# Patient Record
Sex: FEMALE | Race: WHITE | Employment: FULL TIME | URBAN - NONMETROPOLITAN AREA
[De-identification: names, ages, dates, MRNs, and addresses within clinical notes are randomized per-mention and may not be internally consistent; named-entity substitution may affect disease eponyms.]

---

## 2017-01-04 ENCOUNTER — OFFICE VISIT (OUTPATIENT)
Dept: FAMILY MEDICINE | Facility: OTHER | Age: 42
End: 2017-01-04
Payer: COMMERCIAL

## 2017-01-04 VITALS
OXYGEN SATURATION: 100 % | HEIGHT: 67 IN | SYSTOLIC BLOOD PRESSURE: 126 MMHG | WEIGHT: 147.7 LBS | RESPIRATION RATE: 20 BRPM | DIASTOLIC BLOOD PRESSURE: 60 MMHG | TEMPERATURE: 96.9 F | BODY MASS INDEX: 23.18 KG/M2 | HEART RATE: 90 BPM

## 2017-01-04 VITALS
HEIGHT: 67 IN | DIASTOLIC BLOOD PRESSURE: 60 MMHG | WEIGHT: 147.7 LBS | BODY MASS INDEX: 23.18 KG/M2 | RESPIRATION RATE: 20 BRPM | SYSTOLIC BLOOD PRESSURE: 126 MMHG | OXYGEN SATURATION: 100 % | TEMPERATURE: 96.9 F | HEART RATE: 90 BPM

## 2017-01-04 DIAGNOSIS — F33.2 MAJOR DEPRESSIVE DISORDER, RECURRENT, SEVERE WITHOUT PSYCHOTIC FEATURES (H): ICD-10-CM

## 2017-01-04 DIAGNOSIS — E78.5 HYPERLIPIDEMIA LDL GOAL <160: ICD-10-CM

## 2017-01-04 DIAGNOSIS — K21.9 GASTROESOPHAGEAL REFLUX DISEASE, ESOPHAGITIS PRESENCE NOT SPECIFIED: Primary | ICD-10-CM

## 2017-01-04 DIAGNOSIS — Z00.00 ROUTINE GENERAL MEDICAL EXAMINATION AT A HEALTH CARE FACILITY: Primary | ICD-10-CM

## 2017-01-04 LAB
CHOLEST SERPL-MCNC: 201 MG/DL
HDLC SERPL-MCNC: 32 MG/DL
LDLC SERPL CALC-MCNC: 120 MG/DL
NONHDLC SERPL-MCNC: 169 MG/DL
TRIGL SERPL-MCNC: 244 MG/DL

## 2017-01-04 PROCEDURE — 80061 LIPID PANEL: CPT | Mod: 90 | Performed by: NURSE PRACTITIONER

## 2017-01-04 PROCEDURE — 99395 PREV VISIT EST AGE 18-39: CPT | Performed by: NURSE PRACTITIONER

## 2017-01-04 PROCEDURE — 36415 COLL VENOUS BLD VENIPUNCTURE: CPT | Performed by: NURSE PRACTITIONER

## 2017-01-04 PROCEDURE — 99000 SPECIMEN HANDLING OFFICE-LAB: CPT | Performed by: NURSE PRACTITIONER

## 2017-01-04 PROCEDURE — 99213 OFFICE O/P EST LOW 20 MIN: CPT | Performed by: NURSE PRACTITIONER

## 2017-01-04 RX ORDER — VENLAFAXINE HYDROCHLORIDE 225 MG/1
225 TABLET, EXTENDED RELEASE ORAL DAILY
Qty: 30 TABLET | Refills: 6 | Status: SHIPPED | OUTPATIENT
Start: 2017-01-04 | End: 2017-05-19 | Stop reason: DRUGHIGH

## 2017-01-04 NOTE — PROGRESS NOTES
SUBJECTIVE:     CC: Aide Burroughs is an 41 year old woman who presents for preventive health visit.     Healthy Habits:    Do you get at least three servings of calcium containing foods daily (dairy, green leafy vegetables, etc.)? No    Amount of exercise or daily activities, outside of work: 2-3 day(s) per week    Problems taking medications regularly No    Medication side effects: No    Have you had an eye exam in the past two years? no    Do you see a dentist twice per year? yes  Do you have sleep apnea, excessive snoring or daytime drowsiness?no      Today's PHQ-2 Score:   PHQ-2 (  [employer]) 2017   Q1: Little interest or pleasure in doing things 0 0   Q2: Feeling down, depressed or hopeless 0 0   PHQ-2 Score 0 0       Abuse: Current or Past(Physical, Sexual or Emotional)- No  Do you feel safe in your environment - Yes    Social History   Substance Use Topics     Smoking status: Never Smoker      Smokeless tobacco: Never Used     Alcohol Use: 0.0 oz/week     0 Standard drinks or equivalent per week      Comment: rare     The patient does not drink >3 drinks per day nor >7 drinks per week.    Recent Labs   Lab Test  14   0814 11   CHOL  247*  219*   HDL  49*  39   LDL  182*  148   TRIG  81  161   CHOLHDLRATIO  5.0  5.6       Reviewed orders with patient.  Reviewed health maintenance and updated orders accordingly - Yes    Mammo Decision Support:  Mammogram not appropriate for this patient based on age.    Pertinent mammograms are reviewed under the imaging tab.  History of abnormal Pap smear: NO - age 30-65 PAP every 5 years with negative HPV co-testing recommended  All Histories reviewed and updated in Epic.  Past Medical History   Diagnosis Date     MDD (major depressive disorder), recurrent, severe, with psychosis (H)      Generalized anxiety disorder       Past Surgical History   Procedure Laterality Date     Myringotomy, insert tube, combined Left      Obstetric History        T0      TAB0   SAB0   E0   M0   L2       # Outcome Date GA Lbr Fidencio/2nd Weight Sex Delivery Anes PTL Lv   2             1                    ROS:  C: NEGATIVE for fever, chills, change in weight  I: NEGATIVE for worrisome rashes, moles or lesions  E: NEGATIVE for vision changes or irritation  ENT: NEGATIVE for ear, mouth and throat problems  R: NEGATIVE for significant cough or SOB  B: NEGATIVE for masses, tenderness or discharge  CV: NEGATIVE for chest pain, palpitations or peripheral edema  GI: NEGATIVE for nausea, abdominal pain, heartburn, or change in bowel habits  : NEGATIVE for unusual urinary or vaginal symptoms. Periods are regular.  M: NEGATIVE for significant arthralgias or myalgia  N: NEGATIVE for weakness, dizziness or paresthesias  P: NEGATIVE for changes in mood or affect    Problem list, Medication list, Allergies, and Medical/Social/Surgical histories reviewed in [facility] and updated as appropriate.  BP Readings from Last 3 Encounters:   17 126/60   17 126/60   16 98/64    Wt Readings from Last 3 Encounters:   17 147 lb 11.2 oz (66.996 kg)   17 147 lb 11.2 oz (66.996 kg)   16 152 lb (68.947 kg)                  Patient Active Problem List   Diagnosis     Seasonal allergic rhinitis     Mild major depression (H)     Generalized anxiety disorder     Transient global amnesia     Fracture of great toe     Hyperlipidemia LDL goal <160     Major depressive disorder, recurrent episode, severe (H)     Depressive episode     Severe major depression with psychotic features (H)     Panic disorder with agoraphobia     PTSD (post-traumatic stress disorder)     Suicidal ideation     Past Surgical History   Procedure Laterality Date     Myringotomy, insert tube, combined Left        Social History   Substance Use Topics     Smoking status: Never Smoker      Smokeless tobacco: Never Used     Alcohol Use: 0.0 oz/week     0 Standard drinks or equivalent per week  "     Comment: rare     Family History   Problem Relation Age of Onset     Hypertension Mother      Cancer - colorectal Father      Substance Abuse Father      CEREBROVASCULAR DISEASE Maternal Grandmother      DIABETES Paternal Grandmother      HEART DISEASE Paternal Grandfather      Bipolar Disorder Maternal Aunt      Schizophrenia Maternal Aunt      Hyperlipidemia Mother      Coronary Artery Disease Maternal Grandfather          Current Outpatient Prescriptions   Medication Sig Dispense Refill     venlafaxine (EFFEXOR-ER) 225 MG TB24 24 hr tablet Take 1 tablet (225 mg) by mouth daily 30 tablet 6     omeprazole (PRILOSEC) 20 MG CR capsule Take 1 capsule (20 mg) by mouth daily 30 capsule 1     [DISCONTINUED] venlafaxine (EFFEXOR-ER) 225 MG TB24 Take 1 tablet (225 mg) by mouth daily 30 tablet 6     Allergies   Allergen Reactions     Erythromycin Nausea and Vomiting     Seasonal Allergies      Molds, grass, multiple trees, plant pollen, cats, rabbits, dogs, hay.     OBJECTIVE:     /60 mmHg  Pulse 90  Temp(Src) 96.9  F (36.1  C) (Tympanic)  Resp 20  Ht 5' 6.7\" (1.694 m)  Wt 147 lb 11.2 oz (66.996 kg)  BMI 23.35 kg/m2  SpO2 100%  LMP 01/03/2017  Breastfeeding? No  EXAM:  GENERAL: healthy, alert and no distress  EYES: Eyes grossly normal to inspection, PERRL and conjunctivae and sclerae normal  HENT: ear canals and TM's normal, nose and mouth without ulcers or lesions  NECK: no adenopathy, no asymmetry, masses, or scars and thyroid normal to palpation  RESP: lungs clear to auscultation - no rales, rhonchi or wheezes  BREAST: normal without masses, tenderness or nipple discharge and no palpable axillary masses or adenopathy  CV: regular rate and rhythm, normal S1 S2, no S3 or S4, no murmur, click or rub, no peripheral edema and peripheral pulses strong  ABDOMEN: soft, nontender, no hepatosplenomegaly, no masses and bowel sounds normal  MS: no gross musculoskeletal defects noted, no edema  SKIN: no suspicious " "lesions or rashes  NEURO: Normal strength and tone, mentation intact and speech normal  PSYCH: mentation appears normal, affect normal/bright    ASSESSMENT/PLAN:     1. Routine general medical examination at a health care facility    2. Major depressive disorder, recurrent, severe without psychotic features (H)  Chronic, controlled.  No change in treatment plan.   - venlafaxine (EFFEXOR-ER) 225 MG TB24 24 hr tablet; Take 1 tablet (225 mg) by mouth daily  Dispense: 30 tablet; Refill: 6    3. Hyperlipidemia LDL goal <160  - LIPID REFLEX TO DIRECT LDL PANEL    COUNSELING:   Reviewed preventive health counseling, as reflected in patient instructions       Regular exercise       Healthy diet/nutrition    BP Screening:   Last 3 BP Readings:    BP Readings from Last 3 Encounters:   01/04/17 126/60   01/04/17 126/60   09/30/16 98/64       The following was recommended to the patient:  Re-screen BP within a year and recommended lifestyle modifications       reports that she has never smoked. She has never used smokeless tobacco.    Estimated body mass index is 23.35 kg/(m^2) as calculated from the following:    Height as of this encounter: 5' 6.7\" (1.694 m).    Weight as of this encounter: 147 lb 11.2 oz (66.996 kg).       Counseling Resources:  ATP IV Guidelines  Pooled Cohorts Equation Calculator  Breast Cancer Risk Calculator  FRAX Risk Assessment  ICSI Preventive Guidelines  Dietary Guidelines for Americans, 2010  USDA's MyPlate  ASA Prophylaxis  Lung CA Screening    KYLEE Narvaez [facility]  "

## 2017-01-04 NOTE — PATIENT INSTRUCTIONS
Try Omeprazole once a day on an empty stomach.  Take for 4-6 weeks, then wean off if symptoms are gone.      If your symptoms come back, let me know      GERD (Adult)    The esophagus is a tube that carries food from the mouth to the stomach. A valve at the lower end of the esophagus prevents stomach acid from flowing upward. When this valve doesn't work properly, stomach contents may repeatedly flow back up (reflux) into the esophagus. This is called gastroesophageal reflux disease (GERD). GERD can irritate the esophagus. It can cause problems with swallowing or breathing. In severe cases, GERD can cause recurrent pneumonia or other serious problems.  Symptoms of reflux include burning, pressure or sharp pain in the upper abdomen or mid to lower chest. The pain can spread to the neck, back, or shoulder. There may be belching, an acid taste in the back of the throat, chronic cough, or sore throat or hoarseness. GERD symptoms often occur during the day after a big meal. They can also occur at night when lying down.   Home care  Lifestyle changes can help reduce symptoms. If needed, medicines may be prescribed. Symptoms often improve with treatment, but if treatment is stopped, the symptoms often return after a few months. So most persons with GERD will need to continue treatment.  Lifestyle changes    Limit or avoid fatty, fried, and spicy foods, as well as coffee, chocolate, mint, and foods with high acid content such as tomatoes and citrus fruit and juices (orange, grapefruit, lemon).    Don t eat large meals, especially at night. Frequent, smaller meals are best. Do not lie down right after eating. And don t eat anything 3 hours before going to bed.    Avoid drinking alcohol and smoking. As much as possible, stay away from second hand smoke.    If you are overweight, losing weight will reduce symptoms.     Avoid wearing tight clothing around your stomach area.    If your symptoms occur during sleep, use a foam  "wedge to elevate your upper body (not just your head.) Or, place 4\" blocks under the head of your bed.  Medicines  If needed, medicines can help relieve the symptoms of GERD and prevent damage to the esophagus. Discuss a medicine plan with your healthcare provider. This may include one or more of the following medicines:    Antacids to help neutralize the normal acids in your stomach.    Acid blockers (H2 blockers) to decrease acid production.    Acid inhibitors (PPIs) to decrease acid production in a different way than the blockers. They may work better, but can take a little longer to take effect.  Take an antacid 30-60 minutes after eating and at bedtime, but not at the same time as an acid blocker.  Try not to take medicines such as ibuprofen and aspirin. If you are taking aspirin for your heart or other medical reasons, talk to your healthcare provider about stopping it.  Follow-up care  Follow up with your healthcare provider or as advised by our staff.  When to seek medical advice  Call your healthcare provider if any of the following occur:    Stomach pain gets worse or moves to the lower right abdomen (appendix area)    Chest pain appears or gets worse, or spreads to the back, neck, shoulder, or arm    Frequent vomiting (can t keep down liquids)    Blood in the stool or vomit (red or black in color)    Feeling weak or dizzy    Fever of 100.4 F (38 C) or higher, or as directed by your healthcare provider    1886-3809 [employer]. [address] [place] [address]. All rights reserved. This information is not intended as a substitute for professional medical care. Always follow your healthcare professional's instructions.        "

## 2017-01-04 NOTE — PATIENT INSTRUCTIONS
Labs will be done today.  I will call or send a letter with results.     Consider a calcium supplement.     Preventive Health Recommendations  Female Ages 26 - 39  Yearly exam:   See your health care provider every year in order to    Review health changes.     Discuss preventive care.      Review your medicines if you your doctor has prescribed any.    Until age 30: Get a Pap test every three years (more often if you have had an abnormal result).    After age 30: Talk to your doctor about whether you should have a Pap test every 3 years or have a Pap test with HPV screening every 5 years.   You do not need a Pap test if your uterus was removed (hysterectomy) and you have not had cancer.  You should be tested each year for STDs (sexually transmitted diseases), if you're at risk.   Talk to your provider about how often to have your cholesterol checked.  If you are at risk for diabetes, you should have a diabetes test (fasting glucose).  Shots: Get a flu shot each year. Get a tetanus shot every 10 years.   Nutrition:     Eat at least 5 servings of fruits and vegetables each day.    Eat whole-grain bread, whole-wheat pasta and brown rice instead of white grains and rice.    Talk to your provider about Calcium and Vitamin D.     Lifestyle    Exercise at least 150 minutes a week (30 minutes a day, 5 days of the week). This will help you control your weight and prevent disease.    Limit alcohol to one drink per day.    No smoking.     Wear sunscreen to prevent skin cancer.    See your dentist every six months for an exam and cleaning.

## 2017-01-04 NOTE — NURSING NOTE
"Chief Complaint   Patient presents with     Gastric Problem     burping a lot       Initial /60 mmHg  Pulse 90  Temp(Src) 96.9  F (36.1  C) (Tympanic)  Resp 20  Ht 5' 6.7\" (1.694 m)  Wt 147 lb 11.2 oz (66.996 kg)  BMI 23.35 kg/m2  SpO2 100%  LMP 01/03/2017 Estimated body mass index is 23.35 kg/(m^2) as calculated from the following:    Height as of this encounter: 5' 6.7\" (1.694 m).    Weight as of this encounter: 147 lb 11.2 oz (66.996 kg).  BP completed using cuff size: large  ................Moe Gonzalez LPN,   January 4, 2017,      8:30 AM,   [facility]      "

## 2017-01-04 NOTE — MR AVS SNAPSHOT
After Visit Summary   1/4/2017    Aide Burroughs    MRN: [id number]           Patient Information     Date Of Birth          1975        Visit Information        Provider Department      1/4/2017 8:00 AM Rivka Rodrigues APRN [facility]        Today's Diagnoses     Routine general medical examination at a health care facility    -  1     Major depressive disorder, recurrent, severe without psychotic features (H)         Hyperlipidemia LDL goal <160           Care Instructions    Labs will be done today.  I will call or send a letter with results.     Consider a calcium supplement.     Preventive Health Recommendations  Female Ages 26 - 39  Yearly exam:   See your health care provider every year in order to    Review health changes.     Discuss preventive care.      Review your medicines if you your doctor has prescribed any.    Until age 30: Get a Pap test every three years (more often if you have had an abnormal result).    After age 30: Talk to your doctor about whether you should have a Pap test every 3 years or have a Pap test with HPV screening every 5 years.   You do not need a Pap test if your uterus was removed (hysterectomy) and you have not had cancer.  You should be tested each year for STDs (sexually transmitted diseases), if you're at risk.   Talk to your provider about how often to have your cholesterol checked.  If you are at risk for diabetes, you should have a diabetes test (fasting glucose).  Shots: Get a flu shot each year. Get a tetanus shot every 10 years.   Nutrition:     Eat at least 5 servings of fruits and vegetables each day.    Eat whole-grain bread, whole-wheat pasta and brown rice instead of white grains and rice.    Talk to your provider about Calcium and Vitamin D.     Lifestyle    Exercise at least 150 minutes a week (30 minutes a day, 5 days of the week). This will help you control your weight and prevent disease.    Limit alcohol to one drink per  "day.    No smoking.     Wear sunscreen to prevent skin cancer.    See your dentist every six months for an exam and cleaning.            Follow-ups after your visit        Who to contact     If you have questions or need follow up information about today's clinic visit or your schedule please contact [facility] directly at [phone].  Normal or non-critical lab and imaging results will be communicated to you by MyChart, letter or phone within 4 business days after the clinic has received the results. If you do not hear from us within 7 days, please contact the clinic through [employer] or phone. If you have a critical or abnormal lab result, we will notify you by phone as soon as possible.  Submit refill requests through [employer] or call your pharmacy and they will forward the refill request to us. Please allow 3 business days for your refill to be completed.          Additional Information About Your Visit        MyChart Information     [employer] lets you send messages to your doctor, view your test results, renew your prescriptions, schedule appointments and more. To sign up, go to [email or web address] . Click on \"Log in\" on the left side of the screen, which will take you to the Welcome page. Then click on \"Sign up Now\" on the right side of the page.     You will be asked to enter the access code listed below, as well as some personal information. Please follow the directions to create your username and password.     Your access code is: [email or web address]  Expires: 2017  8:47 AM     Your access code will  in 90 days. If you need help or a new code, please call your [facility] or [phone].        Care EveryWhere ID     This is your Care EveryWhere ID. This could be used by other organizations to access your [facility] medical records  [id number]        Your Vitals Were     Pulse Temperature Respirations    90 96.9  F (36.1  C) (Tympanic) 20    Height BMI (Body Mass Index) Pulse Oximetry " "   5' 6.7\" (1.694 m) 23.35 kg/m2 100%    Last Period Breastfeeding?       01/03/2017 No        Blood Pressure from Last 3 Encounters:   01/04/17 126/60   01/04/17 126/60   09/30/16 98/64    Weight from Last 3 Encounters:   01/04/17 147 lb 11.2 oz (66.996 kg)   01/04/17 147 lb 11.2 oz (66.996 kg)   09/30/16 152 lb (68.947 kg)              We Performed the Following     LIPID REFLEX TO DIRECT LDL PANEL          Today's Medication Changes          These changes are accurate as of: 1/4/17  8:47 AM.  If you have any questions, ask your nurse or doctor.               Start taking these medicines.        Dose/Directions    omeprazole 20 MG CR capsule   Commonly known as:  priLOSEC   Used for:  Gastroesophageal reflux disease, esophagitis presence not specified   Started by:  Rivka Rodrigues APRN CNP        Dose:  20 mg   Take 1 capsule (20 mg) by mouth daily   Quantity:  30 capsule   Refills:  1            Where to get your medicines      These medications were sent to [id number] - [place] - [address]  [address] [place] [address]     Phone:  [phone]    - omeprazole 20 MG CR capsule  - venlafaxine 225 MG Tb24 24 hr tablet             Primary Care Provider Office Phone # Fax #    Priscila KYLEE Bennett [facility] [phone]       [facility]  [address] [place] [address]        Thank you!     Thank you for choosing [facility]  for your care. Our goal is always to provide you with excellent care. Hearing back from our patients is one way we can continue to improve our services. Please take a few minutes to complete the written survey that you may receive in the mail after your visit with us. Thank you!             Your Updated Medication List - Protect others around you: Learn how to safely use, store and throw away your medicines at www.disposemymeds.org.          This list is accurate as of: 1/4/17  8:47 AM.  Always use your most recent med list.             "       Brand Name Dispense Instructions for use    omeprazole 20 MG CR capsule    priLOSEC    30 capsule    Take 1 capsule (20 mg) by mouth daily       venlafaxine 225 MG Tb24 24 hr tablet    EFFEXOR-ER    30 tablet    Take 1 tablet (225 mg) by mouth daily

## 2017-01-04 NOTE — Clinical Note
[facility]  [address] [facility] [address]  Phone: [phone]          January 4, 2017    Aide Burroughs  [address] [address]   [facility] [address]          Dear Aide,      LAB RESULTS:     The results of your recent Tests were ABNORMAL .  If you have any further questions or problems, please contact our office.  Your cholesterol levels came back elevated.  This can put you at increased risk for heart attacks and strokes.  At this time, you do not need medications, but I would like you to watch your diet and increase your exercise.  We will recheck these levels in 1 year.  I have included some information about ways to decrease your cholesterol.                      Electronically signed by Rivka Rodrigues CNP.               How can I control my cholesterol level?      High cholesterol may run in families. Know your family history and discuss it with your healthcare provider.      You can often control cholesterol levels by      eating right      exercising      not smoking      losing weight if you are overweight.      If you have a high risk for heart disease, your healthcare provider may prescribe cholesterol-lowering medicine as well as changes in lifestyle.      Eat right.     To control the cholesterol and types and amounts of fat you eat:      Check food labels for fat and cholesterol content. Choose the foods with less fat per serving.      Limit the amount of butter and margarine you eat.      Use olive, canola, sunflower, safflower, soybean, or corn oil. Avoid tropical oils such as palm or coconut oil. Also avoid oils that have been hydrogenated or partially hydrogenated.      Use salad dressings and margarine made with polyunsaturated and monounsaturated fats.      Use egg whites or egg substitutes rather than whole eggs.      Replace whole-milk dairy products with nonfat or low-fat milk, cheese, spreads, and yogurt.      Eat skinless chicken, turkey, fish, and meatless entrees more often than  red meat.      Choose lean cuts of meat and trim off all visible fat. Keep portion sizes moderate.      Avoid fatty desserts such as ice cream, cream-filled cakes, and cheesecakes. Choose fresh fruits, nonfat frozen yogurt, Popsicles, etc.      Reduce the amount of fried foods, vending machine food, and fast food you eat.      Eat several daily servings of fruits and vegetables (especially fresh fruits and leafy vegetables), beans, and whole grains (such as whole wheat, bran, brown rice, oats, and oatmeal). The fiber in these foods helps lower cholesterol.      Eat 4 to 5 servings of nuts a week. Examples of nuts that can be a part of a healthy diet are walnuts, almonds, hazelnuts, peanuts, pecans, and pistachio nuts.      Look for low-fat or nonfat varieties of the foods you like to eat, or look for substitutes.      Exercise.     Exercise goes hand-in-hand with a healthy diet for controlling cholesterol. Exercise helps because it:      Keeps your weight down.      Decreases your total cholesterol level.      Decreases your LDL (bad cholesterol).      Increases your HDL (good cholesterol).      A good exercise program includes aerobic exercise. Aerobic exercise is any activity that keeps your heart rate up (such as swimming, jogging, walking, and bicycling). You should get at least 30 minutes of moderate aerobic exercise most days of the week. Moderate aerobic exercise is generally defined as requiring the energy it takes to walk 2 miles in 30 minutes. You may need to exercise 60 minutes a day to prevent weight gain and 90 minutes a day to lose weight.      If you haven't been exercising, ask your healthcare provider for an exercise prescription and start your new exercise program slowly.      Don't smoke.     Do not smoke. Smoking increases your risk of heart disease because it lowers HDL levels, increases your risk of blood clots, and decreases oxygen to the tissues.      Lose excess weight.     Extra weight  "increases your risk for heart and blood vessel disease. One way it does this is by causing your LDL (\"bad\") cholesterol to go up. Extra weight can also make you tired. It takes a lot of energy to carry all those pounds around. The result is that you are less active. This can mean that you don't get enough exercise and gain even more weight.      Losing excess weight:      Improves not only the bad LDL cholesterol but other blood fats as well.      Lowers your risk for heart attack or stroke.      Increases your energy and helps you feel better (both physically and mentally) and become more active.      Your weight is primarily the result of 2 factors. One is the number of calories you consume. The other is the number of calories you \"burn\". If you eat more calories than you use, your body will store the extra calories as fat and your weight will go up. If your body uses more calories than you eat, you will lose weight.      Here are some things you can do to lose weight.      Talk to your healthcare provider about your weight. Ask how a change in diet and exercise will change your cholesterol levels. Plan for gradual weight loss, just 1 or 2 pounds per week      Eat fewer calories.      Get more physical activity.      Keep a diary of your food and exercise for a couple of weeks to become more aware of your habits.                In summary, changes that you can make in your lifestyle to control your cholesterol level are:      Eat healthy.      Get regular exercise.      Don't smoke.      Keep a healthy weight.      Have your cholesterol levels checked as often as your provider recommends.                   Sincerely,      Rivka Rodrigues, CNP        "

## 2017-01-04 NOTE — NURSING NOTE
"Chief Complaint   Patient presents with     Physical     fasting       Initial /60 mmHg  Pulse 90  Temp(Src) 96.9  F (36.1  C) (Tympanic)  Resp 20  Ht 5' 6.7\" (1.694 m)  Wt 147 lb 11.2 oz (66.996 kg)  BMI 23.35 kg/m2  SpO2 100%  LMP 01/03/2017  Breastfeeding? No Estimated body mass index is 23.35 kg/(m^2) as calculated from the following:    Height as of this encounter: 5' 6.7\" (1.694 m).    Weight as of this encounter: 147 lb 11.2 oz (66.996 kg).  BP completed using cuff size: large  ................Moe Gonzalez LPN,   January 4, 2017,      8:25 AM,   [facility]      "

## 2017-01-04 NOTE — MR AVS SNAPSHOT
After Visit Summary   1/4/2017    Aide Burroughs    MRN: [id number]           Patient Information     Date Of Birth          1975        Visit Information        Provider Department      1/4/2017 8:20 AM Rivka Rodrigues APRN [facility]        Today's Diagnoses     Gastroesophageal reflux disease, esophagitis presence not specified    -  1       Care Instructions    Try Omeprazole once a day on an empty stomach.  Take for 4-6 weeks, then wean off if symptoms are gone.      If your symptoms come back, let me know      GERD (Adult)    The esophagus is a tube that carries food from the mouth to the stomach. A valve at the lower end of the esophagus prevents stomach acid from flowing upward. When this valve doesn't work properly, stomach contents may repeatedly flow back up (reflux) into the esophagus. This is called gastroesophageal reflux disease (GERD). GERD can irritate the esophagus. It can cause problems with swallowing or breathing. In severe cases, GERD can cause recurrent pneumonia or other serious problems.  Symptoms of reflux include burning, pressure or sharp pain in the upper abdomen or mid to lower chest. The pain can spread to the neck, back, or shoulder. There may be belching, an acid taste in the back of the throat, chronic cough, or sore throat or hoarseness. GERD symptoms often occur during the day after a big meal. They can also occur at night when lying down.   Home care  Lifestyle changes can help reduce symptoms. If needed, medicines may be prescribed. Symptoms often improve with treatment, but if treatment is stopped, the symptoms often return after a few months. So most persons with GERD will need to continue treatment.  Lifestyle changes    Limit or avoid fatty, fried, and spicy foods, as well as coffee, chocolate, mint, and foods with high acid content such as tomatoes and citrus fruit and juices (orange, grapefruit, lemon).    Don t eat large meals,  "especially at night. Frequent, smaller meals are best. Do not lie down right after eating. And don t eat anything 3 hours before going to bed.    Avoid drinking alcohol and smoking. As much as possible, stay away from second hand smoke.    If you are overweight, losing weight will reduce symptoms.     Avoid wearing tight clothing around your stomach area.    If your symptoms occur during sleep, use a foam wedge to elevate your upper body (not just your head.) Or, place 4\" blocks under the head of your bed.  Medicines  If needed, medicines can help relieve the symptoms of GERD and prevent damage to the esophagus. Discuss a medicine plan with your healthcare provider. This may include one or more of the following medicines:    Antacids to help neutralize the normal acids in your stomach.    Acid blockers (H2 blockers) to decrease acid production.    Acid inhibitors (PPIs) to decrease acid production in a different way than the blockers. They may work better, but can take a little longer to take effect.  Take an antacid 30-60 minutes after eating and at bedtime, but not at the same time as an acid blocker.  Try not to take medicines such as ibuprofen and aspirin. If you are taking aspirin for your heart or other medical reasons, talk to your healthcare provider about stopping it.  Follow-up care  Follow up with your healthcare provider or as advised by our staff.  When to seek medical advice  Call your healthcare provider if any of the following occur:    Stomach pain gets worse or moves to the lower right abdomen (appendix area)    Chest pain appears or gets worse, or spreads to the back, neck, shoulder, or arm    Frequent vomiting (can t keep down liquids)    Blood in the stool or vomit (red or black in color)    Feeling weak or dizzy    Fever of 100.4 F (38 C) or higher, or as directed by your healthcare provider    9752-9675 [employer]. [address] [place] [address], [place] [address]. All rights reserved. " "This information is not intended as a substitute for professional medical care. Always follow your healthcare professional's instructions.              Follow-ups after your visit        Who to contact     If you have questions or need follow up information about today's clinic visit or your schedule please contact [facility] directly at [phone].  Normal or non-critical lab and imaging results will be communicated to you by MyChart, letter or phone within 4 business days after the clinic has received the results. If you do not hear from us within 7 days, please contact the clinic through MyChart or phone. If you have a critical or abnormal lab result, we will notify you by phone as soon as possible.  Submit refill requests through [employer] or call your pharmacy and they will forward the refill request to us. Please allow 3 business days for your refill to be completed.          Additional Information About Your Visit        MyChart Information     [employer] lets you send messages to your doctor, view your test results, renew your prescriptions, schedule appointments and more. To sign up, go to [email or web address] . Click on \"Log in\" on the left side of the screen, which will take you to the Welcome page. Then click on \"Sign up Now\" on the right side of the page.     You will be asked to enter the access code listed below, as well as some personal information. Please follow the directions to create your username and password.     Your access code is: [email or web address]  Expires: 2017  8:47 AM     Your access code will  in 90 days. If you need help or a new code, please call your [facility] or [phone].        Care EveryWhere ID     This is your Care EveryWhere ID. This could be used by other organizations to access your [facility] medical records  [id number]        Your Vitals Were     Pulse Temperature Respirations    90 96.9  F (36.1  C) (Tympanic) 20    Height BMI (Body Mass Index) " "Pulse Oximetry    5' 6.7\" (1.694 m) 23.35 kg/m2 100%    Last Period          01/03/2017         Blood Pressure from Last 3 Encounters:   01/04/17 126/60   01/04/17 126/60   09/30/16 98/64    Weight from Last 3 Encounters:   01/04/17 147 lb 11.2 oz (66.996 kg)   01/04/17 147 lb 11.2 oz (66.996 kg)   09/30/16 152 lb (68.947 kg)              Today, you had the following     No orders found for display         Today's Medication Changes          These changes are accurate as of: 1/4/17  8:48 AM.  If you have any questions, ask your nurse or doctor.               Start taking these medicines.        Dose/Directions    omeprazole 20 MG CR capsule   Commonly known as:  priLOSEC   Used for:  Gastroesophageal reflux disease, esophagitis presence not specified   Started by:  Rivka Rodrigues APRN CNP        Dose:  20 mg   Take 1 capsule (20 mg) by mouth daily   Quantity:  30 capsule   Refills:  1            Where to get your medicines      These medications were sent to [facility]2017 - [place] [place] - [address]  [address] [place] [address]     Phone:  [phone]    - omeprazole 20 MG CR capsule  - venlafaxine 225 MG Tb24 24 hr tablet             Primary Care Provider Office Phone # Fax #    Priscila KYLEE Bennett [facility] [phone]       [facility]  [address] [place] [address]        Thank you!     Thank you for choosing [facility]  for your care. Our goal is always to provide you with excellent care. Hearing back from our patients is one way we can continue to improve our services. Please take a few minutes to complete the written survey that you may receive in the mail after your visit with us. Thank you!             Your Updated Medication List - Protect others around you: Learn how to safely use, store and throw away your medicines at www.disposemymeds.org.          This list is accurate as of: 1/4/17  8:48 AM.  Always use your most recent med list.                "    Brand Name Dispense Instructions for use    omeprazole 20 MG CR capsule    priLOSEC    30 capsule    Take 1 capsule (20 mg) by mouth daily       venlafaxine 225 MG Tb24 24 hr tablet    EFFEXOR-ER    30 tablet    Take 1 tablet (225 mg) by mouth daily

## 2017-01-04 NOTE — PROGRESS NOTES
SUBJECTIVE:                                                    Aide Burroughs is a 41 year old female who presents to clinic today for the following health issues:      Gastrointestinal symptoms      Duration: since September 2016    Description:           Burping a lot    Intensity:  moderate    Accompanying signs and symptoms:  nausea    History  Previous {similar problem: no   Previous evaluation:  none    Aggravating factors: meals    Alleviating factors: nothing    Other Therapies tried: None         Problem list and histories reviewed & adjusted, as indicated.  Additional history: none    Patient Active Problem List   Diagnosis     Seasonal allergic rhinitis     Mild major depression (H)     Generalized anxiety disorder     Transient global amnesia     Fracture of great toe     Hyperlipidemia LDL goal <160     Major depressive disorder, recurrent episode, severe (H)     Depressive episode     Severe major depression with psychotic features (H)     Panic disorder with agoraphobia     PTSD (post-traumatic stress disorder)     Suicidal ideation     Past Surgical History   Procedure Laterality Date     Myringotomy, insert tube, combined Left        Social History   Substance Use Topics     Smoking status: Never Smoker      Smokeless tobacco: Never Used     Alcohol Use: 0.0 oz/week     0 Standard drinks or equivalent per week      Comment: rare     Family History   Problem Relation Age of Onset     Hypertension Mother      Cancer - colorectal Father      Substance Abuse Father      CEREBROVASCULAR DISEASE Maternal Grandmother      DIABETES Paternal Grandmother      HEART DISEASE Paternal Grandfather      Bipolar Disorder Maternal Aunt      Schizophrenia Maternal Aunt      Hyperlipidemia Mother      Coronary Artery Disease Maternal Grandfather          Current Outpatient Prescriptions   Medication Sig Dispense Refill     omeprazole (PRILOSEC) 20 MG CR capsule Take 1 capsule (20 mg) by mouth daily 30 capsule 1      "venlafaxine (EFFEXOR-ER) 225 MG TB24 24 hr tablet Take 1 tablet (225 mg) by mouth daily 30 tablet 6     [DISCONTINUED] venlafaxine (EFFEXOR-ER) 225 MG TB24 Take 1 tablet (225 mg) by mouth daily 30 tablet 6     Allergies   Allergen Reactions     Erythromycin Nausea and Vomiting     Seasonal Allergies      Molds, grass, multiple trees, plant pollen, cats, rabbits, dogs, hay.     BP Readings from Last 3 Encounters:   01/04/17 126/60   01/04/17 126/60   09/30/16 98/64    Wt Readings from Last 3 Encounters:   01/04/17 147 lb 11.2 oz (66.996 kg)   01/04/17 147 lb 11.2 oz (66.996 kg)   09/30/16 152 lb (68.947 kg)                    ROS:  C: NEGATIVE for fever, chills, change in weight  E/M: NEGATIVE for ear, mouth and throat problems  R: NEGATIVE for significant cough or SOB  CV: NEGATIVE for chest pain, palpitations or peripheral edema  GI: POSITIVE for gas or bloating and nausea and NEGATIVE for constipation, diarrhea, heartburn or reflux, melena, vomiting and weight loss    OBJECTIVE:                                                    /60 mmHg  Pulse 90  Temp(Src) 96.9  F (36.1  C) (Tympanic)  Resp 20  Ht 5' 6.7\" (1.694 m)  Wt 147 lb 11.2 oz (66.996 kg)  BMI 23.35 kg/m2  SpO2 100%  LMP 01/03/2017  Body mass index is 23.35 kg/(m^2).  GENERAL: healthy, alert and no distress  NECK: no adenopathy, no asymmetry, masses, or scars and thyroid normal to palpation  RESP: lungs clear to auscultation - no rales, rhonchi or wheezes  CV: regular rate and rhythm, normal S1 S2, no S3 or S4, no murmur, click or rub, no peripheral edema and peripheral pulses strong  ABDOMEN: soft, nontender, no hepatosplenomegaly, no masses and bowel sounds normal  MS: no gross musculoskeletal defects noted, no edema    Diagnostic Test Results:  none      ASSESSMENT/PLAN:                                                        1. Gastroesophageal reflux disease, esophagitis presence not specified  Will try Omeprazole for 4-6 weeks.  " Discussed dietary modifications, but her diet is fairly good already.  If symptoms fail to resolve, consider upper GI.   - omeprazole (PRILOSEC) 20 MG CR capsule; Take 1 capsule (20 mg) by mouth daily  Dispense: 30 capsule; Refill: 1    FUTURE APPOINTMENTS:       - Follow up if symptoms fail to resolve as expected.  See Patient Instructions    KYLEE Narvaez [facility]

## 2017-01-05 ASSESSMENT — PATIENT HEALTH QUESTIONNAIRE - PHQ9: SUM OF ALL RESPONSES TO PHQ QUESTIONS 1-9: 0

## 2017-01-16 ENCOUNTER — TELEPHONE (OUTPATIENT)
Dept: FAMILY MEDICINE | Facility: OTHER | Age: 42
End: 2017-01-16

## 2017-01-16 DIAGNOSIS — K21.9 GASTROESOPHAGEAL REFLUX DISEASE, ESOPHAGITIS PRESENCE NOT SPECIFIED: Primary | ICD-10-CM

## 2017-01-16 RX ORDER — OMEPRAZOLE 40 MG/1
40 CAPSULE, DELAYED RELEASE ORAL DAILY
Qty: 30 CAPSULE | Refills: 1 | Status: SHIPPED | OUTPATIENT
Start: 2017-01-16 | End: 2017-05-10

## 2017-01-16 NOTE — TELEPHONE ENCOUNTER
Reason for Call:  Medication or medication refill:    Do you use a [facility]?  Name of the pharmacy and phone number for the current request:  [facility] [place]     Name of the medication requested:      Other request: Started on Omeprazole two weeks ago and is having no relief. Wondering if she can increase her dose or try another medication.     Can we leave a detailed message on this number? YES    Phone number patient can be reached at: Home number on file [phone] (home)    Best Time: any     Call taken on 1/16/2017 at 10:28 AM by Jessica Pollock

## 2017-01-17 NOTE — TELEPHONE ENCOUNTER
Have her increase the dose to 40 mg daily.  I have sent over a new prescription, but she can just take 2 of the pills she has now until those run out.  Please call and advise patient.     Electronically signed by Rivka Rodrigues CNP.

## 2017-03-07 ENCOUNTER — TELEPHONE (OUTPATIENT)
Dept: FAMILY MEDICINE | Facility: OTHER | Age: 42
End: 2017-03-07

## 2017-03-07 DIAGNOSIS — F41.1 GENERALIZED ANXIETY DISORDER: Primary | ICD-10-CM

## 2017-03-07 NOTE — TELEPHONE ENCOUNTER
Reason for Call: Request for an order or referral:    Order or referral being requested: Dr Cole Delvalle    Date needed: as soon as possible    Has the patient been seen by the PCP for this problem? YES    Additional comments: Aide has been seen by Dr Cole Delvalle in the past but it's been over a year and he needs a new referral placed, can you please place another referral.     Phone number Patient can be reached at:  Home number on file [phone] (home)    Best Time:      Can we leave a detailed message on this number?  YES    Call taken on 3/7/2017 at 10:58 AM by Shannan Nj

## 2017-03-08 DIAGNOSIS — F41.1 GAD (GENERALIZED ANXIETY DISORDER): Primary | ICD-10-CM

## 2017-03-08 NOTE — TELEPHONE ENCOUNTER
Left msg informing pt of referral.  .................Moe Gonzalez LPN,   March 8, 2017,      5:03 PM,   [facility]

## 2017-04-19 ENCOUNTER — OFFICE VISIT (OUTPATIENT)
Dept: FAMILY MEDICINE | Facility: OTHER | Age: 42
End: 2017-04-19
Payer: COMMERCIAL

## 2017-04-19 VITALS
TEMPERATURE: 97.5 F | OXYGEN SATURATION: 99 % | DIASTOLIC BLOOD PRESSURE: 70 MMHG | RESPIRATION RATE: 20 BRPM | HEART RATE: 93 BPM | SYSTOLIC BLOOD PRESSURE: 102 MMHG | WEIGHT: 152 LBS | BODY MASS INDEX: 24.02 KG/M2

## 2017-04-19 DIAGNOSIS — K21.9 GASTROESOPHAGEAL REFLUX DISEASE, ESOPHAGITIS PRESENCE NOT SPECIFIED: Primary | ICD-10-CM

## 2017-04-19 PROCEDURE — 99213 OFFICE O/P EST LOW 20 MIN: CPT | Performed by: NURSE PRACTITIONER

## 2017-04-19 RX ORDER — OMEPRAZOLE 40 MG/1
40 CAPSULE, DELAYED RELEASE ORAL DAILY
Qty: 30 CAPSULE | Refills: 1 | Status: SHIPPED | OUTPATIENT
Start: 2017-04-19 | End: 2017-08-31

## 2017-04-19 NOTE — NURSING NOTE
"Chief Complaint   Patient presents with     Gastrophageal Reflux     recheck, not any better       Initial /70  Pulse 93  Temp 97.5  F (36.4  C) (Tympanic)  Resp 20  Wt 152 lb (68.9 kg)  LMP 04/12/2017  SpO2 99%  BMI 24.02 kg/m2 Estimated body mass index is 24.02 kg/(m^2) as calculated from the following:    Height as of 1/4/17: 5' 6.7\" (1.694 m).    Weight as of this encounter: 152 lb (68.9 kg).  Medication Reconciliation: complete   ................Moe Gonzalez LPN,   April 19, 2017,      7:31 AM,   [facility]    "

## 2017-04-19 NOTE — MR AVS SNAPSHOT
After Visit Summary   4/19/2017    Aide Burroughs    MRN: [id number]           Patient Information     Date Of Birth          1975        Visit Information        Provider Department      4/19/2017 7:20 AM Rivka Rodrigues APRN CNP [facility]        Today's Diagnoses     Gastroesophageal reflux disease, esophagitis presence not specified    -  1      Care Instructions    Set up the endoscopy at [place].      We will have them call you to schedule this.             Follow-ups after your visit        Your next 10 appointments already scheduled     May 19, 2017  9:00 AM CDT   New Visit with Cole Delvalle MD   [facility] ([facility])    [address] [facility] [id number] [phone]              Future tests that were ordered for you today     Open Future Orders        Priority Expected Expires Ordered    UPPER GI ENDOSCOPY Routine  6/3/2017 4/19/2017            Who to contact     If you have questions or need follow up information about today's clinic visit or your schedule please contact [facility] directly at [phone].  Normal or non-critical lab and imaging results will be communicated to you by MyChart, letter or phone within 4 business days after the clinic has received the results. If you do not hear from us within 7 days, please contact the clinic through MyChart or phone. If you have a critical or abnormal lab result, we will notify you by phone as soon as possible.  Submit refill requests through [employer] or call your pharmacy and they will forward the refill request to us. Please allow 3 business days for your refill to be completed.          Additional Information About Your Visit        MyChart Information     [employer] lets you send messages to your doctor, view your test results, renew your prescriptions, schedule appointments and more. To sign up, go to [email or web address] .  "Click on \"Log in\" on the left side of the screen, which will take you to the Welcome page. Then click on \"Sign up Now\" on the right side of the page.     You will be asked to enter the access code listed below, as well as some personal information. Please follow the directions to create your username and password.     Your access code is: [email or web address]  Expires: 2017  7:42 AM     Your access code will  in 90 days. If you need help or a new code, please call your [facility] clinic or [phone].        Care EveryWhere ID     This is your Care EveryWhere ID. This could be used by other organizations to access your [facility] medical records  [id number]        Your Vitals Were     Pulse Temperature Respirations Last Period Pulse Oximetry BMI (Body Mass Index)    93 97.5  F (36.4  C) (Tympanic) 20 2017 99% 24.02 kg/m2       Blood Pressure from Last 3 Encounters:   17 102/70   17 126/60   17 126/60    Weight from Last 3 Encounters:   17 152 lb (68.9 kg)   17 147 lb 11.2 oz (67 kg)   17 147 lb 11.2 oz (67 kg)                 Today's Medication Changes          These changes are accurate as of: 17  7:42 AM.  If you have any questions, ask your nurse or doctor.               These medicines have changed or have updated prescriptions.        Dose/Directions    * omeprazole 40 MG capsule   Commonly known as:  priLOSEC   This may have changed:  Another medication with the same name was added. Make sure you understand how and when to take each.   Used for:  Gastroesophageal reflux disease, esophagitis presence not specified   Changed by:  Priscila Desai APRN CNP        Dose:  40 mg   Take 1 capsule (40 mg) by mouth daily   Quantity:  30 capsule   Refills:  1       * omeprazole 40 MG capsule   Commonly known as:  priLOSEC   This may have changed:  You were already taking a medication with the same name, and this prescription was added. Make sure you understand how and " when to take each.   Used for:  Gastroesophageal reflux disease, esophagitis presence not specified   Changed by:  Rivka Rodrigues APRN CNP        Dose:  40 mg   Take 1 capsule (40 mg) by mouth daily   Quantity:  30 capsule   Refills:  1       * Notice:  This list has 2 medication(s) that are the same as other medications prescribed for you. Read the directions carefully, and ask your doctor or other care provider to review them with you.         Where to get your medicines      These medications were sent to [facility] - [place], [place] - [address]  [address] [place] [address]     Phone:  [phone]     omeprazole 40 MG capsule                Primary Care Provider Office Phone # Fax #    KYLEE Narvaez [phone]       [facility] [address] [facility] [address]        Thank you!     Thank you for choosing [facility]  for your care. Our goal is always to provide you with excellent care. Hearing back from our patients is one way we can continue to improve our services. Please take a few minutes to complete the written survey that you may receive in the mail after your visit with us. Thank you!             Your Updated Medication List - Protect others around you: Learn how to safely use, store and throw away your medicines at www.disposemymeds.org.          This list is accurate as of: 4/19/17  7:42 AM.  Always use your most recent med list.                   Brand Name Dispense Instructions for use    * omeprazole 40 MG capsule    priLOSEC    30 capsule    Take 1 capsule (40 mg) by mouth daily       * omeprazole 40 MG capsule    priLOSEC    30 capsule    Take 1 capsule (40 mg) by mouth daily       venlafaxine 225 MG Tb24 24 hr tablet    EFFEXOR-ER    30 tablet    Take 1 tablet (225 mg) by mouth daily       * Notice:  This list has 2 medication(s) that are the same as other medications prescribed for you. Read the directions carefully, and ask your  doctor or other care provider to review them with you.

## 2017-04-19 NOTE — PROGRESS NOTES
SUBJECTIVE:                                                    Aide Burroughs is a 41 year old female who presents to clinic today for the following health issues:      Gastrointestinal symptoms      Duration: 8 months    Description:           REFLUX SYMPTOMS - heartburn, regurgitation of food, odd taste in mouth, belching, nausea and vomitting    Intensity:  moderate    Accompanying signs and symptoms:  bloating    History  Previous {similar problem: no   Previous evaluation:  none    Aggravating factors: none    Alleviating factors: omeprazole helped a little only    Other Therapies tried: None     No association with food.  No improvement on Omeprazole 20 mg daily.  Very minimal improvement on Omeprazole 40 mg daily.      Problem list and histories reviewed & adjusted, as indicated.  Additional history: none    Patient Active Problem List   Diagnosis     Seasonal allergic rhinitis     Mild major depression (H)     Generalized anxiety disorder     Transient global amnesia     Fracture of great toe     Hyperlipidemia LDL goal <160     Major depressive disorder, recurrent episode, severe (H)     Depressive episode     Severe major depression with psychotic features (H)     Panic disorder with agoraphobia     PTSD (post-traumatic stress disorder)     Suicidal ideation     Past Surgical History:   Procedure Laterality Date     MYRINGOTOMY, INSERT TUBE, COMBINED Left        Social History   Substance Use Topics     Smoking status: Never Smoker     Smokeless tobacco: Never Used     Alcohol use 0.0 oz/week     0 Standard drinks or equivalent per week      Comment: rare     Family History   Problem Relation Age of Onset     Hypertension Mother      Hyperlipidemia Mother      Cancer - colorectal Father      Substance Abuse Father      CEREBROVASCULAR DISEASE Maternal Grandmother      DIABETES Paternal Grandmother      HEART DISEASE Paternal Grandfather      Bipolar Disorder Maternal Aunt      Schizophrenia Maternal Aunt       Coronary Artery Disease Maternal Grandfather          Current Outpatient Prescriptions   Medication Sig Dispense Refill     venlafaxine (EFFEXOR-ER) 225 MG TB24 24 hr tablet Take 1 tablet (225 mg) by mouth daily 30 tablet 6     omeprazole (PRILOSEC) 40 MG capsule Take 1 capsule (40 mg) by mouth daily 30 capsule 1     Allergies   Allergen Reactions     Erythromycin Nausea and Vomiting     Seasonal Allergies      Molds, grass, multiple trees, plant pollen, cats, rabbits, dogs, hay.     BP Readings from Last 3 Encounters:   04/19/17 102/70   01/04/17 126/60   01/04/17 126/60    Wt Readings from Last 3 Encounters:   04/19/17 152 lb (68.9 kg)   01/04/17 147 lb 11.2 oz (67 kg)   01/04/17 147 lb 11.2 oz (67 kg)           Reviewed and updated as needed this visit by clinical staff  Tobacco  Allergies  Meds  Soc Hx      Reviewed and updated as needed this visit by Provider      ROS:  C: NEGATIVE for fever, chills, change in weight  E/M: NEGATIVE for ear, mouth and throat problems  R: NEGATIVE for significant cough or SOB  CV: NEGATIVE for chest pain, palpitations or peripheral edema  GI: POSITIVE for gas or bloating, heartburn or reflux, nausea and vomiting as above and NEGATIVE for constipation, diarrhea, dysphagia, poor appetite and weight loss  : NEGATIVE for dysuria, hematuria, flank pain, vaginal symptoms     OBJECTIVE:                                                    /70  Pulse 93  Temp 97.5  F (36.4  C) (Tympanic)  Resp 20  Wt 152 lb (68.9 kg)  LMP 04/12/2017  SpO2 99%  BMI 24.02 kg/m2  Body mass index is 24.02 kg/(m^2).  GENERAL: healthy, alert and no distress  NECK: no adenopathy, no asymmetry, masses, or scars and thyroid normal to palpation  RESP: lungs clear to auscultation - no rales, rhonchi or wheezes  CV: regular rate and rhythm, normal S1 S2, no S3 or S4, no murmur, click or rub, no peripheral edema and peripheral pulses strong  ABDOMEN: tenderness generalized and bowel sounds normal,  no guarding or rebound pain   MS: no gross musculoskeletal defects noted, no edema    Diagnostic Test Results:  none      ASSESSMENT/PLAN:                                                      1. Gastroesophageal reflux disease, esophagitis presence not specified  Symptoms not improving with PPI therapy and dietary modifications.  Will obtain upper GI for further evaluation.   - UPPER GI ENDOSCOPY; Future  - omeprazole (PRILOSEC) 40 MG capsule; Take 1 capsule (40 mg) by mouth daily  Dispense: 30 capsule; Refill: 1    See Patient Instructions    KYLEE Narvaez [facility]

## 2017-04-20 ENCOUNTER — TELEPHONE (OUTPATIENT)
Dept: FAMILY MEDICINE | Facility: OTHER | Age: 42
End: 2017-04-20

## 2017-04-20 NOTE — TELEPHONE ENCOUNTER
Left message for patient to return call to schedule EGD/colonoscopy. If Anna or Isela are not available, please transfer to same day surgery        Ok to schedule with temi

## 2017-04-28 ENCOUNTER — OFFICE VISIT (OUTPATIENT)
Dept: FAMILY MEDICINE | Facility: OTHER | Age: 42
End: 2017-04-28
Payer: COMMERCIAL

## 2017-04-28 VITALS
RESPIRATION RATE: 20 BRPM | OXYGEN SATURATION: 100 % | HEART RATE: 78 BPM | DIASTOLIC BLOOD PRESSURE: 80 MMHG | BODY MASS INDEX: 24.5 KG/M2 | SYSTOLIC BLOOD PRESSURE: 116 MMHG | TEMPERATURE: 98.3 F | WEIGHT: 155 LBS

## 2017-04-28 DIAGNOSIS — J04.0 LARYNGITIS: ICD-10-CM

## 2017-04-28 DIAGNOSIS — H65.192 OTHER ACUTE NONSUPPURATIVE OTITIS MEDIA OF LEFT EAR, RECURRENCE NOT SPECIFIED: Primary | ICD-10-CM

## 2017-04-28 PROCEDURE — 99213 OFFICE O/P EST LOW 20 MIN: CPT | Performed by: NURSE PRACTITIONER

## 2017-04-28 RX ORDER — AMOXICILLIN 875 MG
875 TABLET ORAL 2 TIMES DAILY
Qty: 20 TABLET | Refills: 0 | Status: SHIPPED | OUTPATIENT
Start: 2017-04-28 | End: 2017-05-10

## 2017-04-28 NOTE — PATIENT INSTRUCTIONS
Take the Amoxicillin twice a day as prescribed.     Follow up if symptoms fail to resolve as expected.           Laryngitis    Laryngitis is a swelling of the tissues around the vocal cords. Symptoms include a hoarse (scratchy) voice. The voice may be lost completely. It may be caused by a viral illness, such as a head or chest cold. It may also be due to overuse and strain of the voice. Smoking, drinking alcohol, acid reflux, allergies, or inhaling harsh chemicals may also lead to symptoms. This condition will usually resolve in 1-2 weeks.  Home care    Rest your voice until it recovers. Talk as little as possible. If your symptoms are severe, rest at home for a day or so.    Breathing cool steam from a humidifier/vaporizer or in a steamy shower may be helpful.    Drink plenty of fluids to stay well hydrated.    Do not smoke  Follow-up care  Follow up with your healthcare provider or this facility if you have not improved after one week.  When to seek medical advice  Contact your healthcare provider for any of the following:    Severe pain with swallowing    Trouble opening mouth    Neck swelling, neck pain, or trouble moving neck    Noisy breathing or trouble breathing    Fever of 100.4 F (38. C) or higher, or as directed by your healthcare provider    Drooling    Symptoms do not resolve in 2 weeks    0636-2259 [employer]. [address] [place] [address], [place] [address]. All rights reserved. This information is not intended as a substitute for professional medical care. Always follow your healthcare professional's instructions.

## 2017-04-28 NOTE — MR AVS SNAPSHOT
After Visit Summary   4/28/2017    Aide Burroughs    MRN: [id number]           Patient Information     Date Of Birth          1975        Visit Information        Provider Department      4/28/2017 1:00 PM Rivka Rodrigues APRN [facility]        Today's Diagnoses     Other acute nonsuppurative otitis media of left ear, recurrence not specified    -  1      Care Instructions    Take the Amoxicillin twice a day as prescribed.     Follow up if symptoms fail to resolve as expected.           Laryngitis    Laryngitis is a swelling of the tissues around the vocal cords. Symptoms include a hoarse (scratchy) voice. The voice may be lost completely. It may be caused by a viral illness, such as a head or chest cold. It may also be due to overuse and strain of the voice. Smoking, drinking alcohol, acid reflux, allergies, or inhaling harsh chemicals may also lead to symptoms. This condition will usually resolve in 1-2 weeks.  Home care    Rest your voice until it recovers. Talk as little as possible. If your symptoms are severe, rest at home for a day or so.    Breathing cool steam from a humidifier/vaporizer or in a steamy shower may be helpful.    Drink plenty of fluids to stay well hydrated.    Do not smoke  Follow-up care  Follow up with your healthcare provider or this facility if you have not improved after one week.  When to seek medical advice  Contact your healthcare provider for any of the following:    Severe pain with swallowing    Trouble opening mouth    Neck swelling, neck pain, or trouble moving neck    Noisy breathing or trouble breathing    Fever of 100.4 F (38. C) or higher, or as directed by your healthcare provider    Drooling    Symptoms do not resolve in 2 weeks    [id number] [employer]. [address] [place] [address]. All rights reserved. This information is not intended as a substitute for professional medical care. Always follow your  "healthcare professional's instructions.                Follow-ups after your visit        Your next 10 appointments already scheduled     May 15, 2017   Procedure with Robles Falk MD   [facility] ([facility])    [address] [facility] [address] [phone]            May 19, 2017  9:00 AM CDT   New Visit with Cole Delvalle MD   [facility] ([facility])    [address] [facility] [address] [phone]              Who to contact     If you have questions or need follow up information about today's clinic visit or your schedule please contact [facility] directly at [phone].  Normal or non-critical lab and imaging results will be communicated to you by MyChart, letter or phone within 4 business days after the clinic has received the results. If you do not hear from us within 7 days, please contact the clinic through MyChart or phone. If you have a critical or abnormal lab result, we will notify you by phone as soon as possible.  Submit refill requests through [employer] or call your pharmacy and they will forward the refill request to us. Please allow 3 business days for your refill to be completed.          Additional Information About Your Visit        [employer] Information     [employer] lets you send messages to your doctor, view your test results, renew your prescriptions, schedule appointments and more. To sign up, go to [email or web address] . Click on \"Log in\" on the left side of the screen, which will take you to the Welcome page. Then click on \"Sign up Now\" on the right side of the page.     You will be asked to enter the access code listed below, as well as some personal information. Please follow the directions to create your username and password.     Your access code is: [email or web address]  Expires: 2017  7:42 AM     Your access code will  in 90 days. If " you need help or a new code, please call your [facility] or [phone].        Care EveryWhere ID     This is your Care EveryWhere ID. This could be used by other organizations to access your [facility] medical records  [id number]        Your Vitals Were     Pulse Temperature Respirations Last Period Pulse Oximetry BMI (Body Mass Index)    78 98.3  F (36.8  C) (Tympanic) 20 04/12/2017 100% 24.5 kg/m2       Blood Pressure from Last 3 Encounters:   04/28/17 116/80   04/19/17 102/70   01/04/17 126/60    Weight from Last 3 Encounters:   04/28/17 155 lb (70.3 kg)   04/19/17 152 lb (68.9 kg)   01/04/17 147 lb 11.2 oz (67 kg)              Today, you had the following     No orders found for display         Today's Medication Changes          These changes are accurate as of: 4/28/17  1:16 PM.  If you have any questions, ask your nurse or doctor.               Start taking these medicines.        Dose/Directions    amoxicillin 875 MG tablet   Commonly known as:  AMOXIL   Used for:  Other acute nonsuppurative otitis media of left ear, recurrence not specified   Started by:  Rivka Rodrigues APRN CNP        Dose:  875 mg   Take 1 tablet (875 mg) by mouth 2 times daily   Quantity:  20 tablet   Refills:  0            Where to get your medicines      These medications were sent to [facility] - [place], [place] - [address]  [address] [place] [address]     Phone:  [phone]     amoxicillin 875 MG tablet                Primary Care Provider Office Phone # Fax #    KYLEE Narvaez [phone]       [facility] [address] [facility] [address]        Thank you!     Thank you for choosing [facility]  for your care. Our goal is always to provide you with excellent care. Hearing back from our patients is one way we can continue to improve our services. Please take a few minutes to complete the written survey that you may receive in the mail after your visit with us.  Thank you!             Your Updated Medication List - Protect others around you: Learn how to safely use, store and throw away your medicines at www.disposemymeds.org.          This list is accurate as of: 4/28/17  1:16 PM.  Always use your most recent med list.                   Brand Name Dispense Instructions for use    amoxicillin 875 MG tablet    AMOXIL    20 tablet    Take 1 tablet (875 mg) by mouth 2 times daily       * omeprazole 40 MG capsule    priLOSEC    30 capsule    Take 1 capsule (40 mg) by mouth daily       * omeprazole 40 MG capsule    priLOSEC    30 capsule    Take 1 capsule (40 mg) by mouth daily       venlafaxine 225 MG Tb24 24 hr tablet    EFFEXOR-ER    30 tablet    Take 1 tablet (225 mg) by mouth daily       * Notice:  This list has 2 medication(s) that are the same as other medications prescribed for you. Read the directions carefully, and ask your doctor or other care provider to review them with you.

## 2017-04-28 NOTE — PROGRESS NOTES
SUBJECTIVE:                                                    Aide Burroughs is a 41 year old female who presents to clinic today for the following health issues:      RESPIRATORY SYMPTOMS      Duration: 4 days    Description  cough, hoarse voice and laryngitis    Severity: moderate    Accompanying signs and symptoms: no voice    History (predisposing factors):  none    Precipitating or alleviating factors: None    Therapies tried and outcome:  none     Her  is ill with sinus congestion as well.     Problem list and histories reviewed & adjusted, as indicated.  Additional history: none    Patient Active Problem List   Diagnosis     Seasonal allergic rhinitis     Mild major depression (H)     Generalized anxiety disorder     Transient global amnesia     Fracture of great toe     Hyperlipidemia LDL goal <160     Major depressive disorder, recurrent episode, severe (H)     Depressive episode     Severe major depression with psychotic features (H)     Panic disorder with agoraphobia     PTSD (post-traumatic stress disorder)     Suicidal ideation     Past Surgical History:   Procedure Laterality Date     MYRINGOTOMY, INSERT TUBE, COMBINED Left        Social History   Substance Use Topics     Smoking status: Never Smoker     Smokeless tobacco: Never Used     Alcohol use 0.0 oz/week     0 Standard drinks or equivalent per week      Comment: rare     Family History   Problem Relation Age of Onset     Hypertension Mother      Hyperlipidemia Mother      Cancer - colorectal Father      Substance Abuse Father      CEREBROVASCULAR DISEASE Maternal Grandmother      DIABETES Paternal Grandmother      HEART DISEASE Paternal Grandfather      Bipolar Disorder Maternal Aunt      Schizophrenia Maternal Aunt      Coronary Artery Disease Maternal Grandfather          Current Outpatient Prescriptions   Medication Sig Dispense Refill     omeprazole (PRILOSEC) 40 MG capsule Take 1 capsule (40 mg) by mouth daily 30 capsule 1      omeprazole (PRILOSEC) 40 MG capsule Take 1 capsule (40 mg) by mouth daily 30 capsule 1     venlafaxine (EFFEXOR-ER) 225 MG TB24 24 hr tablet Take 1 tablet (225 mg) by mouth daily 30 tablet 6     Allergies   Allergen Reactions     Erythromycin Nausea and Vomiting     Seasonal Allergies      Molds, grass, multiple trees, plant pollen, cats, rabbits, dogs, hay.     BP Readings from Last 3 Encounters:   04/28/17 116/80   04/19/17 102/70   01/04/17 126/60    Wt Readings from Last 3 Encounters:   04/28/17 155 lb (70.3 kg)   04/19/17 152 lb (68.9 kg)   01/04/17 147 lb 11.2 oz (67 kg)                    Reviewed and updated as needed this visit by clinical staff  Tobacco  Allergies  Meds  Soc Hx      Reviewed and updated as needed this visit by Provider         ROS:  C: NEGATIVE for fever, chills, change in weight  ENT/MOUTH: POSITIVE for hoarse voice, nasal congestion and sore throat and NEGATIVE for fever  RESP:POSITIVE for cough-non productive and NEGATIVE for dyspnea on exertion, Hx asthma, Hx COPD, SOB/dyspnea and wheezing  CV: NEGATIVE for chest pain, palpitations or peripheral edema    OBJECTIVE:                                                    /80  Pulse 78  Temp 98.3  F (36.8  C) (Tympanic)  Resp 20  Wt 155 lb (70.3 kg)  LMP 04/12/2017  SpO2 100%  BMI 24.5 kg/m2  Body mass index is 24.5 kg/(m^2).  GENERAL: healthy, alert and no distress  HENT: normal cephalic/atraumatic, right ear: normal: no effusions, no erythema, normal landmarks, left ear: erythematous and bulging membrane, nose and mouth without ulcers or lesions, oral mucous membranes moist, tonsillar erythema and sinuses: not tender  NECK: bilateral anterior cervical adenopathy, no asymmetry, masses, or scars and thyroid normal to palpation  RESP: lungs clear to auscultation - no rales, rhonchi or wheezes  CV: regular rate and rhythm, normal S1 S2, no S3 or S4, no murmur, click or rub, no peripheral edema and peripheral pulses strong  MS:  no gross musculoskeletal defects noted, no edema     Diagnostic Test Results:  none      ASSESSMENT/PLAN:                                                        1. Other acute nonsuppurative otitis media of left ear, recurrence not specified  - amoxicillin (AMOXIL) 875 MG tablet; Take 1 tablet (875 mg) by mouth 2 times daily  Dispense: 20 tablet; Refill: 0    2. Laryngitis  Advised on symptomatic treatments including Tylenol, Ibuprofen, increasing fluids and rest.     FUTURE APPOINTMENTS:       - Follow up if symptoms fail to resolve as expected.   See Patient Instructions    KYLEE Narvaez [facility]

## 2017-04-28 NOTE — NURSING NOTE
"Chief Complaint   Patient presents with     Throat Problem     x4 days       Initial /80  Pulse 78  Temp 98.3  F (36.8  C) (Tympanic)  Resp 20  Wt 155 lb (70.3 kg)  LMP 04/12/2017  SpO2 100%  BMI 24.5 kg/m2 Estimated body mass index is 24.5 kg/(m^2) as calculated from the following:    Height as of 1/4/17: 5' 6.7\" (1.694 m).    Weight as of this encounter: 155 lb (70.3 kg).  Medication Reconciliation: complete   ................Moe Gonzalez LPN,   April 28, 2017,      1:07 PM,   [facility]    "

## 2017-05-15 ENCOUNTER — SURGERY (OUTPATIENT)
Age: 42
End: 2017-05-15

## 2017-05-15 ENCOUNTER — HOSPITAL ENCOUNTER (OUTPATIENT)
Facility: CLINIC | Age: 42
Discharge: HOME | End: 2017-05-15
Attending: INTERNAL MEDICINE | Admitting: INTERNAL MEDICINE
Payer: COMMERCIAL

## 2017-05-15 VITALS
SYSTOLIC BLOOD PRESSURE: 114 MMHG | RESPIRATION RATE: 12 BRPM | TEMPERATURE: 99 F | OXYGEN SATURATION: 97 % | BODY MASS INDEX: 24.5 KG/M2 | DIASTOLIC BLOOD PRESSURE: 79 MMHG | WEIGHT: 155 LBS

## 2017-05-15 LAB
HCG UR QL: NEGATIVE
UPPER GI ENDOSCOPY: NORMAL

## 2017-05-15 PROCEDURE — 88305 TISSUE EXAM BY PATHOLOGIST: CPT | Mod: 26 | Performed by: INTERNAL MEDICINE

## 2017-05-15 PROCEDURE — 43239 EGD BIOPSY SINGLE/MULTIPLE: CPT | Performed by: INTERNAL MEDICINE

## 2017-05-15 PROCEDURE — 88342 IMHCHEM/IMCYTCHM 1ST ANTB: CPT | Performed by: INTERNAL MEDICINE

## 2017-05-15 PROCEDURE — 40000296 ZZH STATISTIC ENDO RECOVERY CLASS 1:2 FIRST HOUR: Performed by: INTERNAL MEDICINE

## 2017-05-15 PROCEDURE — 25000128 H RX IP 250 OP 636: Performed by: INTERNAL MEDICINE

## 2017-05-15 PROCEDURE — 88305 TISSUE EXAM BY PATHOLOGIST: CPT | Performed by: INTERNAL MEDICINE

## 2017-05-15 PROCEDURE — 25000125 ZZHC RX 250: Performed by: INTERNAL MEDICINE

## 2017-05-15 PROCEDURE — 81025 URINE PREGNANCY TEST: CPT | Performed by: INTERNAL MEDICINE

## 2017-05-15 PROCEDURE — 88342 IMHCHEM/IMCYTCHM 1ST ANTB: CPT | Mod: 26 | Performed by: INTERNAL MEDICINE

## 2017-05-15 RX ORDER — LIDOCAINE 40 MG/G
CREAM TOPICAL
Status: DISCONTINUED | OUTPATIENT
Start: 2017-05-15 | End: 2017-05-15 | Stop reason: HOSPADM

## 2017-05-15 RX ORDER — ONDANSETRON 2 MG/ML
4 INJECTION INTRAMUSCULAR; INTRAVENOUS
Status: DISCONTINUED | OUTPATIENT
Start: 2017-05-15 | End: 2017-05-15 | Stop reason: HOSPADM

## 2017-05-15 RX ORDER — FENTANYL CITRATE 50 UG/ML
INJECTION, SOLUTION INTRAMUSCULAR; INTRAVENOUS PRN
Status: DISCONTINUED | OUTPATIENT
Start: 2017-05-15 | End: 2017-05-15 | Stop reason: HOSPADM

## 2017-05-15 RX ADMIN — LIDOCAINE HYDROCHLORIDE 0.1 ML: 10 INJECTION, SOLUTION EPIDURAL; INFILTRATION; INTRACAUDAL; PERINEURAL at 13:22

## 2017-05-15 RX ADMIN — MIDAZOLAM HYDROCHLORIDE 1 MG: 1 INJECTION, SOLUTION INTRAMUSCULAR; INTRAVENOUS at 13:57

## 2017-05-15 RX ADMIN — FENTANYL CITRATE 25 MCG: 50 INJECTION, SOLUTION INTRAMUSCULAR; INTRAVENOUS at 13:59

## 2017-05-15 RX ADMIN — MIDAZOLAM HYDROCHLORIDE 1 MG: 1 INJECTION, SOLUTION INTRAMUSCULAR; INTRAVENOUS at 13:55

## 2017-05-15 RX ADMIN — MIDAZOLAM HYDROCHLORIDE 1 MG: 1 INJECTION, SOLUTION INTRAMUSCULAR; INTRAVENOUS at 13:56

## 2017-05-15 RX ADMIN — LIDOCAINE HYDROCHLORIDE 5 ML: 20 SOLUTION ORAL; TOPICAL at 13:54

## 2017-05-15 RX ADMIN — FENTANYL CITRATE 50 MCG: 50 INJECTION, SOLUTION INTRAMUSCULAR; INTRAVENOUS at 13:54

## 2017-05-15 RX ADMIN — MIDAZOLAM HYDROCHLORIDE 2 MG: 1 INJECTION, SOLUTION INTRAMUSCULAR; INTRAVENOUS at 13:54

## 2017-05-15 NOTE — PRE-PROCEDURE INSTRUCTIONS
[facility] Pre-Procedure Physical Assessment    Aide Burroughs MRN# [id number]   Age: 41 year old YOB: 1975      Date of Surgery: 5/15/2017  Location [facility]      Date of Exam 5/15/2017 Facility (Same day)         Primary care provider: Rivka Rodrigues         Active problem list:   Patient Active Problem List   Diagnosis     Seasonal allergic rhinitis     Mild major depression (H)     Generalized anxiety disorder     Transient global amnesia     Fracture of great toe     Hyperlipidemia LDL goal <160     Major depressive disorder, recurrent episode, severe (H)     Depressive episode     Severe major depression with psychotic features (H)     Panic disorder with agoraphobia     PTSD (post-traumatic stress disorder)     Suicidal ideation            Medications (include herbals and vitamins):   Any Plavix use in the last 7 days?  No     Current Facility-Administered Medications   Medication     lidocaine 1 % 1 mL     lidocaine (LMX4) kit     sodium chloride (PF) 0.9% PF flush 3 mL     sodium chloride (PF) 0.9% PF flush 3 mL     sodium chloride (PF) 0.9% PF flush 3 mL     ondansetron (ZOFRAN) injection 4 mg             Allergies:      Allergies   Allergen Reactions     Erythromycin Nausea and Vomiting     Seasonal Allergies      Molds, grass, multiple trees, plant pollen, cats, rabbits, dogs, hay.     Allergy to Latex?  No  Allergy to tape?    No          Social History:     Social History   Substance Use Topics     Smoking status: Never Smoker     Smokeless tobacco: Never Used     Alcohol use 0.0 oz/week     0 Standard drinks or equivalent per week      Comment: rare            Physical Exam:   All vitals have been reviewed  Blood pressure 114/70, temperature 99  F (37.2  C), temperature source Oral, resp. rate 16, weight 70.3 kg (155 lb), last menstrual period 04/12/2017, SpO2 99 %, not currently breastfeeding.  Airway assessment:   Patient is able to open mouth wide       Lungs:   No increased work of breathing, good air exchange, clear to auscultation bilaterally, no crackles or wheezing      Cardiovascular:   Normal apical impulse, regular rate and rhythm, normal S1 and S2, no S3 or S4, and no murmur noted           Lab / Radiology Results:   All laboratory data reviewed          Assessment:   Appropriately NPO  Chief complaint or anatomic assessment of involved area: dyspepsia         Plan:   Moderate (conscious) sedation     Patient's active problems diagnostically and therapeutically optimized for the planned procedure  Risks, benefits, alternatives to procedure explained and consent obtained  P1 (normal healthy patient)  Orders and progress notes are in the chart  Discharge from Phase 1 and / or Phase 2 recovery when patient meets criteria    I have reviewed the history and physical, lab finding(s), diagnostic data, medicaitons, and the plan for sedation.  I have determined this patient to be an appropriate candidate for the planned sedation / procedure and have reassessed the patient immediately prior to sedation / procedure.    I have personally and medically directed the administration of medications used.    Robles Falk MD

## 2017-05-17 ENCOUNTER — OFFICE VISIT (OUTPATIENT)
Dept: FAMILY MEDICINE | Facility: OTHER | Age: 42
End: 2017-05-17
Payer: COMMERCIAL

## 2017-05-17 VITALS
BODY MASS INDEX: 24.18 KG/M2 | RESPIRATION RATE: 20 BRPM | OXYGEN SATURATION: 100 % | HEART RATE: 78 BPM | TEMPERATURE: 97.2 F | WEIGHT: 153 LBS | SYSTOLIC BLOOD PRESSURE: 126 MMHG | DIASTOLIC BLOOD PRESSURE: 70 MMHG

## 2017-05-17 DIAGNOSIS — R10.84 ABDOMINAL PAIN, GENERALIZED: ICD-10-CM

## 2017-05-17 DIAGNOSIS — K21.9 GASTROESOPHAGEAL REFLUX DISEASE WITHOUT ESOPHAGITIS: Primary | ICD-10-CM

## 2017-05-17 PROCEDURE — 99213 OFFICE O/P EST LOW 20 MIN: CPT | Performed by: NURSE PRACTITIONER

## 2017-05-17 RX ORDER — SUCRALFATE ORAL 1 G/10ML
1 SUSPENSION ORAL 4 TIMES DAILY
Qty: 420 ML | Refills: 1 | Status: SHIPPED | OUTPATIENT
Start: 2017-05-17 | End: 2017-08-31

## 2017-05-17 NOTE — PATIENT INSTRUCTIONS
Schedule the ultrasound in [place].     Try the Carafate 4 times a day before meals.     If this doesn't help, I am going to send you to a gastroenterologist.

## 2017-05-17 NOTE — MR AVS SNAPSHOT
After Visit Summary   5/17/2017    Aide Burroughs    MRN: [id number]           Patient Information     Date Of Birth          1975        Visit Information        Provider Department      5/17/2017 9:20 AM Rivka Rodrigues APRN CNP [facility]        Today's Diagnoses     Gastroesophageal reflux disease without esophagitis    -  1    Abdominal pain, generalized          Care Instructions    Schedule the ultrasound in [place].     Try the Carafate 4 times a day before meals.     If this doesn't help, I am going to send you to a gastroenterologist.             Follow-ups after your visit        Your next 10 appointments already scheduled     May 19, 2017  9:00 AM CDT   New Visit with Cole Delvalle MD   [facility]    [address] [facility] [address] [phone]              Future tests that were ordered for you today     Open Future Orders        Priority Expected Expires Ordered    US Abdomen Complete Routine  5/17/2018 5/17/2017            Who to contact     If you have questions or need follow up information about today's clinic visit or your schedule please contact [facility] directly at [phone].  Normal or non-critical lab and imaging results will be communicated to you by MyChart, letter or phone within 4 business days after the clinic has received the results. If you do not hear from us within 7 days, please contact the clinic through [employer] or phone. If you have a critical or abnormal lab result, we will notify you by phone as soon as possible.  Submit refill requests through [employer] or call your pharmacy and they will forward the refill request to us. Please allow 3 business days for your refill to be completed.          Additional Information About Your Visit        MyChart Information     [employer] lets you send messages to your doctor, view your test results, renew  "your prescriptions, schedule appointments and more. To sign up, go to [email or web address] . Click on \"Log in\" on the left side of the screen, which will take you to the Welcome page. Then click on \"Sign up Now\" on the right side of the page.     You will be asked to enter the access code listed below, as well as some personal information. Please follow the directions to create your username and password.     Your access code is: [email or web address]  Expires: 2017  7:42 AM     Your access code will  in 90 days. If you need help or a new code, please call your [place] clinic or [phone].        Care EveryWhere ID     This is your Care EveryWhere ID. This could be used by other organizations to access your [facility] medical records  [id number]        Your Vitals Were     Pulse Temperature Respirations Last Period Pulse Oximetry Breastfeeding?    78 97.2  F (36.2  C) (Tympanic) 20 04/10/2017 100% No    BMI (Body Mass Index)                   24.18 kg/m2            Blood Pressure from Last 3 Encounters:   17 126/70   05/15/17 114/79   17 116/80    Weight from Last 3 Encounters:   17 153 lb (69.4 kg)   05/15/17 155 lb (70.3 kg)   17 155 lb (70.3 kg)                 Today's Medication Changes          These changes are accurate as of: 17  9:38 AM.  If you have any questions, ask your nurse or doctor.               Start taking these medicines.        Dose/Directions    sucralfate 1 GM/10ML suspension   Commonly known as:  CARAFATE   Used for:  Gastroesophageal reflux disease without esophagitis   Started by:  Rivka Rodrigues APRN CNP        Dose:  1 g   Take 10 mLs (1 g) by mouth 4 times daily   Quantity:  420 mL   Refills:  1            Where to get your medicines      These medications were sent to [facility] - [place] - [address]  [address] [address]     Phone:  [phone]     sucralfate 1 GM/10ML suspension                Primary Care Provider Office " Phone # Fax #    KYLEE Narvaez [phone]       [facility] [address] [facility] [address]        Thank you!     Thank you for choosing [facility]  for your care. Our goal is always to provide you with excellent care. Hearing back from our patients is one way we can continue to improve our services. Please take a few minutes to complete the written survey that you may receive in the mail after your visit with us. Thank you!             Your Updated Medication List - Protect others around you: Learn how to safely use, store and throw away your medicines at www.disposemymeds.org.          This list is accurate as of: 5/17/17  9:38 AM.  Always use your most recent med list.                   Brand Name Dispense Instructions for use    omeprazole 40 MG capsule    priLOSEC    30 capsule    Take 1 capsule (40 mg) by mouth daily       sucralfate 1 GM/10ML suspension    CARAFATE    420 mL    Take 10 mLs (1 g) by mouth 4 times daily       venlafaxine 225 MG Tb24 24 hr tablet    EFFEXOR-ER    30 tablet    Take 1 tablet (225 mg) by mouth daily

## 2017-05-17 NOTE — PROGRESS NOTES
SUBJECTIVE:                                                    Aide Burroughs is a 41 year old female who presents to clinic today for the following health issues:      FOLLOW UP SINCE ENDOSCOPY  --done: 5/15/17       GERD/Heartburn      Duration: several months     Description (location/character/radiation): burning, acid reflux, nausea, bloating, belching and abdominal pain    Intensity:  moderate    Accompanying signs and symptoms:  food getting stuck: no   nausea/vomiting/blood: YES - nausea only   abdominal pain: YES  black/tarry or bloody stools: no :    History (similar episodes/previous evaluation): None    Precipitating or alleviating factors:  worse with no particular food or drink.  current NSAID/Aspirin use: no     Therapies tried and outcome: Omeprazole (Prilosec) - 40 mg did not help.       Recent upper endoscopy was totally normal.  Biopsies are pending.       Problem list and histories reviewed & adjusted, as indicated.  Additional history: none    Patient Active Problem List   Diagnosis     Seasonal allergic rhinitis     Mild major depression (H)     Generalized anxiety disorder     Transient global amnesia     Fracture of great toe     Hyperlipidemia LDL goal <160     Major depressive disorder, recurrent episode, severe (H)     Depressive episode     Severe major depression with psychotic features (H)     Panic disorder with agoraphobia     PTSD (post-traumatic stress disorder)     Suicidal ideation     Past Surgical History:   Procedure Laterality Date     ESOPHAGOSCOPY, GASTROSCOPY, DUODENOSCOPY (EGD), COMBINED N/A 5/15/2017    Procedure: COMBINED ESOPHAGOSCOPY, GASTROSCOPY, DUODENOSCOPY (EGD), BIOPSY SINGLE OR MULTIPLE;  ESOPHAGOSCOPY, GASTROSCOPY, DUODENOSCOPY (EGD) with biopsies by biopsy;  Surgeon: Robles Falk MD;  Location: [facility]     MYRINGOTOMY, INSERT TUBE, COMBINED Left        Social History   Substance Use Topics     Smoking status: Never Smoker     Smokeless tobacco: Never  Used     Alcohol use 0.0 oz/week     0 Standard drinks or equivalent per week      Comment: rare     Family History   Problem Relation Age of Onset     Hypertension Mother      Hyperlipidemia Mother      Cancer - colorectal Father      Substance Abuse Father      CEREBROVASCULAR DISEASE Maternal Grandmother      DIABETES Paternal Grandmother      HEART DISEASE Paternal Grandfather      Bipolar Disorder Maternal Aunt      Schizophrenia Maternal Aunt      Coronary Artery Disease Maternal Grandfather          Current Outpatient Prescriptions   Medication Sig Dispense Refill     sucralfate (CARAFATE) 1 GM/10ML suspension Take 10 mLs (1 g) by mouth 4 times daily 420 mL 1     omeprazole (PRILOSEC) 40 MG capsule Take 1 capsule (40 mg) by mouth daily 30 capsule 1     venlafaxine (EFFEXOR-ER) 225 MG TB24 24 hr tablet Take 1 tablet (225 mg) by mouth daily 30 tablet 6     Allergies   Allergen Reactions     Erythromycin Nausea and Vomiting     Seasonal Allergies      Molds, grass, multiple trees, plant pollen, cats, rabbits, dogs, hay.     BP Readings from Last 3 Encounters:   05/17/17 126/70   05/15/17 114/79   04/28/17 116/80    Wt Readings from Last 3 Encounters:   05/17/17 153 lb (69.4 kg)   05/15/17 155 lb (70.3 kg)   04/28/17 155 lb (70.3 kg)                    Reviewed and updated as needed this visit by clinical staff  Tobacco  Allergies  Meds  Soc Hx      Reviewed and updated as needed this visit by Provider         ROS:  C: NEGATIVE for fever, chills, change in weight  E/M: NEGATIVE for ear, mouth and throat problems  R: NEGATIVE for significant cough or SOB  CV: NEGATIVE for chest pain, palpitations or peripheral edema  GI: POSITIVE for abdominal pain generalized, dyspepsia, gas or bloating, heartburn or reflux and nausea and NEGATIVE for constipation, diarrhea, vomiting and weight loss  : NEGATIVE for dysuria, hematuria, flank pain     OBJECTIVE:                                                    /70   Pulse 78  Temp 97.2  F (36.2  C) (Tympanic)  Resp 20  Wt 153 lb (69.4 kg)  LMP 04/10/2017  SpO2 100%  Breastfeeding? No  BMI 24.18 kg/m2  Body mass index is 24.18 kg/(m^2).  GENERAL: healthy, alert and no distress  NECK: no adenopathy, no asymmetry, masses, or scars and thyroid normal to palpation  RESP: lungs clear to auscultation - no rales, rhonchi or wheezes  CV: regular rate and rhythm, normal S1 S2, no S3 or S4, no murmur, click or rub, no peripheral edema and peripheral pulses strong  ABDOMEN: soft, tenderness epigastric, RLQ and LLQ and bowel sounds normal  MS: no gross musculoskeletal defects noted, no edema    Diagnostic Test Results:  Pending      ASSESSMENT/PLAN:                                                        1. Gastroesophageal reflux disease without esophagitis  Her symptoms are consistent with GERD, but her recent endoscopy showed no sign of this.  Will try Carafate as well, but I am also going to obtain an abdominal US to rule out other causes.  She does have diffuse abdominal pain and bloating.  If that is negative, would refer to GI.   - sucralfate (CARAFATE) 1 GM/10ML suspension; Take 10 mLs (1 g) by mouth 4 times daily  Dispense: 420 mL; Refill: 1    2. Abdominal pain, generalized  As above   - US Abdomen Complete; Future    See Patient Instructions    KYLEE Narvaez [facility]

## 2017-05-17 NOTE — NURSING NOTE
"Chief Complaint   Patient presents with     RECHECK     follow up after Endoscopy       Initial /70  Pulse 78  Temp 97.2  F (36.2  C) (Tympanic)  Resp 20  Wt 153 lb (69.4 kg)  LMP 04/10/2017  SpO2 100%  Breastfeeding? No  BMI 24.18 kg/m2 Estimated body mass index is 24.18 kg/(m^2) as calculated from the following:    Height as of 1/4/17: 5' 6.7\" (1.694 m).    Weight as of this encounter: 153 lb (69.4 kg).  Medication Reconciliation: complete   ................Moe Gonzalez LPN,   May 17, 2017,      9:30 AM,   [facility]    "

## 2017-05-18 ENCOUNTER — HOSPITAL ENCOUNTER (OUTPATIENT)
Dept: ULTRASOUND IMAGING | Facility: CLINIC | Age: 42
Discharge: HOME | End: 2017-05-18
Attending: NURSE PRACTITIONER | Admitting: NURSE PRACTITIONER
Payer: COMMERCIAL

## 2017-05-18 DIAGNOSIS — R14.1 FLATULENCE, ERUCTATION AND GAS PAIN: ICD-10-CM

## 2017-05-18 DIAGNOSIS — R10.84 ABDOMINAL PAIN, GENERALIZED: ICD-10-CM

## 2017-05-18 DIAGNOSIS — R10.84 ABDOMINAL PAIN, GENERALIZED: Primary | ICD-10-CM

## 2017-05-18 DIAGNOSIS — R14.3 FLATULENCE, ERUCTATION AND GAS PAIN: ICD-10-CM

## 2017-05-18 DIAGNOSIS — R14.2 FLATULENCE, ERUCTATION AND GAS PAIN: ICD-10-CM

## 2017-05-18 PROCEDURE — 76700 US EXAM ABDOM COMPLETE: CPT

## 2017-05-19 ENCOUNTER — TELEPHONE (OUTPATIENT)
Dept: NURSING | Facility: CLINIC | Age: 42
End: 2017-05-19

## 2017-05-19 ENCOUNTER — OFFICE VISIT (OUTPATIENT)
Dept: PSYCHIATRY | Facility: CLINIC | Age: 42
End: 2017-05-19
Payer: COMMERCIAL

## 2017-05-19 ENCOUNTER — TELEPHONE (OUTPATIENT)
Dept: FAMILY MEDICINE | Facility: OTHER | Age: 42
End: 2017-05-19

## 2017-05-19 DIAGNOSIS — F33.42 MAJOR DEPRESSIVE DISORDER, RECURRENT EPISODE, IN FULL REMISSION (H): Primary | ICD-10-CM

## 2017-05-19 PROCEDURE — 90792 PSYCH DIAG EVAL W/MED SRVCS: CPT | Performed by: PSYCHIATRY & NEUROLOGY

## 2017-05-19 RX ORDER — VENLAFAXINE HYDROCHLORIDE 150 MG/1
150 CAPSULE, EXTENDED RELEASE ORAL DAILY
Qty: 90 CAPSULE | Refills: 3 | Status: SHIPPED | OUTPATIENT
Start: 2017-05-19 | End: 2017-11-29

## 2017-05-19 ASSESSMENT — ANXIETY QUESTIONNAIRES
3. WORRYING TOO MUCH ABOUT DIFFERENT THINGS: NOT AT ALL
GAD7 TOTAL SCORE: 2
1. FEELING NERVOUS, ANXIOUS, OR ON EDGE: SEVERAL DAYS
7. FEELING AFRAID AS IF SOMETHING AWFUL MIGHT HAPPEN: NOT AT ALL
2. NOT BEING ABLE TO STOP OR CONTROL WORRYING: NOT AT ALL
IF YOU CHECKED OFF ANY PROBLEMS ON THIS QUESTIONNAIRE, HOW DIFFICULT HAVE THESE PROBLEMS MADE IT FOR YOU TO DO YOUR WORK, TAKE CARE OF THINGS AT HOME, OR GET ALONG WITH OTHER PEOPLE: SOMEWHAT DIFFICULT
6. BECOMING EASILY ANNOYED OR IRRITABLE: SEVERAL DAYS
5. BEING SO RESTLESS THAT IT IS HARD TO SIT STILL: NOT AT ALL

## 2017-05-19 ASSESSMENT — PATIENT HEALTH QUESTIONNAIRE - PHQ9: 5. POOR APPETITE OR OVEREATING: NOT AT ALL

## 2017-05-19 NOTE — MR AVS SNAPSHOT
"                  MRN:[id number]                      After Visit Summary   2017    Aide Burroughs    MRN: [id number]           Visit Information        Provider Department      2017 9:00 AM Cole Delvalle MD [facility]        Care Instructions        Treatment Plan:  May drop Effexor (venlafaxine) to 150 mg per day, go back to 225 as needed   Supplementation with vitamin D was suggested and information was provided regarding this.   Safety plan was reviewed; to the ER as needed or call after hours crisis line; [phone]  Education and counseling was done regarding use of medications, psychotherapy options  Follow up with Rivka Rodrigues for medication management and refills.        [employer] Information     [employer] lets you send messages to your doctor, view your test results, renew your prescriptions, schedule appointments and more. To sign up, go to [email or web address] . Click on \"Log in\" on the left side of the screen, which will take you to the Welcome page. Then click on \"Sign up Now\" on the right side of the page.     You will be asked to enter the access code listed below, as well as some personal information. Please follow the directions to create your username and password.     Your access code is: [email or web address]  Expires: 2017  7:42 AM     Your access code will  in 90 days. If you need help or a new code, please call your [place] clinic or [phone].        Care EveryWhere ID     This is your Care EveryWhere ID. This could be used by other organizations to access your [facility] medical records  [id number]        "

## 2017-05-19 NOTE — TELEPHONE ENCOUNTER
"Call Type: Triage Call    Presenting Problem: \"I received a call from my clinic today regarding  test results.\"  Writer gave these instructions: Please call and  advise patient her ultrasound was normal.  I want her to see the  gastroenterologist and have placed a referral for this.  Gave  preferred location phone number for [facility] in [place].  Also  gave patient surgical path results.  Aide had no further questions  and will call to make an appointment.  Triage Note:  Guideline Title: Information Only Call; No Symptom Triage (Adult)  Recommended Disposition: Provide Information or Advice Only  Original Inclination: Wanted to speak with a nurse  Override Disposition:  Intended Action: Follow advice given  Physician Contacted: No  Requesting information regarding scheduled exam, test or procedure; no triage  required. Information provided from approved resources or clinical experience. ?  YES  Requesting regular office appointment ? NO  Sign(s) or symptom(s) associated with a diagnosed condition or with a new illness  ? NO  Requesting information about provider, services or community resources ? NO  Call back to complete assessment/clarification of information from prior caller to  complete triage ? NO  Requesting information and provider is best resource; no triage required. ? NO  Caller not with patient and is unable to provide clinical information about  patient to facilitate triage. ? NO  Requesting provider information for recently scheduled test, procedure; no triage  required. Needed information not available per approved resources or clinical  experience. ? NO  Requesting information not available per approved reference or clinical  experience; no triage required. ? NO  Physician Instructions:  Care Advice:  "
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
## 2017-05-19 NOTE — TELEPHONE ENCOUNTER
----- Message from KYLEE Narvaez CNP sent at 5/18/2017 10:03 PM CDT -----  Please call and advise patient her ultrasound was normal.  I want her to see the gastroenterologist and have placed a referral for this.  Please assist in scheduling.     Electronically signed by Rivka Rodrigues CNP.

## 2017-05-19 NOTE — TELEPHONE ENCOUNTER
I have attempted to contact this patient by phone with the following results: left message to return my call on answering machine.     Preferred Location: [facility] [phone]        Sandie Coles MA     5/19/2017

## 2017-05-19 NOTE — PATIENT INSTRUCTIONS
Treatment Plan:  May drop Effexor (venlafaxine) to 150 mg per day, go back to 225 as needed   Supplementation with vitamin D was suggested and information was provided regarding this.   Safety plan was reviewed; to the ER as needed or call after hours crisis line; [phone]  Education and counseling was done regarding use of medications, psychotherapy options  Follow up with Rivka Rodrigues for medication management and refills.

## 2017-05-20 ASSESSMENT — PATIENT HEALTH QUESTIONNAIRE - PHQ9: SUM OF ALL RESPONSES TO PHQ QUESTIONS 1-9: 2

## 2017-05-20 ASSESSMENT — ANXIETY QUESTIONNAIRES: GAD7 TOTAL SCORE: 2

## 2017-05-22 LAB — COPATH REPORT: NORMAL

## 2017-08-31 ENCOUNTER — OFFICE VISIT (OUTPATIENT)
Dept: FAMILY MEDICINE | Facility: OTHER | Age: 42
End: 2017-08-31
Payer: COMMERCIAL

## 2017-08-31 VITALS
OXYGEN SATURATION: 99 % | TEMPERATURE: 97.6 F | DIASTOLIC BLOOD PRESSURE: 76 MMHG | RESPIRATION RATE: 20 BRPM | SYSTOLIC BLOOD PRESSURE: 110 MMHG | HEART RATE: 88 BPM | BODY MASS INDEX: 23.39 KG/M2 | WEIGHT: 148 LBS

## 2017-08-31 DIAGNOSIS — F32.0 MILD MAJOR DEPRESSION (H): Primary | ICD-10-CM

## 2017-08-31 DIAGNOSIS — F43.10 PTSD (POST-TRAUMATIC STRESS DISORDER): ICD-10-CM

## 2017-08-31 DIAGNOSIS — F41.1 GENERALIZED ANXIETY DISORDER: ICD-10-CM

## 2017-08-31 PROCEDURE — 99213 OFFICE O/P EST LOW 20 MIN: CPT | Performed by: NURSE PRACTITIONER

## 2017-08-31 ASSESSMENT — PAIN SCALES - GENERAL: PAINLEVEL: NO PAIN (0)

## 2017-08-31 NOTE — LETTER
My Depression Action Plan  Name: Aide Burroughs   Date of Birth 1975  Date: 8/31/2017    My doctor: Rivka Rodrigues   My clinic: [place] [address] [facility] [address] [phone]          GREEN    ZONE   Good Control    What it looks like:     Things are going generally well. You have normal up s and down s. You may even feel depressed from time to time, but bad moods usually last less than a day.   What you need to do:  1. Continue to care for yourself (see self care plan)  2. Check your depression survival kit and update it as needed  3. Follow your physician s recommendations including any medication.  4. Do not stop taking medication unless you consult with your physician first.           YELLOW         ZONE Getting Worse    What it looks like:     Depression is starting to interfere with your life.     It may be hard to get out of bed; you may be starting to isolate yourself from others.    Symptoms of depression are starting to last most all day and this has happened for several days.     You may have suicidal thoughts but they are not constant.   What you need to do:     1. Call your care team, your response to treatment will improve if you keep your care team informed of your progress. Yellow periods are signs an adjustment may need to be made.     2. Continue your self-care, even if you have to fake it!    3. Talk to someone in your support network    4. Open up your depression survival kit           RED    ZONE Medical Alert - Get Help    What it looks like:     Depression is seriously interfering with your life.     You may experience these or other symptoms: You can t get out of bed most days, can t work or engage in other necessary activities, you have trouble taking care of basic hygiene, or basic responsibilities, thoughts of suicide or death that will not go away, self-injurious behavior.     What you need to do:  1. Call your care team and request a  same-day appointment. If they are not available (weekends or after hours) call your local crisis line, emergency room or 911.      Electronically signed by: Teresa Gonzalez, August 31, 2017    Depression Self Care Plan / Survival Kit    Self-Care for Depression  Here s the deal. Your body and mind are really not as separate as most people think.  What you do and think affects how you feel and how you feel influences what you do and think. This means if you do things that people who feel good do, it will help you feel better.  Sometimes this is all it takes.  There is also a place for medication and therapy depending on how severe your depression is, so be sure to consult with your medical provider and/ or Behavioral Health Consultant if your symptoms are worsening or not improving.     In order to better manage my stress, I will:    Exercise  Get some form of exercise, every day. This will help reduce pain and release endorphins, the  feel good  chemicals in your brain. This is almost as good as taking antidepressants!  This is not the same as joining a gym and then never going! (they count on that by the way ) It can be as simple as just going for a walk or doing some gardening, anything that will get you moving.      Hygiene   Maintain good hygiene (Get out of bed in the morning, Make your bed, Brush your teeth, Take a shower, and Get dressed like you were going to work, even if you are unemployed).  If your clothes don't fit try to get ones that do.    Diet  I will strive to eat foods that are good for me, drink plenty of water, and avoid excessive sugar, caffeine, alcohol, and other mood-altering substances.  Some foods that are helpful in depression are: complex carbohydrates, B vitamins, flaxseed, fish or fish oil, fresh fruits and vegetables.    Psychotherapy  I agree to participate in Individual Therapy (if recommended).    Medication  If prescribed medications, I agree to take them.  Missing doses can result  in serious side effects.  I understand that drinking alcohol, or other illicit drug use, may cause potential side effects.  I will not stop my medication abruptly without first discussing it with my provider.    Staying Connected With Others  I will stay in touch with my friends, family members, and my primary care provider/team.    Use your imagination  Be creative.  We all have a creative side; it doesn t matter if it s oil painting, sand castles, or mud pies! This will also kick up the endorphins.    Witness Beauty  (AKA stop and smell the roses) Take a look outside, even in mid-winter. Notice colors, textures. Watch the squirrels and birds.     Service to others  Be of service to others.  There is always someone else in need.  By helping others we can  get out of ourselves  and remember the really important things.  This also provides opportunities for practicing all the other parts of the program.    Humor  Laugh and be silly!  Adjust your TV habits for less news and crime-drama and more comedy.    Control your stress  Try breathing deep, massage therapy, biofeedback, and meditation. Find time to relax each day.     My support system    Clinic Contact:  Phone number:    Contact 1:  Phone number:    Contact 2:  Phone number:    Oriental orthodox/:  Phone number:    Therapist:  Phone number:    Local crisis center:    Phone number:    Other community support:  Phone number:

## 2017-08-31 NOTE — NURSING NOTE
"Chief Complaint   Patient presents with     Forms     FMLA forms need to be filled out       Initial /76  Pulse 88  Temp 97.6  F (36.4  C) (Tympanic)  Resp 20  Wt 148 lb (67.1 kg)  LMP 07/31/2017 (Approximate)  SpO2 99%  Breastfeeding? No  BMI 23.39 kg/m2 Estimated body mass index is 23.39 kg/(m^2) as calculated from the following:    Height as of 1/4/17: 5' 6.7\" (1.694 m).    Weight as of this encounter: 148 lb (67.1 kg).  Medication Reconciliation: complete   ................Moe Gonzalez LPN,   August 31, 2017,      11:59 AM,   [facility]    "

## 2017-08-31 NOTE — PROGRESS NOTES
SUBJECTIVE:   Aide Burroughs is a 41 year old female who presents to clinic today for the following health issues:      FORMS  -FMLA paperwork to be filled out    Takes intermittent days off of work due to her anxiety and depression.  This has been very well controlled recently and she is typically only taking days off for scheduled appointments.  Needs updated paperwork filled out.     Problem list and histories reviewed & adjusted, as indicated.  Additional history: none    Patient Active Problem List   Diagnosis     Seasonal allergic rhinitis     Mild major depression (H)     Generalized anxiety disorder     Transient global amnesia     Fracture of great toe     Hyperlipidemia LDL goal <160     Major depressive disorder, recurrent episode, severe (H)     Depressive episode     Severe major depression with psychotic features (H)     Panic disorder with agoraphobia     PTSD (post-traumatic stress disorder)     Suicidal ideation     Past Surgical History:   Procedure Laterality Date     ESOPHAGOSCOPY, GASTROSCOPY, DUODENOSCOPY (EGD), COMBINED N/A 5/15/2017    Procedure: COMBINED ESOPHAGOSCOPY, GASTROSCOPY, DUODENOSCOPY (EGD), BIOPSY SINGLE OR MULTIPLE;  ESOPHAGOSCOPY, GASTROSCOPY, DUODENOSCOPY (EGD) with biopsies by biopsy;  Surgeon: Robles Falk MD;  Location: [facility]     MYRINGOTOMY, INSERT TUBE, COMBINED Left        Social History   Substance Use Topics     Smoking status: Never Smoker     Smokeless tobacco: Never Used     Alcohol use 0.0 oz/week     0 Standard drinks or equivalent per week      Comment: rare     Family History   Problem Relation Age of Onset     Hypertension Mother      Hyperlipidemia Mother      MENTAL ILLNESS Mother      Cancer - colorectal Father      Substance Abuse Father      CEREBROVASCULAR DISEASE Maternal Grandmother      DIABETES Paternal Grandmother      HEART DISEASE Paternal Grandfather      Bipolar Disorder Maternal Aunt      Schizophrenia Maternal Aunt      Coronary Artery  Disease Maternal Grandfather          Current Outpatient Prescriptions   Medication Sig Dispense Refill     venlafaxine (EFFEXOR-XR) 150 MG 24 hr capsule Take 1 capsule (150 mg) by mouth daily 90 capsule 3     Allergies   Allergen Reactions     Erythromycin Nausea and Vomiting     Seasonal Allergies      Molds, grass, multiple trees, plant pollen, cats, rabbits, dogs, hay.     BP Readings from Last 3 Encounters:   08/31/17 110/76   05/17/17 126/70   05/15/17 114/79    Wt Readings from Last 3 Encounters:   08/31/17 148 lb (67.1 kg)   05/17/17 153 lb (69.4 kg)   05/15/17 155 lb (70.3 kg)                        Reviewed and updated as needed this visit by clinical staffTobacco  Allergies  Meds  Soc Hx      Reviewed and updated as needed this visit by Provider         ROS:  C: NEGATIVE for fever, chills, change in weight  E/M: NEGATIVE for ear, mouth and throat problems  R: NEGATIVE for significant cough or SOB  CV: NEGATIVE for chest pain, palpitations or peripheral edema  PSYCHIATRIC: NEGATIVE for changes in mood or affect    OBJECTIVE:     /76  Pulse 88  Temp 97.6  F (36.4  C) (Tympanic)  Resp 20  Wt 148 lb (67.1 kg)  LMP 07/31/2017 (Approximate)  SpO2 99%  Breastfeeding? No  BMI 23.39 kg/m2  Body mass index is 23.39 kg/(m^2).  GENERAL: healthy, alert and no distress  PSYCH: mentation appears normal, affect normal/bright    Diagnostic Test Results:  none     ASSESSMENT/PLAN:         1. Mild major depression (H)  2. PTSD (post-traumatic stress disorder)  3. Generalized anxiety disorder  I filled out her [employer] forms for intermittent leave as needed.    Follow up with psychiatry as planned.       See Patient Instructions    15 minutes of the appointment were spent  evaluating and developing a treatment plan for the above diagnoses.      KYLEE Narvaez [facility]

## 2017-08-31 NOTE — MR AVS SNAPSHOT
"              After Visit Summary   2017    Aide Burroughs    MRN: [id number]           Patient Information     Date Of Birth          1975        Visit Information        Provider Department      2017 11:40 AM Rivka Rodrigues APRN CNP [facility]         Follow-ups after your visit        Who to contact     If you have questions or need follow up information about today's clinic visit or your schedule please contact [facility] directly at [phone].  Normal or non-critical lab and imaging results will be communicated to you by MyChart, letter or phone within 4 business days after the clinic has received the results. If you do not hear from us within 7 days, please contact the clinic through [email or web address] or phone. If you have a critical or abnormal lab result, we will notify you by phone as soon as possible.  Submit refill requests through TrackIF or call your pharmacy and they will forward the refill request to us. Please allow 3 business days for your refill to be completed.          Additional Information About Your Visit        My[place]onkers Information     TrackIF lets you send messages to your doctor, view your test results, renew your prescriptions, schedule appointments and more. To sign up, go to [email or web address] . Click on \"Log in\" on the left side of the screen, which will take you to the Welcome page. Then click on \"Sign up Now\" on the right side of the page.     You will be asked to enter the access code listed below, as well as some personal information. Please follow the directions to create your username and password.     Your access code is: [email or web address]  Expires: 2017 12:32 PM     Your access code will  in 90 days. If you need help or a new code, please call your [facility] or [phone].        Care EveryWhere ID     This is your Care EveryWhere ID. This could be used by other organizations to access your [place] medical " records  [id number]        Your Vitals Were     Pulse Temperature Respirations Last Period Pulse Oximetry Breastfeeding?    88 97.6  F (36.4  C) (Tympanic) 20 07/31/2017 (Approximate) 99% No    BMI (Body Mass Index)                   23.39 kg/m2            Blood Pressure from Last 3 Encounters:   08/31/17 110/76   05/17/17 126/70   05/15/17 114/79    Weight from Last 3 Encounters:   08/31/17 148 lb (67.1 kg)   05/17/17 153 lb (69.4 kg)   05/15/17 155 lb (70.3 kg)              We Performed the Following     DEPRESSION ACTION PLAN (DAP)        Primary Care Provider Office Phone # Fax #    RivkaKYLEE Kearney [phone]       [address] [facility] [address]        Equal Access to Services     MAGNUS INGRAM : Hadii aad cesar hadasho Soomaali, waaxda luqadaha, qaybta kaalmada adeegyada, lex martinez haydarling durand . So [facility] [phone].    ATENCIÓN: Si habla español, tiene a norton disposición servicios gratuitos de asistencia lingüística. Llame al [phone].    We comply with applicable federal civil rights laws and [place] laws. We do not discriminate on the basis of race, color, national origin, age, disability sex, sexual orientation or gender identity.            Thank you!     Thank you for choosing [facility]  for your care. Our goal is always to provide you with excellent care. Hearing back from our patients is one way we can continue to improve our services. Please take a few minutes to complete the written survey that you may receive in the mail after your visit with us. Thank you!             Your Updated Medication List - Protect others around you: Learn how to safely use, store and throw away your medicines at www.disposemymeds.org.          This list is accurate as of: 8/31/17 12:32 PM.  Always use your most recent med list.                   Brand Name Dispense Instructions for use Diagnosis    venlafaxine 150 MG 24 hr capsule    EFFEXOR-XR    90 capsule     Take 1 capsule (150 mg) by mouth daily    Major depressive disorder, recurrent episode, in full remission (H)

## 2017-10-09 ENCOUNTER — OFFICE VISIT (OUTPATIENT)
Dept: PSYCHOLOGY | Facility: CLINIC | Age: 42
End: 2017-10-09
Payer: COMMERCIAL

## 2017-10-09 DIAGNOSIS — F33.1 MAJOR DEPRESSIVE DISORDER, RECURRENT EPISODE, MODERATE (H): Primary | ICD-10-CM

## 2017-10-09 DIAGNOSIS — F41.1 GENERALIZED ANXIETY DISORDER: ICD-10-CM

## 2017-10-09 DIAGNOSIS — F43.10 POSTTRAUMATIC STRESS DISORDER: ICD-10-CM

## 2017-10-09 PROCEDURE — 90791 PSYCH DIAGNOSTIC EVALUATION: CPT | Performed by: MARRIAGE & FAMILY THERAPIST

## 2017-10-09 ASSESSMENT — ANXIETY QUESTIONNAIRES
1. FEELING NERVOUS, ANXIOUS, OR ON EDGE: SEVERAL DAYS
3. WORRYING TOO MUCH ABOUT DIFFERENT THINGS: NOT AT ALL
2. NOT BEING ABLE TO STOP OR CONTROL WORRYING: NOT AT ALL
IF YOU CHECKED OFF ANY PROBLEMS ON THIS QUESTIONNAIRE, HOW DIFFICULT HAVE THESE PROBLEMS MADE IT FOR YOU TO DO YOUR WORK, TAKE CARE OF THINGS AT HOME, OR GET ALONG WITH OTHER PEOPLE: SOMEWHAT DIFFICULT
7. FEELING AFRAID AS IF SOMETHING AWFUL MIGHT HAPPEN: NOT AT ALL
6. BECOMING EASILY ANNOYED OR IRRITABLE: SEVERAL DAYS
GAD7 TOTAL SCORE: 5
5. BEING SO RESTLESS THAT IT IS HARD TO SIT STILL: SEVERAL DAYS

## 2017-10-09 ASSESSMENT — PATIENT HEALTH QUESTIONNAIRE - PHQ9
5. POOR APPETITE OR OVEREATING: MORE THAN HALF THE DAYS
SUM OF ALL RESPONSES TO PHQ QUESTIONS 1-9: 9

## 2017-10-09 NOTE — PROGRESS NOTES
Progress Note - Initial Session    Client Name:  Aide Burroughs Date: 10-9-17         Service Type: Individual      Session Start Time: 12pm  Session End Time: 1pm      Session Length: 53 - 60      Session #: 1     Attendees: Client attended alone         Diagnostic Assessment in progress.  Unable to complete documentation at the conclusion of the first session due to lack of paperwork time later in the day.        Mental Status Assessment:  Appearance:   Appropriate   Eye Contact:   Good   Psychomotor Behavior: Normal   Attitude:   Cooperative   Orientation:   All  Speech   Rate / Production: Normal    Volume:  Normal   Mood:    Anxious  Depressed   Affect:    Labile  Worrisome   Thought Content:  Clear   Thought Form:  Coherent  Logical   Insight:    Good       Safety Issues and Plan for Safety and Risk Management:  Client denies current fears or concerns for personal safety.  Client denies current or recent suicidal ideation or behaviors.  Client denies current or recent homicidal ideation or behaviors.  Client denies current or recent self injurious behavior or ideation.  Client denies other safety concerns.  A safety and risk management plan has not been developed at this time, however client was given the after-hours number / 911 should there be a change in any of these risk factors.  Client reports there are firearms in the house. The firearms are secured in a locked space.      Diagnostic Criteria:  A. Excessive anxiety and worry about a number of events or activities (such as work or school performance).   B. The person finds it difficult to control the worry.  C. Select 3 or more symptoms (required for diagnosis). Only one item is required in children.   - Restlessness or feeling keyed up or on edge.    - Being easily fatigued.    - Difficulty concentrating or mind going blank.    - Irritability.    - Muscle tension.    - Sleep disturbance (difficulty falling or staying asleep, or restless  unsatisfying sleep).   D. The focus of the anxiety and worry is not confined to features of an Axis I disorder.  E. The anxiety, worry, or physical symptoms cause clinically significant distress or impairment in social, occupational, or other important areas of functioning.   F. The disturbance is not due to the direct physiological effects of a substance (e.g., a drug of abuse, a medication) or a general medical condition (e.g., hyperthyroidism) and does not occur exclusively during a Mood Disorder, a Psychotic Disorder, or a Pervasive Developmental Disorder.    - The aformentioned symptoms began - year(s) ago and occurs 5 days per week and is experienced as moderate.  A) Recurrent episode(s) - symptoms have been present during the same 2-week period and represent a change from previous functioning 5 or more symptoms (required for diagnosis)   - Depressed mood. Note: In children and adolescents, can be irritable mood.     - Diminished interest or pleasure in all, or almost all, activities.    - Decreased sleep.    - Psychomotor activity retardation.    - Fatigue or loss of energy.    - Feelings of worthlessness or inappropriate guilt.    - Diminished ability to think or concentrate, or indecisiveness.   B) The symptoms cause clinically significant distress or impairment in social, occupational, or other important areas of functioning  C) The episode is not attributable to the physiological effects of a substance or to another medical condition  D) The occurence of major depressive episode is not better explained by other thought / psychotic disorders  E) There has never been a manic episode or hypomanic episode  A. The person has been exposed to a traumatic event in which both of the following were present:     (1) the person experienced, witnessed, or was confronted with an event or events that involved actual or threatened death or serious injury, or a threat to the physical integrity of self or others     (2)  the person's response involved intense fear, helplessness, or horror. Note: In children, this may be expressed instead by disorganized or agitated behavior  B. The traumatic event is persistently reexperienced in one (or more) of the following ways:     - Recurrent and intrusive distressing recollections of the event, including images, thoughts, or perceptions. Note: In young children, repetitive play may occur in which themes or aspects of the trauma are expressed.      - Recurrent distressing dreams of the event. Note: In children, there may be frightening dreams without recognizable content.      - Acting or feeling as if the traumatic event were recurring (includes a sense of reliving the experience, illusions, hallucinations, and dissociative flashback episodes, including those that occur on awakening or when intoxicated). Note: In young children, trauma-specific reenactment may occur.      - Intense psychological distress at exposure to internal or external cues that symbolize or resemble an aspect of the traumatic event.      - Physiological reactivity on exposure to internal or external cues that symbolize or resemble an aspect of the traumatic event.   C. Persistent avoidance of stimuli associated with the trauma and numbing of general responsiveness (not present before the trauma), as indicated by three (or more) of the following:     - Efforts to avoid thoughts, feelings, or conversations associated with the trauma.      - Efforts to avoid activities, places, or people that arouse recollections of the trauma.      - Inability to recall an important aspect of the trauma.      - Markedly diminished interest or participation in significant activities.      - Feeling of detachment or estrangement from others.      - Restricted range of affect (e.g., unable to have loving feelings).      - Sense of a foreshortened future (e.g., does not expect to have a career, marriage, children, or a normal life span).   D.  Persistent symptoms of increased arousal (not present before the trauma), as indicated by two (or more) of the following:     - Difficulty falling or staying asleep.      - Irritability or outbursts of anger.      - Difficulty concentrating.      - Hypervigilance.      - Exaggerated startle response.   E. Duration of the disturbance is more than 1 month.  F. The disturbance causes clinically significant distress or impairment in social, occupational, or other important areas of functioning.    - The aformentioned symptoms began - year(s) ago and occurs 5 days per week and is experienced as moderate.        DSM5 Diagnoses: (Sustained by DSM5 Criteria Listed Above)  Diagnoses: 296.32 (F33.1) Major Depressive Disorder, Recurrent Episode, Moderate _ and With anxious distress  300.02 (F41.1) Generalized Anxiety Disorder  309.81 (F43.10) Posttraumatic Stress Disorder (includes Posttraumatic Stress Disorder for Children 6 Years and Younger)  Without dissociative symptoms  Psychosocial & Contextual Factors: current relational stressors   WHODAS 2.0 (12 item)            This questionnaire asks about difficulties due to health conditions. Health conditions  include  disease or illnesses, other health problems that may be short or long lasting,  injuries, mental health or emotional problems, and problems with alcohol or drugs.                     Think back over the past 30 days and answer these questions, thinking about how much  difficulty you had doing the following activities. For each question, please [place] only  one response.    S1 Standing for long periods such as 30 minutes? None =         1   S2 Taking care of household responsibilities? Mild =           2   S3 Learning a new task, for example, learning how to get to a new place? Mild =           2   S4 How much of a problem do you have joining community activities (for example, festivals, Mormon or other activities) in the same way as anyone else can? Severe =        4   S5 How much have you been emotionally affected by your health problems? Severe =       4     In the past 30 days, how much difficulty did you have in:   S6 Concentrating on doing something for ten minutes? Moderate =   3   S7 Walking a long distance such as a kilometer (or equivalent)? Mild =           2   S8 Washing your whole body? None =         1   S9 Getting dressed? None =         1   S10 Dealing with people you do not know? Mild =           2   S11 Maintaining a friendship? Severe =       4   S12 Your day to day work? Mild =           2     H1 Overall, in the past 30 days, how many days were these difficulties present? Record number of days 30   H2 In the past 30 days, for how many days were you totally unable to carry out your usual activities or work because of any health condition? Record number of days  0   H3 In the past 30 days, not counting the days that you were totally unable, for how many days did you cut back or reduce your usual activities or work because of any health condition? Record number of days 0       Collateral Reports Completed:  Routed note to PCP      PLAN: (Homework, other):  Client stated that she may follow up for ongoing services with [facility].  Client has a follow up appointment in two weeks.        Leeann Gandara, TH

## 2017-10-09 NOTE — MR AVS SNAPSHOT
"                  MRN:[id number]                      After Visit Summary   10/9/2017    Aide Burroughs    MRN: [id number]           Visit Information        Provider Department      10/9/2017 12:00 PM NicholLeeann [facility]      Your next 10 appointments already scheduled     Oct 23, 2017  2:00 PM CDT   Return Visit with Leeann Gandara [facility] ([facility])    [address] [address]   [phone]            2017 11:00 AM CST   Return Visit with Leeann Gandara [facility] ([facility])    [address] [address]   [phone]              MyChart Information     Loudiet lets you send messages to your doctor, view your test results, renew your prescriptions, schedule appointments and more. To sign up, go to [email or web address] . Click on \"Log in\" on the left side of the screen, which will take you to the Welcome page. Then click on \"Sign up Now\" on the right side of the page.     You will be asked to enter the access code listed below, as well as some personal information. Please follow the directions to create your username and password.     Your access code is: [email or web address]  Expires: 2017 12:32 PM     Your access code will  in 90 days. If you need help or a new code, please call your [place] clinic or [phone].        Care EveryWhere ID     This is your Care EveryWhere ID. This could be used by other organizations to access your [place] medical records  [id number]        Equal Access to Services     IM[facility] : Hadii aad cesar hadvira Socaseyali, waaxda luqadaha, qaybta kaalmada adejuanyada, lex durand . So [facility] [phone].    ATENCIÓN: Si habla español, tiene a norton disposición servicios gratuitos de asistencia lingüística. Llame al [phone].    We comply " with applicable federal civil rights laws and [place] laws. We do not discriminate on the basis of race, color, national origin, age, disability, sex, sexual orientation, or gender identity.

## 2017-10-09 NOTE — Clinical Note
Client is getting back started in the counseling center to work on relational stressors and some unresolved struggles from her past.  Thank you!  Leeann Gandara

## 2017-10-10 ASSESSMENT — ANXIETY QUESTIONNAIRES: GAD7 TOTAL SCORE: 5

## 2017-10-23 ENCOUNTER — OFFICE VISIT (OUTPATIENT)
Dept: PSYCHOLOGY | Facility: CLINIC | Age: 42
End: 2017-10-23
Payer: COMMERCIAL

## 2017-10-23 DIAGNOSIS — F43.10 POSTTRAUMATIC STRESS DISORDER: ICD-10-CM

## 2017-10-23 DIAGNOSIS — F41.1 GENERALIZED ANXIETY DISORDER: ICD-10-CM

## 2017-10-23 DIAGNOSIS — F33.1 MAJOR DEPRESSIVE DISORDER, RECURRENT EPISODE, MODERATE (H): Primary | ICD-10-CM

## 2017-10-23 PROCEDURE — 90834 PSYTX W PT 45 MINUTES: CPT | Performed by: MARRIAGE & FAMILY THERAPIST

## 2017-10-23 ASSESSMENT — ANXIETY QUESTIONNAIRES
1. FEELING NERVOUS, ANXIOUS, OR ON EDGE: SEVERAL DAYS
6. BECOMING EASILY ANNOYED OR IRRITABLE: MORE THAN HALF THE DAYS
3. WORRYING TOO MUCH ABOUT DIFFERENT THINGS: SEVERAL DAYS
5. BEING SO RESTLESS THAT IT IS HARD TO SIT STILL: SEVERAL DAYS
7. FEELING AFRAID AS IF SOMETHING AWFUL MIGHT HAPPEN: NOT AT ALL
IF YOU CHECKED OFF ANY PROBLEMS ON THIS QUESTIONNAIRE, HOW DIFFICULT HAVE THESE PROBLEMS MADE IT FOR YOU TO DO YOUR WORK, TAKE CARE OF THINGS AT HOME, OR GET ALONG WITH OTHER PEOPLE: SOMEWHAT DIFFICULT
2. NOT BEING ABLE TO STOP OR CONTROL WORRYING: SEVERAL DAYS
GAD7 TOTAL SCORE: 8

## 2017-10-23 ASSESSMENT — PATIENT HEALTH QUESTIONNAIRE - PHQ9
SUM OF ALL RESPONSES TO PHQ QUESTIONS 1-9: 10
5. POOR APPETITE OR OVEREATING: MORE THAN HALF THE DAYS
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
## 2017-10-23 NOTE — MR AVS SNAPSHOT
"                  MRN:[id number]                      After Visit Summary   10/23/2017    Aide Burroughs    MRN: [id number]           Visit Information        Provider Department      10/23/2017 2:00 PM Leeann Gandara [facility] Generic      Your next 10 appointments already scheduled     Oct 27, 2017  4:00 PM CDT   SHORT with KYLEE Narvaez CNP   [facility]    [address] [facility] [address]   [phone]            2017 11:00 AM CST   Return Visit with Leeann L Nichol [facility] ([facility])    [address] [id number] [phone]            2017  1:00 PM CST   Return Visit with Leeann Gandara [facility] ([facility])    [address] [id number] [phone]              MyChart Information     [employer] lets you send messages to your doctor, view your test results, renew your prescriptions, schedule appointments and more. To sign up, go to [email or web address] . Click on \"Log in\" on the left side of the screen, which will take you to the Welcome page. Then click on \"Sign up Now\" on the right side of the page.     You will be asked to enter the access code listed below, as well as some personal information. Please follow the directions to create your username and password.     Your access code is: [email or web address]  Expires: 2017 12:32 PM     Your access code will  in 90 days. If you need help or a new code, please call your [place] clinic or [phone].        Care EveryWhere ID     This is your Care EveryWhere ID. This could be used by other organizations to access your [place] medical records  [id number]        Equal Access to Services     MAGNUS INGRAM AH: Maria Alejandra wood Sosafia, wamadonnada luqadaha, qaybta kaalmada adeiam, lex martinez " missy durand ah. So [facility] [phone].    ATENCIÓN: Si habla español, tiene a norton disposición servicios gratuitos de asistencia lingüística. Llame al [phone].    We comply with applicable federal civil rights laws and [place] laws. We do not discriminate on the basis of race, color, national origin, age, disability, sex, sexual orientation, or gender identity.

## 2017-10-24 ASSESSMENT — ANXIETY QUESTIONNAIRES: GAD7 TOTAL SCORE: 8

## 2017-10-27 ENCOUNTER — OFFICE VISIT (OUTPATIENT)
Dept: FAMILY MEDICINE | Facility: OTHER | Age: 42
End: 2017-10-27
Payer: COMMERCIAL

## 2017-10-27 VITALS
TEMPERATURE: 97.3 F | BODY MASS INDEX: 23.39 KG/M2 | OXYGEN SATURATION: 100 % | WEIGHT: 148 LBS | SYSTOLIC BLOOD PRESSURE: 130 MMHG | HEART RATE: 78 BPM | RESPIRATION RATE: 16 BRPM | DIASTOLIC BLOOD PRESSURE: 80 MMHG

## 2017-10-27 DIAGNOSIS — A63.0 GENITAL WARTS: Primary | ICD-10-CM

## 2017-10-27 PROCEDURE — 56501 DESTROY VULVA LESIONS SIM: CPT | Performed by: NURSE PRACTITIONER

## 2017-10-27 RX ORDER — SWAB
1600 SWAB, NON-MEDICATED MISCELLANEOUS
COMMUNITY
Start: 2017-10-27 | End: 2018-01-05 | Stop reason: DRUGHIGH

## 2017-10-27 NOTE — NURSING NOTE
"Chief Complaint   Patient presents with     Vaginal Problem     bumps/clusters x2 months       Initial /80  Pulse 78  Temp 97.3  F (36.3  C) (Tympanic)  Resp 16  Wt 148 lb (67.1 kg)  LMP 09/27/2017 (Approximate)  SpO2 100%  Breastfeeding? No  BMI 23.39 kg/m2 Estimated body mass index is 23.39 kg/(m^2) as calculated from the following:    Height as of 1/4/17: 5' 6.7\" (1.694 m).    Weight as of this encounter: 148 lb (67.1 kg).  Medication Reconciliation: complete   ................Moe Gonzalez LPN,   October 27, 2017,      4:17 PM,   [facility]    "

## 2017-10-27 NOTE — PROGRESS NOTES
SUBJECTIVE:   Aide Burroughs is a 42 year old female who presents to clinic today for the following health issues:      Vaginal Symptoms      Duration: 2 months    Description  itching and cluster bumps    Intensity:  moderate    Accompanying signs and symptoms (fever/dysuria/abdominal or back pain): None    History  Sexually active: yes, single partner, contraception - male surgical  Possibility of pregnancy: No  Recent antibiotic use: no     Precipitating or alleviating factors: Hx of herpes    Therapies tried and outcome: none       No vaginal discharge.  The bumps are not painful.  She has a history of genital herpes diagnosed several years ago, very few outbreaks since that time.  Has never even taken medications for this.  She states this feels different.     Is , was  for a brief time last year, did have another sexual partner during that time.  She is not sure if her  had any other sexual partners or not.       Problem list and histories reviewed & adjusted, as indicated.  Additional history: none    Patient Active Problem List   Diagnosis     Seasonal allergic rhinitis     Mild major depression (H)     Generalized anxiety disorder     Transient global amnesia     Fracture of great toe     Hyperlipidemia LDL goal <160     Major depressive disorder, recurrent episode, severe (H)     Depressive episode     Severe major depression with psychotic features (H)     Panic disorder with agoraphobia     PTSD (post-traumatic stress disorder)     Suicidal ideation     Past Surgical History:   Procedure Laterality Date     ESOPHAGOSCOPY, GASTROSCOPY, DUODENOSCOPY (EGD), COMBINED N/A 5/15/2017    Procedure: COMBINED ESOPHAGOSCOPY, GASTROSCOPY, DUODENOSCOPY (EGD), BIOPSY SINGLE OR MULTIPLE;  ESOPHAGOSCOPY, GASTROSCOPY, DUODENOSCOPY (EGD) with biopsies by biopsy;  Surgeon: Robles Falk MD;  Location: [facility]     MYRINGOTOMY, INSERT TUBE, COMBINED Left        Social History   Substance Use  Topics     Smoking status: Never Smoker     Smokeless tobacco: Never Used     Alcohol use 0.0 oz/week     0 Standard drinks or equivalent per week      Comment: rare     Family History   Problem Relation Age of Onset     Hypertension Mother      Hyperlipidemia Mother      MENTAL ILLNESS Mother      Cancer - colorectal Father      Substance Abuse Father      CEREBROVASCULAR DISEASE Maternal Grandmother      DIABETES Paternal Grandmother      HEART DISEASE Paternal Grandfather      Bipolar Disorder Maternal Aunt      Schizophrenia Maternal Aunt      Coronary Artery Disease Maternal Grandfather          Current Outpatient Prescriptions   Medication Sig Dispense Refill     Vitamin D, Cholecalciferol, 400 UNITS CAPS Take 1,600 mg by mouth       venlafaxine (EFFEXOR-XR) 150 MG 24 hr capsule Take 1 capsule (150 mg) by mouth daily 90 capsule 3     Allergies   Allergen Reactions     Erythromycin Nausea and Vomiting     Seasonal Allergies      Molds, grass, multiple trees, plant pollen, cats, rabbits, dogs, hay.     BP Readings from Last 3 Encounters:   10/27/17 130/80   08/31/17 110/76   05/17/17 126/70    Wt Readings from Last 3 Encounters:   10/27/17 148 lb (67.1 kg)   08/31/17 148 lb (67.1 kg)   05/17/17 153 lb (69.4 kg)                  Reviewed and updated as needed this visit by clinical staffTobacco  Allergies  Meds  Med Hx  Surg Hx  Fam Hx  Soc Hx      Reviewed and updated as needed this visit by Provider         ROS:  C: NEGATIVE for fever, chills, change in weight  E/M: NEGATIVE for ear, mouth and throat problems  R: NEGATIVE for significant cough or SOB  CV: NEGATIVE for chest pain, palpitations or peripheral edema  : vaginal bumps as above.  No discharge, pain or drainage.  No urinary symptoms.     OBJECTIVE:     /80  Pulse 78  Temp 97.3  F (36.3  C) (Tympanic)  Resp 16  Wt 148 lb (67.1 kg)  LMP 09/27/2017 (Approximate)  SpO2 100%  Breastfeeding? No  BMI 23.39 kg/m2  Body mass index is 23.39  kg/(m^2).  GENERAL: healthy, alert and no distress   (female): 4 raised, flesh colored verrucous lesions on the outer labia.  Consistent with genital warts.  There are no vesicles or open lesions   Diagnostic Test Results:  none     PROCEDURE: CRYOTHERAPY:   Discussed treatment options for warts including OTC treatments, cryotherapy and surgical removal.  Patient elects cryotherapy.  All lesions are frozen with LN2 x 2 freeze/thaw cycles. Patient tolerated procedure.       ASSESSMENT/PLAN:       1. Genital warts  Return as needed for another treatment.  Also discussed topical medication options.  She is going to return for another pap smear with her next physical, she was HPV negative in 2014, but had a new sexual partner since that time.     - DESTRUCT BENIGN LESION, UP TO 14    FUTURE APPOINTMENTS:       - Follow-up for annual visit or as needed  See Patient Instructions    KYLEE Narvaez [facility]

## 2017-10-27 NOTE — MR AVS SNAPSHOT
After Visit Summary   10/27/2017    Aide Burroughs    MRN: [id number]           Patient Information     Date Of Birth          1975        Visit Information        Provider Department      10/27/2017 4:00 PM Rivka Rodrigues APRN [facility] Instructions    You should have another pap smear done early.     Genital Warts (Condyloma)          Genital warts (also called condyloma) are caused by a virus that is often transmitted sexually. This virus is known as human papillomavirus  (HPV). The warts are often so tiny that they are hard to see. But even tiny warts can cause big trouble, especially in women. Genital warts can cause cell changes that can lead to genital cancers such as cervical cancer. Warts can even be passed to babies during childbirth.  Note: Latex condoms may help protect against genital warts. But condoms don t cover all the areas that can get infected. That means condoms may not protect you completely.   What are the symptoms of genital warts?  Genital warts can be flat. Or they can be raised and look like tiny cauliflowers. They can grow on the penis, vagina, or cervix. They can also grow in and around the rectum, and even in the throat. You can have the virus for many months before the warts appear. Or you may have the virus but never develop visible warts. Once warts form, they are often too small to be noticed. That s why you need regular exams by your healthcare provider. He or she can find tiny warts and can check your cells for changes that mean the virus is present.  What is the treatment of genital warts?  Genital warts tend to grow with time. The smaller the warts are, the easier they are to remove. So don t delay. Warts are most often removed with chemicals. Sometimes they re frozen off with liquid nitrogen. Warts may also be removed with heat or laser. More than one treatment may be needed. Never try to treat genital warts yourself.  How are  genital warts prevented?  To prevent genital warts, consider getting vaccinated. Your healthcare provider can tell you if the vaccine is right for you. Also know your partner s sexual history. Even if someone doesn t have visible warts, he or she can still transmit the virus. Protect yourself by using latex condoms. And get regular medical exams. In women, regular Pap smears can help detect changes caused by genital warts and catch any signs of cervical cancer early. Also, ask your healthcare provider about the HPV vaccine.  Resources  American Social Health Association STD Hotline   513.917.3673   www.ashastd.org  Centers for Disease Control and Prevention   729.761.3593  www.cdc.gov/std   Date Last Reviewed: 1/1/2017 2000-2017 [employer]. [address] [place] [address]. All rights reserved. This information is not intended as a substitute for professional medical care. Always follow your healthcare professional's instructions.                Follow-ups after your visit        Your next 10 appointments already scheduled     Nov 06, 2017 11:00 AM CST   Return Visit with KYLE Gong   [address] [address]   [phone]            Nov 17, 2017  1:00 PM CST   Return Visit with KYLE Gong   [address] [address]   [phone]              Who to contact     If you have questions or need follow up information about today's clinic visit or your schedule please contact [facility] directly at [phone].  Normal or non-critical lab and imaging results will be communicated to you by MyChart, letter or phone within 4 business days after the clinic has received the results. If you do not hear from us within 7 days, please contact the clinic through MyChart or phone. If you  "have a critical or abnormal lab result, we will notify you by phone as soon as possible.  Submit refill requests through [employer] or call your pharmacy and they will forward the refill request to us. Please allow 3 business days for your refill to be completed.          Additional Information About Your Visit        H-carehart Information     [employer] lets you send messages to your doctor, view your test results, renew your prescriptions, schedule appointments and more. To sign up, go to [email or web address] . Click on \"Log in\" on the left side of the screen, which will take you to the Welcome page. Then click on \"Sign up Now\" on the right side of the page.     You will be asked to enter the access code listed below, as well as some personal information. Please follow the directions to create your username and password.     Your access code is: [email or web address]  Expires: 2017 12:32 PM     Your access code will  in 90 days. If you need help or a new code, please call your [place] clinic or [phone].        Care EveryWhere ID     This is your Care EveryWhere ID. This could be used by other organizations to access your [place] medical records  [id number]        Your Vitals Were     Pulse Temperature Respirations Last Period Pulse Oximetry Breastfeeding?    78 97.3  F (36.3  C) (Tympanic) 16 2017 (Approximate) 100% No    BMI (Body Mass Index)                   23.39 kg/m2            Blood Pressure from Last 3 Encounters:   10/27/17 130/80   17 110/76   17 126/70    Weight from Last 3 Encounters:   10/27/17 148 lb (67.1 kg)   17 148 lb (67.1 kg)   17 153 lb (69.4 kg)              Today, you had the following     No orders found for display       Primary Care Provider Office Phone # Fax #    KYLEE Narvaez [phone]       [address] [facility] [address]        Equal Access to Services     MAGNUS INGRAM AH: cam Casper, " lex carey yawdeni millsjuan durand ah. So [facility] [phone].    ATENCIÓN: Si dorene lea, tiene a norton disposición servicios gratuitos de asistencia lingüística. Llame al [phone].    We comply with applicable federal civil rights laws and [place] laws. We do not discriminate on the basis of race, color, national origin, age, disability, sex, sexual orientation, or gender identity.            Thank you!     Thank you for choosing [facility]  for your care. Our goal is always to provide you with excellent care. Hearing back from our patients is one way we can continue to improve our services. Please take a few minutes to complete the written survey that you may receive in the mail after your visit with us. Thank you!             Your Updated Medication List - Protect others around you: Learn how to safely use, store and throw away your medicines at www.disposemymeds.org.          This list is accurate as of: 10/27/17  4:37 PM.  Always use your most recent med list.                   Brand Name Dispense Instructions for use Diagnosis    venlafaxine 150 MG 24 hr capsule    EFFEXOR-XR    90 capsule    Take 1 capsule (150 mg) by mouth daily    Major depressive disorder, recurrent episode, in full remission (H)       Vitamin D (Cholecalciferol) 400 UNITS Caps      Take 1,600 mg by mouth

## 2017-10-27 NOTE — PATIENT INSTRUCTIONS
You should have another pap smear done early.     Genital Warts (Condyloma)          Genital warts (also called condyloma) are caused by a virus that is often transmitted sexually. This virus is known as human papillomavirus  (HPV). The warts are often so tiny that they are hard to see. But even tiny warts can cause big trouble, especially in women. Genital warts can cause cell changes that can lead to genital cancers such as cervical cancer. Warts can even be passed to babies during childbirth.  Note: Latex condoms may help protect against genital warts. But condoms don t cover all the areas that can get infected. That means condoms may not protect you completely.   What are the symptoms of genital warts?  Genital warts can be flat. Or they can be raised and look like tiny cauliflowers. They can grow on the penis, vagina, or cervix. They can also grow in and around the rectum, and even in the throat. You can have the virus for many months before the warts appear. Or you may have the virus but never develop visible warts. Once warts form, they are often too small to be noticed. That s why you need regular exams by your healthcare provider. He or she can find tiny warts and can check your cells for changes that mean the virus is present.  What is the treatment of genital warts?  Genital warts tend to grow with time. The smaller the warts are, the easier they are to remove. So don t delay. Warts are most often removed with chemicals. Sometimes they re frozen off with liquid nitrogen. Warts may also be removed with heat or laser. More than one treatment may be needed. Never try to treat genital warts yourself.  How are genital warts prevented?  To prevent genital warts, consider getting vaccinated. Your healthcare provider can tell you if the vaccine is right for you. Also know your partner s sexual history. Even if someone doesn t have visible warts, he or she can still transmit the virus. Protect yourself by using  latex condoms. And get regular medical exams. In women, regular Pap smears can help detect changes caused by genital warts and catch any signs of cervical cancer early. Also, ask your healthcare provider about the HPV vaccine.  Resources  American Social Health Association STD Hotline   415.587.8243   www.ashastd.org  Centers for Disease Control and Prevention   507.136.5934  www.cdc.gov/std   Date Last Reviewed: 1/1/2017 2000-2017 [employer]. [address] [place], [place] [address]. All rights reserved. This information is not intended as a substitute for professional medical care. Always follow your healthcare professional's instructions.

## 2017-11-06 ENCOUNTER — OFFICE VISIT (OUTPATIENT)
Dept: PSYCHOLOGY | Facility: CLINIC | Age: 42
End: 2017-11-06
Payer: COMMERCIAL

## 2017-11-06 DIAGNOSIS — F41.1 GENERALIZED ANXIETY DISORDER: ICD-10-CM

## 2017-11-06 DIAGNOSIS — F43.10 POSTTRAUMATIC STRESS DISORDER: ICD-10-CM

## 2017-11-06 DIAGNOSIS — F33.1 MAJOR DEPRESSIVE DISORDER, RECURRENT EPISODE, MODERATE (H): Primary | ICD-10-CM

## 2017-11-06 PROCEDURE — 90834 PSYTX W PT 45 MINUTES: CPT | Performed by: MARRIAGE & FAMILY THERAPIST

## 2017-11-06 NOTE — MR AVS SNAPSHOT
"                  MRN:[id number]                      After Visit Summary   2017    Aide Burroughs    MRN: [id number]           Visit Information        Provider Department      2017 11:00 AM Leeann Gandara [facility] Generic      Your next 10 appointments already scheduled     2017  1:00 PM CST   Return Visit with Leeann Gandara [facility]    [address] [address]   [phone]            Dec 13, 2017  4:00 PM CST   Return Visit with Leeann Gandara [facility]    [address] [phone] [phone]              MyChart Information     [employer] lets you send messages to your doctor, view your test results, renew your prescriptions, schedule appointments and more. To sign up, go to [email or web address] . Click on \"Log in\" on the left side of the screen, which will take you to the Welcome page. Then click on \"Sign up Now\" on the right side of the page.     You will be asked to enter the access code listed below, as well as some personal information. Please follow the directions to create your username and password.     Your access code is: [email or web address]  Expires: 2017 11:32 AM     Your access code will  in 90 days. If you need help or a new code, please call your [place] clinic or [phone].        Care EveryWhere ID     This is your Care EveryWhere ID. This could be used by other organizations to access your [place] medical records  [id number]        Equal Access to Services     MAGNUS INGRAM : Hadii kimberlee wood Sosafia, waaxda luqadaha, qaybta kaalmada sarita, lex coleman. So [facility] [phone].    ATENCIÓN: Si habla español, tiene a norton disposición servicios gratuitos de asistencia lingüística. Llame al [phone].    We comply " with applicable federal civil rights laws and [place] laws. We do not discriminate on the basis of race, color, national origin, age, disability, sex, sexual orientation, or gender identity.
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
## 2017-11-07 ENCOUNTER — FCC EXTENDED DOCUMENTATION (OUTPATIENT)
Dept: PSYCHOLOGY | Facility: CLINIC | Age: 42
End: 2017-11-07
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
## 2017-11-16 ENCOUNTER — OFFICE VISIT (OUTPATIENT)
Dept: PSYCHOLOGY | Facility: CLINIC | Age: 42
End: 2017-11-16
Payer: COMMERCIAL

## 2017-11-16 DIAGNOSIS — F41.1 GENERALIZED ANXIETY DISORDER: ICD-10-CM

## 2017-11-16 DIAGNOSIS — F33.1 MAJOR DEPRESSIVE DISORDER, RECURRENT EPISODE, MODERATE (H): Primary | ICD-10-CM

## 2017-11-16 DIAGNOSIS — F43.10 POSTTRAUMATIC STRESS DISORDER: ICD-10-CM

## 2017-11-16 PROCEDURE — 90834 PSYTX W PT 45 MINUTES: CPT | Performed by: MARRIAGE & FAMILY THERAPIST

## 2017-11-16 ASSESSMENT — ANXIETY QUESTIONNAIRES
6. BECOMING EASILY ANNOYED OR IRRITABLE: MORE THAN HALF THE DAYS
3. WORRYING TOO MUCH ABOUT DIFFERENT THINGS: MORE THAN HALF THE DAYS
5. BEING SO RESTLESS THAT IT IS HARD TO SIT STILL: MORE THAN HALF THE DAYS
7. FEELING AFRAID AS IF SOMETHING AWFUL MIGHT HAPPEN: MORE THAN HALF THE DAYS
1. FEELING NERVOUS, ANXIOUS, OR ON EDGE: NEARLY EVERY DAY
IF YOU CHECKED OFF ANY PROBLEMS ON THIS QUESTIONNAIRE, HOW DIFFICULT HAVE THESE PROBLEMS MADE IT FOR YOU TO DO YOUR WORK, TAKE CARE OF THINGS AT HOME, OR GET ALONG WITH OTHER PEOPLE: SOMEWHAT DIFFICULT
2. NOT BEING ABLE TO STOP OR CONTROL WORRYING: MORE THAN HALF THE DAYS
GAD7 TOTAL SCORE: 14

## 2017-11-16 ASSESSMENT — PATIENT HEALTH QUESTIONNAIRE - PHQ9
SUM OF ALL RESPONSES TO PHQ QUESTIONS 1-9: 16
5. POOR APPETITE OR OVEREATING: SEVERAL DAYS
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
## 2017-11-16 NOTE — MR AVS SNAPSHOT
"                  MRN:[id number]                      After Visit Summary   2017    Aide Burroughs    MRN: [id number]           Visit Information        Provider Department      2017 1:00 PM Leeann Gandara [facility]      Your next 10 appointments already scheduled     2017  1:00 PM CST   Return Visit with Leeann Gandara [facility]    [address] [address]   [phone]            Dec 13, 2017  4:00 PM CST   Return Visit with Leeann Gandara [facility]    [address] [address]   [phone]            Dec 28, 2017  9:00 AM CST   Return Visit with Leeann Gandara [facility]    [address] [address]   [phone]              MyChart Information     [employer] lets you send messages to your doctor, view your test results, renew your prescriptions, schedule appointments and more. To sign up, go to [email or web address] . Click on \"Log in\" on the left side of the screen, which will take you to the Welcome page. Then click on \"Sign up Now\" on the right side of the page.     You will be asked to enter the access code listed below, as well as some personal information. Please follow the directions to create your username and password.     Your access code is: [email or web address]  Expires: 2017 11:32 AM     Your access code will  in 90 days. If you need help or a new code, please call your [place] clinic or [phone].        Care EveryWhere ID     This is your Care EveryWhere ID. This could be used by other organizations to access your [place] medical records  [id number]        Equal Access to Services     MAGNUS INGRAM AH: Maria Alejandra Moore, cam conde, qaybta fior " lex stein ah. So [facility] [phone].    ATENCIÓN: Si habla español, tiene a norton disposición servicios gratuitos de asistencia lingüística. Llame al [phone].    We comply with applicable federal civil rights laws and [place] laws. We do not discriminate on the basis of race, color, national origin, age, disability, sex, sexual orientation, or gender identity.

## 2017-11-17 ASSESSMENT — ANXIETY QUESTIONNAIRES: GAD7 TOTAL SCORE: 14

## 2017-11-21 ENCOUNTER — OFFICE VISIT (OUTPATIENT)
Dept: PSYCHIATRY | Facility: CLINIC | Age: 42
End: 2017-11-21
Payer: COMMERCIAL

## 2017-11-21 DIAGNOSIS — F33.1 MAJOR DEPRESSIVE DISORDER, RECURRENT EPISODE, MODERATE (H): Primary | ICD-10-CM

## 2017-11-21 PROCEDURE — 99214 OFFICE O/P EST MOD 30 MIN: CPT | Performed by: PSYCHIATRY & NEUROLOGY

## 2017-11-21 RX ORDER — VENLAFAXINE HYDROCHLORIDE 75 MG/1
CAPSULE, EXTENDED RELEASE ORAL
Qty: 90 CAPSULE | Refills: 2 | Status: SHIPPED | OUTPATIENT
Start: 2017-11-21 | End: 2017-12-01 | Stop reason: DRUGHIGH

## 2017-11-21 ASSESSMENT — PATIENT HEALTH QUESTIONNAIRE - PHQ9
5. POOR APPETITE OR OVEREATING: MORE THAN HALF THE DAYS
SUM OF ALL RESPONSES TO PHQ QUESTIONS 1-9: 20

## 2017-11-21 ASSESSMENT — ANXIETY QUESTIONNAIRES
1. FEELING NERVOUS, ANXIOUS, OR ON EDGE: MORE THAN HALF THE DAYS
5. BEING SO RESTLESS THAT IT IS HARD TO SIT STILL: SEVERAL DAYS
3. WORRYING TOO MUCH ABOUT DIFFERENT THINGS: SEVERAL DAYS
2. NOT BEING ABLE TO STOP OR CONTROL WORRYING: MORE THAN HALF THE DAYS
7. FEELING AFRAID AS IF SOMETHING AWFUL MIGHT HAPPEN: SEVERAL DAYS
6. BECOMING EASILY ANNOYED OR IRRITABLE: NEARLY EVERY DAY
GAD7 TOTAL SCORE: 12

## 2017-11-21 NOTE — PATIENT INSTRUCTIONS
Treatment Plan:  Increase Effexor (venlafaxine) XR to 225 mg. Call for increase to 300 mg   Fax FLMA forms as needed   Supplementation with vitamin D was suggested and information was provided regarding this.   Safety plan was reviewed; to the ER as needed or call after hours crisis line; [phone]  Education and counseling was done regarding use of medications, psychotherapy options  Call for a follow up appointment with Dr. Delvalle in January; [phone].

## 2017-11-21 NOTE — PROGRESS NOTES
"          Psychiatric Progress Note    Name: Aide Burroughs  Date: 11/21/2017  Length of Visit: 30 minutes  MRN: [id number]      Current Outpatient Prescriptions   Medication Sig     Vitamin D, Cholecalciferol, 400 UNITS CAPS Take 1,600 mg by mouth     venlafaxine (EFFEXOR-XR) 150 MG 24 hr capsule Take 1 capsule (150 mg) by mouth daily     No current facility-administered medications for this visit.        Therapist:  Leeann Gandara     PHQ-9:  PHQ-9 SCORE 10/23/2017 11/16/2017 11/21/2017   Total Score - - -   Total Score 10 16 20       SANJAY-7:  SANJAY-7 SCORE 10/23/2017 11/16/2017 11/21/2017   Total Score - - -   Total Score 8 14 12       Interim History:  \"Things are getting bad again\" (as she comes in crying). Her ex- (but, they had been back together for a year) told her in August that he didn't want to get  again.   They are still living together with the kids 13 and 17. As opposed to when I saw her this spring she is physically feeling terrible; as bad as when they were first .  Psychotherapy is somewhat helpful in discussing the dynamics of what's going on.    Impression: from eval 5/19  Over the past year or so she has been very stable on Effexor and at this point I think we can try a dose reduction.  She has had several episodes of depression and a strong family history; she should probably stay on something lifelong.     Treatment Plan:  May drop Effexor (venlafaxine) to 150 mg per day, go back to 225 as needed   Supplementation with vitamin D was suggested and information was provided regarding this.   Safety plan was reviewed; to the ER as needed or call after hours crisis line; [phone]  Education and counseling was done regarding use of medications, psychotherapy options  Follow up with Rivka Rodrigues for medication management and refills.      The patient is being returned to the referring provider for ongoing care and medication prescribing.  The patient can be referred back to this " "service for further consultation as needed.     Past Medical History:   Diagnosis Date     Generalized anxiety disorder      MDD (major depressive disorder), recurrent, severe, with psychosis (H)        10 point ROS was performed and is negative except for \"aches all over\", chronic HAs.       Mental Status Assessment:  Appearance:  Well groomed     Behavior/relationship to examiner/demeanor:  Cooperative, tearful much of the visit   Motor activity:  Normal  Gait:  Normal   Speech:  Normal in volume, articulation, coherence   Mood (subjective report):  \"down\"  Affect (objective appearance):  Mood congruent  Thought Process (Associations):  Logical, linear and goal directed  Thought content:  No evidence of suicidal or homicidal ideation,          no overt psychosis and                    patient does not appear to be responding to internal stimuli  Oriented to person, place, date/time   Attention Span and concentration: Intact   Memory:  Short-term memory intact and Long-term memory; Intact  Language:  Fluent   Fund of Knowledge/Intelligence:  Average  Use of language: Intact   Abstraction:  Normal  Insight:  Adequate  Judgment:  Adequate for safety    DSM-5 DIAGNOSIS:  296.36 Major Depressive Disorder, Recurrent Episode, In full remission _  300.00 (F41.9) Unspecified Anxiety Disorder     Vitamin D Deficiency Screening Results:  No results found for: VITDT     Assessment:  Her Effexor was decreased from 225 to150 mg this spring when I saw her and she was doing well; obviously she will now need to go back up on the dose and continue with psychotherapy   This time of year she should increase vitamin D .    Treatment Plan:  Increase Effexor (venlafaxine) XR to 225 mg. Call for increase to 300 mg   Fax FLMA forms as needed   Supplementation with vitamin D was suggested and information was provided regarding this.   Safety plan was reviewed; to the ER as needed or call after hours crisis line; [phone]  Education and " counseling was done regarding use of medications, psychotherapy options  Call for a follow up appointment with Dr. Delvalle in January; [phone].     It is anticipated that the patient will be seen for 2-3 visits for stabilization.    Signed: Cole Delvalle MD

## 2017-11-21 NOTE — MR AVS SNAPSHOT
"                  MRN:[id number]                      After Visit Summary   11/21/2017    Aide Burroughs    MRN: [id number]           Visit Information        Provider Department      11/21/2017 9:20 AM Cole Delvalle MD [facility]        Your next 10 appointments already scheduled     Nov 30, 2017  1:00 PM CST   Return Visit with Leeann MAC Nichol [facility] ([facility])    [address] [address]   [phone]            Dec 13, 2017  4:00 PM CST   Return Visit with Leeann WATTS Adelajulian [facility] ([facility])    [address] [facility] [address]  [facility] [address]   [phone]            Dec 28, 2017  9:00 AM CST   Return Visit with Leeann Gandara [facility] ([facility])    [address] [address]   [phone]              Care Instructions        Treatment Plan:  Increase Effexor (venlafaxine) XR to 225 mg. Call for increase to 300 mg   Fax FLMA forms as needed   Supplementation with vitamin D was suggested and information was provided regarding this.   Safety plan was reviewed; to the ER as needed or call after hours crisis line; [phone]  Education and counseling was done regarding use of medications, psychotherapy options  Call for a follow up appointment with Dr. Delvalle in January; [phone].          [employer] Information     [employer] lets you send messages to your doctor, view your test results, renew your prescriptions, schedule appointments and more. To sign up, go to [email or web address] . Click on \"Log in\" on the left side of the screen, which will take you to the Welcome page. Then click on \"Sign up Now\" on the right side of the page.     You will be asked to enter the access code listed below, as well as some personal information. Please follow the directions to create your " username and password.     Your access code is: [email or web address]  Expires: 2017 11:32 AM     Your access code will  in 90 days. If you need help or a new code, please call your [place] clinic or [phone].        Care EveryWhere ID     This is your Care EveryWhere ID. This could be used by other organizations to access your [place] medical records  [id number]        Equal Access to Services     MAGNUS INGRAM : Hadii kimberlee kuoo Sosafia, waaxda luqadaha, qaybta kaalmada adeiam, lex durand . So [facility] [phone].    ATENCIÓN: Si habla español, tiene a norton disposición servicios gratuitos de asistencia lingüística. Llame al [phone].    We comply with applicable federal civil rights laws and [place] laws. We do not discriminate on the basis of race, color, national origin, age, disability, sex, sexual orientation, or gender identity.

## 2017-11-22 ASSESSMENT — ANXIETY QUESTIONNAIRES: GAD7 TOTAL SCORE: 12

## 2017-11-29 ENCOUNTER — TELEPHONE (OUTPATIENT)
Dept: PSYCHIATRY | Facility: CLINIC | Age: 42
End: 2017-11-29

## 2017-11-29 DIAGNOSIS — F33.42 MAJOR DEPRESSIVE DISORDER, RECURRENT EPISODE, IN FULL REMISSION (H): ICD-10-CM

## 2017-11-29 RX ORDER — VENLAFAXINE HYDROCHLORIDE 150 MG/1
300 CAPSULE, EXTENDED RELEASE ORAL DAILY
Qty: 60 CAPSULE | Refills: 1 | Status: SHIPPED | OUTPATIENT
Start: 2017-11-29 | End: 2017-12-01

## 2017-11-29 NOTE — TELEPHONE ENCOUNTER
Reason for call:  Other   Patient called regarding (reason for call): call back  Additional comments: Client is wondering the status for her FMLA paperwork that she faxed as well as wondering if she can get an increase on her meds that she talked with Dr. Delvalle about at the appointment.  Please call her.        Phone number to reach patient:  Home number on file [phone] (home)    Best Time:  ASAP    Can we leave a detailed message on this number?  YES

## 2017-11-29 NOTE — TELEPHONE ENCOUNTER
Effexor increased per treatment plan. Forms were received in provider's clinic. Patient will need a call about the status of her forms.     From 11/21/2017:  Assessment:  Her Effexor was decreased from 225 to150 mg this spring when I saw her and she was doing well; obviously she will now need to go back up on the dose and continue with psychotherapy   This time of year she should increase vitamin D .     Treatment Plan:  Increase Effexor (venlafaxine) XR to 225 mg. Call for increase to 300 mg   Fax FLMA forms as needed   Supplementation with vitamin D was suggested and information was provided regarding this.   Safety plan was reviewed; to the ER as needed or call after hours crisis line; [phone]  Education and counseling was done regarding use of medications, psychotherapy options  Call for a follow up appointment with Dr. Delvalle in January; [phone].      It is anticipated that the patient will be seen for 2-3 visits for stabilization.     Signed:                     Cole Delvalle MD

## 2017-11-30 ENCOUNTER — TELEPHONE (OUTPATIENT)
Dept: PSYCHIATRY | Facility: CLINIC | Age: 42
End: 2017-11-30

## 2017-11-30 ENCOUNTER — OFFICE VISIT (OUTPATIENT)
Dept: PSYCHOLOGY | Facility: CLINIC | Age: 42
End: 2017-11-30
Payer: COMMERCIAL

## 2017-11-30 DIAGNOSIS — F41.1 GENERALIZED ANXIETY DISORDER: ICD-10-CM

## 2017-11-30 DIAGNOSIS — F33.1 MAJOR DEPRESSIVE DISORDER, RECURRENT EPISODE, MODERATE (H): Primary | ICD-10-CM

## 2017-11-30 DIAGNOSIS — F43.10 POSTTRAUMATIC STRESS DISORDER: ICD-10-CM

## 2017-11-30 PROCEDURE — 90834 PSYTX W PT 45 MINUTES: CPT | Performed by: MARRIAGE & FAMILY THERAPIST

## 2017-11-30 ASSESSMENT — ANXIETY QUESTIONNAIRES
2. NOT BEING ABLE TO STOP OR CONTROL WORRYING: MORE THAN HALF THE DAYS
GAD7 TOTAL SCORE: 15
1. FEELING NERVOUS, ANXIOUS, OR ON EDGE: MORE THAN HALF THE DAYS
5. BEING SO RESTLESS THAT IT IS HARD TO SIT STILL: MORE THAN HALF THE DAYS
3. WORRYING TOO MUCH ABOUT DIFFERENT THINGS: SEVERAL DAYS
IF YOU CHECKED OFF ANY PROBLEMS ON THIS QUESTIONNAIRE, HOW DIFFICULT HAVE THESE PROBLEMS MADE IT FOR YOU TO DO YOUR WORK, TAKE CARE OF THINGS AT HOME, OR GET ALONG WITH OTHER PEOPLE: VERY DIFFICULT
7. FEELING AFRAID AS IF SOMETHING AWFUL MIGHT HAPPEN: MORE THAN HALF THE DAYS
6. BECOMING EASILY ANNOYED OR IRRITABLE: NEARLY EVERY DAY

## 2017-11-30 ASSESSMENT — PATIENT HEALTH QUESTIONNAIRE - PHQ9
SUM OF ALL RESPONSES TO PHQ QUESTIONS 1-9: 19
5. POOR APPETITE OR OVEREATING: NEARLY EVERY DAY

## 2017-11-30 NOTE — TELEPHONE ENCOUNTER
I finally got around to addressing the FMLA form and called Aide. As it turns out, Leeann Gandara was able complete them this morning; is all taken care of  She said that she is slowly feeling better
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
## 2017-11-30 NOTE — MR AVS SNAPSHOT
"                  MRN:[id number]                      After Visit Summary   2017    Aide Burroughs    MRN: [id number]           Visit Information        Provider Department      2017 9:00 AM Ad[facility]arosanneLeeann [facility]      Your next 10 appointments already scheduled     Dec 13, 2017  4:00 PM CST   Return Visit with Leeann Gandara [facility] ([facility])    [address] [id number] [phone]            Dec 28, 2017  9:00 AM CST   Return Visit with Leeann Gandara [facility] ([facility])    [address] [id number] [phone]              MyChart Information     [employer] lets you send messages to your doctor, view your test results, renew your prescriptions, schedule appointments and more. To sign up, go to [email or web address] . Click on \"Log in\" on the left side of the screen, which will take you to the Welcome page. Then click on \"Sign up Now\" on the right side of the page.     You will be asked to enter the access code listed below, as well as some personal information. Please follow the directions to create your username and password.     Your access code is: [email or web address]  Expires: 2018 12:15 PM     Your access code will  in 90 days. If you need help or a new code, please call your [place] clinic or [phone].        Care EveryWhere ID     This is your Care EveryWhere ID. This could be used by other organizations to access your [place] medical records  [id number]        Equal Access to Services     MAGNUS INGRAM : Hadii kimberlee wood Sosafia, waaxda luqadaha, qaybta kaalmada sarita, lex coleman. So [facility] [phone].    ATENCIÓN: Si habla español, tiene a norton disposición servicios gratuitos de asistencia lingüística. Llame al [phone].    We comply " with applicable federal civil rights laws and [place] laws. We do not discriminate on the basis of race, color, national origin, age, disability, sex, sexual orientation, or gender identity.

## 2017-11-30 NOTE — TELEPHONE ENCOUNTER
PA needed for Effexor.  Sent to Yes[facility] Branch to complete.    Writer called and LVM for Pt informing of the PA and that provider has paperwork that isn't completed yet.    Krishna Mi RN

## 2017-12-01 ENCOUNTER — TELEPHONE (OUTPATIENT)
Dept: PSYCHIATRY | Facility: CLINIC | Age: 42
End: 2017-12-01

## 2017-12-01 DIAGNOSIS — F33.42 MAJOR DEPRESSIVE DISORDER, RECURRENT EPISODE, IN FULL REMISSION (H): ICD-10-CM

## 2017-12-01 RX ORDER — VENLAFAXINE HYDROCHLORIDE 225 MG/1
225 TABLET, EXTENDED RELEASE ORAL DAILY
Qty: 30 TABLET | Refills: 1 | Status: SHIPPED | OUTPATIENT
Start: 2017-12-01 | End: 2018-02-12

## 2017-12-01 RX ORDER — VENLAFAXINE HYDROCHLORIDE 75 MG/1
75 CAPSULE, EXTENDED RELEASE ORAL DAILY
Qty: 30 CAPSULE | Refills: 1 | Status: SHIPPED | OUTPATIENT
Start: 2017-12-01 | End: 2018-02-12

## 2017-12-01 ASSESSMENT — ANXIETY QUESTIONNAIRES: GAD7 TOTAL SCORE: 15

## 2017-12-28 ENCOUNTER — OFFICE VISIT (OUTPATIENT)
Dept: PSYCHOLOGY | Facility: CLINIC | Age: 42
End: 2017-12-28
Payer: COMMERCIAL

## 2017-12-28 DIAGNOSIS — F41.1 GENERALIZED ANXIETY DISORDER: ICD-10-CM

## 2017-12-28 DIAGNOSIS — F33.1 MAJOR DEPRESSIVE DISORDER, RECURRENT EPISODE, MODERATE (H): Primary | ICD-10-CM

## 2017-12-28 PROCEDURE — 90834 PSYTX W PT 45 MINUTES: CPT | Performed by: MARRIAGE & FAMILY THERAPIST

## 2017-12-28 NOTE — MR AVS SNAPSHOT
"                  MRN:[id number]                      After Visit Summary   2017    Aide Burroughs    MRN: [id number]           Visit Information        Provider Department      2017 9:00 AM Leeann Gandara TH [facility] Generic      Your next 10 appointments already scheduled     2018  7:20 AM CST   PHYSICAL with KYLEE Narvaez CNP   [facility] ([facility]    [address] [facility] [id number] [phone]            2018 12:00 PM CST   Return Visit with KYLE Gong   [facility] ([facility])    [address] [id number] [phone]              MyChart Information     [employer] lets you send messages to your doctor, view your test results, renew your prescriptions, schedule appointments and more. To sign up, go to [email or web address] . Click on \"Log in\" on the left side of the screen, which will take you to the Welcome page. Then click on \"Sign up Now\" on the right side of the page.     You will be asked to enter the access code listed below, as well as some personal information. Please follow the directions to create your username and password.     Your access code is: [email or web address]  Expires: 2018 12:15 PM     Your access code will  in 90 days. If you need help or a new code, please call your [place] clinic or [phone].        Care EveryWhere ID     This is your Care EveryWhere ID. This could be used by other organizations to access your [place] medical records  [id number]        Equal Access to Services     MAGNUS INGRAM : Hadquincy Moore, wachavez conde, qacarolina kaallex cerda. So [facility] [phone].    ATENCIÓN: Si habla español, tiene a norton disposición servicios gratuitos de asistencia lingüística. Llame al [phone].    We comply with applicable federal " civil rights laws and [place] laws. We do not discriminate on the basis of race, color, national origin, age, disability, sex, sexual orientation, or gender identity.
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
## 2018-01-05 ENCOUNTER — OFFICE VISIT (OUTPATIENT)
Dept: FAMILY MEDICINE | Facility: OTHER | Age: 43
End: 2018-01-05
Payer: COMMERCIAL

## 2018-01-05 VITALS
HEART RATE: 87 BPM | RESPIRATION RATE: 20 BRPM | SYSTOLIC BLOOD PRESSURE: 114 MMHG | BODY MASS INDEX: 23.23 KG/M2 | HEIGHT: 67 IN | WEIGHT: 148 LBS | DIASTOLIC BLOOD PRESSURE: 80 MMHG | TEMPERATURE: 97.5 F | OXYGEN SATURATION: 100 %

## 2018-01-05 DIAGNOSIS — Z00.00 ROUTINE GENERAL MEDICAL EXAMINATION AT A HEALTH CARE FACILITY: Primary | ICD-10-CM

## 2018-01-05 DIAGNOSIS — Z13.1 SCREENING FOR DIABETES MELLITUS: ICD-10-CM

## 2018-01-05 DIAGNOSIS — E78.5 HYPERLIPIDEMIA LDL GOAL <160: ICD-10-CM

## 2018-01-05 DIAGNOSIS — Z12.4 SCREENING FOR CERVICAL CANCER: ICD-10-CM

## 2018-01-05 DIAGNOSIS — H91.92 HEARING LOSS OF LEFT EAR, UNSPECIFIED HEARING LOSS TYPE: ICD-10-CM

## 2018-01-05 DIAGNOSIS — Z12.31 ENCOUNTER FOR SCREENING MAMMOGRAM FOR BREAST CANCER: ICD-10-CM

## 2018-01-05 LAB
CHOLEST SERPL-MCNC: 244 MG/DL
GLUCOSE SERPL-MCNC: 99 MG/DL (ref 70–99)
HDLC SERPL-MCNC: 40 MG/DL
LDLC SERPL CALC-MCNC: 161 MG/DL
NONHDLC SERPL-MCNC: 204 MG/DL
TRIGL SERPL-MCNC: 215 MG/DL

## 2018-01-05 PROCEDURE — 36415 COLL VENOUS BLD VENIPUNCTURE: CPT | Performed by: NURSE PRACTITIONER

## 2018-01-05 PROCEDURE — 82947 ASSAY GLUCOSE BLOOD QUANT: CPT | Performed by: NURSE PRACTITIONER

## 2018-01-05 PROCEDURE — 99396 PREV VISIT EST AGE 40-64: CPT | Performed by: NURSE PRACTITIONER

## 2018-01-05 PROCEDURE — G0145 SCR C/V CYTO,THINLAYER,RESCR: HCPCS | Performed by: NURSE PRACTITIONER

## 2018-01-05 PROCEDURE — 87624 HPV HI-RISK TYP POOLED RSLT: CPT | Performed by: NURSE PRACTITIONER

## 2018-01-05 PROCEDURE — 80061 LIPID PANEL: CPT | Performed by: NURSE PRACTITIONER

## 2018-01-05 NOTE — PROGRESS NOTES
SUBJECTIVE:   CC: Aide Burroughs is an 42 year old woman who presents for preventive health visit.     Physical   Annual:     Getting at least 3 servings of Calcium per day::  NO    Bi-annual eye exam::  Yes    Dental care twice a year::  Yes    Sleep apnea or symptoms of sleep apnea::  None    Diet::  Regular (no restrictions)    Frequency of exercise::  2-3 days/week    Duration of exercise::  Greater than 60 minutes    Taking medications regularly::  Yes    Medication side effects::  Muscle aches, Lightheadedness and Other    Additional concerns today::  YES (hearing loss)          Hearing Problem     Hearing loss in left hear - ongoing issue since she was a child.  She has had many ear infections in that hear and multiple surgeries - tubes, TM reconstruction x 2.  This is getting worse and she wants to see if there is anything she can do about it.       Today's PHQ-2 Score: PHQ-2 ( 1999 Pf[employer]) 1/5/2018   Q1: Little interest or pleasure in doing things 1   Q2: Feeling down, depressed or hopeless 1   PHQ-2 Score 2   Q1: Little interest or pleasure in doing things Several days   Q2: Feeling down, depressed or hopeless Several days   PHQ-2 Score 2       Abuse: Current or Past(Physical, Sexual or Emotional)- No  Do you feel safe in your environment - Yes    Social History   Substance Use Topics     Smoking status: Never Smoker     Smokeless tobacco: Never Used     Alcohol use 0.0 oz/week     0 Standard drinks or equivalent per week      Comment: rare     Alcohol Use 1/5/2018   If you drink alcohol, do you typically have greater than 3 drinks per day OR greater than 7 drinks per week?   No       Reviewed orders with patient.  Reviewed health maintenance and updated orders accordingly - Yes  Labs reviewed in EPIC  BP Readings from Last 3 Encounters:   01/05/18 114/80   10/27/17 130/80   08/31/17 110/76    Wt Readings from Last 3 Encounters:   01/05/18 148 lb (67.1 kg)   10/27/17 148 lb (67.1 kg)   08/31/17 148 lb  (67.1 kg)                  Patient Active Problem List   Diagnosis     Seasonal allergic rhinitis     Mild major depression (H)     Generalized anxiety disorder     Transient global amnesia     Fracture of great toe     Hyperlipidemia LDL goal <160     Major depressive disorder, recurrent episode, severe (H)     Depressive episode     Severe major depression with psychotic features (H)     Panic disorder with agoraphobia     PTSD (post-traumatic stress disorder)     Suicidal ideation     Genital warts     Past Surgical History:   Procedure Laterality Date     ESOPHAGOSCOPY, GASTROSCOPY, DUODENOSCOPY (EGD), COMBINED N/A 5/15/2017    Procedure: COMBINED ESOPHAGOSCOPY, GASTROSCOPY, DUODENOSCOPY (EGD), BIOPSY SINGLE OR MULTIPLE;  ESOPHAGOSCOPY, GASTROSCOPY, DUODENOSCOPY (EGD) with biopsies by biopsy;  Surgeon: Robles Falk MD;  Location: [facility]     MYRINGOTOMY, INSERT TUBE, COMBINED Left        Social History   Substance Use Topics     Smoking status: Never Smoker     Smokeless tobacco: Never Used     Alcohol use 0.0 oz/week     0 Standard drinks or equivalent per week      Comment: rare     Family History   Problem Relation Age of Onset     Hypertension Mother      Hyperlipidemia Mother      MENTAL ILLNESS Mother      Cancer - colorectal Father      Substance Abuse Father      CEREBROVASCULAR DISEASE Maternal Grandmother      DIABETES Paternal Grandmother      HEART DISEASE Paternal Grandfather      Bipolar Disorder Maternal Aunt      Schizophrenia Maternal Aunt      Coronary Artery Disease Maternal Grandfather          Current Outpatient Prescriptions   Medication Sig Dispense Refill     cholecalciferol (VITAMIN D3) 5000 UNITS CAPS capsule Take by mouth daily       venlafaxine (EFFEXOR-ER) 225 MG TB24 24 hr tablet Take 1 tablet (225 mg) by mouth daily 30 tablet 1     venlafaxine (EFFEXOR-XR) 75 MG 24 hr capsule Take 1 capsule (75 mg) by mouth daily 30 capsule 1     Allergies   Allergen Reactions      Erythromycin Nausea and Vomiting     Seasonal Allergies      Molds, grass, multiple trees, plant pollen, cats, rabbits, dogs, hay.           Patient under age 50, mutual decision reflected in health maintenance.        Pertinent mammograms are reviewed under the imaging tab.  History of abnormal Pap smear: NO - age 30-65 PAP every 5 years with negative HPV co-testing recommended    She has had a new sexual partner and a STD since her last pap in 2014 so will repeat pap screening today.     Reviewed and updated as needed this visit by clinical staffTobacco  Allergies  Meds  Med Hx       Reviewed and updated as needed this visit by Provider        Past Medical History:   Diagnosis Date     Generalized anxiety disorder      MDD (major depressive disorder), recurrent, severe, with psychosis (H)       Past Surgical History:   Procedure Laterality Date     ESOPHAGOSCOPY, GASTROSCOPY, DUODENOSCOPY (EGD), COMBINED N/A 5/15/2017    Procedure: COMBINED ESOPHAGOSCOPY, GASTROSCOPY, DUODENOSCOPY (EGD), BIOPSY SINGLE OR MULTIPLE;  ESOPHAGOSCOPY, GASTROSCOPY, DUODENOSCOPY (EGD) with biopsies by biopsy;  Surgeon: Robles Falk MD;  Location: [facility]     MYRINGOTOMY, INSERT TUBE, COMBINED Left        Review of Systems  C: NEGATIVE for fever, chills, change in weight  I: NEGATIVE for worrisome rashes, moles or lesions  E: NEGATIVE for vision changes or irritation  ENT: left side hearing loss as above   R: NEGATIVE for significant cough or SOB  B: NEGATIVE for masses, tenderness or discharge  CV: NEGATIVE for chest pain, palpitations or peripheral edema  GI: NEGATIVE for nausea, abdominal pain, heartburn, or change in bowel habits  : NEGATIVE for unusual urinary or vaginal symptoms. Periods are regular.  M: NEGATIVE for significant arthralgias or myalgia  N: NEGATIVE for weakness, dizziness or paresthesias  P: NEGATIVE for changes in mood or affect     OBJECTIVE:   /80  Pulse 87  Temp 97.5  F (36.4  C) (Tympanic)   "Resp 20  Ht 5' 6.7\" (1.694 m)  Wt 148 lb (67.1 kg)  LMP 12/26/2017  SpO2 100%  Breastfeeding? No  BMI 23.39 kg/m2  Physical Exam  GENERAL: healthy, alert and no distress  EYES: Eyes grossly normal to inspection, PERRL and conjunctivae and sclerae normal  HENT: normal cephalic/atraumatic, right ear: normal: no effusions, no erythema, normal landmarks, left ear: abnormal TM - lots of scar tissue and abnormal shape, no erythema or fluid noted, nose and mouth without ulcers or lesions, oropharynx clear and oral mucous membranes moist  NECK: no adenopathy, no asymmetry, masses, or scars and thyroid normal to palpation  RESP: lungs clear to auscultation - no rales, rhonchi or wheezes  BREAST: normal without masses, tenderness or nipple discharge and no palpable axillary masses or adenopathy  CV: regular rate and rhythm, normal S1 S2, no S3 or S4, no murmur, click or rub, no peripheral edema and peripheral pulses strong  ABDOMEN: soft, nontender, no hepatosplenomegaly, no masses and bowel sounds normal   (female): normal female external genitalia, normal urethral meatus, vaginal mucosa pink, moist, well rugated, and normal cervix/adnexa/uterus without masses or discharge  MS: no gross musculoskeletal defects noted, no edema  SKIN: no suspicious lesions or rashes  NEURO: Normal strength and tone, mentation intact and speech normal  PSYCH: mentation appears normal, affect normal/bright    ASSESSMENT/PLAN:   1. Routine general medical examination at a health care facility    2. Screening for cervical cancer  - Pap imaged thin layer screen with HPV - recommended age 30 - 65 years (select HPV order below)  - HPV High Risk Types DNA Cervical    3. Screening for diabetes mellitus  - GLUCOSE    4. Hyperlipidemia LDL goal <160  - Lipid panel reflex to direct LDL Fasting    5. Hearing loss of left ear, unspecified hearing loss type  Will refer to audiology and ENT.  Her TM is scarred and very abnormal in appearance.   - " "OTOLARYNGOLOGY REFERRAL  - AUDIOLOGY ADULT REFERRAL    6. Encounter for screening mammogram for breast cancer  - *MA Screening Digital Bilateral; Future    COUNSELING:  Reviewed preventive health counseling, as reflected in patient instructions       Regular exercise       Healthy diet/nutrition       Hearing screening       Immunizations    Declined: Influenza due to Conscientious objector                 reports that she has never smoked. She has never used smokeless tobacco.    Estimated body mass index is 23.39 kg/(m^2) as calculated from the following:    Height as of this encounter: 5' 6.7\" (1.694 m).    Weight as of this encounter: 148 lb (67.1 kg).         Counseling Resources:  ATP IV Guidelines  Pooled Cohorts Equation Calculator  Breast Cancer Risk Calculator  FRAX Risk Assessment  ICSI Preventive Guidelines  Dietary Guidelines for Americans, 2010  USDA's MyPlate  ASA Prophylaxis  Lung CA Screening    KYLEE Narvaez [facility] [place]  "

## 2018-01-05 NOTE — LETTER
January 15, 2018    Aide Manjeet  [address]   [place] [address]    Dear Aide,  We are happy to inform you that your PAP smear result from 1/5/18 is normal.  We are now able to do a follow up test on PAP smears. The DNA test is for HPV (Human Papilloma Virus). Cervical cancer is closely linked with certain types of HPV. Your result showed no evidence of high risk HPV.  Therefore we recommend you return in 5 years for your next pap smear and HPV test.  You will still need to return to the clinic every year for an annual exam and other preventive tests.  Please contact the clinic at [phone] with any questions.  Sincerely,    KYLEE Narvaez CNP/rlm

## 2018-01-05 NOTE — PROGRESS NOTES
Letter sent to patient informing of recent lab results.  ................Moe Gonzalez LPN,   January 5, 2018,      4:13 PM,   [facility]

## 2018-01-05 NOTE — MR AVS SNAPSHOT
After Visit Summary   1/5/2018    Aide Burroughs    MRN: [id number]           Patient Information     Date Of Birth          1975        Visit Information        Provider Department      1/5/2018 7:20 AM Rivka Rodrigues APRN [facility]        Today's Diagnoses     Routine general medical examination at a health care facility    -  1    Screening for cervical cancer        Screening for diabetes mellitus        Hyperlipidemia LDL goal <160        Hearing loss of left ear, unspecified hearing loss type          Care Instructions    Labs will be done today.  I will call or send a letter with results within the next 1-2 weeks.      The results of the pap test take about 2 weeks to come back.  We will send a letter with these results and the recommendations for further pap tests in the future.     Schedule with ENT and audiology in [place].         Preventive Health Recommendations  Female Ages 40 to 49    Yearly exam:     See your health care provider every year in order to  1. Review health changes.   2. Discuss preventive care.    3. Review your medicines if your doctor prescribed any.      Get a Pap test every three years (unless you have an abnormal result and your provider advises testing more often).      If you get Pap tests with HPV test, you only need to test every 5 years, unless you have an abnormal result. You do not need a Pap test if your uterus was removed (hysterectomy) and you have not had cancer.      You should be tested each year for STDs (sexually transmitted diseases), if you're at risk.       Ask your doctor if you should have a mammogram.      Have a colonoscopy (test for colon cancer) if someone in your family has had colon cancer or polyps before age 50.       Have a cholesterol test every 5 years.       Have a diabetes test (fasting glucose) after age 45. If you are at risk for diabetes, you should have this test every 3 years.    Shots: Get a flu shot  each year. Get a tetanus shot every 10 years.     Nutrition:     Eat at least 5 servings of fruits and vegetables each day.    Eat whole-grain bread, whole-wheat pasta and brown rice instead of white grains and rice.    Talk to your provider about Calcium and Vitamin D.     Lifestyle    Exercise at least 150 minutes a week (an average of 30 minutes a day, 5 days a week). This will help you control your weight and prevent disease.    Limit alcohol to one drink per day.    No smoking.     Wear sunscreen to prevent skin cancer.    See your dentist every six months for an exam and cleaning.          Follow-ups after your visit        Additional Services     AUDIOLOGY ADULT REFERRAL       Your provider [place] you [place] [facility] [place] [facility] [place] [place] [phone]   [email or web address]    Specialty Testing:  Audiogram w/Tymps and Reflexes (Comprehensive Audiology Evaluation)            OTOLARYNGOLOGY REFERRAL       Your provider has referred you to: [facility] [place] [facility] [place], [place] [phone]   [email or web address]    Please be aware that coverage of these services is subject to the terms and limitations of your health insurance plan.  Call member services at your health plan with any benefit or coverage questions.      Please bring the following with you to your appointment:    (1) Any X-Rays, CTs or MRIs which have been performed.  Contact the facility where they were done to arrange for  prior to your scheduled appointment.   (2) List of current medications  (3) This referral request   (4) Any documents/labs given to you for this referral                  Your next 10 appointments already scheduled     Jan 17, 2018 12:00 PM CST   Return Visit with Leeann Gandara [facility] ([facility])    [address]  [facility] [place] [address] [phone]              Who to  "contact     If you have questions or need follow up information about today's clinic visit or your schedule please contact [facility] directly at [phone].  Normal or non-critical lab and imaging results will be communicated to you by MyChart, letter or phone within 4 business days after the clinic has received the results. If you do not hear from us within 7 days, please contact the clinic through MyChart or phone. If you have a critical or abnormal lab result, we will notify you by phone as soon as possible.  Submit refill requests through MyToons or call your pharmacy and they will forward the refill request to us. Please allow 3 business days for your refill to be completed.          Additional Information About Your Visit        View[employer] Information     MyToons lets you send messages to your doctor, view your test results, renew your prescriptions, schedule appointments and more. To sign up, go to [email or web address] . Click on \"Log in\" on the left side of the screen, which will take you to the Welcome page. Then click on \"Sign up Now\" on the right side of the page.     You will be asked to enter the access code listed below, as well as some personal information. Please follow the directions to create your username and password.     Your access code is: [email or web address]  Expires: 2018 12:15 PM     Your access code will  in 90 days. If you need help or a new code, please call your [place] clinic or [phone].        Care EveryWhere ID     This is your Care EveryWhere ID. This could be used by other organizations to access your [place] medical records  [id number]        Your Vitals Were     Pulse Temperature Respirations Height Last Period Pulse Oximetry    87 97.5  F (36.4  C) (Tympanic) 20 5' 6.7\" (1.694 m) 2017 100%    Breastfeeding? BMI (Body Mass Index)                No 23.39 kg/m2           Blood Pressure from Last 3 Encounters:   18 114/80   10/27/17 130/80 "   08/31/17 110/76    Weight from Last 3 Encounters:   01/05/18 148 lb (67.1 kg)   10/27/17 148 lb (67.1 kg)   08/31/17 148 lb (67.1 kg)              We Performed the Following     AUDIOLOGY ADULT REFERRAL     GLUCOSE     HPV High Risk Types DNA Cervical     Lipid panel reflex to direct LDL Fasting     OTOLARYNGOLOGY REFERRAL     Pap imaged thin layer screen with HPV - recommended age 30 - 65 years (select HPV order below)          Today's Medication Changes          These changes are accurate as of: 1/5/18  7:53 AM.  If you have any questions, ask your nurse or doctor.               These medicines have changed or have updated prescriptions.        Dose/Directions    cholecalciferol 5000 UNITS Caps capsule   Commonly known as:  vitamin D3   This may have changed:  Another medication with the same name was removed. Continue taking this medication, and follow the directions you see here.   Changed by:  Rivka Rodrigues APRN CNP        Take by mouth daily   Refills:  0                Primary Care Provider Office Phone # Fax #    KYLEE Narvaez [phone]       [address] [facility] [address]        Equal Access to Services     IMAN INGRAM : Hadii kimberlee guerrero hadasho Soomaali, waaxda luqadaha, qaybta kaalmada adejuanyaann, lex durand . So [facility] [phone].    ATENCIÓN: Si habla español, tiene a norton disposición servicios gratuitos de asistencia lingüística. Llame al [phone].    We comply with applicable federal civil rights laws and [place] laws. We do not discriminate on the basis of race, color, national origin, age, disability, sex, sexual orientation, or gender identity.            Thank you!     Thank you for choosing [facility]  for your care. Our goal is always to provide you with excellent care. Hearing back from our patients is one way we can continue to improve our services. Please take a few minutes to complete the written survey that you  may receive in the mail after your visit with us. Thank you!             Your Updated Medication List - Protect others around you: Learn how to safely use, store and throw away your medicines at www.disposemymeds.org.          This list is accurate as of: 1/5/18  7:53 AM.  Always use your most recent med list.                   Brand Name Dispense Instructions for use Diagnosis    cholecalciferol 5000 UNITS Caps capsule    vitamin D3     Take by mouth daily        * venlafaxine 75 MG 24 hr capsule    EFFEXOR-XR    30 capsule    Take 1 capsule (75 mg) by mouth daily    Major depressive disorder, recurrent episode, in full remission (H)       * venlafaxine 225 MG Tb24 24 hr tablet    EFFEXOR-ER    30 tablet    Take 1 tablet (225 mg) by mouth daily    Major depressive disorder, recurrent episode, in full remission (H)       * Notice:  This list has 2 medication(s) that are the same as other medications prescribed for you. Read the directions carefully, and ask your doctor or other care provider to review them with you.

## 2018-01-05 NOTE — NURSING NOTE
"Chief Complaint   Patient presents with     Physical     Hearing Problem       Initial /80  Pulse 87  Temp 97.5  F (36.4  C) (Tympanic)  Resp 20  Ht 5' 6.7\" (1.694 m)  Wt 148 lb (67.1 kg)  LMP 12/26/2017  SpO2 100%  Breastfeeding? No  BMI 23.39 kg/m2 Estimated body mass index is 23.39 kg/(m^2) as calculated from the following:    Height as of this encounter: 5' 6.7\" (1.694 m).    Weight as of this encounter: 148 lb (67.1 kg).  Medication Reconciliation: complete   ................Moe Gonzalez LPN,   January 5, 2018,      7:39 AM,   [facility]    "

## 2018-01-05 NOTE — PATIENT INSTRUCTIONS
Labs will be done today.  I will call or send a letter with results within the next 1-2 weeks.      The results of the pap test take about 2 weeks to come back.  We will send a letter with these results and the recommendations for further pap tests in the future.     Schedule with ENT and audiology in [place].         Preventive Health Recommendations  Female Ages 40 to 49    Yearly exam:     See your health care provider every year in order to  1. Review health changes.   2. Discuss preventive care.    3. Review your medicines if your doctor prescribed any.      Get a Pap test every three years (unless you have an abnormal result and your provider advises testing more often).      If you get Pap tests with HPV test, you only need to test every 5 years, unless you have an abnormal result. You do not need a Pap test if your uterus was removed (hysterectomy) and you have not had cancer.      You should be tested each year for STDs (sexually transmitted diseases), if you're at risk.       Ask your doctor if you should have a mammogram.      Have a colonoscopy (test for colon cancer) if someone in your family has had colon cancer or polyps before age 50.       Have a cholesterol test every 5 years.       Have a diabetes test (fasting glucose) after age 45. If you are at risk for diabetes, you should have this test every 3 years.    Shots: Get a flu shot each year. Get a tetanus shot every 10 years.     Nutrition:     Eat at least 5 servings of fruits and vegetables each day.    Eat whole-grain bread, whole-wheat pasta and brown rice instead of white grains and rice.    Talk to your provider about Calcium and Vitamin D.     Lifestyle    Exercise at least 150 minutes a week (an average of 30 minutes a day, 5 days a week). This will help you control your weight and prevent disease.    Limit alcohol to one drink per day.    No smoking.     Wear sunscreen to prevent skin cancer.    See your dentist every six months for an  exam and cleaning.

## 2018-01-05 NOTE — LETTER
January 5, 2018      Aide Burroughs  [address]   [place] [address]        Dear ,    We are writing to inform you of your test results.    1. Glucose was normal, no sign of diabetes   2. Your cholesterol levels came back elevated.  This can put you at increased risk for heart attacks and strokes.  At this time, you do not need medications, but I would like you to watch your diet and increase your exercise.  We will recheck these levels in 1 year.  I have included some information about ways to decrease your cholesterol.         Electronically signed by Rivka Rodrigues CNP.                How can I control my cholesterol level?   High cholesterol may run in families. Know your family history and discuss it with your healthcare provider.   You can often control cholesterol levels by   eating right   exercising   not smoking   losing weight if you are overweight.   If you have a high risk for heart disease, your healthcare provider may prescribe cholesterol-lowering medicine as well as changes in lifestyle.   Eat right.   To control the cholesterol and types and amounts of fat you eat:   Check food labels for fat and cholesterol content. Choose the foods with less fat per serving.   Limit the amount of butter and margarine you eat.   Use olive, canola, sunflower, safflower, soybean, or corn oil. Avoid tropical oils such as palm or coconut oil. Also avoid oils that have been hydrogenated or partially hydrogenated.   Use salad dressings and margarine made with polyunsaturated and monounsaturated fats.   Use egg whites or egg substitutes rather than whole eggs.   Replace whole-milk dairy products with nonfat or low-fat milk, cheese, spreads, and yogurt.   Eat skinless chicken, turkey, fish, and meatless entrees more often than red meat.   Choose lean cuts of meat and trim off all visible fat. Keep portion sizes moderate.   Avoid fatty desserts such as ice cream, cream-filled cakes, and cheesecakes. Choose fresh  "fruits, nonfat frozen yogurt, Popsicles, etc.   Reduce the amount of fried foods, vending machine food, and fast food you eat.   Eat several daily servings of fruits and vegetables (especially fresh fruits and leafy vegetables), beans, and whole grains (such as whole wheat, bran, brown rice, oats, and oatmeal). The fiber in these foods helps lower cholesterol.   Eat 4 to 5 servings of nuts a week. Examples of nuts that can be a part of a healthy diet are walnuts, almonds, hazelnuts, peanuts, pecans, and pistachio nuts.   Look for low-fat or nonfat varieties of the foods you like to eat, or look for substitutes.   Exercise.   Exercise goes hand-in-hand with a healthy diet for controlling cholesterol. Exercise helps because it:   Keeps your weight down.   Decreases your total cholesterol level.   Decreases your LDL (bad cholesterol).   Increases your HDL (good cholesterol).   A good exercise program includes aerobic exercise. Aerobic exercise is any activity that keeps your heart rate up (such as swimming, jogging, walking, and bicycling). You should get at least 30 minutes of moderate aerobic exercise most days of the week. Moderate aerobic exercise is generally defined as requiring the energy it takes to walk 2 miles in 30 minutes. You may need to exercise 60 minutes a day to prevent weight gain and 90 minutes a day to lose weight.   If you haven't been exercising, ask your healthcare provider for an exercise prescription and start your new exercise program slowly.   Don't smoke.   Do not smoke. Smoking increases your risk of heart disease because it lowers HDL levels, increases your risk of blood clots, and decreases oxygen to the tissues.   Lose excess weight.   Extra weight increases your risk for heart and blood vessel disease. One way it does this is by causing your LDL (\"bad\") cholesterol to go up. Extra weight can also make you tired. It takes a lot of energy to carry all those pounds around. The result is " "that you are less active. This can mean that you don't get enough exercise and gain even more weight.   Losing excess weight:   Improves not only the bad LDL cholesterol but other blood fats as well.   Lowers your risk for heart attack or stroke.   Increases your energy and helps you feel better (both physically and mentally) and become more active.   Your weight is primarily the result of 2 factors. One is the number of calories you consume. The other is the number of calories you \"burn\". If you eat more calories than you use, your body will store the extra calories as fat and your weight will go up. If your body uses more calories than you eat, you will lose weight.   Here are some things you can do to lose weight.   Talk to your healthcare provider about your weight. Ask how a change in diet and exercise will change your cholesterol levels. Plan for gradual weight loss, just 1 or 2 pounds per week   Eat fewer calories.   Get more physical activity.   Keep a diary of your food and exercise for a couple of weeks to become more aware of your habits.       In summary, changes that you can make in your lifestyle to control your cholesterol level are:   Eat healthy.   Get regular exercise.   Don't smoke.   Keep a healthy weight.   Have your cholesterol levels checked as often as your provider recommends.     Resulted Orders   GLUCOSE   Result Value Ref Range    Glucose 99 70 - 99 mg/dL      Comment:      Fasting specimen   Lipid panel reflex to direct LDL Fasting   Result Value Ref Range    Cholesterol 244 (H) <200 mg/dL      Comment:      Desirable:       <200 mg/dl    Triglycerides 215 (H) <150 mg/dL      Comment:      Borderline high:  150-199 mg/dl  High:             200-499 mg/dl  Very high:       >499 mg/dl  Fasting specimen      HDL Cholesterol 40 (L) >49 mg/dL    LDL Cholesterol Calculated 161 (H) <100 mg/dL      Comment:      Above desirable:  100-129 mg/dl  Borderline High:  130-159 mg/dL  High:             " 160-189 mg/dL  Very high:       >189 mg/dl      Non HDL Cholesterol 204 (H) <130 mg/dL      Comment:      Above Desirable:  130-159 mg/dl  Borderline high:  160-189 mg/dl  High:             190-219 mg/dl  Very high:       >219 mg/dl         If you have any questions or concerns, please call the clinic at the number listed above.       Sincerely,        KYLEE Narvaez CNP

## 2018-01-10 LAB
COPATH REPORT: NORMAL
PAP: NORMAL

## 2018-01-12 LAB
FINAL DIAGNOSIS: NORMAL
HPV HR 12 DNA CVX QL NAA+PROBE: NEGATIVE
HPV16 DNA SPEC QL NAA+PROBE: NEGATIVE
HPV18 DNA SPEC QL NAA+PROBE: NEGATIVE
SPECIMEN DESCRIPTION: NORMAL

## 2018-01-17 ENCOUNTER — OFFICE VISIT (OUTPATIENT)
Dept: PSYCHOLOGY | Facility: CLINIC | Age: 43
End: 2018-01-17
Payer: COMMERCIAL

## 2018-01-17 DIAGNOSIS — F33.1 MAJOR DEPRESSIVE DISORDER, RECURRENT EPISODE, MODERATE (H): Primary | ICD-10-CM

## 2018-01-17 DIAGNOSIS — F41.1 GENERALIZED ANXIETY DISORDER: ICD-10-CM

## 2018-01-17 PROCEDURE — 90834 PSYTX W PT 45 MINUTES: CPT | Performed by: MARRIAGE & FAMILY THERAPIST
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
## 2018-01-17 NOTE — MR AVS SNAPSHOT
"                  MRN:[id number]                      After Visit Summary   2018    Aide Burroughs    MRN: [id number]           Visit Information        Provider Department      2018 12:00 PM Leeann Gandara TH [facility]      Your next 10 appointments already scheduled     2018  2:15 PM CST   New Visit with London Smith   [facility] ([address] [address]   [phone]            2018  2:45 PM CST   New Visit with Juan C Beltrán MD   [facility] ([address] [address]   [phone]            2018 11:30 AM CST   Return Visit with Cole Delvalle MD   [facility] ([facility])    [address] [place] [id number] [phone]            2018  2:00 PM CST   Return Visit with KYLE Gong   [facility] ([facility])    [address] [address] [phone]              MyChart Information     [employer] lets you send messages to your doctor, view your test results, renew your prescriptions, schedule appointments and more. To sign up, go to [email or web address] . Click on \"Log in\" on the left side of the screen, which will take you to the Welcome page. Then click on \"Sign up Now\" on the right side of the page.     You will be asked to enter the access code listed below, as well as some personal information. Please follow the directions to create your username and password.     Your access code is: [email or web address]  Expires: 2018 12:15 PM     Your access code will  in 90 days. If you need help or a new code, please call your [facility] clinic or [phone].        Care EveryWhere ID     This is your Care EveryWhere ID. This could be " used by other organizations to access your [facility] medical records  [id number]        Equal Access to Services     MAGNUS INGRAM : Maria Alejandra Moore, cam conde, pennie stein, lex coleman. So [facility] [phone].    ATENCIÓN: Si habla español, tiene a norton disposición servicios gratuitos de asistencia lingüística. Llame al [phone].    We comply with applicable federal civil rights laws and [place] laws. We do not discriminate on the basis of race, color, national origin, age, disability, sex, sexual orientation, or gender identity.

## 2018-02-01 NOTE — PROGRESS NOTES
ENT Consultation    Aide Burroughs is a 42 year old female who is seen in consultation at the request of AGUSTO Olvera.      History of Present Illness - Aide Burroughs is a 42 year old female with left side gradual hearing loss. Symptoms started in her childhood. She had PE tubes placed at age 22, followed by a tympanoplasty in [place]. The hearing of her left ear started to worsen again, but the hearing in the right ear remained stable. She does have allergies, she does not use any sprays or OTC medication.      Past Medical History -   Past Medical History:   Diagnosis Date     Generalized anxiety disorder      MDD (major depressive disorder), recurrent, severe, with psychosis (H)        Current Medications -   Current Outpatient Prescriptions:      cholecalciferol (VITAMIN D3) 5000 UNITS CAPS capsule, Take by mouth daily, Disp: , Rfl:      venlafaxine (EFFEXOR-XR) 75 MG 24 hr capsule, Take 1 capsule (75 mg) by mouth daily, Disp: 30 capsule, Rfl: 1     venlafaxine (EFFEXOR-ER) 225 MG TB24 24 hr tablet, Take 1 tablet (225 mg) by mouth daily, Disp: 30 tablet, Rfl: 1    Allergies -   Allergies   Allergen Reactions     Erythromycin Nausea and Vomiting     Seasonal Allergies      Molds, grass, multiple trees, plant pollen, cats, rabbits, dogs, hay.       Social History -   Social History     Social History     Marital status:      Spouse name: N/A     Number of children: N/A     Years of education: N/A     Occupational History           [employer]     Social History Main Topics     Smoking status: Never Smoker     Smokeless tobacco: Never Used     Alcohol use 0.0 oz/week     0 Standard drinks or equivalent per week      Comment: rare     Drug use: No     Sexual activity: Yes     Partners: Male     Birth control/ protection: Surgical      Comment: Vasectomy     Other Topics Concern     Not on file     Social History Narrative       Family History -   Family History   Problem Relation Age of Onset      Hypertension Mother      Hyperlipidemia Mother      MENTAL ILLNESS Mother      Cancer - colorectal Father      Substance Abuse Father      CEREBROVASCULAR DISEASE Maternal Grandmother      DIABETES Paternal Grandmother      HEART DISEASE Paternal Grandfather      Bipolar Disorder Maternal Aunt      Schizophrenia Maternal Aunt      Coronary Artery Disease Maternal Grandfather        Review of Systems - As per HPI and PMHx, otherwise review of system review of the head and neck negative.    Physical Exam  Pulse 77  Wt 69.4 kg (153 lb)  SpO2 99%  BMI 24.18 kg/m2  BMI: Body mass index is 24.18 kg/(m^2).    General - The patient is well nourished and well developed, and appears to have good nutritional status.  Alert and oriented to person and place, answers questions and cooperates with examination appropriately.    SKIN - No suspicious lesions or rashes.  Respiration - No respiratory distress.  Head and Face - Normocephalic and atraumatic, with no gross asymmetry noted of the contour of the facial features.  The facial nerve is intact, with strong symmetric movements.    Voice and Breathing - The patient was breathing comfortably without the use of accessory muscles. The patients voice was clear and strong, and had appropriate pitch and quality.    Ears - Bilateral pinna and EACs with normal appearing overlying skin. Tympanic membrane intact with good mobility on pneumatic otoscopy bilaterally. Bony landmarks of the ossicular chain are normal on the right, unable to visualize on the left. The tympanic membrane on the right is normal in appearance, the left shows some tympanosclerosis. No retraction, perforation, or masses.  No fluid or purulence was seen in the external canal or the middle ear.     Eyes - Extraocular movements intact.  Sclera were not icteric or injected, conjunctiva were pink and moist.    Mouth - Examination of the oral cavity showed pink, healthy oral mucosa. No lesions or ulcerations noted.  The  tongue was mobile and midline, and the dentition were in good condition.      Throat - The walls of the oropharynx were smooth, pink, moist, symmetric, and had no lesions or ulcerations.  The tonsillar pillars and soft palate were symmetric.  The uvula was midline on elevation.    Neck - Normal midline excursion of the laryngotracheal complex during swallowing.  Full range of motion on passive movement.  Palpation of the occipital, submental, submandibular, internal jugular chain, and supraclavicular nodes did not demonstrate any abnormal lymph nodes or masses.  The carotid pulse was palpable bilaterally.  Palpation of the thyroid was soft and smooth, with no nodules or goiter appreciated.  The trachea was mobile and midline.    Nose - External contour is symmetric, no gross deflection or scars.  Nasal mucosa is pink and moist with no abnormal mucus.  The septum was midline and non-obstructive, turbinates of normal size and position.  No polyps, masses, or purulence noted on examination.    Neuro - Nonfocal neuro exam is normal, CN 2 through 12 intact, normal gait and muscle tone.      Performed in clinic today:  Audiologic Studies - An audiogram and tympanogram were performed today as part of the evaluation and personally reviewed. The tympanogram shows Type A curves on the right and Type B curves on the left, with normal canal volumes in the right and on the left there is high canal volumes and middle ear pressures.  The audiogram showed normal hearing on the right and moderate mixed loss on the left.        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 I had a lengthy discussion with patient about having another surgery or getting hearing aids. After this discussion patient decided that she will like to further her options with an endoscopic tympanoplasty with Dr. Youngblood with [facility].      This document serves as a record of the services and decisions  personally performed and made by Dr. Juan C Beltrán MD. It was created on his behalf by Erinn Cantor, a trained medical scribe. The creation of this document is based the provider's statements to the medical scribe.  Erinn Cantor 8:15 AM 2/5/2018    Provider:   The information in this document, created by the medical scribe for me, accurately reflects the services I personally performed and the decisions made by me. I have reviewed and approved this document for accuracy prior to leaving the patient care area.  Dr. Juan C Beltrán MD 8:15 AM 2/5/2018    Juan C Beltrán MD

## 2018-02-05 ENCOUNTER — OFFICE VISIT (OUTPATIENT)
Dept: OTOLARYNGOLOGY | Facility: CLINIC | Age: 43
End: 2018-02-05
Payer: COMMERCIAL

## 2018-02-05 ENCOUNTER — OFFICE VISIT (OUTPATIENT)
Dept: AUDIOLOGY | Facility: CLINIC | Age: 43
End: 2018-02-05
Payer: COMMERCIAL

## 2018-02-05 VITALS — HEART RATE: 77 BPM | OXYGEN SATURATION: 99 % | WEIGHT: 153 LBS | BODY MASS INDEX: 24.18 KG/M2

## 2018-02-05 DIAGNOSIS — H90.72 MIXED CONDUCTIVE AND SENSORINEURAL HEARING LOSS OF LEFT EAR WITH UNRESTRICTED HEARING OF RIGHT EAR: Primary | ICD-10-CM

## 2018-02-05 DIAGNOSIS — H74.01 TYMPANOSCLEROSIS OF RIGHT EAR: ICD-10-CM

## 2018-02-05 PROCEDURE — 99207 ZZC NO CHARGE LOS: CPT | Performed by: AUDIOLOGIST

## 2018-02-05 PROCEDURE — 92567 TYMPANOMETRY: CPT | Performed by: AUDIOLOGIST

## 2018-02-05 PROCEDURE — 99203 OFFICE O/P NEW LOW 30 MIN: CPT | Performed by: OTOLARYNGOLOGY

## 2018-02-05 PROCEDURE — 92557 COMPREHENSIVE HEARING TEST: CPT | Performed by: AUDIOLOGIST

## 2018-02-05 NOTE — NURSING NOTE
"Chief Complaint   Patient presents with     Consult     Ear Problem     Hearing loss       Initial Pulse 77  Wt 69.4 kg (153 lb)  SpO2 99%  BMI 24.18 kg/m2 Estimated body mass index is 24.18 kg/(m^2) as calculated from the following:    Height as of 1/5/18: 1.694 m (5' 6.7\").    Weight as of this encounter: 69.4 kg (153 lb).  Medication Reconciliation: complete  "

## 2018-02-05 NOTE — MR AVS SNAPSHOT
"              After Visit Summary   2/5/2018    Aide Burroughs    MRN: [id number]           Patient Information     Date Of Birth          1975        Visit Information        Provider Department      2/5/2018 2:15 PM Mellisa Donis AuD [facility]        Today's Diagnoses     Mixed conductive and sensorineural hearing loss of left ear with unrestricted hearing of right ear    -  1       Follow-ups after your visit        Your next 10 appointments already scheduled     Feb 09, 2018  9:30 AM CST   Return Visit with Cole Delvalle MD   [facility] ([facility])    [address] [facility] [address]   [phone]            Feb 16, 2018  2:00 PM CST   Return Visit with KYLE Gong   [facility] ([facility])    [address] [facility] [address]  [facility] [address] [phone]              Who to contact     If you have questions or need follow up information about today's clinic visit or your schedule please contact [facility] directly at [phone].  Normal or non-critical lab and imaging results will be communicated to you by Movero Technologyhart, letter or phone within 4 business days after the clinic has received the results. If you do not hear from us within 7 days, please contact the clinic through Mover[employer]hart or phone. If you have a critical or abnormal lab result, we will notify you by phone as soon as possible.  Submit refill requests through [employer] App or call your pharmacy and they will forward the refill request to us. Please allow 3 business days for your refill to be completed.          Additional Information About Your Visit        MyChart Information     Targeter App lets you send messages to your doctor, view your test results, renew your prescriptions, schedule appointments and more. To sign up, go to [email or web address] . Click on \"Log in\" on the left side of " "the screen, which will take you to the Welcome page. Then click on \"Sign up Now\" on the right side of the page.     You will be asked to enter the access code listed below, as well as some personal information. Please follow the directions to create your username and password.     Your access code is: [email or web address]  Expires: 2018 12:15 PM     Your access code will  in 90 days. If you need help or a new code, please call your [place] clinic or [phone].        Care EveryWhere ID     This is your Care EveryWhere ID. This could be used by other organizations to access your [place] medical records  [id number]         Blood Pressure from Last 3 Encounters:   18 114/80   10/27/17 130/80   17 110/76    Weight from Last 3 Encounters:   18 153 lb (69.4 kg)   18 148 lb (67.1 kg)   10/27/17 148 lb (67.1 kg)              We Performed the Following     AUDIOGRAM/TYMPANOGRAM - INTERFACE     COMPREHENSIVE HEARING TEST     TYMPANOMETRY        Primary Care Provider Office Phone # Fax #    KYLEE Narvaez [phone]       [address] [facility] [address]        Equal Access to Services     MAGNUS INGRAM : Hadii aad ku hadasho Soomaali, waaxda luqadaha, qaybta kaalmada adeegyada, waxay idiin hayaan bessy durand . So [facility] [phone].    ATENCIÓN: Si habla español, tiene a norton disposición servicios gratuitos de asistencia lingüística. Llame al [phone].    We comply with applicable federal civil rights laws and [place] laws. We do not discriminate on the basis of race, color, national origin, age, disability, sex, sexual orientation, or gender identity.            Thank you!     Thank you for choosing [facility]  for your care. Our goal is always to provide you with excellent care. Hearing back from our patients is one way we can continue to improve our services. Please take a few minutes to complete the written survey that you may receive " in the mail after your visit with us. Thank you!             Your Updated Medication List - Protect others around you: Learn how to safely use, store and throw away your medicines at www.disposemymeds.org.          This list is accurate as of 2/5/18  3:15 PM.  Always use your most recent med list.                   Brand Name Dispense Instructions for use Diagnosis    cholecalciferol 5000 UNITS Caps capsule    vitamin D3     Take by mouth daily        * venlafaxine 75 MG 24 hr capsule    EFFEXOR-XR    30 capsule    Take 1 capsule (75 mg) by mouth daily    Major depressive disorder, recurrent episode, in full remission (H)       * venlafaxine 225 MG Tb24 24 hr tablet    EFFEXOR-ER    30 tablet    Take 1 tablet (225 mg) by mouth daily    Major depressive disorder, recurrent episode, in full remission (H)       * Notice:  This list has 2 medication(s) that are the same as other medications prescribed for you. Read the directions carefully, and ask your doctor or other care provider to review them with you.

## 2018-02-05 NOTE — MR AVS SNAPSHOT
"              After Visit Summary   2/5/2018    Aide Burroughs    MRN: [id number]           Patient Information     Date Of Birth          1975        Visit Information        Provider Department      2/5/2018 2:45 PM Juan C Beltrán MD [facility]        Today's Diagnoses     Mixed hearing loss    -  1       Follow-ups after your visit        Additional Services     AUDIOLOGY ADULT REFERRAL                 Your next 10 appointments already scheduled     Feb 09, 2018  9:30 AM CST   Return Visit with Cole Delvalle MD   [facility] ([facility])    [address] [facility] [address]   [phone]            Feb 16, 2018  2:00 PM CST   Return Visit with KYLE Gong   [facility] ([facility])    [address] [facility] [address]  [facility] [address] [phone]              Who to contact     If you have questions or need follow up information about today's clinic visit or your schedule please contact [facility] directly at [phone].  Normal or non-critical lab and imaging results will be communicated to you by TimeGeniushart, letter or phone within 4 business days after the clinic has received the results. If you do not hear from us within 7 days, please contact the clinic through TimeGeniushart or phone. If you have a critical or abnormal lab result, we will notify you by phone as soon as possible.  Submit refill requests through [employer] or call your pharmacy and they will forward the refill request to us. Please allow 3 business days for your refill to be completed.          Additional Information About Your Visit        MyChart Information     [employer] lets you send messages to your doctor, view your test results, renew your prescriptions, schedule appointments and more. To sign up, go to [email or web address] . Click on \"Log in\" on the left side of the screen, which will " "take you to the Welcome page. Then click on \"Sign up Now\" on the right side of the page.     You will be asked to enter the access code listed below, as well as some personal information. Please follow the directions to create your username and password.     Your access code is: [email or web address]  Expires: 2018 12:15 PM     Your access code will  in 90 days. If you need help or a new code, please call your [place] clinic or [phone].        Care EveryWhere ID     This is your Care EveryWhere ID. This could be used by other organizations to access your [place] medical records  [id number]        Your Vitals Were     Pulse Pulse Oximetry BMI (Body Mass Index)             77 99% 24.18 kg/m2          Blood Pressure from Last 3 Encounters:   18 114/80   10/27/17 130/80   17 110/76    Weight from Last 3 Encounters:   18 69.4 kg (153 lb)   18 67.1 kg (148 lb)   10/27/17 67.1 kg (148 lb)              We Performed the Following     AUDIOLOGY ADULT REFERRAL        Primary Care Provider Office Phone # Fax #    KYLEE Narvaez [phone]       [address] [facility] [address]        Equal Access to Services     MAGNUS INGRAM : Hadii kimberlee ku hadasho Soomaali, waaxda luqadaha, qaybta kaalmada adeegyada, lex martinez haydarling durand . So [facility] [phone].    ATENCIÓN: Si habla español, tiene a norton disposición servicios gratuitos de asistencia lingüística. Llame al [phone].    We comply with applicable federal civil rights laws and [place] laws. We do not discriminate on the basis of race, color, national origin, age, disability, sex, sexual orientation, or gender identity.            Thank you!     Thank you for choosing [facility]  for your care. Our goal is always to provide you with excellent care. Hearing back from our patients is one way we can continue to improve our services. Please take a few minutes to complete the written " survey that you may receive in the mail after your visit with us. Thank you!             Your Updated Medication List - Protect others around you: Learn how to safely use, store and throw away your medicines at www.disposemymeds.org.          This list is accurate as of 2/5/18  3:24 PM.  Always use your most recent med list.                   Brand Name Dispense Instructions for use Diagnosis    cholecalciferol 5000 UNITS Caps capsule    vitamin D3     Take by mouth daily        * venlafaxine 75 MG 24 hr capsule    EFFEXOR-XR    30 capsule    Take 1 capsule (75 mg) by mouth daily    Major depressive disorder, recurrent episode, in full remission (H)       * venlafaxine 225 MG Tb24 24 hr tablet    EFFEXOR-ER    30 tablet    Take 1 tablet (225 mg) by mouth daily    Major depressive disorder, recurrent episode, in full remission (H)       * Notice:  This list has 2 medication(s) that are the same as other medications prescribed for you. Read the directions carefully, and ask your doctor or other care provider to review them with you.

## 2018-02-05 NOTE — PROGRESS NOTES
AUDIOLOGY REPORT     SUMMARY: Audiology visit completed. See audiogram for results.     RECOMMENDATIONS: Follow-up with ENT    Paulette Osuna Licensed Audiologist #[id number]

## 2018-02-05 NOTE — LETTER
2/5/2018         RE: Aide Burroughs  [address]   [place] [facility] [place] [facility] [address]        Dear Colleague,    Thank you for referring your patient, Aide Burroughs, to the [facility]. Please see a copy of my visit note below.    ENT Consultation    Aide Burroughs is a 42 year old female who is seen in consultation at the request of AGUSTO Olvera.      History of Present Illness - Aide Burroughs is a 42 year old female with left side gradual hearing loss. Symptoms started in her childhood. She had PE tubes placed at age 22, followed by a tympanoplasty in [place]. The hearing of her left ear started to worsen again, but the hearing in the right ear remained stable. She does have allergies, she does not use any sprays or OTC medication.      Past Medical History -   Past Medical History:   Diagnosis Date     Generalized anxiety disorder      MDD (major depressive disorder), recurrent, severe, with psychosis (H)        Current Medications -   Current Outpatient Prescriptions:      cholecalciferol (VITAMIN D3) 5000 UNITS CAPS capsule, Take by mouth daily, Disp: , Rfl:      venlafaxine (EFFEXOR-XR) 75 MG 24 hr capsule, Take 1 capsule (75 mg) by mouth daily, Disp: 30 capsule, Rfl: 1     venlafaxine (EFFEXOR-ER) 225 MG TB24 24 hr tablet, Take 1 tablet (225 mg) by mouth daily, Disp: 30 tablet, Rfl: 1    Allergies -   Allergies   Allergen Reactions     Erythromycin Nausea and Vomiting     Seasonal Allergies      Molds, grass, multiple trees, plant pollen, cats, rabbits, dogs, hay.       Social History -   Social History     Social History     Marital status:      Spouse name: N/A     Number of children: N/A     Years of education: N/A     Occupational History           [employer]     Social History Main Topics     Smoking status: Never Smoker     Smokeless tobacco: Never Used     Alcohol use 0.0 oz/week     0 Standard drinks or equivalent per week      Comment: rare     Drug use: No     Sexual activity:  Yes     Partners: Male     Birth control/ protection: Surgical      Comment: Vasectomy     Other Topics Concern     Not on file     Social History Narrative       Family History -   Family History   Problem Relation Age of Onset     Hypertension Mother      Hyperlipidemia Mother      MENTAL ILLNESS Mother      Cancer - colorectal Father      Substance Abuse Father      CEREBROVASCULAR DISEASE Maternal Grandmother      DIABETES Paternal Grandmother      HEART DISEASE Paternal Grandfather      Bipolar Disorder Maternal Aunt      Schizophrenia Maternal Aunt      Coronary Artery Disease Maternal Grandfather        Review of Systems - As per HPI and PMHx, otherwise review of system review of the head and neck negative.    Physical Exam  Pulse 77  Wt 69.4 kg (153 lb)  SpO2 99%  BMI 24.18 kg/m2  BMI: Body mass index is 24.18 kg/(m^2).    General - The patient is well nourished and well developed, and appears to have good nutritional status.  Alert and oriented to person and place, answers questions and cooperates with examination appropriately.    SKIN - No suspicious lesions or rashes.  Respiration - No respiratory distress.  Head and Face - Normocephalic and atraumatic, with no gross asymmetry noted of the contour of the facial features.  The facial nerve is intact, with strong symmetric movements.    Voice and Breathing - The patient was breathing comfortably without the use of accessory muscles. The patients voice was clear and strong, and had appropriate pitch and quality.    Ears - Bilateral pinna and EACs with normal appearing overlying skin. Tympanic membrane intact with good mobility on pneumatic otoscopy bilaterally. Bony landmarks of the ossicular chain are normal on the right, unable to visualize on the left. The tympanic membrane on the right is normal in appearance, the left shows some tympanosclerosis. No retraction, perforation, or masses.  No fluid or purulence was seen in the external canal or the  middle ear.     Eyes - Extraocular movements intact.  Sclera were not icteric or injected, conjunctiva were pink and moist.    Mouth - Examination of the oral cavity showed pink, healthy oral mucosa. No lesions or ulcerations noted.  The tongue was mobile and midline, and the dentition were in good condition.      Throat - The walls of the oropharynx were smooth, pink, moist, symmetric, and had no lesions or ulcerations.  The tonsillar pillars and soft palate were symmetric.  The uvula was midline on elevation.    Neck - Normal midline excursion of the laryngotracheal complex during swallowing.  Full range of motion on passive movement.  Palpation of the occipital, submental, submandibular, internal jugular chain, and supraclavicular nodes did not demonstrate any abnormal lymph nodes or masses.  The carotid pulse was palpable bilaterally.  Palpation of the thyroid was soft and smooth, with no nodules or goiter appreciated.  The trachea was mobile and midline.    Nose - External contour is symmetric, no gross deflection or scars.  Nasal mucosa is pink and moist with no abnormal mucus.  The septum was midline and non-obstructive, turbinates of normal size and position.  No polyps, masses, or purulence noted on examination.    Neuro - Nonfocal neuro exam is normal, CN 2 through 12 intact, normal gait and muscle tone.      Performed in clinic today:  Audiologic Studies - An audiogram and tympanogram were performed today as part of the evaluation and personally reviewed. The tympanogram shows Type A curves on the right and Type B curves on the left, with normal canal volumes in the right and on the left there is high canal volumes and middle ear pressures.  The audiogram showed normal hearing on the right and moderate mixed loss on the left.        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 I had a lengthy discussion with patient about having another surgery or getting hearing  aids. After this discussion patient decided that she will like to further her options with an endoscopic tympanoplasty with Dr. Youngblood with [facility].      This document serves as a record of the services and decisions personally performed and made by Dr. Juan C Beltrán MD. It was created on his behalf by Erinn Cantor, a trained medical scribe. The creation of this document is based the provider's statements to the medical scribe.  Erinn Cantor 8:15 AM 2/5/2018    Provider:   The information in this document, created by the medical scribe for me, accurately reflects the services I personally performed and the decisions made by me. I have reviewed and approved this document for accuracy prior to leaving the patient care area.  Dr. Juan C Beltrán MD 8:15 AM 2/5/2018    Juan C Beltrán MD      Again, thank you for allowing me to participate in the care of your patient.        Sincerely,        Juan C Beltrán MD, MD

## 2018-02-12 ENCOUNTER — TELEPHONE (OUTPATIENT)
Dept: PSYCHIATRY | Facility: CLINIC | Age: 43
End: 2018-02-12

## 2018-02-12 DIAGNOSIS — F33.42 MAJOR DEPRESSIVE DISORDER, RECURRENT EPISODE, IN FULL REMISSION (H): ICD-10-CM

## 2018-02-12 RX ORDER — VENLAFAXINE HYDROCHLORIDE 75 MG/1
CAPSULE, EXTENDED RELEASE ORAL
Qty: 30 CAPSULE | Refills: 1 | Status: SHIPPED | OUTPATIENT
Start: 2018-02-12 | End: 2018-03-07

## 2018-02-12 RX ORDER — VENLAFAXINE HYDROCHLORIDE 225 MG/1
TABLET, EXTENDED RELEASE ORAL
Qty: 30 TABLET | Refills: 1 | Status: SHIPPED | OUTPATIENT
Start: 2018-02-12 | End: 2018-03-07

## 2018-02-12 NOTE — TELEPHONE ENCOUNTER
Received fax request from [id number] - [place], [place] - [address] [facility] [address] requesting refill(s) for venlafaxine HCL  MG    Last refilled on 1/15/18    Pt last seen on 11/21/17  Next appt scheduled for NA    Routing to facilitate refill.      Montserrat ENGLE    [facility]

## 2018-02-19 ENCOUNTER — TRANSFERRED RECORDS (OUTPATIENT)
Dept: HEALTH INFORMATION MANAGEMENT | Facility: CLINIC | Age: 43
End: 2018-02-19

## 2018-03-07 ENCOUNTER — OFFICE VISIT (OUTPATIENT)
Dept: FAMILY MEDICINE | Facility: OTHER | Age: 43
End: 2018-03-07
Payer: COMMERCIAL

## 2018-03-07 VITALS
SYSTOLIC BLOOD PRESSURE: 122 MMHG | WEIGHT: 152.6 LBS | HEART RATE: 84 BPM | RESPIRATION RATE: 16 BRPM | BODY MASS INDEX: 24.12 KG/M2 | OXYGEN SATURATION: 99 % | TEMPERATURE: 98.4 F | DIASTOLIC BLOOD PRESSURE: 94 MMHG

## 2018-03-07 DIAGNOSIS — H66.005 RECURRENT ACUTE SUPPURATIVE OTITIS MEDIA WITHOUT SPONTANEOUS RUPTURE OF LEFT TYMPANIC MEMBRANE: Primary | ICD-10-CM

## 2018-03-07 PROCEDURE — 99213 OFFICE O/P EST LOW 20 MIN: CPT | Performed by: FAMILY MEDICINE

## 2018-03-07 RX ORDER — VENLAFAXINE HYDROCHLORIDE 150 MG/1
150 CAPSULE, EXTENDED RELEASE ORAL
Refills: 1 | COMMUNITY
Start: 2018-01-15 | End: 2018-03-16

## 2018-03-07 ASSESSMENT — PAIN SCALES - GENERAL: PAINLEVEL: SEVERE PAIN (7)

## 2018-03-07 NOTE — MR AVS SNAPSHOT
After Visit Summary   3/7/2018    Aide Burroughs    MRN: [id number]           Patient Information     Date Of Birth          1975        Visit Information        Provider Department      3/7/2018 3:10 PM Wood Dhaliwal MD [facility]        Today's Diagnoses     Recurrent acute suppurative otitis media without spontaneous rupture of left tympanic membrane    -  1       Follow-ups after your visit        Follow-up notes from your care team     Return in about 8 weeks (around 5/2/2018) for preop.      Your next 10 appointments already scheduled     Mar 16, 2018 10:30 AM CDT   Return Visit with Cole Delvalle MD   [facility] ([facility])    [address] [facility] [address] [phone]            Apr 10, 2018  9:00 AM CDT   Return Visit with KYLE Gong   [facility] ([facility])    [address] [address]   [phone]            May 02, 2018  7:40 AM CDT   Pre-Op physical with KYLEE Narvaez [facility] ([facility])    [address] [facility] [address] [phone]              Who to contact     If you have questions or need follow up information about today's clinic visit or your schedule please contact [facility] directly at [phone].  Normal or non-critical lab and imaging results will be communicated to you by MyChart, letter or phone within 4 business days after the clinic has received the results. If you do not hear from us within 7 days, please contact the clinic through MyChart or phone. If you have a critical or abnormal lab result, we will notify you by phone as soon as possible.  Submit refill requests through [employer] or call your pharmacy and they will forward the refill request to us. Please allow 3 business days for your refill to be completed.           "Additional Information About Your Visit        MyChart Information     liveBooks lets you send messages to your doctor, view your test results, renew your prescriptions, schedule appointments and more. To sign up, go to [email or web address] . Click on \"Log in\" on the left side of the screen, which will take you to the Welcome page. Then click on \"Sign up Now\" on the right side of the page.     You will be asked to enter the access code listed below, as well as some personal information. Please follow the directions to create your username and password.     Your access code is: [email or web address]  Expires: 2018  3:38 PM     Your access code will  in 90 days. If you need help or a new code, please call your [place] clinic or [phone].        Care EveryWhere ID     This is your Care EveryWhere ID. This could be used by other organizations to access your [place] medical records  [id number]        Your Vitals Were     Pulse Temperature Respirations Last Period Pulse Oximetry BMI (Body Mass Index)    84 98.4  F (36.9  C) (Temporal) 16 2018 99% 24.12 kg/m2       Blood Pressure from Last 3 Encounters:   18 (!) 122/94   18 114/80   10/27/17 130/80    Weight from Last 3 Encounters:   18 152 lb 9.6 oz (69.2 kg)   18 153 lb (69.4 kg)   18 148 lb (67.1 kg)              Today, you had the following     No orders found for display         Today's Medication Changes          These changes are accurate as of 3/7/18  3:38 PM.  If you have any questions, ask your nurse or doctor.               Start taking these medicines.        Dose/Directions    amoxicillin-clavulanate 875-125 MG per tablet   Commonly known as:  AUGMENTIN   Used for:  Recurrent acute suppurative otitis media without spontaneous rupture of left tympanic membrane   Started by:  Wood Dhaliwal MD        Dose:  1 tablet   Take 1 tablet by mouth 2 times daily   Quantity:  20 tablet   Refills:  0            Where to get " your medicines      These medications were sent to [id number] - [place], [place] - [address]  [address] [place] [address]     Phone:  [phone]     amoxicillin-clavulanate 875-125 MG per tablet                Primary Care Provider Office Phone # Fax #    KYLEE Narvaez [phone]       [address] [facility] [address]        Equal Access to Services     MAGNUS INGRAM : Hadii aad ku hadasho Soomaali, waaxda luqadaha, qaybta kaalmada adeegyada, waxay idiin hayaan adeeg kharash lamabel . So [facility] [phone].    ATENCIÓN: Si habla espkavitha, tiene a norton disposición servicios gratuitos de asistencia lingüística. Llame al [phone].    We comply with applicable federal civil rights laws and [place] laws. We do not discriminate on the basis of race, color, national origin, age, disability, sex, sexual orientation, or gender identity.            Thank you!     Thank you for choosing [facility]  for your care. Our goal is always to provide you with excellent care. Hearing back from our patients is one way we can continue to improve our services. Please take a few minutes to complete the written survey that you may receive in the mail after your visit with us. Thank you!             Your Updated Medication List - Protect others around you: Learn how to safely use, store and throw away your medicines at www.disposemymeds.org.          This list is accurate as of 3/7/18  3:38 PM.  Always use your most recent med list.                   Brand Name Dispense Instructions for use Diagnosis    amoxicillin-clavulanate 875-125 MG per tablet    AUGMENTIN    20 tablet    Take 1 tablet by mouth 2 times daily    Recurrent acute suppurative otitis media without spontaneous rupture of left tympanic membrane       cholecalciferol 5000 UNITS Caps capsule    vitamin D3     Take by mouth daily        venlafaxine 150 MG 24 hr capsule    EFFEXOR-XR     Take 150 mg by mouth

## 2018-03-07 NOTE — PROGRESS NOTES
SUBJECTIVE:   Aide Burroughs is a 42 year old female who presents to clinic today for the following health issues:        Concern - Left Ear Pain  Onset: Week    Description:   Pressure and feels ready to pop. Feels fluid moving in ear    Intensity: moderate    Progression of Symptoms:  worsening    Accompanying Signs & Symptoms:  Headaches, constant throbbing with sharp pains. Have had a recent cold and having fevers.     Previous history of similar problem:   Yes, but years ago for an ear infection.     Precipitating factors:   Worsened by: movement    Alleviating factors:  Improved by: no    Therapies Tried and outcome: Nothing taken. Is having reconstructive surgery 05/16/2018 on left ear.         Problem list and histories reviewed & adjusted, as indicated.  Additional history:   Aide Burroughs is a 42 year old female with moderate mixed loss of the  left. patient decided that she will like to further her options with an endoscopic tympanoplasty with Dr. Youngblood with [facility].    Patient Active Problem List   Diagnosis     Seasonal allergic rhinitis     Mild major depression (H)     Generalized anxiety disorder     Transient global amnesia     Fracture of great toe     Hyperlipidemia LDL goal <160     Major depressive disorder, recurrent episode, severe (H)     Depressive episode     Severe major depression with psychotic features (H)     Panic disorder with agoraphobia     PTSD (post-traumatic stress disorder)     Suicidal ideation     Genital warts     Past Surgical History:   Procedure Laterality Date     ESOPHAGOSCOPY, GASTROSCOPY, DUODENOSCOPY (EGD), COMBINED N/A 5/15/2017    Procedure: COMBINED ESOPHAGOSCOPY, GASTROSCOPY, DUODENOSCOPY (EGD), BIOPSY SINGLE OR MULTIPLE;  ESOPHAGOSCOPY, GASTROSCOPY, DUODENOSCOPY (EGD) with biopsies by biopsy;  Surgeon: Robles Falk MD;  Location: [facility]     MYRINGOTOMY, INSERT TUBE, COMBINED Left        Social History   Substance Use Topics     Smoking  status: Never Smoker     Smokeless tobacco: Never Used     Alcohol use 0.0 oz/week     0 Standard drinks or equivalent per week      Comment: rare     Family History   Problem Relation Age of Onset     Hypertension Mother      Hyperlipidemia Mother      MENTAL ILLNESS Mother      Cancer - colorectal Father      Substance Abuse Father      CEREBROVASCULAR DISEASE Maternal Grandmother      DIABETES Paternal Grandmother      HEART DISEASE Paternal Grandfather      Bipolar Disorder Maternal Aunt      Schizophrenia Maternal Aunt      Coronary Artery Disease Maternal Grandfather          Current Outpatient Prescriptions   Medication Sig Dispense Refill     venlafaxine (EFFEXOR-XR) 150 MG 24 hr capsule Take 150 mg by mouth  1     [DISCONTINUED] venlafaxine (EFFEXOR-ER) 225 MG TB24 24 hr tablet Take one capsule (225 mg) by mouth daily along with 75 mg capsule to total 300 mg per day (Patient not taking: Reported on 3/7/2018) 30 tablet 1     [DISCONTINUED] venlafaxine (EFFEXOR-XR) 75 MG 24 hr capsule Take one capsule (75 mg) by mouth daily along with 225 mg capsule to total 300 mg per day (Patient not taking: Reported on 3/7/2018) 30 capsule 1     cholecalciferol (VITAMIN D3) 5000 UNITS CAPS capsule Take by mouth daily       Allergies   Allergen Reactions     Erythromycin Nausea and Vomiting     Seasonal Allergies      Molds, grass, multiple trees, plant pollen, cats, rabbits, dogs, hay.     Recent Labs   Lab Test  01/05/18   0758  01/04/17   0847  08/05/15   0851  01/06/15   0816  11/13/14   0814  04/09/13   1114   LDL  161*  120*   --    --   182*   --    HDL  40*  32*   --    --   49*   --    TRIG  215*  244*   --    --   81   --    ALT   --    --    --   23   --   25   CR   --    --   0.88  0.91  0.86  0.88   GFRESTIMATED   --    --   72  69  75  73   GFRESTBLACK   --    --   87  84  >90   GFR Calc    88   POTASSIUM   --    --   4.2  3.7  3.5  4.2   TSH   --    --   1.12  1.35   --    --       BP Readings  from Last 3 Encounters:   03/07/18 (!) 122/94   01/05/18 114/80   10/27/17 130/80    Wt Readings from Last 3 Encounters:   03/07/18 152 lb 9.6 oz (69.2 kg)   02/05/18 153 lb (69.4 kg)   01/05/18 148 lb (67.1 kg)                  Labs reviewed in EPIC    Reviewed and updated as needed this visit by clinical staff  Tobacco  Allergies  Meds  Problems  Med Hx  Surg Hx  Fam Hx  Soc Hx        Reviewed and updated as needed this visit by Provider  Allergies  Meds  Problems         ROS:  CONSTITUTIONAL: NEGATIVE for fever, chills, change in weight  ENT/MOUTH: POSITIVE for ear pain left and Hx otitis media and NEGATIVE for discharge from left ear, fever, sinus pressure and sore throat  RESP: NEGATIVE for significant cough or SOB  CV: NEGATIVE for chest pain, palpitations or peripheral edema  ROS otherwise negative    OBJECTIVE:     BP (!) 122/94 (BP Location: Right arm, Patient Position: Chair, Cuff Size: Adult Regular)  Pulse 84  Temp 98.4  F (36.9  C) (Temporal)  Resp 16  Wt 152 lb 9.6 oz (69.2 kg)  LMP 02/21/2018  SpO2 99%  BMI 24.12 kg/m2  Body mass index is 24.12 kg/(m^2).  GENERAL: healthy, alert and no distress  HENT: normal cephalic/atraumatic, right ear: normal: no effusions, no erythema, normal landmarks, left ear: TM immobile on insufflation, erythematous and tympanosclerosis, nose and mouth without ulcers or lesions, oropharynx clear and oral mucous membranes moist  NECK: no adenopathy, no asymmetry, masses, or scars and thyroid normal to palpation  RESP: lungs clear to auscultation - no rales, rhonchi or wheezes  CV: regular rate and rhythm, normal S1 S2, no S3 or S4, no murmur, click or rub, no peripheral edema and peripheral pulses strong  MS: no gross musculoskeletal defects noted, no edema    Diagnostic Test Results:  none     ASSESSMENT/PLAN:     1. Recurrent acute suppurative otitis media without spontaneous rupture of left tympanic membrane  Acute on chronic. Start Augmentin, Warm pack.  Follow up as scheduled for preop or sooner if not improving.  - amoxicillin-clavulanate (AUGMENTIN) 875-125 MG per tablet; Take 1 tablet by mouth 2 times daily  Dispense: 20 tablet; Refill: 0    FUTURE APPOINTMENTS:       - Follow-up visit in 5/2/18 for preop.    Wood Dhaliwal MD  [facility]

## 2018-03-07 NOTE — NURSING NOTE
"Chief Complaint   Patient presents with     Ear Problem       Initial BP (!) 122/94 (BP Location: Right arm, Patient Position: Chair, Cuff Size: Adult Regular)  Pulse 84  Temp 98.4  F (36.9  C) (Temporal)  Resp 16  Wt 152 lb 9.6 oz (69.2 kg)  LMP 02/21/2018  SpO2 99%  BMI 24.12 kg/m2 Estimated body mass index is 24.12 kg/(m^2) as calculated from the following:    Height as of 1/5/18: 5' 6.7\" (1.694 m).    Weight as of this encounter: 152 lb 9.6 oz (69.2 kg).  Medication Reconciliation: complete     Nicole Galdamez MA 3/7/2018  3:21 PM          "

## 2018-03-16 ENCOUNTER — OFFICE VISIT (OUTPATIENT)
Dept: PSYCHIATRY | Facility: CLINIC | Age: 43
End: 2018-03-16
Payer: COMMERCIAL

## 2018-03-16 ENCOUNTER — TELEPHONE (OUTPATIENT)
Dept: PHARMACY | Facility: OTHER | Age: 43
End: 2018-03-16

## 2018-03-16 DIAGNOSIS — F33.41 RECURRENT MAJOR DEPRESSION IN PARTIAL REMISSION (H): Primary | ICD-10-CM

## 2018-03-16 PROCEDURE — 99214 OFFICE O/P EST MOD 30 MIN: CPT | Performed by: PSYCHIATRY & NEUROLOGY

## 2018-03-16 RX ORDER — VENLAFAXINE HYDROCHLORIDE 150 MG/1
150 CAPSULE, EXTENDED RELEASE ORAL DAILY
Qty: 90 CAPSULE | Refills: 1 | Status: SHIPPED | OUTPATIENT
Start: 2018-03-16 | End: 2018-10-12

## 2018-03-16 ASSESSMENT — ANXIETY QUESTIONNAIRES
7. FEELING AFRAID AS IF SOMETHING AWFUL MIGHT HAPPEN: NOT AT ALL
3. WORRYING TOO MUCH ABOUT DIFFERENT THINGS: SEVERAL DAYS
5. BEING SO RESTLESS THAT IT IS HARD TO SIT STILL: NOT AT ALL
GAD7 TOTAL SCORE: 6
2. NOT BEING ABLE TO STOP OR CONTROL WORRYING: SEVERAL DAYS
6. BECOMING EASILY ANNOYED OR IRRITABLE: SEVERAL DAYS
1. FEELING NERVOUS, ANXIOUS, OR ON EDGE: SEVERAL DAYS

## 2018-03-16 ASSESSMENT — PATIENT HEALTH QUESTIONNAIRE - PHQ9: 5. POOR APPETITE OR OVEREATING: MORE THAN HALF THE DAYS

## 2018-03-16 NOTE — MR AVS SNAPSHOT
"                  MRN:[id number]                      After Visit Summary   3/16/2018    Aide Burroughs    MRN: [id number]           Visit Information        Provider Department      3/16/2018 10:30 AM Cole Delvalle MD [facility]        Your next 10 appointments already scheduled     Apr 10, 2018  9:00 AM CDT   Return Visit with KYLE Gong   [facility] ([facility]    [address]  [facility] [address] [phone]            May 02, 2018  7:40 AM CDT   Pre-Op physical with KYLEE Narvaez CNP   [facility]    [address] [facility] [address] [phone]              Care Instructions          Treatment Plan:  Continue Effexor (venlafaxine)  mg;   Continue current dose of vitamin D, and have a blood level checked with next blood draw    A referral has been made for a consultation with a pharmacist to review medications. They should be calling you soon to schedule a visit. Their phone # is [phone] in case you need to reach them.   Safety plan was reviewed; to the ER as needed  Education and counseling was done regarding use of medications, psychotherapy options  Follow up with Rivka Rodrigues for medication management and refills.            NeuString Information     NeuString lets you send messages to your doctor, view your test results, renew your prescriptions, schedule appointments and more. To sign up, go to [email or web address] . Click on \"Log in\" on the left side of the screen, which will take you to the Welcome page. Then click on \"Sign up Now\" on the right side of the page.     You will be asked to enter the access code listed below, as well as some personal information. Please follow the directions to create your username and password.     Your access code is: [email or web address]  Expires: 2018  4:38 PM     Your access code will  in 90 days. If " you need help or a new code, please call your [place] clinic or [phone].        Care EveryWhere ID     This is your Care EveryWhere ID. This could be used by other organizations to access your [place] medical records  [id number]        Equal Access to Services     MAGNUS INGRAM : Maria Alejandra Moore, cam conde, qacarolina kaalmaann stein, lex coleman. So [facility] [phone].    ATENCIÓN: Si habla español, tiene a norton disposición servicios gratuitos de asistencia lingüística. Llame al [phone].    We comply with applicable federal civil rights laws and [place] laws. We do not discriminate on the basis of race, color, national origin, age, disability, sex, sexual orientation, or gender identity.

## 2018-03-16 NOTE — PATIENT INSTRUCTIONS
Treatment Plan:  Continue Effexor (venlafaxine)  mg;   Continue current dose of vitamin D, and have a blood level checked with next blood draw    A referral has been made for a consultation with a pharmacist to review medications. They should be calling you soon to schedule a visit. Their phone # is [phone] in case you need to reach them.   Safety plan was reviewed; to the ER as needed  Education and counseling was done regarding use of medications, psychotherapy options  Follow up with Rivka Rodrigues for medication management and refills.

## 2018-03-16 NOTE — TELEPHONE ENCOUNTER
MTM referral from: [place] clinic visit (referral by provider)    MTM referral outreach attempt #1 on March 16, 2018 at 11:57 AM      Outcome: Left Message    Araceli Gunter MTM Coordinator

## 2018-03-16 NOTE — PROGRESS NOTES
Psychiatric Progress Note    Name: Aide Burroughs  Date: March 16, 2018   Length of Visit: 30 minutes  MRN: [id number]      Current Outpatient Prescriptions   Medication Sig     venlafaxine (EFFEXOR-XR) 150 MG 24 hr capsule Take 150 mg by mouth     amoxicillin-clavulanate (AUGMENTIN) 875-125 MG per tablet Take 1 tablet by mouth 2 times daily     cholecalciferol (VITAMIN D3) 5000 UNITS CAPS capsule Take by mouth daily     No current facility-administered medications for this visit.        Therapist:  Leeann Gandara     PHQ-9:  PHQ-9 SCORE 11/16/2017 11/21/2017 11/30/2017   Total Score - - -   Total Score 16 20 19       SANJAY-7:  SANJAY-7 SCORE 11/16/2017 11/21/2017 11/30/2017   Total Score - - -   Total Score 14 12 15       Interim History:  She received a notification from her insurance company that they would no longer cover the Effexor XR so she dropped down from 300-150 mg in mid January so was to stay of on her supply of medication.  She is actually feeling fine on this dose.  She reports that things with her ex- are now going very well; the enjoyed nice trip to [place] and he seems more invested in the relationship.     Assessment: from last visit Nov 21  Her Effexor was decreased from 225 to150 mg this spring when I saw her and she was doing well; obviously she will now need to go back up on the dose and continue with psychotherapy   This time of year she should increase vitamin D .    Treatment Plan:  Increase Effexor (venlafaxine) XR to 225 mg. Call for increase to 300 mg   Fax FLMA forms as needed   Supplementation with vitamin D was suggested and information was provided regarding this.   Safety plan was reviewed; to the ER as needed or call after hours crisis line; [phone]  Education and counseling was done regarding use of medications, psychotherapy options  Call for a follow up appointment with Dr. Delvalle in January; [phone].       Past Medical History:   Diagnosis Date     Generalized  "anxiety disorder      MDD (major depressive disorder), recurrent, severe, with psychosis (H)        10 point ROS was performed and is negative except for \"aches all over\", chronic HAs, R ear pain (having surgery soon).       Mental Status Assessment:  Appearance:  Well groomed     Behavior/relationship to examiner/demeanor:  Cooperative, conversational   Motor activity:  Normal  Gait:  Normal   Speech:  Normal in volume, articulation, coherence   Mood (subjective report):  \"OK\"  Affect (objective appearance):  Mood congruent  Thought Process (Associations):  Logical, linear and goal directed  Thought content:  No evidence of suicidal or homicidal ideation,          no overt psychosis and                    patient does not appear to be responding to internal stimuli  Oriented to person, place, date/time   Attention Span and concentration: Intact   Memory:  Short-term memory intact and Long-term memory; Intact  Language:  Fluent   Fund of Knowledge/Intelligence:  Average  Use of language: Intact   Abstraction:  Normal  Insight:  Adequate  Judgment:  Adequate for safety    DSM-5 DIAGNOSIS:  F33.41 Major Depressive Disorder, Recurrent Episode, partial remission   300.00 (F41.9) Unspecified Anxiety Disorder     Vitamin D Deficiency Screening Results:  No results found for: VITDT     Now using 5K vitamin D     Assessment:  She is doing fine on the current dose of Effexor.  With the help of Carmen Mejia I was able to clarify the issues with her PBM in regards to the Effexor; the formulation that she would work with her formulary was sent to the pharmacy.  Referral for MTM was made to help with the transition back to her primary care provider; she was reminded that the pharmacists can continue to be a resource in regards to questions about her psychiatric medications.    Treatment Plan:  Continue Effexor (venlafaxine)  mg;   Continue current dose of vitamin D, and have a blood level checked with next blood draw  "   A referral has been made for a consultation with a pharmacist to review medications. They should be calling you soon to schedule a visit. Their phone # is [phone] in case you need to reach them.   Safety plan was reviewed; to the ER as needed  Education and counseling was done regarding use of medications, psychotherapy options  Follow up with Rivka Rodrigues for medication management and refills.         Signed: Cole Delvalle MD

## 2018-03-17 ASSESSMENT — ANXIETY QUESTIONNAIRES: GAD7 TOTAL SCORE: 6

## 2018-03-17 ASSESSMENT — PATIENT HEALTH QUESTIONNAIRE - PHQ9: SUM OF ALL RESPONSES TO PHQ QUESTIONS 1-9: 7

## 2018-03-19 NOTE — TELEPHONE ENCOUNTER
MTM referral from: [facility] visit (referral by provider)    MTM referral outreach attempt #2 on March 19, 2018 at 2:31 PM      Outcome: Patient not reachable after several attempts, will route to MTM Pharmacist/Provider as an FYI. Thank you for the referral.    David Meraz, MTM Coordinator

## 2018-04-10 ENCOUNTER — OFFICE VISIT (OUTPATIENT)
Dept: PSYCHOLOGY | Facility: CLINIC | Age: 43
End: 2018-04-10
Payer: COMMERCIAL

## 2018-04-10 DIAGNOSIS — F33.1 MAJOR DEPRESSIVE DISORDER, RECURRENT EPISODE, MODERATE (H): Primary | ICD-10-CM

## 2018-04-10 DIAGNOSIS — F41.1 GENERALIZED ANXIETY DISORDER: ICD-10-CM

## 2018-04-10 PROCEDURE — 90834 PSYTX W PT 45 MINUTES: CPT | Performed by: MARRIAGE & FAMILY THERAPIST

## 2018-04-10 ASSESSMENT — ANXIETY QUESTIONNAIRES
3. WORRYING TOO MUCH ABOUT DIFFERENT THINGS: MORE THAN HALF THE DAYS
GAD7 TOTAL SCORE: 14
IF YOU CHECKED OFF ANY PROBLEMS ON THIS QUESTIONNAIRE, HOW DIFFICULT HAVE THESE PROBLEMS MADE IT FOR YOU TO DO YOUR WORK, TAKE CARE OF THINGS AT HOME, OR GET ALONG WITH OTHER PEOPLE: SOMEWHAT DIFFICULT
5. BEING SO RESTLESS THAT IT IS HARD TO SIT STILL: MORE THAN HALF THE DAYS
6. BECOMING EASILY ANNOYED OR IRRITABLE: MORE THAN HALF THE DAYS
7. FEELING AFRAID AS IF SOMETHING AWFUL MIGHT HAPPEN: MORE THAN HALF THE DAYS
2. NOT BEING ABLE TO STOP OR CONTROL WORRYING: MORE THAN HALF THE DAYS
1. FEELING NERVOUS, ANXIOUS, OR ON EDGE: MORE THAN HALF THE DAYS

## 2018-04-10 ASSESSMENT — PATIENT HEALTH QUESTIONNAIRE - PHQ9: 5. POOR APPETITE OR OVEREATING: MORE THAN HALF THE DAYS
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
## 2018-04-10 NOTE — MR AVS SNAPSHOT
"                  MRN:[id number]                      After Visit Summary   4/10/2018    Aide Burroughs    MRN: [id number]           Visit Information        Provider Department      4/10/2018 9:00 AM Leeann Gandara TH [facility] Generic      Your next 10 appointments already scheduled     2018  4:00 PM CDT   Return Visit with KYLE Gong   [facility] ([facility])    [address]  [facility] [address] [phone]            May 02, 2018  7:40 AM CDT   Pre-Op physical with KYLEE Narvaez [facility] ([facility])    [address] [facility] [address] [phone]              MyChart Information     [employer] lets you send messages to your doctor, view your test results, renew your prescriptions, schedule appointments and more. To sign up, go to [email or web address] . Click on \"Log in\" on the left side of the screen, which will take you to the Welcome page. Then click on \"Sign up Now\" on the right side of the page.     You will be asked to enter the access code listed below, as well as some personal information. Please follow the directions to create your username and password.     Your access code is: [email or web address]  Expires: 2018  4:38 PM     Your access code will  in 90 days. If you need help or a new code, please call your [place] clinic or [phone].        Care EveryWhere ID     This is your Care EveryWhere ID. This could be used by other organizations to access your [place] medical records  [id number]        Equal Access to Services     IMAN INGRAM : Hadii kimberlee Moore, waaxda lusarahadaha, qaybta kaalmada sarita, lex coleman. So [facility] [phone].    ATENCIÓN: Si habla español, tiene a norton disposición servicios gratuitos de asistencia lingüística. Llame al [phone].    We comply with applicable " federal civil rights laws and [place] laws. We do not discriminate on the basis of race, color, national origin, age, disability, sex, sexual orientation, or gender identity.

## 2018-04-11 ASSESSMENT — ANXIETY QUESTIONNAIRES: GAD7 TOTAL SCORE: 14

## 2018-04-11 ASSESSMENT — PATIENT HEALTH QUESTIONNAIRE - PHQ9: SUM OF ALL RESPONSES TO PHQ QUESTIONS 1-9: 14

## 2018-04-19 ENCOUNTER — OFFICE VISIT (OUTPATIENT)
Dept: FAMILY MEDICINE | Facility: OTHER | Age: 43
End: 2018-04-19
Payer: COMMERCIAL

## 2018-04-19 VITALS
DIASTOLIC BLOOD PRESSURE: 78 MMHG | WEIGHT: 154 LBS | HEART RATE: 91 BPM | SYSTOLIC BLOOD PRESSURE: 116 MMHG | HEIGHT: 67 IN | TEMPERATURE: 98.8 F | RESPIRATION RATE: 20 BRPM | OXYGEN SATURATION: 100 % | BODY MASS INDEX: 24.17 KG/M2

## 2018-04-19 DIAGNOSIS — H71.92 CHOLESTEATOMA, LEFT: ICD-10-CM

## 2018-04-19 DIAGNOSIS — Z01.818 PREOP GENERAL PHYSICAL EXAM: Primary | ICD-10-CM

## 2018-04-19 PROCEDURE — 99214 OFFICE O/P EST MOD 30 MIN: CPT | Performed by: NURSE PRACTITIONER

## 2018-04-19 ASSESSMENT — PAIN SCALES - GENERAL: PAINLEVEL: SEVERE PAIN (7)

## 2018-04-19 NOTE — PROGRESS NOTES
[facility]  [address] [facility] [address]  [phone]  Dept: [phone]    PRE-OP EVALUATION:  Today's date: 2018    Aide Burroughs (: 1975) presents for pre-operative evaluation assessment as requested by Dr. Foreman.  She requires evaluation and anesthesia risk assessment prior to undergoing surgery/procedure for treatment of ear problem/pain .    Fax number for surgical facility: [phone] [facility] [place]  Primary Physician: Rivka Rodrigues  Type of Anesthesia Anticipated: General    Patient has a Health Care Directive or Living Will:  NO    Preop Questions 2018   Who is doing your surgery? dr foreman   What are you having done? tympanic reconstruction with graft and ocr   Date of Surgery/Procedure: may 16 2018   Facility or Hospital where procedure/surgery will be performed: [facility]   1.  Do you have a history of Heart attack, stroke, stent, coronary bypass surgery, or other heart surgery? No   2.  Do you ever have any pain or discomfort in your chest? No   3.  Do you have a history of  Heart Failure? No   4.   Are you troubled by shortness of breath when:  walking on a level surface, or up a slight hill, or at night? No   5.  Do you currently have a cold, bronchitis or other respiratory infection? No   6.  Do you have a cough, shortness of breath, or wheezing? No   7.  Do you sometimes get pains in the calves of your legs when you walk? No   8. Do you or anyone in your family have previous history of blood clots? YES - maternal grandmother   9.  Do you or does anyone in your family have a serious bleeding problem such as prolonged bleeding following surgeries or cuts? no   10. Have you ever had problems with anemia or been told to take iron pills? YES - as a teenager   11. Have you had any abnormal blood loss such as black, tarry or bloody stools, or abnormal vaginal bleeding? No   12. Have you ever had a blood  transfusion? No   13. Have you or any of your relatives ever had problems with anesthesia? no   14. Do you have sleep apnea, excessive snoring or daytime drowsiness? No   15. Do you have any prosthetic heart valves? No   16. Do you have prosthetic joints? No   17. Is there any chance that you may be pregnant? No         HPI:     HPI related to upcoming procedure: Chronic left side hearing loss, s/p reconstructive surgery.  Was found to have a cholesteatoma and that is going to be removed.       See problem list for active medical problems.  Problems all longstanding and stable, except as noted/documented.  See ROS for pertinent symptoms related to these conditions.                                                                                                                                                          .    MEDICAL HISTORY:     Patient Active Problem List    Diagnosis Date Noted     Genital warts 10/27/2017     Priority: Medium     Suicidal ideation 10/01/2015     Priority: Medium     Severe major depression with psychotic features (H) 08/28/2015     Priority: Medium     Panic disorder with agoraphobia 08/28/2015     Priority: Medium     PTSD (post-traumatic stress disorder) 08/28/2015     Priority: Medium     Depressive episode 01/05/2015     Priority: Medium     Major depressive disorder, recurrent episode, severe (H) 12/21/2014     Priority: Medium     Problem list name updated by automated process. Provider to review       Hyperlipidemia LDL goal <160 11/13/2014     Priority: Medium     Fracture of great toe 11/11/2013     Priority: Medium     Transient global amnesia 05/19/2013     Priority: Medium     Mild major depression (H) 11/06/2012     Priority: Medium     Generalized anxiety disorder 11/06/2012     Priority: Medium     Diagnosis updated by automated process. Provider to review and confirm.       Seasonal allergic rhinitis 04/26/2012     Priority: Medium      Past Medical History:    Diagnosis Date     Generalized anxiety disorder      MDD (major depressive disorder), recurrent, severe, with psychosis (H)      Past Surgical History:   Procedure Laterality Date     ESOPHAGOSCOPY, GASTROSCOPY, DUODENOSCOPY (EGD), COMBINED N/A 5/15/2017    Procedure: COMBINED ESOPHAGOSCOPY, GASTROSCOPY, DUODENOSCOPY (EGD), BIOPSY SINGLE OR MULTIPLE;  ESOPHAGOSCOPY, GASTROSCOPY, DUODENOSCOPY (EGD) with biopsies by biopsy;  Surgeon: Robles Falk MD;  Location: [facility]     MYRINGOTOMY, INSERT TUBE, COMBINED Left      Current Outpatient Prescriptions   Medication Sig Dispense Refill     venlafaxine (EFFEXOR-XR) 150 MG 24 hr capsule Take 1 capsule (150 mg) by mouth daily 90 capsule 1     cholecalciferol (VITAMIN D3) 5000 UNITS CAPS capsule Take by mouth daily       OTC products: none    Allergies   Allergen Reactions     Erythromycin Nausea and Vomiting     Seasonal Allergies      Molds, grass, multiple trees, plant pollen, cats, rabbits, dogs, hay.      Latex Allergy: NO    Social History   Substance Use Topics     Smoking status: Never Smoker     Smokeless tobacco: Never Used     Alcohol use 0.0 oz/week     0 Standard drinks or equivalent per week      Comment: rare     History   Drug Use No       REVIEW OF SYSTEMS:   CONSTITUTIONAL: NEGATIVE for fever, chills, change in weight  INTEGUMENTARY/SKIN: NEGATIVE for worrisome rashes, moles or lesions  EYES: NEGATIVE for vision changes or irritation  ENT/MOUTH: POSITIVE for ear pain left and vertigo.  NEGATIVE for sore throat, sinus pain, congestion  RESP: NEGATIVE for significant cough or SOB  BREAST: NEGATIVE for masses, tenderness or discharge  CV: NEGATIVE for chest pain, palpitations or peripheral edema  GI: NEGATIVE for nausea, abdominal pain, heartburn, or change in bowel habits  : NEGATIVE for frequency, dysuria, or hematuria  MUSCULOSKELETAL: NEGATIVE for significant arthralgias or myalgia  NEURO: NEGATIVE for weakness, dizziness or  "paresthesias  ENDOCRINE: NEGATIVE for temperature intolerance, skin/hair changes  HEME: NEGATIVE for bleeding problems  PSYCHIATRIC: NEGATIVE for changes in mood or affect    EXAM:   /78  Pulse 91  Temp 98.8  F (37.1  C) (Tympanic)  Resp 20  Ht 5' 6.7\" (1.694 m)  Wt 154 lb (69.9 kg)  SpO2 100%  BMI 24.34 kg/m2    GENERAL APPEARANCE: healthy, alert and no distress     EYES: EOMI, PERRL     HENT: nose and mouth without ulcers or lesions and right TM normal.  Left TM has extensive scar tissue      NECK: no adenopathy, no asymmetry, masses, or scars and thyroid normal to palpation     RESP: lungs clear to auscultation - no rales, rhonchi or wheezes     CV: regular rates and rhythm, normal S1 S2, no S3 or S4 and no murmur, click or rub     ABDOMEN:  soft, nontender, no HSM or masses and bowel sounds normal     MS: extremities normal- no gross deformities noted, no evidence of inflammation in joints, FROM in all extremities.     SKIN: no suspicious lesions or rashes     NEURO: Normal strength and tone, sensory exam grossly normal, mentation intact and speech normal     PSYCH: mentation appears normal. and affect normal/bright     LYMPHATICS: No cervical adenopathy    DIAGNOSTICS:       Recent Labs   Lab Test  08/05/15   0851  01/06/15   0816   04/09/13   1114   HGB   --   13.5   --   14.6   PLT   --   242   --   250   NA  139  136   < >  147*   POTASSIUM  4.2  3.7   < >  4.2   CR  0.88  0.91   < >  0.88    < > = values in this interval not displayed.        IMPRESSION:   Reason for surgery/procedure: left side hearing loss, left cholesteatoma      The proposed surgical procedure is considered INTERMEDIATE risk.    REVISED CARDIAC RISK INDEX  The patient has the following serious cardiovascular risks for perioperative complications such as (MI, PE, VFib and 3  AV Block):  No serious cardiac risks  INTERPRETATION: 0 risks: Class I (very low risk - 0.4% complication rate)    The patient has the following " additional risks for perioperative complications:  No identified additional risks      ICD-10-CM    1. Preop general physical exam Z01.818    2. Cholesteatoma, left H71.92        RECOMMENDATIONS:     She will hold medications while NPO    APPROVAL GIVEN to proceed with proposed procedure, without further diagnostic evaluation       Signed Electronically by: KYLEE Narvaez CNP    Copy of this evaluation report is provided to requesting physician.    [facility] Preop Guidelines    Revised Cardiac Risk Index

## 2018-04-19 NOTE — MR AVS SNAPSHOT
After Visit Summary   4/19/2018    Aide Burroughs    MRN: [id number]           Patient Information     Date Of Birth          1975        Visit Information        Provider Department      4/19/2018 9:20 AM Rivka Rodrigues APRN CNP [facility]        Today's Diagnoses     Preop general physical exam    -  1      Care Instructions    Don't take any more Ibuprofen, Aleve, Naproxen or Aspirin prior to surgery.  Tylenol and/or prescription pain pills are okay to use if needed.       Before Your Surgery      Call your surgeon if there is any change in your health. This includes signs of a cold or flu (such as a sore throat, runny nose, cough, rash or fever).    Do not smoke, drink alcohol or take over the counter medicine (unless your surgeon or primary care doctor tells you to) for the 24 hours before and after surgery.    If you take prescribed drugs: Follow your doctor s orders about which medicines to take and which to stop until after surgery.    Eating and drinking prior to surgery: follow the instructions from your surgeon    Take a shower or bath the night before surgery. Use the soap your surgeon gave you to gently clean your skin. If you do not have soap from your surgeon, use your regular soap. Do not shave or scrub the surgery site.  Wear clean pajamas and have clean sheets on your bed.           Follow-ups after your visit        Your next 10 appointments already scheduled     Apr 24, 2018  4:00 PM CDT   Return Visit with Leeann Gandara [facility] ([facility])    [address] [address] [phone]              Who to contact     If you have questions or need follow up information about today's clinic visit or your schedule please contact [facility] directly at [phone].  Normal or non-critical lab and imaging results will be communicated to you by MyChart, letter or phone within 4  "business days after the clinic has received the results. If you do not hear from us within 7 days, please contact the clinic through [employer] or phone. If you have a critical or abnormal lab result, we will notify you by phone as soon as possible.  Submit refill requests through [employer] or call your pharmacy and they will forward the refill request to us. Please allow 3 business days for your refill to be completed.          Additional Information About Your Visit        [employer] Information     [employer] lets you send messages to your doctor, view your test results, renew your prescriptions, schedule appointments and more. To sign up, go to [email or web address] . Click on \"Log in\" on the left side of the screen, which will take you to the Welcome page. Then click on \"Sign up Now\" on the right side of the page.     You will be asked to enter the access code listed below, as well as some personal information. Please follow the directions to create your username and password.     Your access code is: [email or web address]  Expires: 2018  4:38 PM     Your access code will  in 90 days. If you need help or a new code, please call your [place] clinic or [phone].        Care EveryWhere ID     This is your Care EveryWhere ID. This could be used by other organizations to access your [place] medical records  [id number]        Your Vitals Were     Pulse Temperature Respirations Height Pulse Oximetry BMI (Body Mass Index)    91 98.8  F (37.1  C) (Tympanic) 20 5' 6.7\" (1.694 m) 100% 24.34 kg/m2       Blood Pressure from Last 3 Encounters:   18 116/78   18 (!) 122/94   18 114/80    Weight from Last 3 Encounters:   18 154 lb (69.9 kg)   18 152 lb 9.6 oz (69.2 kg)   18 153 lb (69.4 kg)              Today, you had the following     No orders found for display       Primary Care Provider Office Phone # Fax #    KYLEE Navraez [phone]       [address] " [facility] [address]        Equal Access to Services     MAGNUS INGRAM : Hadii aad ku hadkeaganraisa Moore, wamadonnada irisadaha, qakatieta lex robison. So [facility] [phone].    ATENCIÓN: Si habla español, tiene a norton disposición servicios gratuitos de asistencia lingüística. Llame al [phone].    We comply with applicable federal civil rights laws and [place] laws. We do not discriminate on the basis of race, color, national origin, age, disability, sex, sexual orientation, or gender identity.            Thank you!     Thank you for choosing [facility]  for your care. Our goal is always to provide you with excellent care. Hearing back from our patients is one way we can continue to improve our services. Please take a few minutes to complete the written survey that you may receive in the mail after your visit with us. Thank you!             Your Updated Medication List - Protect others around you: Learn how to safely use, store and throw away your medicines at www.disposemymeds.org.          This list is accurate as of 4/19/18  9:59 AM.  Always use your most recent med list.                   Brand Name Dispense Instructions for use Diagnosis    cholecalciferol 5000 units Caps capsule    vitamin D3     Take by mouth daily        venlafaxine 150 MG 24 hr capsule    EFFEXOR-XR    90 capsule    Take 1 capsule (150 mg) by mouth daily    Recurrent major depression in partial remission (H)

## 2018-04-19 NOTE — NURSING NOTE
"Chief Complaint   Patient presents with     Pre-Op Exam     dos: 5/16/18, Dr. Youngblood at [facility]       Initial /78  Pulse 91  Temp 98.8  F (37.1  C) (Tympanic)  Resp 20  Ht 5' 6.7\" (1.694 m)  Wt 154 lb (69.9 kg)  SpO2 100%  BMI 24.34 kg/m2 Estimated body mass index is 24.34 kg/(m^2) as calculated from the following:    Height as of this encounter: 5' 6.7\" (1.694 m).    Weight as of this encounter: 154 lb (69.9 kg).  Medication Reconciliation: complete   ................Moe Gonzalez LPN,   April 19, 2018,      9:35 AM,   [facility]    "
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
## 2018-04-23 ENCOUNTER — TELEPHONE (OUTPATIENT)
Dept: FAMILY MEDICINE | Facility: OTHER | Age: 43
End: 2018-04-23

## 2018-04-23 DIAGNOSIS — G89.29 CHRONIC LEFT EAR PAIN: Primary | ICD-10-CM

## 2018-04-23 DIAGNOSIS — H71.92 CHOLESTEATOMA, LEFT: ICD-10-CM

## 2018-04-23 DIAGNOSIS — H92.02 CHRONIC LEFT EAR PAIN: Primary | ICD-10-CM

## 2018-04-23 RX ORDER — HYDROCODONE BITARTRATE AND ACETAMINOPHEN 5; 325 MG/1; MG/1
1-2 TABLET ORAL EVERY 4 HOURS PRN
Qty: 12 TABLET | Refills: 0 | Status: SHIPPED | OUTPATIENT
Start: 2018-04-23 | End: 2018-05-03

## 2018-04-23 NOTE — TELEPHONE ENCOUNTER
Rx printed and placed in my out basket.  Please call patient.     Electronically signed by Rivka Rodrigues CNP.

## 2018-04-23 NOTE — TELEPHONE ENCOUNTER
Reason for Call:  Medication or medication refill:    Do you use a [facility]?  Name of the pharmacy and phone number for the current request:  [facility] in [place]    Name of the medication requested: Patient states she was seen last week & didn't think she would need the pain meds, but she would like something stronger as symptoms are getting worse (collasped at work on Friday), please advise    Other request:     Can we leave a detailed message on this number? YES    Phone number patient can be reached at: Home number on file [phone] (home)    Best Time:     Call taken on 4/23/2018 at 7:01 AM by Gosia Craft

## 2018-04-24 ENCOUNTER — OFFICE VISIT (OUTPATIENT)
Dept: FAMILY MEDICINE | Facility: OTHER | Age: 43
End: 2018-04-24
Payer: COMMERCIAL

## 2018-04-24 VITALS
DIASTOLIC BLOOD PRESSURE: 82 MMHG | BODY MASS INDEX: 24.5 KG/M2 | HEART RATE: 84 BPM | OXYGEN SATURATION: 100 % | WEIGHT: 155 LBS | RESPIRATION RATE: 20 BRPM | TEMPERATURE: 98.4 F | SYSTOLIC BLOOD PRESSURE: 128 MMHG

## 2018-04-24 DIAGNOSIS — H71.92 CHOLESTEATOMA, LEFT: ICD-10-CM

## 2018-04-24 DIAGNOSIS — Z02.89 ENCOUNTER FOR REVIEW OF FORM WITH PATIENT: Primary | ICD-10-CM

## 2018-04-24 PROCEDURE — 99213 OFFICE O/P EST LOW 20 MIN: CPT | Performed by: NURSE PRACTITIONER

## 2018-04-24 NOTE — PROGRESS NOTES
SUBJECTIVE:   Aide Burroughs is a 42 year old female who presents to clinic today for the following health issues:      FORM   -needs form updated for surgery  -collapsed 4/20/18    She has a known cholesteatoma in left ear.  Is scheduled for surgery on 5-16-18.  This is causing severe dizziness and pain, to the point she collapsed at work last week due to the symptoms.  She needs work restrictions until her surgery.  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  She has been trying to work from home more.     Problem list and histories reviewed & adjusted, as indicated.  Additional history: as documented    Patient Active Problem List   Diagnosis     Seasonal allergic rhinitis     Mild major depression (H)     Generalized anxiety disorder     Transient global amnesia     Fracture of great toe     Hyperlipidemia LDL goal <160     Major depressive disorder, recurrent episode, severe (H)     Depressive episode     Severe major depression with psychotic features (H)     Panic disorder with agoraphobia     PTSD (post-traumatic stress disorder)     Suicidal ideation     Genital warts     Past Surgical History:   Procedure Laterality Date     ESOPHAGOSCOPY, GASTROSCOPY, DUODENOSCOPY (EGD), COMBINED N/A 5/15/2017    Procedure: COMBINED ESOPHAGOSCOPY, GASTROSCOPY, DUODENOSCOPY (EGD), BIOPSY SINGLE OR MULTIPLE;  ESOPHAGOSCOPY, GASTROSCOPY, DUODENOSCOPY (EGD) with biopsies by biopsy;  Surgeon: Robles Falk MD;  Location: [facility]     MYRINGOTOMY, INSERT TUBE, COMBINED Left        Social History   Substance Use Topics     Smoking status: Never Smoker     Smokeless tobacco: Never Used     Alcohol use 0.0 oz/week     0 Standard drinks or equivalent per week      Comment: rare     Family History   Problem Relation Age of Onset     Hypertension Mother      Hyperlipidemia Mother      MENTAL ILLNESS Mother      Cancer - colorectal Father      Substance Abuse Father      CEREBROVASCULAR  DISEASE Maternal Grandmother      DIABETES Paternal Grandmother      HEART DISEASE Paternal Grandfather      Bipolar Disorder Maternal Aunt      Schizophrenia Maternal Aunt      Coronary Artery Disease Maternal Grandfather          Current Outpatient Prescriptions   Medication Sig Dispense Refill     cholecalciferol (VITAMIN D3) 5000 UNITS CAPS capsule Take by mouth daily       HYDROcodone-acetaminophen (NORCO) 5-325 MG per tablet Take 1-2 tablets by mouth every 4 hours as needed for pain maximum 10 tablet(s) per day 12 tablet 0     venlafaxine (EFFEXOR-XR) 150 MG 24 hr capsule Take 1 capsule (150 mg) by mouth daily 90 capsule 1     Allergies   Allergen Reactions     Erythromycin Nausea and Vomiting     Seasonal Allergies      Molds, grass, multiple trees, plant pollen, cats, rabbits, dogs, hay.     BP Readings from Last 3 Encounters:   04/24/18 128/82   04/19/18 116/78   03/07/18 (!) 122/94    Wt Readings from Last 3 Encounters:   04/24/18 155 lb (70.3 kg)   04/19/18 154 lb (69.9 kg)   03/07/18 152 lb 9.6 oz (69.2 kg)                    Reviewed and updated as needed this visit by clinical staff  Tobacco  Allergies  Meds  Med Hx  Surg Hx  Fam Hx  Soc Hx      Reviewed and updated as needed this visit by Provider         ROS:  Constitutional, HEENT, cardiovascular, pulmonary, gi and gu systems are negative, except as otherwise noted.    OBJECTIVE:     /82  Pulse 84  Temp 98.4  F (36.9  C) (Tympanic)  Resp 20  Wt 155 lb (70.3 kg)  SpO2 100%  BMI 24.5 kg/m2  Body mass index is 24.5 kg/(m^2).  GENERAL: healthy, alert and no distress    Diagnostic Test Results:  none     ASSESSMENT/PLAN:       1. Encounter for review of form with patient  Work restrictions updated, she is going to try to mostly work from home and avoid driving.  She is also going to contact her surgeon's office and see if they can move up the surgery date.  Has pain medications to use as needed.     2. Cholesteatoma, left      See  Patient Instructions    KYLEE Narvaez [facility]

## 2018-04-24 NOTE — MR AVS SNAPSHOT
"              After Visit Summary   4/24/2018    Aide Burroughs    MRN: [id number]           Patient Information     Date Of Birth          1975        Visit Information        Provider Department      4/24/2018 11:20 AM Rivka Rodrigues APRN [facility]        Care Instructions    If you need further work restrictions, let me know               Follow-ups after your visit        Your next 10 appointments already scheduled     May 30, 2018  9:00 AM CDT   [facility]    [address]  [facility] [address] [phone]              Who to contact     If you have questions or need follow up information about today's clinic visit or your schedule please contact [facility] directly at [phone].  Normal or non-critical lab and imaging results will be communicated to you by Ahonyahart, letter or phone within 4 business days after the clinic has received the results. If you do not hear from us within 7 days, please contact the clinic through MyChart or phone. If you have a critical or abnormal lab result, we will notify you by phone as soon as possible.  Submit refill requests through Workstreamer or call your pharmacy and they will forward the refill request to us. Please allow 3 business days for your refill to be completed.          Additional Information About Your Visit        MyChart Information     Workstreamer lets you send messages to your doctor, view your test results, renew your prescriptions, schedule appointments and more. To sign up, go to [email or web address] . Click on \"Log in\" on the left side of the screen, which will take you to the Welcome page. Then click on \"Sign up Now\" on the right side of the page.     You will be asked to enter the access code listed below, as well as some personal information. Please follow the directions to create your username and " password.     Your access code is: [email or web address]  Expires: 2018  4:38 PM     Your access code will  in 90 days. If you need help or a new code, please call your [place] clinic or [phone].        Care EveryWhere ID     This is your Care EveryWhere ID. This could be used by other organizations to access your [place] medical records  [id number]        Your Vitals Were     Pulse Temperature Respirations Pulse Oximetry BMI (Body Mass Index)       84 98.4  F (36.9  C) (Tympanic) 20 100% 24.5 kg/m2        Blood Pressure from Last 3 Encounters:   18 128/82   18 116/78   18 (!) 122/94    Weight from Last 3 Encounters:   18 155 lb (70.3 kg)   18 154 lb (69.9 kg)   18 152 lb 9.6 oz (69.2 kg)              Today, you had the following     No orders found for display       Primary Care Provider Office Phone # Fax #    Rivka Rodrigues, KYLEE [phone]       [address] [facility] [address]        Equal Access to Services     MAGNUS INGRAM : Hadii kimberlee ku hadasho Soomaali, waaxda luqadaha, qaybta kaalmada adeegyada, lex coleman. So [facility] [phone].    ATENCIÓN: Si habla español, tiene a norton disposición servicios gratuitos de asistencia lingüística. Llame al [phone].    We comply with applicable federal civil rights laws and [place] laws. We do not discriminate on the basis of race, color, national origin, age, disability, sex, sexual orientation, or gender identity.            Thank you!     Thank you for choosing [facility]  for your care. Our goal is always to provide you with excellent care. Hearing back from our patients is one way we can continue to improve our services. Please take a few minutes to complete the written survey that you may receive in the mail after your visit with us. Thank you!             Your Updated Medication List - Protect others around you: Learn how to safely use, store and throw  away your medicines at www.disposemymeds.org.          This list is accurate as of 4/24/18 11:49 AM.  Always use your most recent med list.                   Brand Name Dispense Instructions for use Diagnosis    cholecalciferol 5000 units Caps capsule    vitamin D3     Take by mouth daily        HYDROcodone-acetaminophen 5-325 MG per tablet    NORCO    12 tablet    Take 1-2 tablets by mouth every 4 hours as needed for pain maximum 10 tablet(s) per day    Chronic left ear pain, Cholesteatoma, left       venlafaxine 150 MG 24 hr capsule    EFFEXOR-XR    90 capsule    Take 1 capsule (150 mg) by mouth daily    Recurrent major depression in partial remission (H)

## 2018-04-24 NOTE — NURSING NOTE
"Chief Complaint   Patient presents with     Forms     needs previous form updated for surgery       Initial /82  Pulse 84  Temp 98.4  F (36.9  C) (Tympanic)  Resp 20  Wt 155 lb (70.3 kg)  SpO2 100%  BMI 24.5 kg/m2 Estimated body mass index is 24.5 kg/(m^2) as calculated from the following:    Height as of 4/19/18: 5' 6.7\" (1.694 m).    Weight as of this encounter: 155 lb (70.3 kg).  Medication Reconciliation: complete   ................Moe Gonzalez LPN,   April 24, 2018,      11:32 AM,   [facility]    "

## 2018-05-03 ENCOUNTER — TELEPHONE (OUTPATIENT)
Dept: FAMILY MEDICINE | Facility: OTHER | Age: 43
End: 2018-05-03

## 2018-05-03 DIAGNOSIS — G89.29 CHRONIC LEFT EAR PAIN: ICD-10-CM

## 2018-05-03 DIAGNOSIS — H92.02 CHRONIC LEFT EAR PAIN: ICD-10-CM

## 2018-05-03 DIAGNOSIS — H71.92 CHOLESTEATOMA, LEFT: ICD-10-CM

## 2018-05-03 RX ORDER — HYDROCODONE BITARTRATE AND ACETAMINOPHEN 5; 325 MG/1; MG/1
1-2 TABLET ORAL EVERY 4 HOURS PRN
Qty: 12 TABLET | Refills: 0 | Status: SHIPPED | OUTPATIENT
Start: 2018-05-03 | End: 2018-05-09

## 2018-05-03 NOTE — TELEPHONE ENCOUNTER
A prescription for Norco was authorized and mailed to [facility] [facility].  ................Moe Gonzalez LPN,   May 3, 2018,      11:22 AM,   [facility]

## 2018-05-03 NOTE — TELEPHONE ENCOUNTER
Pt aware that Rx is ready. Rx in med room narcotic file folder. (did not put in mail)  ................Moe Gonzalez LPN,   May 3, 2018,      1:12 PM,   [facility]

## 2018-05-03 NOTE — TELEPHONE ENCOUNTER
Hydrocodone-acet.  Last Written Prescription Date:  4/23/18  Last Fill Quantity: 12,  # refills: 0   Last office visit: 4/24/2018 with prescribing provider   Future Office Visit:   Next 5 appointments (look out 90 days)     May 30, 2018  9:00 AM CDT   Return Visit with KYLE Gong   [facility]    [address] [id number] [phone]                  Routing refill request to provider for review/approval because:  Drug not on the [facility], [facility] or [facility] refill protocol or controlled substance

## 2018-05-09 DIAGNOSIS — H71.92 CHOLESTEATOMA, LEFT: ICD-10-CM

## 2018-05-09 DIAGNOSIS — H92.02 CHRONIC LEFT EAR PAIN: ICD-10-CM

## 2018-05-09 DIAGNOSIS — G89.29 CHRONIC LEFT EAR PAIN: ICD-10-CM

## 2018-05-09 RX ORDER — HYDROCODONE BITARTRATE AND ACETAMINOPHEN 5; 325 MG/1; MG/1
1-2 TABLET ORAL EVERY 4 HOURS PRN
Qty: 12 TABLET | Refills: 0 | Status: SHIPPED | OUTPATIENT
Start: 2018-05-09 | End: 2018-07-02

## 2018-05-09 NOTE — TELEPHONE ENCOUNTER
I called Aide, spoke to her , she couldn't talk. Informed her  that the Rx is ready. , Mauricio, will come pick it up and will bring his I.d.    Rx is in the Narcotic file folder  med room.  ................Moe Gonzalez LPN,   May 9, 2018,      4:06 PM,   [facility]

## 2018-05-09 NOTE — TELEPHONE ENCOUNTER
NORCO     (pt will , call when ready)  Last Written Prescription Date:  5/3/18  Last Fill Quantity: 12,  # refills: 0   Taking 2 daily, one week until surgery, has 2 left  Last office visit: 4/24/2018 with prescribing provider   Future Office Visit:   Next 5 appointments (look out 90 days)     May 30, 2018  9:00 AM CDT   Return Visit with Leeann Gandara [facility] ([facility])    [address] [address] [phone]

## 2018-05-22 ENCOUNTER — OFFICE VISIT (OUTPATIENT)
Dept: PSYCHOLOGY | Facility: CLINIC | Age: 43
End: 2018-05-22
Payer: COMMERCIAL

## 2018-05-22 DIAGNOSIS — F33.1 MAJOR DEPRESSIVE DISORDER, RECURRENT EPISODE, MODERATE (H): Primary | ICD-10-CM

## 2018-05-22 DIAGNOSIS — F41.1 GENERALIZED ANXIETY DISORDER: ICD-10-CM

## 2018-05-22 PROCEDURE — 90834 PSYTX W PT 45 MINUTES: CPT | Performed by: MARRIAGE & FAMILY THERAPIST

## 2018-05-22 ASSESSMENT — ANXIETY QUESTIONNAIRES
IF YOU CHECKED OFF ANY PROBLEMS ON THIS QUESTIONNAIRE, HOW DIFFICULT HAVE THESE PROBLEMS MADE IT FOR YOU TO DO YOUR WORK, TAKE CARE OF THINGS AT HOME, OR GET ALONG WITH OTHER PEOPLE: SOMEWHAT DIFFICULT
5. BEING SO RESTLESS THAT IT IS HARD TO SIT STILL: SEVERAL DAYS
7. FEELING AFRAID AS IF SOMETHING AWFUL MIGHT HAPPEN: SEVERAL DAYS
2. NOT BEING ABLE TO STOP OR CONTROL WORRYING: SEVERAL DAYS
3. WORRYING TOO MUCH ABOUT DIFFERENT THINGS: MORE THAN HALF THE DAYS
GAD7 TOTAL SCORE: 10
6. BECOMING EASILY ANNOYED OR IRRITABLE: MORE THAN HALF THE DAYS
1. FEELING NERVOUS, ANXIOUS, OR ON EDGE: MORE THAN HALF THE DAYS

## 2018-05-22 ASSESSMENT — PATIENT HEALTH QUESTIONNAIRE - PHQ9: 5. POOR APPETITE OR OVEREATING: SEVERAL DAYS
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
## 2018-05-22 NOTE — MR AVS SNAPSHOT
"                  MRN:[id number]                      After Visit Summary   2018    Aide Burroughs    MRN: [id number]           Visit Information        Provider Department      2018 11:00 AM Nichol LeeannKYLE [facility]      Your next 10 appointments already scheduled     2018  2:00 PM CDT   Return Visit with KYLE Gong   [facility]    [address] [address] [phone]              MyChart Information     TranscribeMe lets you send messages to your doctor, view your test results, renew your prescriptions, schedule appointments and more. To sign up, go to [email or web address] . Click on \"Log in\" on the left side of the screen, which will take you to the Welcome page. Then click on \"Sign up Now\" on the right side of the page.     You will be asked to enter the access code listed below, as well as some personal information. Please follow the directions to create your username and password.     Your access code is: [email or web address]  Expires: 2018  4:38 PM     Your access code will  in 90 days. If you need help or a new code, please call your [place] clinic or [phone].        Care EveryWhere ID     This is your Care EveryWhere ID. This could be used by other organizations to access your [place] medical records  [id number]        Equal Access to Services     IMAN INGRAM AH: Hadii kimberlee kuoo Socaseyali, waaxda luqadaha, qaybta kaalmada adeegyada, lex coleman. So [facility] [phone].    ATENCIÓN: Si habla español, tiene a norton disposición servicios gratuitos de asistencia lingüística. Llame al [phone].    We comply with applicable federal civil rights laws and [place] laws. We do not discriminate on the basis of race, color, national origin, age, disability, sex, sexual orientation, or gender identity.            "

## 2018-05-23 ASSESSMENT — PATIENT HEALTH QUESTIONNAIRE - PHQ9: SUM OF ALL RESPONSES TO PHQ QUESTIONS 1-9: 10

## 2018-05-23 ASSESSMENT — ANXIETY QUESTIONNAIRES: GAD7 TOTAL SCORE: 10

## 2018-06-26 ENCOUNTER — OFFICE VISIT (OUTPATIENT)
Dept: PSYCHOLOGY | Facility: CLINIC | Age: 43
End: 2018-06-26
Payer: COMMERCIAL

## 2018-06-26 DIAGNOSIS — F41.1 GENERALIZED ANXIETY DISORDER: Primary | ICD-10-CM

## 2018-06-26 PROCEDURE — 90834 PSYTX W PT 45 MINUTES: CPT | Performed by: MARRIAGE & FAMILY THERAPIST

## 2018-06-26 ASSESSMENT — ANXIETY QUESTIONNAIRES
6. BECOMING EASILY ANNOYED OR IRRITABLE: SEVERAL DAYS
2. NOT BEING ABLE TO STOP OR CONTROL WORRYING: SEVERAL DAYS
7. FEELING AFRAID AS IF SOMETHING AWFUL MIGHT HAPPEN: SEVERAL DAYS
IF YOU CHECKED OFF ANY PROBLEMS ON THIS QUESTIONNAIRE, HOW DIFFICULT HAVE THESE PROBLEMS MADE IT FOR YOU TO DO YOUR WORK, TAKE CARE OF THINGS AT HOME, OR GET ALONG WITH OTHER PEOPLE: SOMEWHAT DIFFICULT
3. WORRYING TOO MUCH ABOUT DIFFERENT THINGS: SEVERAL DAYS
GAD7 TOTAL SCORE: 8
1. FEELING NERVOUS, ANXIOUS, OR ON EDGE: MORE THAN HALF THE DAYS
5. BEING SO RESTLESS THAT IT IS HARD TO SIT STILL: SEVERAL DAYS

## 2018-06-26 ASSESSMENT — PATIENT HEALTH QUESTIONNAIRE - PHQ9: 5. POOR APPETITE OR OVEREATING: SEVERAL DAYS
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
## 2018-06-26 NOTE — MR AVS SNAPSHOT
"                  MRN:[id number]                      After Visit Summary   2018    Aide Burroughs    MRN: [id number]           Visit Information        Provider Department      2018 2:00 PM Leeann Gandara TH [facility] Generic      Your next 10 appointments already scheduled     2018 12:00 PM CDT   Return Visit with KYLE Gong   [facility])    [address] [address]   [phone]              MyChart Information     [employer] [facility] lets you send messages to your doctor, view your test results, renew your prescriptions, schedule appointments and more. To sign up, go to [email or web address] . Click on \"Log in\" on the left side of the screen, which will take you to the Welcome page. Then click on \"Sign up Now\" on the right side of the page.     You will be asked to enter the access code listed below, as well as some personal information. Please follow the directions to create your username and password.     Your access code is: [email or web address]  Expires: 2018  2:54 PM     Your access code will  in 90 days. If you need help or a new code, please call your [facility] clinic or [phone].        Care EveryWhere ID     This is your Care EveryWhere ID. This could be used by other organizations to access your [facility] medical records  [id number]        Equal Access to Services     [facility] JUAN JOSE AH: Hadii kimberlee guerrero hadkeagano Socaseyali, waaxda luqadaha, qaybta kaalmada adeegyada, lex colmean. So [facility] [phone].    ATENCIÓN: Si habla español, tiene a norton disposición servicios gratuitos de asistencia lingüística. Llame al [phone].    We comply with applicable federal civil rights laws and [place] laws. We do not discriminate on the basis of race, color, national origin, age, disability, sex, sexual orientation, or gender identity.            "

## 2018-06-27 ASSESSMENT — PATIENT HEALTH QUESTIONNAIRE - PHQ9: SUM OF ALL RESPONSES TO PHQ QUESTIONS 1-9: 7

## 2018-06-27 ASSESSMENT — ANXIETY QUESTIONNAIRES: GAD7 TOTAL SCORE: 8

## 2018-07-02 ENCOUNTER — OFFICE VISIT (OUTPATIENT)
Dept: FAMILY MEDICINE | Facility: OTHER | Age: 43
End: 2018-07-02
Payer: COMMERCIAL

## 2018-07-02 VITALS
TEMPERATURE: 97.5 F | SYSTOLIC BLOOD PRESSURE: 130 MMHG | RESPIRATION RATE: 16 BRPM | OXYGEN SATURATION: 98 % | WEIGHT: 150.7 LBS | BODY MASS INDEX: 23.82 KG/M2 | DIASTOLIC BLOOD PRESSURE: 80 MMHG | HEART RATE: 80 BPM

## 2018-07-02 DIAGNOSIS — R21 RASH: Primary | ICD-10-CM

## 2018-07-02 PROCEDURE — 99213 OFFICE O/P EST LOW 20 MIN: CPT | Performed by: PHYSICIAN ASSISTANT

## 2018-07-02 RX ORDER — PREDNISONE 20 MG/1
TABLET ORAL
Qty: 20 TABLET | Refills: 0 | Status: SHIPPED | OUTPATIENT
Start: 2018-07-02 | End: 2018-10-05

## 2018-07-02 ASSESSMENT — PAIN SCALES - GENERAL: PAINLEVEL: NO PAIN (0)

## 2018-07-02 NOTE — PROGRESS NOTES
SUBJECTIVE:   Aide Burroughs is a 42 year old female who presents to clinic today for the following health issues:      Rash  Onset: 1 week    Description:   Location: bilateral legs and arms, stomach  Character: blotchy, raised, flakey, red  Itching (Pruritis): YES    Progression of Symptoms:  worsening    Accompanying Signs & Symptoms:  Fever: no   Body aches or joint pain: no   Sore throat symptoms: no   Recent cold symptoms: no     History:   Previous similar rash: no     Precipitating factors:   Exposure to similar rash: YES  New exposures: None   Recent travel: no     Alleviating factors:  Allergy medication    Therapies Tried and outcome: allergy medication,slight relief; hydrocortisone cream, slight relief    Patient is a pleasant 42 year old female who is in today with 1 week of rash. Rash started on the anterior lower legs after exposure to plants while weeding her yard. Initially red itchy bumps, that became fluid filled and have since popped. Over the week, she has developed itchy bumps on her arms and abdomen. She denies fever, pain, recent use of antibiotics or chemicals. She has been treating the rash with OTC topical creams with minimal benefit.     Problem list and histories reviewed & adjusted, as indicated.  Additional history: as documented    Patient Active Problem List   Diagnosis     Seasonal allergic rhinitis     Mild major depression (H)     Generalized anxiety disorder     Transient global amnesia     Fracture of great toe     Hyperlipidemia LDL goal <160     Major depressive disorder, recurrent episode, severe (H)     Depressive episode     Severe major depression with psychotic features (H)     Panic disorder with agoraphobia     PTSD (post-traumatic stress disorder)     Suicidal ideation     Genital warts     Past Surgical History:   Procedure Laterality Date     ESOPHAGOSCOPY, GASTROSCOPY, DUODENOSCOPY (EGD), COMBINED N/A 5/15/2017    Procedure: COMBINED ESOPHAGOSCOPY, GASTROSCOPY,  DUODENOSCOPY (EGD), BIOPSY SINGLE OR MULTIPLE;  ESOPHAGOSCOPY, GASTROSCOPY, DUODENOSCOPY (EGD) with biopsies by biopsy;  Surgeon: Robles Falk MD;  Location: [facility]     MYRINGOTOMY, INSERT TUBE, COMBINED Left        Social History   Substance Use Topics     Smoking status: Never Smoker     Smokeless tobacco: Never Used     Alcohol use 0.0 oz/week     0 Standard drinks or equivalent per week      Comment: rare     Family History   Problem Relation Age of Onset     Hypertension Mother      Hyperlipidemia Mother      Mental Illness Mother      Cancer - colorectal Father      Substance Abuse Father      Cerebrovascular Disease Maternal Grandmother      Diabetes Paternal Grandmother      HEART DISEASE Paternal Grandfather      Bipolar Disorder Maternal Aunt      Schizophrenia Maternal Aunt      Coronary Artery Disease Maternal Grandfather          Current Outpatient Prescriptions   Medication Sig Dispense Refill     cholecalciferol (VITAMIN D3) 5000 UNITS CAPS capsule Take by mouth daily       predniSONE (DELTASONE) 20 MG tablet Take 3 tabs (60 mg) by mouth daily x 3 days, 2 tabs (40 mg) daily x 3 days, 1 tab (20 mg) daily x 3 days, then 1/2 tab (10 mg) x 3 days. 20 tablet 0     venlafaxine (EFFEXOR-XR) 150 MG 24 hr capsule Take 1 capsule (150 mg) by mouth daily 90 capsule 1     Allergies   Allergen Reactions     Erythromycin Nausea and Vomiting     Seasonal Allergies      Molds, grass, multiple trees, plant pollen, cats, rabbits, dogs, hay.     Labs reviewed in EPIC    Reviewed and updated as needed this visit by clinical staff  Tobacco  Allergies  Meds  Med Hx  Surg Hx  Fam Hx  Soc Hx      Reviewed and updated as needed this visit by Provider         ROS:  Constitutional, HEENT, cardiovascular, pulmonary, gi and gu systems are negative, except as otherwise noted.    OBJECTIVE:     /80 (BP Location: Right arm, Patient Position: Chair, Cuff Size: Adult Regular)  Pulse 80  Temp 97.5  F (36.4  C)  (Oral)  Resp 16  Wt 150 lb 11.2 oz (68.4 kg)  SpO2 98%  BMI 23.82 kg/m2  Body mass index is 23.82 kg/(m^2).  GENERAL: healthy, alert and no distress  RESP: lungs clear to auscultation - no rales, rhonchi or wheezes  CV: regular rate and rhythm, normal S1 S2, no S3 or S4, no murmur, click or rub, no peripheral edema and peripheral pulses strong  ABDOMEN: soft, nontender, no hepatosplenomegaly, no masses and bowel sounds normal  SKIN: papules and vesicles in scattered distribution over the lower legs, abdomen and flexor surfaces of the arms. Blanchable, non-tender to palpation, cool to touch.     Diagnostic Test Results:  none     ASSESSMENT/PLAN:     1. Rash  Discussed with patient the treatments for contact dermatitis and atopic dermatitis as well as home therapies she can do to facilitate faster recovery. Educational material given. Patient will contact if she does not improve as expected.   - predniSONE (DELTASONE) 20 MG tablet; Take 3 tabs (60 mg) by mouth daily x 3 days, 2 tabs (40 mg) daily x 3 days, 1 tab (20 mg) daily x 3 days, then 1/2 tab (10 mg) x 3 days.  Dispense: 20 tablet; Refill: 0    Follow up with clinic as needed or sooner if conditions change, worsen or fail to improve as expected.      Hakeem Reyes PA-C  [facility]

## 2018-07-02 NOTE — MR AVS SNAPSHOT
"              After Visit Summary   7/2/2018    Aide Burroughs    MRN: [id number]           Patient Information     Date Of Birth          1975        Visit Information        Provider Department      7/2/2018 2:00 PM Hakeem Reyes PA-C [facility]        Today's Diagnoses     Rash    -  1      Care Instructions      1. Prednisone 60mg x 3 days, 40mg x 3 days, 20mg x 3 days, 10mg x 3 days  2. Benadryl at night to reduce itch and help sleep  3. Oral antihistamine (Claritin/zyrtec/allegra) daily as needed      Contact Dermatitis  Contact dermatitis is a skin rash caused by something that touches the skin and makes it irritated and inflamed. Your skin may be red, swollen, dry, and may be cracked. Blisters may form and ooze. The rash will itch.  Contact dermatitis can form on the face and neck, backs of hands, forearms, genitals, and lower legs.  People can get contact dermatitis from lots of sources. These include:    Plants such as poison ivy, oak, or sumac    Chemicals in hair dyes and rinses, soaps, solvents, waxes, fingernail polish, and deodorants     Jewelry or watchbands made of nickel  Contact dermatitis is not passed from person to person.  Talk with your healthcare provider about what may have caused the rash. A type of allergy testing called \"patch testing\" may be used to discover what you are allergic to. You will need to avoid the source of your rash in the future to prevent it from coming back.  Treatment is done to relieve itching and prevent the rash from coming back. The rash should go away in a few days to a few weeks.  Home care  Your healthcare provider may prescribe medicine to relieve swelling and itching. Follow all instructions when using these medicines.  General care:    Avoid anything that heats up your skin, such as hot showers or baths, or direct sunlight. This can make itching worse.    Apply cold compresses to soothe your sores to help relieve your symptoms. Do this " for 30 minutes 3 to 4 times a day. You can make a cold compress by soaking a cloth in cold water. Squeeze out excess water. You can add colloidal oatmeal to the water to help reduce itching. For severe itching in a small area, apply an ice pack wrapped in a thin towel. Do this for 20 minutes 3 to 4 times a day.    You can also try wet dressings. One way to do this is to wear a wet piece of clothing under a dry one. Wear a damp shirt under a dry shirt if your upper body is affected. This can relieve itching and prevent you from scratching the affected area.    You can also help relieve large areas of itching by taking a lukewarm bath with colloidal oatmeal added to the water.    Use hydrocortisone cream for redness and irritation, unless another medicine was prescribed. You can also use benzocaine anesthetic cream or spray. Calamine lotion can also relieve mild symptoms.    Use oral diphenhydramine to help reduce itching. You can buy this antihistamine at drug and grocery stores. It can make you sleepy, so use lower doses during the daytime. Or you can use loratadine. This is an antihistamine that will not make you sleepy. Do not use diphenhydramine if you have glaucoma or have trouble urinating due to an enlarged prostate.    If a plant causes your rash, make sure to wash your skin and the clothes you were wearing when you came into contact with the plant. This is to wash away the plant oils that gave you the rash and prevent more or worse symptoms.    Stay away from the substance or object that causes your symptoms. If you can t avoid it, wear gloves or some other type of protection.  Follow-up care  Follow up with your healthcare provider, or as advised.  When to seek medical advice  Call your healthcare provider right away if any of these occur:    Spreading of the rash to other parts of your body    Severe swelling of your face, eyelids, mouth, throat or tongue    Trouble urinating due to swelling in the genital  area    Fever of 100.4 F (38 C) or higher    Redness or swelling that gets worse    Pain that gets worse    Foul-smelling fluid leaking from the skin    Yellow-brown crusts on the open blisters  Date Last Reviewed: 9/1/2016 2000-2017 [employer]. [address] [place] [address]. All rights reserved. This information is not intended as a substitute for professional medical care. Always follow your healthcare professional's instructions.        Atopic Dermatitis (Adult)  Atopic dermatitis is a dry, itchy, red rash. It s also called eczema. The rash is chronic, or ongoing. It can come and go over time. The disease is often passed down in families. It causes a problem with the skin barrier that makes the skin more sensitive to the environment and other factors. The increased skin sensitivity causes an itch, which causes scratching. Scratching can worsen the itching or also break the skin. This can put the skin at risk of infection.  The condition is most common in people with asthma, hay fever, hives, or dry or sensitive skin. The rash may be caused by extreme heat or heavy sweating. Skin irritants can cause the rash to flare up. These can include wool or silk clothing, grease, oils, some medicines, and harsh soaps and detergents. Emotional stress can also be a trigger.  Treatment is done to relieve the itching and inflammation of the skin.  Home care  Follow these tips to care for your condition:    Keep the areas of rash clean by bathing at least every other day. Use lukewarm water to bathe. Don t use hot water, which can dry out the skin.    Don t use soaps with strong detergents. Use mild soaps made for sensitive skin.    Apply a cream or ointment to damp skin right after bathing.    Avoid things that irritate your skin. Wear absorbent, soft fabrics next to the skin rather than rough or scratchy materials.    Use mild laundry soap free of scents and perfumes. Make sure to rinse all the  soap out of your clothes.    Treat any skin infection as directed.    Use oral diphenhydramine to help reduce itching. This is an antihistamine you can buy at drug and grocery stores. It can make you sleepy, so use lower doses during the daytime. Or you can use loratadine. This is an antihistamine that will not make you sleepy. Do not use diphenhydramine if you have glaucoma or have trouble urinating due to an enlarged prostate.  Follow-up care  See your healthcare provider, or as advised. If your symptoms don t get better or if they get worse in the next 7 days, make an appointment with your healthcare provider.  When to seek medical advice  Call your healthcare provider right away  if any of these occur:    Increasing area of redness or pain in the skin    Yellow crusts or wet drainage from the rash    Fever of 100.4 F (38 C) or higher, or as directed by your healthcare provider  Date Last Reviewed: 9/1/2016 2000-2017 [employer]. [address] [place] [address]. All rights reserved. This information is not intended as a substitute for professional medical care. Always follow your healthcare professional's instructions.                Follow-ups after your visit        Your next 10 appointments already scheduled     Jul 31, 2018 12:00 PM CDT   Return Visit with KYLE Gong   [facility] ([facility])    [address] [address] [phone]              Who to contact     If you have questions or need follow up information about today's clinic visit or your schedule please contact [facility] directly at [phone].  Normal or non-critical lab and imaging results will be communicated to you by MyChart, letter or phone within 4 business days after the clinic has received the results. If you do not hear from us within 7 days, please contact the clinic through MyChart or phone. If you have a critical or  "abnormal lab result, we will notify you by phone as soon as possible.  Submit refill requests through [employer] or call your pharmacy and they will forward the refill request to us. Please allow 3 business days for your refill to be completed.          Additional Information About Your Visit        [employer] Information     [employer] lets you send messages to your doctor, view your test results, renew your prescriptions, schedule appointments and more. To sign up, go to [email or web address] . Click on \"Log in\" on the left side of the screen, which will take you to the Welcome page. Then click on \"Sign up Now\" on the right side of the page.     You will be asked to enter the access code listed below, as well as some personal information. Please follow the directions to create your username and password.     Your access code is: [email or web address]  Expires: 2018  2:54 PM     Your access code will  in 90 days. If you need help or a new code, please call your [place] clinic or [phone].        Care EveryWhere ID     This is your Care EveryWhere ID. This could be used by other organizations to access your [place] medical records  [id number]        Your Vitals Were     Pulse Temperature Respirations Pulse Oximetry BMI (Body Mass Index)       80 97.5  F (36.4  C) (Oral) 16 98% 23.82 kg/m2        Blood Pressure from Last 3 Encounters:   18 130/80   18 128/82   18 116/78    Weight from Last 3 Encounters:   18 150 lb 11.2 oz (68.4 kg)   18 155 lb (70.3 kg)   18 154 lb (69.9 kg)              Today, you had the following     No orders found for display         Today's Medication Changes          These changes are accurate as of 18  2:32 PM.  If you have any questions, ask your nurse or doctor.               Start taking these medicines.        Dose/Directions    predniSONE 20 MG tablet   Commonly known as:  DELTASONE   Used for:  Rash   Started by:  Hakeem Reyes PA-C     "    Take 3 tabs (60 mg) by mouth daily x 3 days, 2 tabs (40 mg) daily x 3 days, 1 tab (20 mg) daily x 3 days, then 1/2 tab (10 mg) x 3 days.   Quantity:  20 tablet   Refills:  0            Where to get your medicines      These medications were sent to [id number] - [place], [place] - [address]  [address] [place] [address]     Phone:  [phone]     predniSONE 20 MG tablet                Primary Care Provider Office Phone # Fax #    Rivka Rodrigues, KYLEE [phone]       [address] [facility] [place] [address]        Equal Access to Services     [facility] : Hadii kimberlee guerreor hadasho Sosafia, waaxda luqadaha, qaybta kaalmada adeegyada, lex durand . So [facility] [phone].    ATENCIÓN: Si habla español, tiene a norton disposición servicios gratuitos de asistencia lingüística. Llame al [phone].    We comply with applicable federal civil rights laws and [place] laws. We do not discriminate on the basis of race, color, national origin, age, disability, sex, sexual orientation, or gender identity.            Thank you!     Thank you for choosing [facility]  for your care. Our goal is always to provide you with excellent care. Hearing back from our patients is one way we can continue to improve our services. Please take a few minutes to complete the written survey that you may receive in the mail after your visit with us. Thank you!             Your Updated Medication List - Protect others around you: Learn how to safely use, store and throw away your medicines at www.disposemymeds.org.          This list is accurate as of 7/2/18  2:32 PM.  Always use your most recent med list.                   Brand Name Dispense Instructions for use Diagnosis    cholecalciferol 5000 units Caps capsule    vitamin D3     Take by mouth daily        predniSONE 20 MG tablet    DELTASONE    20 tablet    Take 3 tabs (60 mg) by mouth daily x 3 days, 2 tabs (40 mg) daily x 3 days, 1  tab (20 mg) daily x 3 days, then 1/2 tab (10 mg) x 3 days.    Rash       venlafaxine 150 MG 24 hr capsule    EFFEXOR-XR    90 capsule    Take 1 capsule (150 mg) by mouth daily    Recurrent major depression in partial remission (H)

## 2018-07-02 NOTE — PATIENT INSTRUCTIONS
"  1. Prednisone 60mg x 3 days, 40mg x 3 days, 20mg x 3 days, 10mg x 3 days  2. Benadryl at night to reduce itch and help sleep  3. Oral antihistamine (Claritin/zyrtec/allegra) daily as needed      Contact Dermatitis  Contact dermatitis is a skin rash caused by something that touches the skin and makes it irritated and inflamed. Your skin may be red, swollen, dry, and may be cracked. Blisters may form and ooze. The rash will itch.  Contact dermatitis can form on the face and neck, backs of hands, forearms, genitals, and lower legs.  People can get contact dermatitis from lots of sources. These include:    Plants such as poison ivy, oak, or sumac    Chemicals in hair dyes and rinses, soaps, solvents, waxes, fingernail polish, and deodorants     Jewelry or watchbands made of nickel  Contact dermatitis is not passed from person to person.  Talk with your healthcare provider about what may have caused the rash. A type of allergy testing called \"patch testing\" may be used to discover what you are allergic to. You will need to avoid the source of your rash in the future to prevent it from coming back.  Treatment is done to relieve itching and prevent the rash from coming back. The rash should go away in a few days to a few weeks.  Home care  Your healthcare provider may prescribe medicine to relieve swelling and itching. Follow all instructions when using these medicines.  General care:    Avoid anything that heats up your skin, such as hot showers or baths, or direct sunlight. This can make itching worse.    Apply cold compresses to soothe your sores to help relieve your symptoms. Do this for 30 minutes 3 to 4 times a day. You can make a cold compress by soaking a cloth in cold water. Squeeze out excess water. You can add colloidal oatmeal to the water to help reduce itching. For severe itching in a small area, apply an ice pack wrapped in a thin towel. Do this for 20 minutes 3 to 4 times a day.    You can also try wet " dressings. One way to do this is to wear a wet piece of clothing under a dry one. Wear a damp shirt under a dry shirt if your upper body is affected. This can relieve itching and prevent you from scratching the affected area.    You can also help relieve large areas of itching by taking a lukewarm bath with colloidal oatmeal added to the water.    Use hydrocortisone cream for redness and irritation, unless another medicine was prescribed. You can also use benzocaine anesthetic cream or spray. Calamine lotion can also relieve mild symptoms.    Use oral diphenhydramine to help reduce itching. You can buy this antihistamine at drug and grocery stores. It can make you sleepy, so use lower doses during the daytime. Or you can use loratadine. This is an antihistamine that will not make you sleepy. Do not use diphenhydramine if you have glaucoma or have trouble urinating due to an enlarged prostate.    If a plant causes your rash, make sure to wash your skin and the clothes you were wearing when you came into contact with the plant. This is to wash away the plant oils that gave you the rash and prevent more or worse symptoms.    Stay away from the substance or object that causes your symptoms. If you can t avoid it, wear gloves or some other type of protection.  Follow-up care  Follow up with your healthcare provider, or as advised.  When to seek medical advice  Call your healthcare provider right away if any of these occur:    Spreading of the rash to other parts of your body    Severe swelling of your face, eyelids, mouth, throat or tongue    Trouble urinating due to swelling in the genital area    Fever of 100.4 F (38 C) or higher    Redness or swelling that gets worse    Pain that gets worse    Foul-smelling fluid leaking from the skin    Yellow-brown crusts on the open blisters  Date Last Reviewed: 9/1/2016 2000-2017 [employer]. [address] [place] [address], [place] [address]. All rights reserved. This  information is not intended as a substitute for professional medical care. Always follow your healthcare professional's instructions.        Atopic Dermatitis (Adult)  Atopic dermatitis is a dry, itchy, red rash. It s also called eczema. The rash is chronic, or ongoing. It can come and go over time. The disease is often passed down in families. It causes a problem with the skin barrier that makes the skin more sensitive to the environment and other factors. The increased skin sensitivity causes an itch, which causes scratching. Scratching can worsen the itching or also break the skin. This can put the skin at risk of infection.  The condition is most common in people with asthma, hay fever, hives, or dry or sensitive skin. The rash may be caused by extreme heat or heavy sweating. Skin irritants can cause the rash to flare up. These can include wool or silk clothing, grease, oils, some medicines, and harsh soaps and detergents. Emotional stress can also be a trigger.  Treatment is done to relieve the itching and inflammation of the skin.  Home care  Follow these tips to care for your condition:    Keep the areas of rash clean by bathing at least every other day. Use lukewarm water to bathe. Don t use hot water, which can dry out the skin.    Don t use soaps with strong detergents. Use mild soaps made for sensitive skin.    Apply a cream or ointment to damp skin right after bathing.    Avoid things that irritate your skin. Wear absorbent, soft fabrics next to the skin rather than rough or scratchy materials.    Use mild laundry soap free of scents and perfumes. Make sure to rinse all the soap out of your clothes.    Treat any skin infection as directed.    Use oral diphenhydramine to help reduce itching. This is an antihistamine you can buy at drug and grocery stores. It can make you sleepy, so use lower doses during the daytime. Or you can use loratadine. This is an antihistamine that will not make you sleepy. Do not  use diphenhydramine if you have glaucoma or have trouble urinating due to an enlarged prostate.  Follow-up care  See your healthcare provider, or as advised. If your symptoms don t get better or if they get worse in the next 7 days, make an appointment with your healthcare provider.  When to seek medical advice  Call your healthcare provider right away  if any of these occur:    Increasing area of redness or pain in the skin    Yellow crusts or wet drainage from the rash    Fever of 100.4 F (38 C) or higher, or as directed by your healthcare provider  Date Last Reviewed: 9/1/2016 2000-2017 [employer]. [address] [place] [address], [place] [address]. All rights reserved. This information is not intended as a substitute for professional medical care. Always follow your healthcare professional's instructions.

## 2018-07-02 NOTE — NURSING NOTE
Health Maintenance Due   Topic Date Due     HIV SCREEN (SYSTEM ASSIGNED)  10/18/1995     URINE DRUG SCREEN Q1 YR  10/01/2016     Mine SALEH LPN

## 2018-07-13 ENCOUNTER — OFFICE VISIT (OUTPATIENT)
Dept: FAMILY MEDICINE | Facility: OTHER | Age: 43
End: 2018-07-13
Payer: COMMERCIAL

## 2018-07-13 VITALS
SYSTOLIC BLOOD PRESSURE: 124 MMHG | HEART RATE: 72 BPM | BODY MASS INDEX: 23.37 KG/M2 | TEMPERATURE: 97.3 F | DIASTOLIC BLOOD PRESSURE: 70 MMHG | WEIGHT: 147.9 LBS | RESPIRATION RATE: 12 BRPM

## 2018-07-13 DIAGNOSIS — Z30.011 ENCOUNTER FOR INITIAL PRESCRIPTION OF CONTRACEPTIVE PILLS: Primary | ICD-10-CM

## 2018-07-13 LAB — BETA HCG QUAL IFA URINE: NEGATIVE

## 2018-07-13 PROCEDURE — 99213 OFFICE O/P EST LOW 20 MIN: CPT | Performed by: PHYSICIAN ASSISTANT

## 2018-07-13 PROCEDURE — 84703 CHORIONIC GONADOTROPIN ASSAY: CPT | Performed by: PHYSICIAN ASSISTANT

## 2018-07-13 RX ORDER — NORGESTIMATE AND ETHINYL ESTRADIOL 0.25-0.035
1 KIT ORAL DAILY
Qty: 84 TABLET | Refills: 3 | Status: SHIPPED | OUTPATIENT
Start: 2018-07-13 | End: 2018-11-19

## 2018-07-13 ASSESSMENT — PAIN SCALES - GENERAL: PAINLEVEL: NO PAIN (0)

## 2018-07-13 NOTE — PROGRESS NOTES
SUBJECTIVE:   Aide Burroughs is a 42 year old female who presents to clinic today for the following health issues:      Concern - discuss birth control  Onset:     Description:   Discuss birth control    Intensity:     Progression of Symptoms:      Accompanying Signs & Symptoms:      Previous history of similar problem:       Precipitating factors:   Worsened by:     Alleviating factors:  Improved by:     Therapies Tried and outcome: birth control tablet, good relief    Patient is a 42 year old female who is in today to start oral contraception. She said that she has taken OC before and tolerated it well. Denies history of breast/ovarian/endometrial cancer, history of CVA/DVT/PE, history of bleeding disorder or genital bleeding of unknown cause, liver disease or migraines with aura. No history of cardiac disease and BP is 124/70 today which is consistent with past measures. Patient does not smoke and has no history of smoking. She would prefer oral contraception vs other options. We discussed use of secondary protection for first 7 days as the medication will not be effective until that point and recommended continued use of secondary protection following that as the medication is not 100% effective. Patient indicated understanding.     Problem list and histories reviewed & adjusted, as indicated.  Additional history: as documented    Patient Active Problem List   Diagnosis     Seasonal allergic rhinitis     Mild major depression (H)     Generalized anxiety disorder     Transient global amnesia     Fracture of great toe     Hyperlipidemia LDL goal <160     Major depressive disorder, recurrent episode, severe (H)     Depressive episode     Severe major depression with psychotic features (H)     Panic disorder with agoraphobia     PTSD (post-traumatic stress disorder)     Suicidal ideation     Genital warts     Past Surgical History:   Procedure Laterality Date     ESOPHAGOSCOPY, GASTROSCOPY, DUODENOSCOPY (EGD),  COMBINED N/A 5/15/2017    Procedure: COMBINED ESOPHAGOSCOPY, GASTROSCOPY, DUODENOSCOPY (EGD), BIOPSY SINGLE OR MULTIPLE;  ESOPHAGOSCOPY, GASTROSCOPY, DUODENOSCOPY (EGD) with biopsies by biopsy;  Surgeon: Robles Falk MD;  Location: [facility]     MYRINGOTOMY, INSERT TUBE, COMBINED Left        Social History   Substance Use Topics     Smoking status: Never Smoker     Smokeless tobacco: Never Used     Alcohol use 0.0 oz/week     0 Standard drinks or equivalent per week      Comment: rare     Family History   Problem Relation Age of Onset     Hypertension Mother      Hyperlipidemia Mother      Mental Illness Mother      Cancer - colorectal Father      Substance Abuse Father      Cerebrovascular Disease Maternal Grandmother      Diabetes Paternal Grandmother      HEART DISEASE Paternal Grandfather      Bipolar Disorder Maternal Aunt      Schizophrenia Maternal Aunt      Coronary Artery Disease Maternal Grandfather          Current Outpatient Prescriptions   Medication Sig Dispense Refill     cholecalciferol (VITAMIN D3) 5000 UNITS CAPS capsule Take by mouth daily       norgestimate-ethinyl estradiol (ORTHO-CYCLEN, SPRINTEC) 0.25-35 MG-MCG per tablet Take 1 tablet by mouth daily 84 tablet 3     venlafaxine (EFFEXOR-XR) 150 MG 24 hr capsule Take 1 capsule (150 mg) by mouth daily 90 capsule 1     predniSONE (DELTASONE) 20 MG tablet Take 3 tabs (60 mg) by mouth daily x 3 days, 2 tabs (40 mg) daily x 3 days, 1 tab (20 mg) daily x 3 days, then 1/2 tab (10 mg) x 3 days. (Patient not taking: Reported on 7/13/2018) 20 tablet 0     Allergies   Allergen Reactions     Erythromycin Nausea and Vomiting     Seasonal Allergies      Molds, grass, multiple trees, plant pollen, cats, rabbits, dogs, hay.     Labs reviewed in EPIC    Reviewed and updated as needed this visit by clinical staff  Tobacco  Allergies  Meds  Med Hx  Surg Hx  Fam Hx  Soc Hx      Reviewed and updated as needed this visit by Provider          ROS:  Constitutional, HEENT, cardiovascular, pulmonary, gi and gu systems are negative, except as otherwise noted.    OBJECTIVE:     /70 (BP Location: Left arm, Patient Position: Chair, Cuff Size: Adult Regular)  Pulse 72  Temp 97.3  F (36.3  C) (Oral)  Resp 12  Wt 147 lb 14.4 oz (67.1 kg)  LMP 06/18/2018  BMI 23.37 kg/m2  Body mass index is 23.37 kg/(m^2).  GENERAL: healthy, alert and no distress  RESP: lungs clear to auscultation - no rales, rhonchi or wheezes  CV: regular rate and rhythm, normal S1 S2, no S3 or S4, no murmur, click or rub, no peripheral edema and peripheral pulses strong  ABDOMEN: soft, nontender, no hepatosplenomegaly, no masses and bowel sounds normal  MS: no gross musculoskeletal defects noted, no edema  NEURO: Normal strength and tone, mentation intact and speech normal  PSYCH: mentation appears normal, affect normal/bright    Component Value Flag Ref Range Units Status Collected Lab   Beta HCG Qual IFA Urine Negative  NEG^Negative    Final 07/13/2018  4:46 PM [facility] lab         ASSESSMENT/PLAN:   1. Encounter for initial prescription of contraceptive pills  Reviewed quick start vs Sunday start options and the general recommendations for taking oral contraception such as taking the medication at the same time daily. Provided patient with educational materials.   - Beta HCG Qual, Urine - [facility] [place] ([id number])  - norgestimate-ethinyl estradiol (ORTHO-CYCLEN, SPRINTEC) 0.25-35 MG-MCG per tablet; Take 1 tablet by mouth daily  Dispense: 84 tablet; Refill: 3    Follow up with clinic as needed or sooner if conditions change, worsen or fail to improve as expected.      Hakeem Reyes PA-C  [facility]

## 2018-07-13 NOTE — MR AVS SNAPSHOT
"              After Visit Summary   7/13/2018    Aide Burroughs    MRN: [id number]           Patient Information     Date Of Birth          1975        Visit Information        Provider Department      7/13/2018 4:40 PM Hakeem Reyes PA-C [facility]        Today's Diagnoses     Encounter for initial prescription of contraceptive pills    -  1       Follow-ups after your visit        Your next 10 appointments already scheduled     Jul 31, 2018 12:00 PM CDT   [facility] with Leeann Gandara [facility]    [address]  [facility] [address] [phone]              Who to contact     If you have questions or need follow up information about today's clinic visit or your schedule please contact [facility] directly at [phone].  Normal or non-critical lab and imaging results will be communicated to you by Xylemehart, letter or phone within 4 business days after the clinic has received the results. If you do not hear from us within 7 days, please contact the clinic through MyChart or phone. If you have a critical or abnormal lab result, we will notify you by phone as soon as possible.  Submit refill requests through KakKstati or call your pharmacy and they will forward the refill request to us. Please allow 3 business days for your refill to be completed.          Additional Information About Your Visit        MyChart Information     KakKstati lets you send messages to your doctor, view your test results, renew your prescriptions, schedule appointments and more. To sign up, go to [email or web address] . Click on \"Log in\" on the left side of the screen, which will take you to the Welcome page. Then click on \"Sign up Now\" on the right side of the page.     You will be asked to enter the access code listed below, as well as some personal information. Please follow the directions to create your username and " password.     Your access code is: [email or web address]  Expires: 2018  2:54 PM     Your access code will  in 90 days. If you need help or a new code, please call your [facility] or [phone].        Care EveryWhere ID     This is your Care EveryWhere ID. This could be used by other organizations to access your [place] medical records  [id number]        Your Vitals Were     Pulse Temperature Respirations Last Period BMI (Body Mass Index)       72 97.3  F (36.3  C) (Oral) 12 2018 23.37 kg/m2        Blood Pressure from Last 3 Encounters:   18 124/70   18 130/80   18 128/82    Weight from Last 3 Encounters:   18 147 lb 14.4 oz (67.1 kg)   18 150 lb 11.2 oz (68.4 kg)   18 155 lb (70.3 kg)              We Performed the Following     Beta HCG Qual, Urine - [facility] [place] ([id number])          Today's Medication Changes          These changes are accurate as of 18 11:59 PM.  If you have any questions, ask your nurse or doctor.               Start taking these medicines.        Dose/Directions    norgestimate-ethinyl estradiol 0.25-35 MG-MCG per tablet   Commonly known as:  ORTHO-CYCLEN, SPRINTEC   Used for:  Encounter for initial prescription of contraceptive pills   Started by:  Hakeem Reyes, PA-C        Dose:  1 tablet   Take 1 tablet by mouth daily   Quantity:  84 tablet   Refills:  3            Where to get your medicines      These medications were sent to [facility] - [place], [place] - [address]  [address] [place] [address]     Phone:  [phone]     norgestimate-ethinyl estradiol 0.25-35 MG-MCG per tablet                Primary Care Provider Office Phone # Fax #    KYLEE Narvaez [phone]       [address] [facility] [address]        Equal Access to Services     MAGNUS INGRAM AH: Hadii kimberlee wood Sosafia, waaxda luqadaha, qaybta kaalmada adelex vitale. So [facility]  [phone].    ATENCIÓN: Si dorene lea, tiene a norton disposición servicios gratuitos de asistencia lingüística. Lyndsay hunt [phone].    We comply with applicable federal civil rights laws and [place] laws. We do not discriminate on the basis of race, color, national origin, age, disability, sex, sexual orientation, or gender identity.            Thank you!     Thank you for choosing [facility]  for your care. Our goal is always to provide you with excellent care. Hearing back from our patients is one way we can continue to improve our services. Please take a few minutes to complete the written survey that you may receive in the mail after your visit with us. Thank you!             Your Updated Medication List - Protect others around you: Learn how to safely use, store and throw away your medicines at www.disposemymeds.org.          This list is accurate as of 7/13/18 11:59 PM.  Always use your most recent med list.                   Brand Name Dispense Instructions for use Diagnosis    cholecalciferol 5000 units Caps capsule    vitamin D3     Take by mouth daily        norgestimate-ethinyl estradiol 0.25-35 MG-MCG per tablet    ORTHO-CYCLEN, SPRINTEC    84 tablet    Take 1 tablet by mouth daily    Encounter for initial prescription of contraceptive pills       predniSONE 20 MG tablet    DELTASONE    20 tablet    Take 3 tabs (60 mg) by mouth daily x 3 days, 2 tabs (40 mg) daily x 3 days, 1 tab (20 mg) daily x 3 days, then 1/2 tab (10 mg) x 3 days.    Rash       venlafaxine 150 MG 24 hr capsule    EFFEXOR-XR    90 capsule    Take 1 capsule (150 mg) by mouth daily    Recurrent major depression in partial remission (H)

## 2018-07-31 ENCOUNTER — OFFICE VISIT (OUTPATIENT)
Dept: PSYCHOLOGY | Facility: CLINIC | Age: 43
End: 2018-07-31
Payer: COMMERCIAL

## 2018-07-31 DIAGNOSIS — F41.1 GENERALIZED ANXIETY DISORDER: Primary | ICD-10-CM

## 2018-07-31 PROCEDURE — 90834 PSYTX W PT 45 MINUTES: CPT | Performed by: MARRIAGE & FAMILY THERAPIST

## 2018-07-31 ASSESSMENT — ANXIETY QUESTIONNAIRES
5. BEING SO RESTLESS THAT IT IS HARD TO SIT STILL: NOT AT ALL
3. WORRYING TOO MUCH ABOUT DIFFERENT THINGS: NOT AT ALL
2. NOT BEING ABLE TO STOP OR CONTROL WORRYING: SEVERAL DAYS
1. FEELING NERVOUS, ANXIOUS, OR ON EDGE: SEVERAL DAYS
IF YOU CHECKED OFF ANY PROBLEMS ON THIS QUESTIONNAIRE, HOW DIFFICULT HAVE THESE PROBLEMS MADE IT FOR YOU TO DO YOUR WORK, TAKE CARE OF THINGS AT HOME, OR GET ALONG WITH OTHER PEOPLE: SOMEWHAT DIFFICULT
7. FEELING AFRAID AS IF SOMETHING AWFUL MIGHT HAPPEN: NOT AT ALL
GAD7 TOTAL SCORE: 2
6. BECOMING EASILY ANNOYED OR IRRITABLE: NOT AT ALL

## 2018-07-31 ASSESSMENT — PATIENT HEALTH QUESTIONNAIRE - PHQ9: 5. POOR APPETITE OR OVEREATING: NOT AT ALL

## 2018-07-31 NOTE — MR AVS SNAPSHOT
"                  MRN:[id number]                      After Visit Summary   2018    Aide Burroughs    MRN: [id number]           Visit Information        Provider Department      2018 12:00 PM Leeann Gandara [facility] DISCHARGE      MyChart Information     Greystripehart lets you send messages to your doctor, view your test results, renew your prescriptions, schedule appointments and more. To sign up, go to [email or web address] . Click on \"Log in\" on the left side of the screen, which will take you to the Welcome page. Then click on \"Sign up Now\" on the right side of the page.     You will be asked to enter the access code listed below, as well as some personal information. Please follow the directions to create your username and password.     Your access code is: [email or web address]  Expires: 2018  2:54 PM     Your access code will  in 90 days. If you need help or a new code, please call your [place] clinic or [phone].        Care EveryWhere ID     This is your Care EveryWhere ID. This could be used by other organizations to access your [place] medical records  [id number]        Equal Access to Services     MAGNUS INGRAM AH: Maria Alejandra Moore, cam conde, qacarolina kabárbara stein, lex coleman. So [facility] [phone].    ATENCIÓN: Si habla español, tiene a norton disposición servicios gratuitos de asistencia lingüística. Llame al [phone].    We comply with applicable federal civil rights laws and [place] laws. We do not discriminate on the basis of race, color, national origin, age, disability, sex, sexual orientation, or gender identity.            "

## 2018-07-31 NOTE — PROGRESS NOTES
"                       Discharge Summary  Multiple Sessions    Client Name: Aide Burroughs MRN#: [id number] YOB: 1975    Discharge Date:   July 31, 2018      Service Type: Individual      Session Start Time: 12pm  Session End Time: 1250pm      Session Length: 45 - 50     Session #: 11     Attendees: Client attended alone    Focus of Treatment Objective(s):  Client's presenting concerns included: Depression, anxiety and relational issues  Stage of Change at time of Discharge: MAINTENANCE (Working to maintain change, with risk of relapse)     Client reports she has moved in with a friend since I last saw her.  She states the father of her children continues to \"play mind games\" and would state they had no future one day and then want the benefits of being in a relationship the next day.  She decided to leave one morning after another one of these incidents.  She reports she is living with a good friend and decided to go out on a date.  She reports the date went great and she is feeling cared for and appreciated again.  She states that getting away from the father of her children has led to more happiness, a feeling of freedom and a sense of adventure.      Medication Adherence:  Yes    Chemical Use:  NA    Assessment: Current Emotional / Mental Status (status of significant symptoms):    Risk status (Self / Other harm or suicidal ideation)  Client denies current fears or concerns for personal safety.  Client denies current or recent suicidal ideation or behaviors.  Client denies current or recent homicidal ideation or behaviors.  Client denies current or recent self injurious behavior or ideation.  Client denies other safety concerns.  A safety and risk management plan has not been developed at this time, however client was given the after-hours number should there be a change in any of these risk factors.    Appearance:   Appropriate   Eye Contact:   Good   Psychomotor Behavior: Normal "   Attitude:   Cooperative   Orientation:   All  Speech   Rate / Production: Normal    Volume:  Normal   Mood:    Normal  Affect:    Appropriate   Thought Content:  Clear   Thought Form:  Coherent  Logical   Insight:   Good     DSM5 Diagnoses: (Sustained by DSM5 Criteria Listed Above)  Diagnoses:            296.32 (F33.1) Major Depressive Disorder, Recurrent Episode, Moderate _ and With anxious distress  300.02 (F41.1) Generalized Anxiety Disorder  309.81 (F43.10) Posttraumatic Stress Disorder (includes Posttraumatic Stress Disorder for Children 6 Years and Younger)  Without dissociative symptoms  Psychosocial & Contextual Factors: current relational stressors              Reason for Discharge:  Client is satisfied with progress      Aftercare Plan:  Client may resume counseling services at any time in the future by calling the [facility] Intake Office, [phone].      Leeann Gandara, TH

## 2018-08-01 ASSESSMENT — ANXIETY QUESTIONNAIRES: GAD7 TOTAL SCORE: 2

## 2018-08-01 ASSESSMENT — PATIENT HEALTH QUESTIONNAIRE - PHQ9: SUM OF ALL RESPONSES TO PHQ QUESTIONS 1-9: 1

## 2018-09-25 ENCOUNTER — TRANSFERRED RECORDS (OUTPATIENT)
Dept: HEALTH INFORMATION MANAGEMENT | Facility: CLINIC | Age: 43
End: 2018-09-25

## 2018-10-05 ENCOUNTER — OFFICE VISIT (OUTPATIENT)
Dept: FAMILY MEDICINE | Facility: OTHER | Age: 43
End: 2018-10-05
Payer: COMMERCIAL

## 2018-10-05 VITALS
SYSTOLIC BLOOD PRESSURE: 130 MMHG | HEART RATE: 80 BPM | TEMPERATURE: 97.7 F | BODY MASS INDEX: 24.07 KG/M2 | OXYGEN SATURATION: 98 % | RESPIRATION RATE: 18 BRPM | DIASTOLIC BLOOD PRESSURE: 86 MMHG | WEIGHT: 152.3 LBS

## 2018-10-05 DIAGNOSIS — M54.2 CERVICALGIA: Primary | ICD-10-CM

## 2018-10-05 PROCEDURE — 99213 OFFICE O/P EST LOW 20 MIN: CPT | Performed by: FAMILY MEDICINE

## 2018-10-05 RX ORDER — TRAMADOL HYDROCHLORIDE 50 MG/1
50 TABLET ORAL EVERY 6 HOURS PRN
Qty: 10 TABLET | Refills: 0 | Status: SHIPPED | OUTPATIENT
Start: 2018-10-05 | End: 2018-11-19

## 2018-10-05 RX ORDER — METHOCARBAMOL 750 MG/1
750 TABLET, FILM COATED ORAL 3 TIMES DAILY
Qty: 30 TABLET | Refills: 0 | Status: SHIPPED | OUTPATIENT
Start: 2018-10-05 | End: 2018-11-19

## 2018-10-05 ASSESSMENT — PAIN SCALES - GENERAL: PAINLEVEL: EXTREME PAIN (9)

## 2018-10-05 NOTE — MR AVS SNAPSHOT
After Visit Summary   10/5/2018    Aide Burroughs    MRN: [id number]           Patient Information     Date Of Birth          1975        Visit Information        Provider Department      10/5/2018 4:10 PM Wood Dhaliwal MD [facility]        Today's Diagnoses     Cervicalgia    -  1      Care Instructions      Know Your Neck: The Cervical Spine  By learning about the parts of the neck, you can better understand your neck problem. The bones of the neck are called cervical vertebrae, commonly identified as C1 through C7. Together, they form a bony column called the spine. Vertebrae also protect the spinal cord, a pathway for messages to reach the brain. Surrounding the spine are soft tissues such as muscles, tendons, and nerves.          Flexibility Is Key  For the neck to function normally, it has to be flexible enough to move without discomfort. A healthy neck can move easily in six different directions.     Flexion and extension move the head forward and backward.      Rotation turns the head from left to right, with the chin over the shoulders.      Lateral bending moves the head from side to side.   Date Last Reviewed: 10/1/2015    2877-5655 [employer]. [address] [place] [address]. All rights reserved. This information is not intended as a substitute for professional medical care. Always follow your healthcare professional's instructions.        Neck Problems: Relieving Your Symptoms  The first goal of treatment is to relieve your symptoms. Your healthcare provider may recommend self-care treatments. These include resting, applying ice and heat, taking medicine, and doing exercises. Your healthcare provider may also recommend that you see a physical therapist who can teach you ways to care for and strengthen your neck.     Heat relaxes sore muscles and helps relieve spasms.   Self-care treatments  Pain can end quickly or last a while. Either way, you ll  want relief as soon as possible. Your healthcare provider can tell you which treatments to do at home to help relieve your pain:    Lying down for a short time takes pressure from the head off the neck.    Ice and heat can help reduce pain. To bring down swelling, rest an ice pack wrapped in a thin towel on your neck for 10 to 15 minutes. To relax sore muscles, apply a warm, wet towel to the area. Or you can take a warm bath or shower.    Over-the-counter medicines, such as ibuprofen, naproxen, and aspirin, can help reduce pain and swelling. Acetaminophen can help relieve pain. Use these only as directed.    Exercises can relax muscles and ease stiffness. To prepare, drape a warm, wet towel around your neck and shoulders for 5 minutes. Remove the towel. Then do any exercises recommended to you by your healthcare provider.  Physical therapy  If self-care treatments aren t helping relieve neck pain, your healthcare provider may suggest physical therapy. Physical therapy is done by a specialist trained to treat injuries and musculoskeletal disorders. Your physical therapist (PT) will teach you how to strengthen muscles, improve the spine s alignment, and help you move properly. Treatment methods used in physical therapy may include:    Heat. A special heating pad called a neck pack may be applied to your neck.    Exercises. Your PT will teach you exercises to help strengthen your neck and improve its range of motion.    Joint mobilization. The PT gently moves your vertebrae to help restore motion in your neck joints and reduce neck pain.    Soft tissue mobilization. The PT massages and stretches the muscles in your neck and shoulders.    Electrical stimulation. Electrical impulses are sent into your neck. This helps reduce soreness and inflammation.    Education in body mechanics. The PT shows you ways to position and move your body that protect the neck.  Other treatments  If physical therapy doesn t relieve your neck  pain, your healthcare provider may suggest other treatments. For example, medicines or injections can help relieve pain and swelling. In some cases, surgery may be needed to treat neck problems.  Date Last Reviewed: 10/1/2017    0190-5781 [employer]. [address] [place] [address], [place] [address]. All rights reserved. This information is not intended as a substitute for professional medical care. Always follow your healthcare professional's instructions.                Follow-ups after your visit        Additional Services     PHYSICAL THERAPY REFERRAL       If you have not heard from the scheduling office within 2 business days, please call [phone] for all locations, with the exception of [place], please call [phone] and [place], please call [phone].    Please be aware that coverage of these services is subject to the terms and limitations of your health insurance plan.  Call member services at your health plan with any benefit or coverage questions.                  Follow-up notes from your care team     Return in about 2 weeks (around 10/19/2018) for recheck neck after PT .      Future tests that were ordered for you today     Open Future Orders        Priority Expected Expires Ordered    PHYSICAL THERAPY REFERRAL Routine  10/5/2019 10/5/2018            Who to contact     If you have questions or need follow up information about today's clinic visit or your schedule please contact [facility] directly at [phone].  Normal or non-critical lab and imaging results will be communicated to you by MyChart, letter or phone within 4 business days after the clinic has received the results. If you do not hear from us within 7 days, please contact the clinic through MyChart or phone. If you have a critical or abnormal lab result, we will notify you by phone as soon as possible.  Submit refill requests through [employer] or call your pharmacy and they will forward the refill request  to us. Please allow 3 business days for your refill to be completed.          Additional Information About Your Visit        Care EveryWhere ID     This is your Care EveryWhere ID. This could be used by other organizations to access your [facility] medical records  [id number]        Your Vitals Were     Pulse Temperature Respirations Last Period Pulse Oximetry BMI (Body Mass Index)    80 97.7  F (36.5  C) (Temporal) 18 10/01/2018 98% 24.07 kg/m2       Blood Pressure from Last 3 Encounters:   10/05/18 130/86   07/13/18 124/70   07/02/18 130/80    Weight from Last 3 Encounters:   10/05/18 152 lb 4.8 oz (69.1 kg)   07/13/18 147 lb 14.4 oz (67.1 kg)   07/02/18 150 lb 11.2 oz (68.4 kg)                 Today's Medication Changes          These changes are accurate as of 10/5/18  5:06 PM.  If you have any questions, ask your nurse or doctor.               Start taking these medicines.        Dose/Directions    methocarbamol 750 MG tablet   Commonly known as:  ROBAXIN   Used for:  Cervicalgia   Started by:  Wood Dhaliwal MD        Dose:  750 mg   Take 1 tablet (750 mg) by mouth 3 times daily   Quantity:  30 tablet   Refills:  0       traMADol 50 MG tablet   Commonly known as:  ULTRAM   Used for:  Cervicalgia   Started by:  Wood Dhaliwal MD        Dose:  50 mg   Take 1 tablet (50 mg) by mouth every 6 hours as needed for severe pain   Quantity:  10 tablet   Refills:  0            Where to get your medicines      These medications were sent [facility]  [address] [facility] [address]     Phone:  [phone]     methocarbamol 750 MG tablet         Some of these will need a paper prescription and others can be bought over the counter.  Ask your nurse if you have questions.     Bring a paper prescription for each of these medications     traMADol 50 MG tablet               Information about OPIOIDS     PRESCRIPTION OPIOIDS: WHAT YOU NEED TO KNOW   We gave you an opioid (narcotic) pain  medicine. It is important to manage your pain, but opioids are not always the best choice. You should first try all the other options your care team gave you. Take this medicine for as short a time (and as few doses) as possible.    Some activities can increase your pain, such as bandage changes or therapy sessions. It may help to take your pain medicine 30 to 60 minutes before these activities. Reduce your stress by getting enough sleep, working on hobbies you enjoy and practicing relaxation or meditation. Talk to your care team about ways to manage your pain beyond prescription opioids.    These medicines have risks:    DO NOT drive when on new or higher doses of pain medicine. These medicines can affect your alertness and reaction times, and you could be arrested for driving under the influence (DUI). If you need to use opioids long-term, talk to your care team about driving.    DO NOT operate heavy machinery    DO NOT do any other dangerous activities while taking these medicines.    DO NOT drink any alcohol while taking these medicines.     If the opioid prescribed includes acetaminophen, DO NOT take with any other medicines that contain acetaminophen. Read all labels carefully. Look for the word  acetaminophen  or  Tylenol.  Ask your pharmacist if you have questions or are unsure.    You can get addicted to pain medicines, especially if you have a history of addiction (chemical, alcohol or substance dependence). Talk to your care team about ways to reduce this risk.    All opioids tend to cause constipation. Drink plenty of water and eat foods that have a lot of fiber, such as fruits, vegetables, prune juice, apple juice and high-fiber cereal. Take a laxative (Miralax, milk of magnesia, Colace, Senna) if you don t move your bowels at least every other day. Other side effects include upset stomach, sleepiness, dizziness, throwing up, tolerance (needing more of the medicine to have the same effect), physical  dependence and slowed breathing.    Store your pills in a secure place, locked if possible. We will not replace any lost or stolen medicine. If you don t finish your medicine, please throw away (dispose) as directed by your pharmacist. The [place] Pollution [employer] has more information about safe disposal: https://www.pca.[facility].mn.us/living-green/managing-unwanted-medications         Primary Care Provider Office Phone # Fax #    KYLEE Narvaez [phone]       [address] [facility] [address]        Equal Access to Services     [facility] Center[facility] : Hadii kimberlee ku hadasho Soomaali, waaxda luqadaha, qaybta kaalmada adeiam, lex durand . So [facility] [phone].    ATENCIÓN: Si habla español, tiene a norton disposición servicios gratuitos de asistencia lingüística. Llame al [phone].    We comply with applicable federal civil rights laws and [place] laws. We do not discriminate on the basis of race, color, national origin, age, disability, sex, sexual orientation, or gender identity.            Thank you!     Thank you for choosing [facility]  for your care. Our goal is always to provide you with excellent care. Hearing back from our patients is one way we can continue to improve our services. Please take a few minutes to complete the written survey that you may receive in the mail after your visit with us. Thank you!             Your Updated Medication List - Protect others around you: Learn how to safely use, store and throw away your medicines at www.disposemymeds.org.          This list is accurate as of 10/5/18  5:06 PM.  Always use your most recent med list.                   Brand Name Dispense Instructions for use Diagnosis    cholecalciferol 5000 units Caps capsule    vitamin D3     Take by mouth daily        methocarbamol 750 MG tablet    ROBAXIN    30 tablet    Take 1 tablet (750 mg) by mouth 3 times daily    Cervicalgia        norgestimate-ethinyl estradiol 0.25-35 MG-MCG per tablet    ORTHO-CYCLEN, SPRINTEC    84 tablet    Take 1 tablet by mouth daily    Encounter for initial prescription of contraceptive pills       traMADol 50 MG tablet    ULTRAM    10 tablet    Take 1 tablet (50 mg) by mouth every 6 hours as needed for severe pain    Cervicalgia       venlafaxine 150 MG 24 hr capsule    EFFEXOR-XR    90 capsule    Take 1 capsule (150 mg) by mouth daily    Recurrent major depression in partial remission (H)

## 2018-10-05 NOTE — PROGRESS NOTES
SUBJECTIVE:   Aide Burroughs is a 42 year old female who presents to clinic today for the following health issues:        Neck Pain  Onset: 09/30/2018    Description:   Location: neck, shoulder  Radiation: nto the left shoulder and into the left forearm    Intensity: moderate, severe    Progression of Symptoms:  worsening    Accompanying Signs & Symptoms:  Burning, prickly sensation (paresthesias) in arm(s): YES  Numbness in arm(s): YES  Weakness in arm(s):  YES  Fever: no   Headache: no   Nausea and/or vomiting: YES    History:   Trauma: no   Previous neck pain: YES- when sleep funny  Previous surgery or injections: no   Previous Imaging (MRI,X ray): no     Precipitating factors:   Does movement increase the pain:  no     Alleviating factors:  Moving around temporally helps    Therapies Tried and outcome:  Old rx hydrocodone gives relief. , Advil, ice hot , ice and heat no relief       Problem list and histories reviewed & adjusted, as indicated.  Additional history: as documented    Patient Active Problem List   Diagnosis     Seasonal allergic rhinitis     Mild major depression (H)     Generalized anxiety disorder     Transient global amnesia     Fracture of great toe     Hyperlipidemia LDL goal <160     Major depressive disorder, recurrent episode, severe (H)     Depressive episode     Severe major depression with psychotic features (H)     Panic disorder with agoraphobia     PTSD (post-traumatic stress disorder)     Suicidal ideation     Genital warts     Past Surgical History:   Procedure Laterality Date     ESOPHAGOSCOPY, GASTROSCOPY, DUODENOSCOPY (EGD), COMBINED N/A 5/15/2017    Procedure: COMBINED ESOPHAGOSCOPY, GASTROSCOPY, DUODENOSCOPY (EGD), BIOPSY SINGLE OR MULTIPLE;  ESOPHAGOSCOPY, GASTROSCOPY, DUODENOSCOPY (EGD) with biopsies by biopsy;  Surgeon: Robles Falk MD;  Location: [facility]     MYRINGOTOMY, INSERT TUBE, COMBINED Left 05/16/2018       Social History   Substance Use Topics     Smoking  status: Never Smoker     Smokeless tobacco: Never Used     Alcohol use 0.0 oz/week     0 Standard drinks or equivalent per week      Comment: rare     Family History   Problem Relation Age of Onset     Hypertension Mother      Hyperlipidemia Mother      Mental Illness Mother      Cancer - colorectal Father      Substance Abuse Father      Cerebrovascular Disease Maternal Grandmother      Diabetes Paternal Grandmother      HEART DISEASE Paternal Grandfather      Bipolar Disorder Maternal Aunt      Schizophrenia Maternal Aunt      Coronary Artery Disease Maternal Grandfather          Current Outpatient Prescriptions   Medication Sig Dispense Refill     cholecalciferol (VITAMIN D3) 5000 UNITS CAPS capsule Take by mouth daily       norgestimate-ethinyl estradiol (ORTHO-CYCLEN, SPRINTEC) 0.25-35 MG-MCG per tablet Take 1 tablet by mouth daily 84 tablet 3     venlafaxine (EFFEXOR-XR) 150 MG 24 hr capsule Take 1 capsule (150 mg) by mouth daily 90 capsule 1     Allergies   Allergen Reactions     Erythromycin Nausea and Vomiting     Seasonal Allergies      Molds, grass, multiple trees, plant pollen, cats, rabbits, dogs, hay.     Recent Labs   Lab Test  01/05/18   0758  01/04/17   0847  08/05/15   0851  01/06/15   0816  11/13/14   0814  04/09/13   1114   LDL  161*  120*   --    --   182*   --    HDL  40*  32*   --    --   49*   --    TRIG  215*  244*   --    --   81   --    ALT   --    --    --   23   --   25   CR   --    --   0.88  0.91  0.86  0.88   GFRESTIMATED   --    --   72  69  75  73   GFRESTBLACK   --    --   87  84  >90   GFR Calc    88   POTASSIUM   --    --   4.2  3.7  3.5  4.2   TSH   --    --   1.12  1.35   --    --       BP Readings from Last 3 Encounters:   10/05/18 130/86   07/13/18 124/70   07/02/18 130/80    Wt Readings from Last 3 Encounters:   10/05/18 152 lb 4.8 oz (69.1 kg)   07/13/18 147 lb 14.4 oz (67.1 kg)   07/02/18 150 lb 11.2 oz (68.4 kg)                  Labs reviewed in  EPIC    Reviewed and updated as needed this visit by clinical staff  Tobacco  Allergies  Meds  Problems  Med Hx  Surg Hx  Fam Hx  Soc Hx        Reviewed and updated as needed this visit by Provider  Allergies  Meds  Problems         ROS:  CONSTITUTIONAL: NEGATIVE for fever, chills, change in weight  ENT/MOUTH: NEGATIVE for ear, mouth and throat problems  RESP: NEGATIVE for significant cough or SOB  CV: NEGATIVE for chest pain, palpitations or peripheral edema  MUSCULOSKELETAL: POSITIVE  for neck pain, paresthesias and radicular pain into left hand  NEURO: NEGATIVE for weakness, dizziness or paresthesias  ROS otherwise negative    OBJECTIVE:     /86 (BP Location: Right arm, Patient Position: Chair, Cuff Size: Adult Large)  Pulse 80  Temp 97.7  F (36.5  C) (Temporal)  Resp 18  Wt 152 lb 4.8 oz (69.1 kg)  LMP 10/01/2018  SpO2 98%  BMI 24.07 kg/m2  Body mass index is 24.07 kg/(m^2).  GENERAL: healthy, alert and no distress  NECK: no adenopathy, no asymmetry, masses, or scars and thyroid normal to palpation  RESP: lungs clear to auscultation - no rales, rhonchi or wheezes  CV: regular rate and rhythm, normal S1 S2, no S3 or S4, no murmur, click or rub, no peripheral edema and peripheral pulses strong  MS: neck exam shows normal strength, ROM is normal and left upper trapezius spasm.  NEURO: Normal strength and tone, mentation intact and speech normal    Diagnostic Test Results:  none     ASSESSMENT/PLAN:     1. Cervicalgia  Acute cervicalgia due to spasm. Warm moist heat or ice. Continue OTC pain medications. Add muscle relaxant and limited tramadol. Refer to PT for follow up if not improving over the weekend.  - methocarbamol (ROBAXIN) 750 MG tablet; Take 1 tablet (750 mg) by mouth 3 times daily  Dispense: 30 tablet; Refill: 0  - PHYSICAL THERAPY REFERRAL; Future  - traMADol (ULTRAM) 50 MG tablet; Take 1 tablet (50 mg) by mouth every 6 hours as needed for severe pain  Dispense: 10 tablet; Refill:  0    CONSULTATION/REFERRAL to Physical therapy  FUTURE APPOINTMENTS:       - Follow-up visit in 2 weeks if not improving with PT.    Patient Instructions       Know Your Neck: The Cervical Spine  By learning about the parts of the neck, you can better understand your neck problem. The bones of the neck are called cervical vertebrae, commonly identified as C1 through C7. Together, they form a bony column called the spine. Vertebrae also protect the spinal cord, a pathway for messages to reach the brain. Surrounding the spine are soft tissues such as muscles, tendons, and nerves.          Flexibility Is Key  For the neck to function normally, it has to be flexible enough to move without discomfort. A healthy neck can move easily in six different directions.     Flexion and extension move the head forward and backward.      Rotation turns the head from left to right, with the chin over the shoulders.      Lateral bending moves the head from side to side.   Date Last Reviewed: 10/1/2015    4857-9125 [employer]. [address] [place] [address]. All rights reserved. This information is not intended as a substitute for professional medical care. Always follow your healthcare professional's instructions.        Neck Problems: Relieving Your Symptoms  The first goal of treatment is to relieve your symptoms. Your healthcare provider may recommend self-care treatments. These include resting, applying ice and heat, taking medicine, and doing exercises. Your healthcare provider may also recommend that you see a physical therapist who can teach you ways to care for and strengthen your neck.     Heat relaxes sore muscles and helps relieve spasms.   Self-care treatments  Pain can end quickly or last a while. Either way, you ll want relief as soon as possible. Your healthcare provider can tell you which treatments to do at home to help relieve your pain:    Lying down for a short time takes pressure from the  head off the neck.    Ice and heat can help reduce pain. To bring down swelling, rest an ice pack wrapped in a thin towel on your neck for 10 to 15 minutes. To relax sore muscles, apply a warm, wet towel to the area. Or you can take a warm bath or shower.    Over-the-counter medicines, such as ibuprofen, naproxen, and aspirin, can help reduce pain and swelling. Acetaminophen can help relieve pain. Use these only as directed.    Exercises can relax muscles and ease stiffness. To prepare, drape a warm, wet towel around your neck and shoulders for 5 minutes. Remove the towel. Then do any exercises recommended to you by your healthcare provider.  Physical therapy  If self-care treatments aren t helping relieve neck pain, your healthcare provider may suggest physical therapy. Physical therapy is done by a specialist trained to treat injuries and musculoskeletal disorders. Your physical therapist (PT) will teach you how to strengthen muscles, improve the spine s alignment, and help you move properly. Treatment methods used in physical therapy may include:    Heat. A special heating pad called a neck pack may be applied to your neck.    Exercises. Your PT will teach you exercises to help strengthen your neck and improve its range of motion.    Joint mobilization. The PT gently moves your vertebrae to help restore motion in your neck joints and reduce neck pain.    Soft tissue mobilization. The PT massages and stretches the muscles in your neck and shoulders.    Electrical stimulation. Electrical impulses are sent into your neck. This helps reduce soreness and inflammation.    Education in body mechanics. The PT shows you ways to position and move your body that protect the neck.  Other treatments  If physical therapy doesn t relieve your neck pain, your healthcare provider may suggest other treatments. For example, medicines or injections can help relieve pain and swelling. In some cases, surgery may be needed to treat  neck problems.  Date Last Reviewed: 10/1/2017    8563-4862 [employer] [address] [facility], [place] [address]. All rights reserved. This information is not intended as a substitute for professional medical care. Always follow your healthcare professional's instructions.            Wood Dhaliwal MD  [facility]

## 2018-10-05 NOTE — PATIENT INSTRUCTIONS
Know Your Neck: The Cervical Spine  By learning about the parts of the neck, you can better understand your neck problem. The bones of the neck are called cervical vertebrae, commonly identified as C1 through C7. Together, they form a bony column called the spine. Vertebrae also protect the spinal cord, a pathway for messages to reach the brain. Surrounding the spine are soft tissues such as muscles, tendons, and nerves.          Flexibility Is Key  For the neck to function normally, it has to be flexible enough to move without discomfort. A healthy neck can move easily in six different directions.     Flexion and extension move the head forward and backward.      Rotation turns the head from left to right, with the chin over the shoulders.      Lateral bending moves the head from side to side.   Date Last Reviewed: 10/1/2015    8318-3271 [employer]. [address] [place] [address]. All rights reserved. This information is not intended as a substitute for professional medical care. Always follow your healthcare professional's instructions.        Neck Problems: Relieving Your Symptoms  The first goal of treatment is to relieve your symptoms. Your healthcare provider may recommend self-care treatments. These include resting, applying ice and heat, taking medicine, and doing exercises. Your healthcare provider may also recommend that you see a physical therapist who can teach you ways to care for and strengthen your neck.     Heat relaxes sore muscles and helps relieve spasms.   Self-care treatments  Pain can end quickly or last a while. Either way, you ll want relief as soon as possible. Your healthcare provider can tell you which treatments to do at home to help relieve your pain:    Lying down for a short time takes pressure from the head off the neck.    Ice and heat can help reduce pain. To bring down swelling, rest an ice pack wrapped in a thin towel on your neck for 10 to 15 minutes.  To relax sore muscles, apply a warm, wet towel to the area. Or you can take a warm bath or shower.    Over-the-counter medicines, such as ibuprofen, naproxen, and aspirin, can help reduce pain and swelling. Acetaminophen can help relieve pain. Use these only as directed.    Exercises can relax muscles and ease stiffness. To prepare, drape a warm, wet towel around your neck and shoulders for 5 minutes. Remove the towel. Then do any exercises recommended to you by your healthcare provider.  Physical therapy  If self-care treatments aren t helping relieve neck pain, your healthcare provider may suggest physical therapy. Physical therapy is done by a specialist trained to treat injuries and musculoskeletal disorders. Your physical therapist (PT) will teach you how to strengthen muscles, improve the spine s alignment, and help you move properly. Treatment methods used in physical therapy may include:    Heat. A special heating pad called a neck pack may be applied to your neck.    Exercises. Your PT will teach you exercises to help strengthen your neck and improve its range of motion.    Joint mobilization. The PT gently moves your vertebrae to help restore motion in your neck joints and reduce neck pain.    Soft tissue mobilization. The PT massages and stretches the muscles in your neck and shoulders.    Electrical stimulation. Electrical impulses are sent into your neck. This helps reduce soreness and inflammation.    Education in body mechanics. The PT shows you ways to position and move your body that protect the neck.  Other treatments  If physical therapy doesn t relieve your neck pain, your healthcare provider may suggest other treatments. For example, medicines or injections can help relieve pain and swelling. In some cases, surgery may be needed to treat neck problems.  Date Last Reviewed: 10/1/2017    5865-3348 [employer]. [address] [place] [address], [place] [address]. All rights reserved. This  information is not intended as a substitute for professional medical care. Always follow your healthcare professional's instructions.

## 2018-10-12 DIAGNOSIS — F33.41 RECURRENT MAJOR DEPRESSION IN PARTIAL REMISSION (H): ICD-10-CM

## 2018-10-12 RX ORDER — VENLAFAXINE HYDROCHLORIDE 150 MG/1
150 CAPSULE, EXTENDED RELEASE ORAL DAILY
Qty: 90 CAPSULE | Refills: 1 | Status: SHIPPED | OUTPATIENT
Start: 2018-10-12 | End: 2018-10-16 | Stop reason: DRUGHIGH

## 2018-10-12 NOTE — TELEPHONE ENCOUNTER
"  Requested Prescriptions   Pending Prescriptions Disp Refills     venlafaxine (EFFEXOR-XR) 150 MG 24 hr capsule 90 capsule 1     Sig: Take 1 capsule (150 mg) by mouth daily    Serotonin-Norepinephrine Reuptake Inhibitors  Failed    10/12/2018  7:20 AM       Failed - Normal serum creatinine on file in past 12 months    Recent Labs   Lab Test  08/05/15   0851   CR  0.88            Passed - Blood pressure under 140/90 in past 12 months    BP Readings from Last 3 Encounters:   10/05/18 130/86   07/13/18 124/70   07/02/18 130/80                Passed - PHQ-9 score of less than 5 in past 6 months    Please review last PHQ-9 score.   PHQ-9 score:    PHQ-9 SCORE 7/31/2018   Total Score -   Total Score 1          Passed - Patient is age 18 or older       Passed - No active pregnancy on record       Passed - No positive pregnancy test in past 12 months       Passed - Recent (6 mo) or future (30 days) visit within the authorizing provider's specialty    Patient had office visit in the last 6 months or has a visit in the next 30 days with authorizing provider or within the authorizing provider's specialty.  See \"Patient Info\" tab in inbasket, or \"Choose Columns\" in Meds & Orders section of the refill encounter.            "

## 2018-10-12 NOTE — TELEPHONE ENCOUNTER
Routing refill request to provider for review/approval because:  Labs not current:  Creatinine    NEGRITO Hurtado, RN  [facility]

## 2018-10-12 NOTE — TELEPHONE ENCOUNTER
VENLAFAXINE 150MG  Last Written Prescription Date:  3/16/18  Last Fill Quantity: 90,  # refills: 1   Last office visit: 10/5/2018 with prescribing provider   Future Office Visit:   Next 5 appointments (look out 90 days)     Oct 16, 2018  7:20 AM CDT   [facility]E [facility]z C[facility]   [facility]    [address] [facility] [address] [phone]

## 2018-10-16 ENCOUNTER — OFFICE VISIT (OUTPATIENT)
Dept: FAMILY MEDICINE | Facility: OTHER | Age: 43
End: 2018-10-16
Payer: COMMERCIAL

## 2018-10-16 VITALS
TEMPERATURE: 97.3 F | DIASTOLIC BLOOD PRESSURE: 78 MMHG | SYSTOLIC BLOOD PRESSURE: 116 MMHG | RESPIRATION RATE: 20 BRPM | HEART RATE: 84 BPM | OXYGEN SATURATION: 98 % | BODY MASS INDEX: 24.18 KG/M2 | WEIGHT: 153 LBS

## 2018-10-16 DIAGNOSIS — F41.1 GENERALIZED ANXIETY DISORDER: ICD-10-CM

## 2018-10-16 DIAGNOSIS — F33.41 RECURRENT MAJOR DEPRESSION IN PARTIAL REMISSION (H): Primary | ICD-10-CM

## 2018-10-16 PROCEDURE — 99214 OFFICE O/P EST MOD 30 MIN: CPT | Performed by: NURSE PRACTITIONER

## 2018-10-16 RX ORDER — VENLAFAXINE HYDROCHLORIDE 37.5 MG/1
CAPSULE, EXTENDED RELEASE ORAL
Qty: 30 CAPSULE | Refills: 1 | Status: SHIPPED | OUTPATIENT
Start: 2018-10-16 | End: 2018-12-12

## 2018-10-16 RX ORDER — VENLAFAXINE HYDROCHLORIDE 75 MG/1
CAPSULE, EXTENDED RELEASE ORAL
Qty: 30 CAPSULE | Refills: 1 | Status: SHIPPED | OUTPATIENT
Start: 2018-10-16 | End: 2018-12-12

## 2018-10-16 ASSESSMENT — ANXIETY QUESTIONNAIRES
6. BECOMING EASILY ANNOYED OR IRRITABLE: NOT AT ALL
5. BEING SO RESTLESS THAT IT IS HARD TO SIT STILL: NOT AT ALL
7. FEELING AFRAID AS IF SOMETHING AWFUL MIGHT HAPPEN: NOT AT ALL
IF YOU CHECKED OFF ANY PROBLEMS ON THIS QUESTIONNAIRE, HOW DIFFICULT HAVE THESE PROBLEMS MADE IT FOR YOU TO DO YOUR WORK, TAKE CARE OF THINGS AT HOME, OR GET ALONG WITH OTHER PEOPLE: NOT DIFFICULT AT ALL
GAD7 TOTAL SCORE: 0
3. WORRYING TOO MUCH ABOUT DIFFERENT THINGS: NOT AT ALL
1. FEELING NERVOUS, ANXIOUS, OR ON EDGE: NOT AT ALL
2. NOT BEING ABLE TO STOP OR CONTROL WORRYING: NOT AT ALL

## 2018-10-16 ASSESSMENT — PATIENT HEALTH QUESTIONNAIRE - PHQ9: 5. POOR APPETITE OR OVEREATING: NOT AT ALL

## 2018-10-16 ASSESSMENT — PAIN SCALES - GENERAL: PAINLEVEL: MILD PAIN (2)

## 2018-10-16 NOTE — PROGRESS NOTES
SUBJECTIVE:   Aide Burroughs is a 42 year old female who presents to clinic today for the following health issues:      Depression Followup    Status since last visit: Improved     See PHQ-9 for current symptoms.  Other associated symptoms: None    Complicating factors:   Significant life event:  No   Current substance abuse:  None  Anxiety or Panic symptoms:  No    PHQ 5/22/2018 6/26/2018 7/31/2018   PHQ-9 Total Score 10 7 1   Q9: Suicide Ideation Not at all Not at all Not at all       Is on Effexor currently, 150 mg daily and this is working very well.  She has  again from her ex- and has actually moved in with her current significant other.  This has all occurred within the past 4-5 months.  She is very happy and thinks this is a good move.  She has stopped her counseling at the recommendation of her therapist because she was doing so well.  Wants to discuss decreasing her Effexor dose now.         Problem list and histories reviewed & adjusted, as indicated.  Additional history: as documented    Patient Active Problem List   Diagnosis     Seasonal allergic rhinitis     Mild major depression (H)     Generalized anxiety disorder     Transient global amnesia     Fracture of great toe     Hyperlipidemia LDL goal <160     Major depressive disorder, recurrent episode, severe (H)     Depressive episode     Severe major depression with psychotic features (H)     Panic disorder with agoraphobia     PTSD (post-traumatic stress disorder)     Suicidal ideation     Genital warts     Past Surgical History:   Procedure Laterality Date     ESOPHAGOSCOPY, GASTROSCOPY, DUODENOSCOPY (EGD), COMBINED N/A 5/15/2017    Procedure: COMBINED ESOPHAGOSCOPY, GASTROSCOPY, DUODENOSCOPY (EGD), BIOPSY SINGLE OR MULTIPLE;  ESOPHAGOSCOPY, GASTROSCOPY, DUODENOSCOPY (EGD) with biopsies by biopsy;  Surgeon: Robles Falk MD;  Location: [facility]     MYRINGOTOMY, INSERT TUBE, COMBINED Left 05/16/2018       Social History    Substance Use Topics     Smoking status: Never Smoker     Smokeless tobacco: Never Used     Alcohol use 0.0 oz/week     0 Standard drinks or equivalent per week      Comment: rare     Family History   Problem Relation Age of Onset     Hypertension Mother      Hyperlipidemia Mother      Mental Illness Mother      Cancer - colorectal Father      Substance Abuse Father      Cerebrovascular Disease Maternal Grandmother      Diabetes Paternal Grandmother      HEART DISEASE Paternal Grandfather      Bipolar Disorder Maternal Aunt      Schizophrenia Maternal Aunt      Coronary Artery Disease Maternal Grandfather          Current Outpatient Prescriptions   Medication Sig Dispense Refill     cholecalciferol (VITAMIN D3) 5000 UNITS CAPS capsule Take by mouth daily       methocarbamol (ROBAXIN) 750 MG tablet Take 1 tablet (750 mg) by mouth 3 times daily 30 tablet 0     norgestimate-ethinyl estradiol (ORTHO-CYCLEN, SPRINTEC) 0.25-35 MG-MCG per tablet Take 1 tablet by mouth daily 84 tablet 3     traMADol (ULTRAM) 50 MG tablet Take 1 tablet (50 mg) by mouth every 6 hours as needed for severe pain 10 tablet 0     venlafaxine (EFFEXOR-XR) 150 MG 24 hr capsule Take 1 capsule (150 mg) by mouth daily 90 capsule 1     Allergies   Allergen Reactions     Erythromycin Nausea and Vomiting     Seasonal Allergies      Molds, grass, multiple trees, plant pollen, cats, rabbits, dogs, hay.     BP Readings from Last 3 Encounters:   10/16/18 116/78   10/05/18 130/86   07/13/18 124/70    Wt Readings from Last 3 Encounters:   10/16/18 153 lb (69.4 kg)   10/05/18 152 lb 4.8 oz (69.1 kg)   07/13/18 147 lb 14.4 oz (67.1 kg)                    Reviewed and updated as needed this visit by clinical staff  Tobacco  Allergies  Meds  Med Hx  Surg Hx  Fam Hx  Soc Hx      Reviewed and updated as needed this visit by Provider         ROS:  Constitutional, HEENT, cardiovascular, pulmonary, GI, , musculoskeletal, neuro, skin, endocrine and psych  systems are negative, except as otherwise noted.    OBJECTIVE:     /78  Pulse 84  Temp 97.3  F (36.3  C) (Tympanic)  Resp 20  Wt 153 lb (69.4 kg)  LMP 10/01/2018 (Exact Date)  SpO2 98%  Breastfeeding? No  BMI 24.18 kg/m2  Body mass index is 24.18 kg/(m^2).  GENERAL: healthy, alert and no distress  PSYCH: mentation appears normal, affect normal/bright    Diagnostic Test Results:  none     ASSESSMENT/PLAN:         1. Recurrent major depression in partial remission (H)  Will cut back on Effexor slightly to 112.5 mg daily.  Want to avoid drastic changes as she has made several life changes within the past 6 months.  She is not currently in counseling, but has contact information for her therapist if she feels she needs to get back in with her.  Follow up in 6 months for recheck.    - venlafaxine (EFFEXOR-XR) 75 MG 24 hr capsule; Take with 37.5 mg capsule to make a total dose of 112.5 mg daily  Dispense: 30 capsule; Refill: 1  - venlafaxine (EFFEXOR-XR) 37.5 MG 24 hr capsule; Take with 75 mg capsule to make a total dose of 112.5 mg daily  Dispense: 30 capsule; Refill: 1    2. Generalized anxiety disorder  Will cut back on Effexor slightly to 112.5 mg daily.  Want to avoid drastic changes as she has made several life changes within the past 6 months.  She is not currently in counseling, but has contact information for her therapist if she feels she needs to get back in with her.  Follow up in 6 months for recheck.   - venlafaxine (EFFEXOR-XR) 75 MG 24 hr capsule; Take with 37.5 mg capsule to make a total dose of 112.5 mg daily  Dispense: 30 capsule; Refill: 1  - venlafaxine (EFFEXOR-XR) 37.5 MG 24 hr capsule; Take with 75 mg capsule to make a total dose of 112.5 mg daily  Dispense: 30 capsule; Refill: 1    See Patient Instructions    KYLEE Narvaez [facility]

## 2018-10-16 NOTE — MR AVS SNAPSHOT
After Visit Summary   10/16/2018    Aide Burroughs    MRN: [id number]           Patient Information     Date Of Birth          1975        Visit Information        Provider Department      10/16/2018 7:20 AM Rivka Rodrigues APRN CNP [facility]        Today's Diagnoses     Recurrent major depression in partial remission (H)    -  1      Care Instructions    Go down to 112.5 mg daily of the Effexor.     Follow up in 6 months for recheck.                   Follow-ups after your visit        Follow-up notes from your care team     Return in about 6 months (around 4/16/2019) for Recheck.      Your next 10 appointments already scheduled     Oct 18, 2018  7:30 AM CDT   [facility]   [facility]    [address] [facility] [address] [phone]              Who to contact     If you have questions or need follow up information about today's clinic visit or your schedule please contact [facility] directly at [phone].  Normal or non-critical lab and imaging results will be communicated to you by MyChart, letter or phone within 4 business days after the clinic has received the results. If you do not hear from us within 7 days, please contact the clinic through MyChart or phone. If you have a critical or abnormal lab result, we will notify you by phone as soon as possible.  Submit refill requests through [employer] or call your pharmacy and they will forward the refill request to us. Please allow 3 business days for your refill to be completed.          Additional Information About Your Visit        Care EveryWhere ID     This is your Care EveryWhere ID. This could be used by other organizations to access your [place] medical records  [id number]        Your Vitals Were     Pulse Temperature Respirations Last Period Pulse Oximetry Breastfeeding?    84 97.3  F (36.3  C) (Tympanic) 20 10/01/2018 (Exact Date) 98%  No    BMI (Body Mass Index)                   24.18 kg/m2            Blood Pressure from Last 3 Encounters:   10/16/18 116/78   10/05/18 130/86   07/13/18 124/70    Weight from Last 3 Encounters:   10/16/18 153 lb (69.4 kg)   10/05/18 152 lb 4.8 oz (69.1 kg)   07/13/18 147 lb 14.4 oz (67.1 kg)              Today, you had the following     No orders found for display         Today's Medication Changes          These changes are accurate as of 10/16/18  7:51 AM.  If you have any questions, ask your nurse or doctor.               These medicines have changed or have updated prescriptions.        Dose/Directions    * venlafaxine 75 MG 24 hr capsule   Commonly known as:  EFFEXOR-XR   This may have changed:    - medication strength  - how much to take  - how to take this  - when to take this  - additional instructions   Used for:  Recurrent major depression in partial remission (H)   Changed by:  Rivka Rodrigues APRN CNP        Take with 37.5 mg capsule to make a total dose of 112.5 mg daily   Quantity:  30 capsule   Refills:  1       * venlafaxine 37.5 MG 24 hr capsule   Commonly known as:  EFFEXOR-XR   This may have changed:  You were already taking a medication with the same name, and this prescription was added. Make sure you understand how and when to take each.   Used for:  Recurrent major depression in partial remission (H)   Changed by:  Rivka Rodrigues APRN CNP        Take with 75 mg capsule to make a total dose of 112.5 mg daily   Quantity:  30 capsule   Refills:  1       * Notice:  This list has 2 medication(s) that are the same as other medications prescribed for you. Read the directions carefully, and ask your doctor or other care provider to review them with you.         Where to get your medicines      These medications were sent to [id number] - [place], [place] - [address]  [address] [place] [address] [address]     Phone:  [phone]     venlafaxine 37.5 MG 24 hr capsule    venlafaxine 75 MG 24 hr  capsule                Primary Care Provider Office Phone # Fax #    KYLEE Narvaez [phone]       [address] [facility] [address]        Equal Access to Services     MAGNUS INGRAM : Hadii aad ku hadkeagano Socaseyali, waaxda luqadaha, qaybta kaalmada adeeglouisda, lex flexin hayaadeni campoverdedayanarajosé miguel coleman. So [facility] [phone].    ATENCIÓN: Si habla español, tiene a norton disposición servicios gratuitos de asistencia lingüística. Llame al [phone].    We comply with applicable federal civil rights laws and [place] laws. We do not discriminate on the basis of race, color, national origin, age, disability, sex, sexual orientation, or gender identity.            Thank you!     Thank you for choosing [facility]  for your care. Our goal is always to provide you with excellent care. Hearing back from our patients is one way we can continue to improve our services. Please take a few minutes to complete the written survey that you may receive in the mail after your visit with us. Thank you!             Your Updated Medication List - Protect others around you: Learn how to safely use, store and throw away your medicines at www.disposemymeds.org.          This list is accurate as of 10/16/18  7:51 AM.  Always use your most recent med list.                   Brand Name Dispense Instructions for use Diagnosis    cholecalciferol 5000 units Caps capsule    vitamin D3     Take by mouth daily        methocarbamol 750 MG tablet    ROBAXIN    30 tablet    Take 1 tablet (750 mg) by mouth 3 times daily    Cervicalgia       norgestimate-ethinyl estradiol 0.25-35 MG-MCG per tablet    ORTHO-CYCLEN, SPRINTEC    84 tablet    Take 1 tablet by mouth daily    Encounter for initial prescription of contraceptive pills       traMADol 50 MG tablet    ULTRAM    10 tablet    Take 1 tablet (50 mg) by mouth every 6 hours as needed for severe pain    Cervicalgia       * venlafaxine 75 MG 24 hr capsule    EFFEXOR-XR     30 capsule    Take with 37.5 mg capsule to make a total dose of 112.5 mg daily    Recurrent major depression in partial remission (H)       * venlafaxine 37.5 MG 24 hr capsule    EFFEXOR-XR    30 capsule    Take with 75 mg capsule to make a total dose of 112.5 mg daily    Recurrent major depression in partial remission (H)       * Notice:  This list has 2 medication(s) that are the same as other medications prescribed for you. Read the directions carefully, and ask your doctor or other care provider to review them with you.

## 2018-10-16 NOTE — LETTER
My Depression Action Plan  Name: Aide Burroughs   Date of Birth 1975  Date: 10/16/2018    My doctor: Rivka Rodrigues   My clinic: [place] [address] [facility] [address] [phone]          GREEN    ZONE   Good Control    What it looks like:     Things are going generally well. You have normal up s and down s. You may even feel depressed from time to time, but bad moods usually last less than a day.   What you need to do:  1. Continue to care for yourself (see self care plan)  2. Check your depression survival kit and update it as needed  3. Follow your physician s recommendations including any medication.  4. Do not stop taking medication unless you consult with your physician first.           YELLOW         ZONE Getting Worse    What it looks like:     Depression is starting to interfere with your life.     It may be hard to get out of bed; you may be starting to isolate yourself from others.    Symptoms of depression are starting to last most all day and this has happened for several days.     You may have suicidal thoughts but they are not constant.   What you need to do:     1. Call your care team, your response to treatment will improve if you keep your care team informed of your progress. Yellow periods are signs an adjustment may need to be made.     2. Continue your self-care, even if you have to fake it!    3. Talk to someone in your support network    4. Open up your depression survival kit           RED    ZONE Medical Alert - Get Help    What it looks like:     Depression is seriously interfering with your life.     You may experience these or other symptoms: You can t get out of bed most days, can t work or engage in other necessary activities, you have trouble taking care of basic hygiene, or basic responsibilities, thoughts of suicide or death that will not go away, self-injurious behavior.     What you need to do:  1. Call your care team and request a  same-day appointment. If they are not available (weekends or after hours) call your local crisis line, emergency room or 911.            Depression Self Care Plan / Survival Kit    Self-Care for Depression  Here s the deal. Your body and mind are really not as separate as most people think.  What you do and think affects how you feel and how you feel influences what you do and think. This means if you do things that people who feel good do, it will help you feel better.  Sometimes this is all it takes.  There is also a place for medication and therapy depending on how severe your depression is, so be sure to consult with your medical provider and/ or Behavioral Health Consultant if your symptoms are worsening or not improving.     In order to better manage my stress, I will:    Exercise  Get some form of exercise, every day. This will help reduce pain and release endorphins, the  feel good  chemicals in your brain. This is almost as good as taking antidepressants!  This is not the same as joining a gym and then never going! (they count on that by the way ) It can be as simple as just going for a walk or doing some gardening, anything that will get you moving.      Hygiene   Maintain good hygiene (Get out of bed in the morning, Make your bed, Brush your teeth, Take a shower, and Get dressed like you were going to work, even if you are unemployed).  If your clothes don't fit try to get ones that do.    Diet  I will strive to eat foods that are good for me, drink plenty of water, and avoid excessive sugar, caffeine, alcohol, and other mood-altering substances.  Some foods that are helpful in depression are: complex carbohydrates, B vitamins, flaxseed, fish or fish oil, fresh fruits and vegetables.    Psychotherapy  I agree to participate in Individual Therapy (if recommended).    Medication  If prescribed medications, I agree to take them.  Missing doses can result in serious side effects.  I understand that drinking  alcohol, or other illicit drug use, may cause potential side effects.  I will not stop my medication abruptly without first discussing it with my provider.    Staying Connected With Others  I will stay in touch with my friends, family members, and my primary care provider/team.    Use your imagination  Be creative.  We all have a creative side; it doesn t matter if it s oil painting, sand castles, or mud pies! This will also kick up the endorphins.    Witness Beauty  (AKA stop and smell the roses) Take a look outside, even in mid-winter. Notice colors, textures. Watch the squirrels and birds.     Service to others  Be of service to others.  There is always someone else in need.  By helping others we can  get out of ourselves  and remember the really important things.  This also provides opportunities for practicing all the other parts of the program.    Humor  Laugh and be silly!  Adjust your TV habits for less news and crime-drama and more comedy.    Control your stress  Try breathing deep, massage therapy, biofeedback, and meditation. Find time to relax each day.     My support system    Clinic Contact:  Phone number:    Contact 1:  Phone number:    Contact 2:  Phone number:    Yarsani/:  Phone number:    Therapist:  Phone number:    Local crisis center:    Phone number:    Other community support:  Phone number:

## 2018-10-17 ASSESSMENT — ANXIETY QUESTIONNAIRES: GAD7 TOTAL SCORE: 0

## 2018-10-17 ASSESSMENT — PATIENT HEALTH QUESTIONNAIRE - PHQ9: SUM OF ALL RESPONSES TO PHQ QUESTIONS 1-9: 0

## 2018-10-18 ENCOUNTER — HOSPITAL ENCOUNTER (OUTPATIENT)
Dept: PHYSICAL THERAPY | Facility: OTHER | Age: 43
Setting detail: THERAPIES SERIES
End: 2018-10-18
Attending: FAMILY MEDICINE
Payer: COMMERCIAL

## 2018-10-18 DIAGNOSIS — M54.2 CERVICALGIA: ICD-10-CM

## 2018-10-18 PROCEDURE — 40000718 ZZHC STATISTIC PT DEPARTMENT ORTHO VISIT: Performed by: PHYSICAL THERAPIST

## 2018-10-18 PROCEDURE — 97010 HOT OR COLD PACKS THERAPY: CPT | Mod: GP | Performed by: PHYSICAL THERAPIST

## 2018-10-18 PROCEDURE — 97014 ELECTRIC STIMULATION THERAPY: CPT | Mod: GP | Performed by: PHYSICAL THERAPIST

## 2018-10-18 PROCEDURE — 97110 THERAPEUTIC EXERCISES: CPT | Mod: GP | Performed by: PHYSICAL THERAPIST

## 2018-10-18 PROCEDURE — 97161 PT EVAL LOW COMPLEX 20 MIN: CPT | Mod: GP | Performed by: PHYSICAL THERAPIST

## 2018-11-07 NOTE — PROGRESS NOTES
Outpatient Physical Therapy Discharge Note     Patient: Aide Burroughs  : 1975    Beginning/End Dates of Reporting Period:  10/18/2018 to 2018    Referring Provider: jose angel ventura MD     Therapy Diagnosis: neck pain      Client Self Report:      Objective Measurements:  Objective Measure: at eval pain 3-8/10 NDI 32      Goals:  Goal Identifier 1   Goal Description instruction in HEP and patient to be compliant with it 5 of 7 days and mindful of posture 90% of the time    Target Date 18   Date Met      Progress:     Goal Identifier 2   Goal Description patient to have resolved left UE tingling    Target Date 18   Date Met      Progress:     Goal Identifier 3   Goal Description patient to have reduction in Pain level currently 3-8/10 goal is 0-3/10   Target Date 18   Date Met      Progress:       Progress Toward Goals:   Progress this reporting period: patient seen for evaluation only she called and cancelled all follow up Rx as she was doing well           Plan:  Discharge from therapy.    Discharge:    Reason for Discharge: Patient has met all goals.    Equipment Issued: none    Discharge Plan: Patient to continue home program.

## 2018-11-19 ENCOUNTER — TELEPHONE (OUTPATIENT)
Dept: FAMILY MEDICINE | Facility: OTHER | Age: 43
End: 2018-11-19

## 2018-11-19 ENCOUNTER — OFFICE VISIT (OUTPATIENT)
Dept: FAMILY MEDICINE | Facility: OTHER | Age: 43
End: 2018-11-19
Payer: COMMERCIAL

## 2018-11-19 VITALS
BODY MASS INDEX: 24.27 KG/M2 | SYSTOLIC BLOOD PRESSURE: 122 MMHG | DIASTOLIC BLOOD PRESSURE: 78 MMHG | WEIGHT: 153.6 LBS | HEART RATE: 76 BPM | RESPIRATION RATE: 16 BRPM | TEMPERATURE: 96.2 F

## 2018-11-19 DIAGNOSIS — I26.99 OTHER ACUTE PULMONARY EMBOLISM WITHOUT ACUTE COR PULMONALE (H): ICD-10-CM

## 2018-11-19 DIAGNOSIS — M62.838 MUSCLE SPASM: ICD-10-CM

## 2018-11-19 DIAGNOSIS — I26.99 OTHER ACUTE PULMONARY EMBOLISM WITHOUT ACUTE COR PULMONALE (H): Primary | ICD-10-CM

## 2018-11-19 PROCEDURE — 99213 OFFICE O/P EST LOW 20 MIN: CPT | Performed by: PHYSICIAN ASSISTANT

## 2018-11-19 RX ORDER — HYDROCODONE BITARTRATE AND ACETAMINOPHEN 5; 325 MG/1; MG/1
1 TABLET ORAL EVERY 6 HOURS PRN
COMMUNITY
Start: 2018-11-15 | End: 2018-11-19

## 2018-11-19 RX ORDER — HYDROCODONE BITARTRATE AND ACETAMINOPHEN 5; 325 MG/1; MG/1
1 TABLET ORAL EVERY 6 HOURS PRN
Qty: 28 TABLET | Refills: 0 | Status: SHIPPED | OUTPATIENT
Start: 2018-11-19 | End: 2018-11-19

## 2018-11-19 RX ORDER — HYDROCODONE BITARTRATE AND ACETAMINOPHEN 5; 325 MG/1; MG/1
1 TABLET ORAL EVERY 6 HOURS PRN
Qty: 8 TABLET | Refills: 0 | Status: SHIPPED | OUTPATIENT
Start: 2018-11-19 | End: 2018-11-29

## 2018-11-19 ASSESSMENT — PAIN SCALES - GENERAL: PAINLEVEL: SEVERE PAIN (6)

## 2018-11-19 NOTE — TELEPHONE ENCOUNTER
Reason for Call:  Medication or medication refill:    Do you use a [facility]?  Name of the pharmacy and phone number for the current request:  Pt will  hard copy    Name of the medication requested: Norco    Other request: pt is requesting 2 days more of [place]. Pt will be Jorge and would like extra just in case. Pt seen JR this morning.    Can we leave a detailed message on this number? YES    Phone number patient can be reached at:     Best Time:     Call taken on 11/19/2018 at 11:45 AM by Shelli Davis

## 2018-11-19 NOTE — PROGRESS NOTES
SUBJECTIVE:   Aide Burroughs is a 43 year old female who presents to clinic today for the following health issues:      ED/UC Followup:    Facility:  [facility] in [place], [place]  Date of visit: 11/15/2018  Reason for visit: Left shoulder pain  Current Status: slight relief       Patient is a 46 year old female who presents today for follow up of ED visit. She was seen at [facility] ED on 11/15 for worsening pain in the left trapezius and arm. While there she underwent laboratory workup and ECG. ECG was normal, but D-dimer was found to be elevated. CT revealed focal embolism in left lower lobe pulmonary artery. She was subsequently placed on anti-coagulant and given norco for pain. Today patient reports that the pain in her left trapezius and arm have persisted, pain is currently 6/10. Associated symptoms for the pain include mild weakness in left upper extremity, light headed feeling with increased pain and some tingling. Patient feels that the pain is random and does not worsen, but improves with activity. Pain does wax/wane from 3-4/10 to 6-7/10.     Patient has been taking xarelto without complication. We discussed how the medication works and the steps to take if bleeding occurs. Patient understands that she will be taking the medication for 6 months due to unprovoked PE with one risk factor of OC use. Patient denies trouble breathing, rapid breathing, chest pain, swelling/edema, changes in vision or hearing or headaches. Patient is interested in alternative versions of birth control including tubal ligation or implantable.     Problem list and histories reviewed & adjusted, as indicated.  Additional history: as documented    Patient Active Problem List   Diagnosis     Seasonal allergic rhinitis     Generalized anxiety disorder     Transient global amnesia     Fracture of great toe     Hyperlipidemia LDL goal <160     Panic disorder with agoraphobia     PTSD (post-traumatic stress disorder)      Genital warts     Recurrent major depression in partial remission (H)     Past Surgical History:   Procedure Laterality Date     ESOPHAGOSCOPY, GASTROSCOPY, DUODENOSCOPY (EGD), COMBINED N/A 5/15/2017    Procedure: COMBINED ESOPHAGOSCOPY, GASTROSCOPY, DUODENOSCOPY (EGD), BIOPSY SINGLE OR MULTIPLE;  ESOPHAGOSCOPY, GASTROSCOPY, DUODENOSCOPY (EGD) with biopsies by biopsy;  Surgeon: Robles Falk MD;  Location: [facility]     MYRINGOTOMY, INSERT TUBE, COMBINED Left 05/16/2018       Social History   Substance Use Topics     Smoking status: Never Smoker     Smokeless tobacco: Never Used     Alcohol use 0.0 oz/week     0 Standard drinks or equivalent per week      Comment: rare     Family History   Problem Relation Age of Onset     Hypertension Mother      Hyperlipidemia Mother      Mental Illness Mother      Cancer - colorectal Father      Substance Abuse Father      Cerebrovascular Disease Maternal Grandmother      Diabetes Paternal Grandmother      HEART DISEASE Paternal Grandfather      Bipolar Disorder Maternal Aunt      Schizophrenia Maternal Aunt      Coronary Artery Disease Maternal Grandfather          Current Outpatient Prescriptions   Medication Sig Dispense Refill     cholecalciferol (VITAMIN D3) 5000 UNITS CAPS capsule Take by mouth daily       HYDROcodone-acetaminophen (NORCO) 5-325 MG per tablet Take 1 tablet by mouth every 6 hours as needed       HYDROcodone-acetaminophen (NORCO) 5-325 MG per tablet Take 1 tablet by mouth every 6 hours as needed for pain 28 tablet 0     rivaroxaban ANTICOAGULANT (XARELTO) 15 MG TABS tablet Take 1 tablet by mouth 2 times daily       venlafaxine (EFFEXOR-XR) 37.5 MG 24 hr capsule Take with 75 mg capsule to make a total dose of 112.5 mg daily 30 capsule 1     venlafaxine (EFFEXOR-XR) 75 MG 24 hr capsule Take with 37.5 mg capsule to make a total dose of 112.5 mg daily 30 capsule 1     Allergies   Allergen Reactions     Erythromycin Nausea and Vomiting     Seasonal Allergies       Molds, grass, multiple trees, plant pollen, cats, rabbits, dogs, hay.     Labs reviewed in EPIC    Reviewed and updated as needed this visit by clinical staff  Tobacco  Allergies  Meds  Med Hx  Surg Hx  Fam Hx  Soc Hx      Reviewed and updated as needed this visit by Provider         ROS:  Constitutional, HEENT, cardiovascular, pulmonary, gi and gu systems are negative, except as otherwise noted.    OBJECTIVE:     /78 (BP Location: Right arm, Patient Position: Chair, Cuff Size: Adult Large)  Pulse 76  Temp 96.2  F (35.7  C) (Oral)  Resp 16  Wt 153 lb 9.6 oz (69.7 kg)  BMI 24.27 kg/m2  Body mass index is 24.27 kg/(m^2).  GENERAL: healthy, alert and no distress  RESP: lungs clear to auscultation - no rales, rhonchi or wheezes  CV: regular rate and rhythm, normal S1 S2, no S3 or S4, no murmur, click or rub, no peripheral edema and peripheral pulses strong  MS: no gross musculoskeletal defects noted, no edema, tenderness to light palpation along left trapezius and rhombhoid  NEURO: 4/5  strength in left vs right (5/5) and tone, mentation intact and speech normal  PSYCH: mentation appears normal, affect normal/bright    Diagnostic Test Results:  none     ASSESSMENT/PLAN:     1. Other acute pulmonary embolism without acute cor pulmonale (H)  Patient will continue Xarelto as directed. She has discontinued oral contraception and will review information on alternative options. Pain in left trapezius continues to cause discomfort. Patient will have norco to use as needed. Encouraged continued PT and use of heat to help loosen muscle.   - HYDROcodone-acetaminophen (NORCO) 5-325 MG per tablet; Take 1 tablet by mouth every 6 hours as needed for pain  Dispense: 28 tablet; Refill: 0    2. Muscle spasm  See above.     Follow up with clinic as needed or sooner if conditions change, worsen or fail to improve as expected.      Hakeem Reyes PA-C  [facility]

## 2018-11-19 NOTE — TELEPHONE ENCOUNTER
Reason for Call:  Form, our goal is to have forms completed with 72 hours, however, some forms may require a visit or additional information.    Type of letter, form or note:  CAROL    Who is the form from?: [employer] (if other please explain)    Where did the form come from: form was faxed in    What clinic location was the form placed at?: [place]    Where the form was placed: 's Box    What number is listed as a contact on the form?: [phone] exp [phone]       Additional comments:     Call taken on 11/19/2018 at 9:07 AM by Alva Villalobos

## 2018-11-19 NOTE — PATIENT INSTRUCTIONS
Discharge Instructions for Pulmonary Embolism  A deep vein thrombosis (DVT) is a blood clot in a large vein deep in a leg, arm, or elsewhere in the body. The clot can separate from the vein, travel to the lungs and cut off blood flow. This is a pulmonary embolism (PE). Pulmonary embolism is very serious and may cause death.   Healthcare providers use the term venous thromboembolism (VTE) to describe both DVT and PE. They use the term VTE because the two conditions are very closely related. And, because their prevention and treatment are closely related.   Home care  Taking care of yourself is very important. To help prevent more blood clots from forming, follow your healthcare provider's instructions. Do the following:    Take your medicines exactly as instructed. Don t skip doses. If you miss a dose, call your healthcare provider and ask what you should do.      Have all lab tests as recommended. This is very important when you take medicines to prevent blood clots.     If your healthcare provider has instructed you to do so, wear elastic (compression stockings).    Get up and get moving.    While sitting for long periods of time, move your knees, ankles, feet, and toes.  Lifestyle changes  To help prevent problems with your heart and blood vessels, do the following:     If you smoke, get help to quit. Talk with your healthcare provider about medicines and programs that can help.    Stay at a healthy weight. Talk to your healthcare provider about losing weight, if you are overweight    Try to exercise at least 30 minutes on most days. Before starting an exercise program, talk with your healthcare provider.     When traveling by car, make frequent stops to get up and move around.    On long airplane rides, get up and move around when possible. If you can t get up, wiggle your toes, move your ankles and tighten your calves to keep your blood moving.  Follow-up care  Make a follow-up appointment as directed. Have  your lab work done as directed.     When to call your healthcare provider  Call your healthcare provider right away if you have:    Pain, swelling, and redness in your leg, arm, or other body area. These symptoms may mean another blood clot.    Blood in your urine    Bleeding with bowel movements    Bleeding from the nose, gums, a cut, or vagina  Call 911  Call 911 if you have symptoms of a blood clot in the lungs:     Chest pain    Trouble breathing    Coughing (may cough up blood)    Fast heartbeat    Sweating    Fainting  Also call 911 if you have:    Heavy or uncontrolled bleeding. If you are taking a blood thinner, you have an increased chance of bleeding.   Date Last Reviewed: 2/1/2017 2000-2018 [employer]. [address] [place] [address], [place] [id number]. All rights reserved. This information is not intended as a substitute for professional medical care. Always follow your healthcare professional's instructions.        Pulmonary Embolism (PE)  A pulmonary embolus is most often due to a blood clot that develops in a deep vein of the leg (deep vein thrombosis). If that clot, breaks loose and travels to the lung, it is called a pulmonary embolism (PE). This can cut off the flow of blood in the lungs.  A blood clot in the lungs is a medical emergency and may cause death.   Healthcare providers use the term venous thromboembolism (VTE) to describe these 2 conditions: deep vein thrombosis and pulmonary embolism. They use the term VTE because the 2 conditions are very closely related. And, because their prevention and treatment are also closely related.      A pulmonary embolism occurs when a blood clot forms in a vein and travels to the lungs.   How is pulmonary embolism diagnosed?  Your healthcare provider examines you and asks about your symptoms and health history. You may also have one or more of the following:    Blood tests to check for blood clotting or other problems    Imaging tests to look for  clots in the veins or lung    Electrocardiography (ECG) to test how well the heart is working  How is pulmonary embolism treated?    Blood-thinning medicines (anticoagulants). These medicines thin the blood. They may be given as a pill, as an injection, or through a tube into a vein (intravenous or IV). Blood thinners help prevent more blood clots from forming. They also help to prevent an existing clot from getting larger.    Thrombolysis. Thrombolytic medicines are used to quickly dissolve a blood clot. A long, narrow tube (catheter) is used to deliver medicine directly to the clot. Thrombolytic medicines increase the risk of bleeding so they are used very carefully.    Inferior vena cava (IVC) filter surgery. The vena cava is the body s largest vein. It carries blood from the body to the heart. A small filter traps blood clots in the lower body and prevents them from traveling to the lungs. The filter is inserted into the vein through a catheter. The filter may be used if blood thinners cannot be taken or if they don't work.     Pulmonary embolectomy. This is a procedure to remove a blood clot in the lungs. It may be done with surgery or with a catheter inserted in the body. It may be done when other treatments aren't safe or don't work.  What are the long-term concerns?  With treatment, blood clots are usually dissolved or removed. Some treatments can even help prevent future clots. But having a PE can put you at risk for another life-threatening blood clot. So, you will likely need to take anticoagulants to help keep blood clots from forming again. You may need to take this medicine for months or years.  You may also need to make lifestyle changes. This may include getting more active and eating healthier. You may need to wear elastic (compression) stockings and and take breaks on long trips.  Call 911  Call 911 or get emergency help if you have symptoms of a blood clot that has traveled to the lungs. The  symptoms include:    Chest pain    Trouble breathing    Coughing (may cough up blood)    Fainting    Fast heartbeat    Sweating  Call 911 if you have heavy or uncontrolled bleeding.  When to call your healthcare provider  Call your healthcare provider if you have swelling or pain in your leg, arm, or other area. These are symptoms of a blood clot.  You may have bleeding if you take medicine to help prevent blood clots.  Call your healthcare provider if you have signs or symptoms of bleeding. This includes:    Blood in the urine    Bleeding with bowel movements    Bleeding from the nose, gums, a cut, or vagina   Date Last Reviewed: 2/1/2017 2000-2018 [employer]. [address] [place] [address], [place] [address]. All rights reserved. This information is not intended as a substitute for professional medical care. Always follow your healthcare professional's instructions.

## 2018-11-19 NOTE — TELEPHONE ENCOUNTER
FMLA forms received and given to Rivka Rodrigues CNP to review and complete.  Rivka Richardson CMA

## 2018-11-19 NOTE — TELEPHONE ENCOUNTER
Forms faxed to the number given [phone]. Patient advised and states she will  the original copies later today. Originals left at  for  and copies made in case fax does not go through.  Rivka Richardson, CMA

## 2018-11-19 NOTE — MR AVS SNAPSHOT
After Visit Summary   11/19/2018    Aide Burroughs    MRN: [id number]           Patient Information     Date Of Birth          1975        Visit Information        Provider Department      11/19/2018 8:20 AM Hakeem Reyes PA-C [facility]        Today's Diagnoses     Other acute pulmonary embolism without acute cor pulmonale (H)    -  1      Care Instructions      Discharge Instructions for Pulmonary Embolism  A deep vein thrombosis (DVT) is a blood clot in a large vein deep in a leg, arm, or elsewhere in the body. The clot can separate from the vein, travel to the lungs and cut off blood flow. This is a pulmonary embolism (PE). Pulmonary embolism is very serious and may cause death.   Healthcare providers use the term venous thromboembolism (VTE) to describe both DVT and PE. They use the term VTE because the two conditions are very closely related. And, because their prevention and treatment are closely related.   Home care  Taking care of yourself is very important. To help prevent more blood clots from forming, follow your healthcare provider's instructions. Do the following:    Take your medicines exactly as instructed. Don t skip doses. If you miss a dose, call your healthcare provider and ask what you should do.      Have all lab tests as recommended. This is very important when you take medicines to prevent blood clots.     If your healthcare provider has instructed you to do so, wear elastic (compression stockings).    Get up and get moving.    While sitting for long periods of time, move your knees, ankles, feet, and toes.  Lifestyle changes  To help prevent problems with your heart and blood vessels, do the following:     If you smoke, get help to quit. Talk with your healthcare provider about medicines and programs that can help.    Stay at a healthy weight. Talk to your healthcare provider about losing weight, if you are overweight    Try to exercise at least 30 minutes  on most days. Before starting an exercise program, talk with your healthcare provider.     When traveling by car, make frequent stops to get up and move around.    On long airplane rides, get up and move around when possible. If you can t get up, wiggle your toes, move your ankles and tighten your calves to keep your blood moving.  Follow-up care  Make a follow-up appointment as directed. Have your lab work done as directed.     When to call your healthcare provider  Call your healthcare provider right away if you have:    Pain, swelling, and redness in your leg, arm, or other body area. These symptoms may mean another blood clot.    Blood in your urine    Bleeding with bowel movements    Bleeding from the nose, gums, a cut, or vagina  Call 911  Call 911 if you have symptoms of a blood clot in the lungs:     Chest pain    Trouble breathing    Coughing (may cough up blood)    Fast heartbeat    Sweating    Fainting  Also call 911 if you have:    Heavy or uncontrolled bleeding. If you are taking a blood thinner, you have an increased chance of bleeding.   Date Last Reviewed: 2/1/2017 2000-2018 [employer]. [address] [place] [address]. All rights reserved. This information is not intended as a substitute for professional medical care. Always follow your healthcare professional's instructions.        Pulmonary Embolism (PE)  A pulmonary embolus is most often due to a blood clot that develops in a deep vein of the leg (deep vein thrombosis). If that clot, breaks loose and travels to the lung, it is called a pulmonary embolism (PE). This can cut off the flow of blood in the lungs.  A blood clot in the lungs is a medical emergency and may cause death.   Healthcare providers use the term venous thromboembolism (VTE) to describe these 2 conditions: deep vein thrombosis and pulmonary embolism. They use the term VTE because the 2 conditions are very closely related. And, because their prevention  and treatment are also closely related.      A pulmonary embolism occurs when a blood clot forms in a vein and travels to the lungs.   How is pulmonary embolism diagnosed?  Your healthcare provider examines you and asks about your symptoms and health history. You may also have one or more of the following:    Blood tests to check for blood clotting or other problems    Imaging tests to look for clots in the veins or lung    Electrocardiography (ECG) to test how well the heart is working  How is pulmonary embolism treated?    Blood-thinning medicines (anticoagulants). These medicines thin the blood. They may be given as a pill, as an injection, or through a tube into a vein (intravenous or IV). Blood thinners help prevent more blood clots from forming. They also help to prevent an existing clot from getting larger.    Thrombolysis. Thrombolytic medicines are used to quickly dissolve a blood clot. A long, narrow tube (catheter) is used to deliver medicine directly to the clot. Thrombolytic medicines increase the risk of bleeding so they are used very carefully.    Inferior vena cava (IVC) filter surgery. The vena cava is the body s largest vein. It carries blood from the body to the heart. A small filter traps blood clots in the lower body and prevents them from traveling to the lungs. The filter is inserted into the vein through a catheter. The filter may be used if blood thinners cannot be taken or if they don't work.     Pulmonary embolectomy. This is a procedure to remove a blood clot in the lungs. It may be done with surgery or with a catheter inserted in the body. It may be done when other treatments aren't safe or don't work.  What are the long-term concerns?  With treatment, blood clots are usually dissolved or removed. Some treatments can even help prevent future clots. But having a PE can put you at risk for another life-threatening blood clot. So, you will likely need to take anticoagulants to help keep  blood clots from forming again. You may need to take this medicine for months or years.  You may also need to make lifestyle changes. This may include getting more active and eating healthier. You may need to wear elastic (compression) stockings and and take breaks on long trips.  Call 911  Call 911 or get emergency help if you have symptoms of a blood clot that has traveled to the lungs. The symptoms include:    Chest pain    Trouble breathing    Coughing (may cough up blood)    Fainting    Fast heartbeat    Sweating  Call 911 if you have heavy or uncontrolled bleeding.  When to call your healthcare provider  Call your healthcare provider if you have swelling or pain in your leg, arm, or other area. These are symptoms of a blood clot.  You may have bleeding if you take medicine to help prevent blood clots.  Call your healthcare provider if you have signs or symptoms of bleeding. This includes:    Blood in the urine    Bleeding with bowel movements    Bleeding from the nose, gums, a cut, or vagina   Date Last Reviewed: 2/1/2017 2000-2018 [employer]. [address] [place] [address]. All rights reserved. This information is not intended as a substitute for professional medical care. Always follow your healthcare professional's instructions.                Follow-ups after your visit        Who to contact     If you have questions or need follow up information about today's clinic visit or your schedule please contact [facility] directly at [phone].  Normal or non-critical lab and imaging results will be communicated to you by MyChart, letter or phone within 4 business days after the clinic has received the results. If you do not hear from us within 7 days, please contact the clinic through MyChart or phone. If you have a critical or abnormal lab result, we will notify you by phone as soon as possible.  Submit refill requests through [employer] or call your pharmacy and they  will forward the refill request to us. Please allow 3 business days for your refill to be completed.          Additional Information About Your Visit        Care EveryWhere ID     This is your Care EveryWhere ID. This could be used by other organizations to access your [place] medical records  [id number]        Your Vitals Were     Pulse Temperature Respirations BMI (Body Mass Index)          76 96.2  F (35.7  C) (Oral) 16 24.27 kg/m2         Blood Pressure from Last 3 Encounters:   11/19/18 122/78   10/16/18 116/78   10/05/18 130/86    Weight from Last 3 Encounters:   11/19/18 153 lb 9.6 oz (69.7 kg)   10/16/18 153 lb (69.4 kg)   10/05/18 152 lb 4.8 oz (69.1 kg)              Today, you had the following     No orders found for display         Today's Medication Changes          These changes are accurate as of 11/19/18  9:09 AM.  If you have any questions, ask your nurse or doctor.               These medicines have changed or have updated prescriptions.        Dose/Directions    * HYDROcodone-acetaminophen 5-325 MG per tablet   Commonly known as:  NORCO   This may have changed:  Another medication with the same name was added. Make sure you understand how and when to take each.   Changed by:  Hakeem Reyes PA-C        Dose:  1 tablet   Take 1 tablet by mouth every 6 hours as needed   Refills:  0       * HYDROcodone-acetaminophen 5-325 MG per tablet   Commonly known as:  NORCO   This may have changed:  You were already taking a medication with the same name, and this prescription was added. Make sure you understand how and when to take each.   Used for:  Other acute pulmonary embolism without acute cor pulmonale (H)   Changed by:  Hakeem Reyes PA-C        Dose:  1 tablet   Take 1 tablet by mouth every 6 hours as needed for pain   Quantity:  28 tablet   Refills:  0       * Notice:  This list has 2 medication(s) that are the same as other medications prescribed for you. Read the directions carefully,  and ask your doctor or other care provider to review them with you.         Where to get your medicines      Some of these will need a paper prescription and others can be bought over the counter.  Ask your nurse if you have questions.     Bring a paper prescription for each of these medications     HYDROcodone-acetaminophen 5-325 MG per tablet               Information about OPIOIDS     PRESCRIPTION OPIOIDS: WHAT YOU NEED TO KNOW   We gave you an opioid (narcotic) pain medicine. It is important to manage your pain, but opioids are not always the best choice. You should first try all the other options your care team gave you. Take this medicine for as short a time (and as few doses) as possible.    Some activities can increase your pain, such as bandage changes or therapy sessions. It may help to take your pain medicine 30 to 60 minutes before these activities. Reduce your stress by getting enough sleep, working on hobbies you enjoy and practicing relaxation or meditation. Talk to your care team about ways to manage your pain beyond prescription opioids.    These medicines have risks:    DO NOT drive when on new or higher doses of pain medicine. These medicines can affect your alertness and reaction times, and you could be arrested for driving under the influence (DUI). If you need to use opioids long-term, talk to your care team about driving.    DO NOT operate heavy machinery    DO NOT do any other dangerous activities while taking these medicines.    DO NOT drink any alcohol while taking these medicines.     If the opioid prescribed includes acetaminophen, DO NOT take with any other medicines that contain acetaminophen. Read all labels carefully. Look for the word  acetaminophen  or  Tylenol.  Ask your pharmacist if you have questions or are unsure.    You can get addicted to pain medicines, especially if you have a history of addiction (chemical, alcohol or substance dependence). Talk to your care team about  ways to reduce this risk.    All opioids tend to cause constipation. Drink plenty of water and eat foods that have a lot of fiber, such as fruits, vegetables, prune juice, apple juice and high-fiber cereal. Take a laxative (Miralax, milk of magnesia, Colace, Senna) if you don t move your bowels at least every other day. Other side effects include upset stomach, sleepiness, dizziness, throwing up, tolerance (needing more of the medicine to have the same effect), physical dependence and slowed breathing.    Store your pills in a secure place, locked if possible. We will not replace any lost or stolen medicine. If you don t finish your medicine, please throw away (dispose) as directed by your pharmacist. The [place] Pollution [employer] has more information about safe disposal: https://www.pca.[facility].[place]/living-green/managing-unwanted-medications         Primary Care Provider Office Phone # Fax #    KYLEE Narvaez [phone]       [address] [facility] [address]        Equal Access to Services     [facility] : Hadii aad ku hadasho Soomaali, waaxda luqadaha, qaybta kaalmada adeegyaann, lex durand . So [facility] [phone].    ATENCIÓN: Si habla español, tiene a norton disposición servicios gratuitos de asistencia lingüística. Llame al [phone].    We comply with applicable federal civil rights laws and [place] laws. We do not discriminate on the basis of race, color, national origin, age, disability, sex, sexual orientation, or gender identity.            Thank you!     Thank you for choosing [facility]  for your care. Our goal is always to provide you with excellent care. Hearing back from our patients is one way we can continue to improve our services. Please take a few minutes to complete the written survey that you may receive in the mail after your visit with us. Thank you!             Your Updated Medication List - Protect others around  you: Learn how to safely use, store and throw away your medicines at www.disposemymeds.org.          This list is accurate as of 11/19/18  9:09 AM.  Always use your most recent med list.                   Brand Name Dispense Instructions for use Diagnosis    cholecalciferol 5000 units (125 mcg) Caps capsule    vitamin D3     Take by mouth daily        * HYDROcodone-acetaminophen 5-325 MG per tablet    NORCO     Take 1 tablet by mouth every 6 hours as needed        * HYDROcodone-acetaminophen 5-325 MG per tablet    NORCO    28 tablet    Take 1 tablet by mouth every 6 hours as needed for pain    Other acute pulmonary embolism without acute cor pulmonale (H)       rivaroxaban ANTICOAGULANT 15 MG Tabs tablet    XARELTO     Take 1 tablet by mouth 2 times daily        * venlafaxine 75 MG 24 hr capsule    EFFEXOR-XR    30 capsule    Take with 37.5 mg capsule to make a total dose of 112.5 mg daily    Recurrent major depression in partial remission (H), Generalized anxiety disorder       * venlafaxine 37.5 MG 24 hr capsule    EFFEXOR-XR    30 capsule    Take with 75 mg capsule to make a total dose of 112.5 mg daily    Recurrent major depression in partial remission (H), Generalized anxiety disorder       * Notice:  This list has 4 medication(s) that are the same as other medications prescribed for you. Read the directions carefully, and ask your doctor or other care provider to review them with you.

## 2018-11-20 ENCOUNTER — TELEPHONE (OUTPATIENT)
Dept: FAMILY MEDICINE | Facility: OTHER | Age: 43
End: 2018-11-20

## 2018-11-20 NOTE — TELEPHONE ENCOUNTER
Reason for Call:  Form, our goal is to have forms completed with 72 hours, however, some forms may require a visit or additional information.    Type of letter, form or note:  forms completed for work incorrectly    Who is the form from?: employer (if other please explain)    Where did the form come from: form was faxed in    What clinic location was the form placed at?: [place]    Where the form was placed: are yet to be faxed    What number is listed as a contact on the form?: Unknown       Additional comments: forms were completed by Rivka yesterday, they were not in regards to [place] health issues, but from her ED visit and blood clot. Forms are being refaxed and need to have Hakeem complete, as he saw her yesterday.     Call taken on 11/20/2018 at 7:32 AM by Larisa Uriarte

## 2018-11-21 NOTE — TELEPHONE ENCOUNTER
Aide was informed that the forms need to be completed by Hakeem Reyes and that he is out until Monday, 11-26-18.

## 2018-11-26 ENCOUNTER — TELEPHONE (OUTPATIENT)
Dept: FAMILY MEDICINE | Facility: OTHER | Age: 43
End: 2018-11-26

## 2018-11-26 NOTE — TELEPHONE ENCOUNTER
Patient is calling again asking for someone to call to confirm that the paperwork was faxed to her employer.  [phone]

## 2018-11-26 NOTE — TELEPHONE ENCOUNTER
Reason for Call:  Other prescription    Detailed comments: she wants to verify that the paperwork is submitted today and also wants to discuss something else for her pain.  She states you had mentioned prednisone too.    Phone Number Patient can be reached at: Home number on file [phone] (home)    Best Time: any    Can we leave a detailed message on this number? YES - she is in [place] until tonight but you can leave her a message.    Call taken on 11/26/2018 at 7:35 AM by Balbir Hoang

## 2018-11-27 ENCOUNTER — TELEPHONE (OUTPATIENT)
Dept: FAMILY MEDICINE | Facility: OTHER | Age: 43
End: 2018-11-27

## 2018-11-27 ENCOUNTER — HOSPITAL ENCOUNTER (EMERGENCY)
Facility: CLINIC | Age: 43
Discharge: HOME | End: 2018-11-27
Attending: NURSE PRACTITIONER | Admitting: NURSE PRACTITIONER
Payer: COMMERCIAL

## 2018-11-27 VITALS
BODY MASS INDEX: 24.18 KG/M2 | WEIGHT: 153 LBS | RESPIRATION RATE: 14 BRPM | HEART RATE: 72 BPM | DIASTOLIC BLOOD PRESSURE: 91 MMHG | SYSTOLIC BLOOD PRESSURE: 149 MMHG | TEMPERATURE: 97.7 F | OXYGEN SATURATION: 100 %

## 2018-11-27 DIAGNOSIS — M62.830 SPASM OF BACK MUSCLES: ICD-10-CM

## 2018-11-27 PROCEDURE — 99284 EMERGENCY DEPT VISIT MOD MDM: CPT | Mod: 25 | Performed by: NURSE PRACTITIONER

## 2018-11-27 PROCEDURE — 20553 NJX 1/MLT TRIGGER POINTS 3/>: CPT | Mod: Z6 | Performed by: NURSE PRACTITIONER

## 2018-11-27 PROCEDURE — 20553 NJX 1/MLT TRIGGER POINTS 3/>: CPT | Performed by: NURSE PRACTITIONER

## 2018-11-27 PROCEDURE — 25000128 H RX IP 250 OP 636: Performed by: NURSE PRACTITIONER

## 2018-11-27 RX ORDER — PREDNISONE 20 MG/1
TABLET ORAL
Qty: 10 TABLET | Refills: 0 | Status: SHIPPED | OUTPATIENT
Start: 2018-11-27 | End: 2018-12-03

## 2018-11-27 RX ORDER — CYCLOBENZAPRINE HCL 10 MG
10 TABLET ORAL 3 TIMES DAILY PRN
Qty: 20 TABLET | Refills: 0 | Status: SHIPPED | OUTPATIENT
Start: 2018-11-27 | End: 2019-02-14

## 2018-11-27 RX ORDER — BUPIVACAINE HYDROCHLORIDE 5 MG/ML
10 INJECTION, SOLUTION EPIDURAL; INTRACAUDAL ONCE
Status: COMPLETED | OUTPATIENT
Start: 2018-11-27 | End: 2018-11-27

## 2018-11-27 RX ORDER — OXYCODONE AND ACETAMINOPHEN 5; 325 MG/1; MG/1
1-2 TABLET ORAL EVERY 6 HOURS PRN
Qty: 12 TABLET | Refills: 0 | Status: SHIPPED | OUTPATIENT
Start: 2018-11-27 | End: 2019-02-14

## 2018-11-27 RX ADMIN — BUPIVACAINE HYDROCHLORIDE 50 MG: 5 INJECTION, SOLUTION EPIDURAL; INTRACAUDAL at 14:19

## 2018-11-27 ASSESSMENT — ENCOUNTER SYMPTOMS
MYALGIAS: 1
ACTIVITY CHANGE: 1
ARTHRALGIAS: 1

## 2018-11-27 NOTE — ED TRIAGE NOTES
Presents with pain to upper left back, radiates up into the shoulder and down left arm. She is currently on Vicodin for a PE and is down to her last pill. She would like a refill on her pain medication as she could not get into the clinic today

## 2018-11-27 NOTE — TELEPHONE ENCOUNTER
I apologize, I am just seeing this now. I do not have any openings, we will have to schedule her for a later date or she can check to see if a provider at [facility] will see her sooner.    Hakeem Reyes PA-C on 11/27/2018 at 3:48 PM

## 2018-11-27 NOTE — ED AVS SNAPSHOT
[facility] Emergency Department    [address] [place] [address]    Phone:  [phone]    Fax:  [phone]                                       Aide Burroughs   MRN: [id number]    Department:  [facility] Emergency Department   Date of Visit:  11/27/2018           Patient Information     Date Of Birth          1975        Your diagnoses for this visit were:     Spasm of back muscles        You were seen by Raysa Nieto, KYLEE CNP.        Discharge Instructions         Back Spasm (No Trauma)    Spasm of the back muscles can occur after a sudden forceful twisting or bending force (such as in a car accident), after a simple awkward movement, or after lifting something heavy with poor body positioning. In any case, muscle spasm adds to the pain. Sleeping in an awkward position or on a poor quality mattress can also cause this. Some people respond to emotional stress by tensing the muscles of their back.  Pain that continues may need further evaluation or other types of treatment such as physical therapy.  You don't always need X-rays for the initial evaluation of back pain, unless you had a physical injury such as from a car accident or fall. If your pain continues and doesn't respond to medical treatment, X-rays and other tests may then be done.   Home care    As soon as possible, start sitting or walking again to avoid problems from prolonged bed rest (muscle weakness, worsening back stiffness and pain, blood clots in the legs).    When in bed, try to find a position of comfort. A firm mattress is best. Try lying flat on your back with pillows under your knees. You can also try lying on your side with your knees bent up toward your chest and a pillow between your knees.    Avoid prolonged sitting, long car rides, or travel. This puts more stress on the lower back than standing or walking.     During the first 24 to 72 hours after an injury or flare-up, apply an ice pack to  the painful area for 20 minutes, then remove it for 20 minutes. Do this over a period of 60 to 90 minutes or several times a day. This will reduce swelling and pain. Always wrap ice packs in a thin towel.    You can start with ice, then switch to heat. Heat (hot shower, hot bath, or heating pad) reduces pain, and works well for muscle spasms. Apply heat to the painful area for 20 minutes, then remove it for 20 minutes. Do this over a period of 60 to 90 minutes or several times a day. Do not sleep on a heating pad as it can burn or damage skin.    Alternate ice and heat therapies.    Be aware of safe lifting methods and do not lift anything over 15 pounds until all the pain is gone.  Gentle stretching will help your back heal faster. Do this simple routine 2 to 3 times a day until your back is feeling better.    Lie on your back with your knees bent and both feet on the ground    Slowly raise your left knee to your chest as you flatten your lower back against the floor. Hold for 20 to 30 seconds.    Relax and repeat the exercise with your right knee.    Do 2 to 3 of these exercises for each leg.    Repeat, hugging both knees to your chest at the same time.    Do not bounce, but use a gentle pull.  Medicines  Talk to your doctor before using medicine, especially if you have other medical problems or are taking other medicines.  You may use acetaminophen or ibuprofen to control pain, unless your healthcare provider prescribed another pain medicine. If you have a chronic condition such as diabetes, liver or kidney disease, stomach ulcer, or gastrointestinal bleeding, or are taking blood thinners, talk with your healthcare provider before taking any medicines.  Be careful if you are given prescription pain medicine, narcotics, or medicine for muscle spasm. They can cause drowsiness, affect your coordination, reflexes, or judgment. Do not drive or operate heavy machinery when taking these medicines. Take pain medicine  only as prescribed by your healthcare provider.  Follow-up care  Follow up with your doctor, or as advised. Physical therapy or further tests may be needed.  If X-rays were taken, they may be reviewed by a radiologist. You will be notified of any new findings that may affect your care.  Call 911  Call 911 if any of these occur:    Trouble breathing    Confusion    Drowsiness or trouble awakening    Fainting or loss of consciousness    Rapid or very slow heart rate    Loss of bowel or bladder control  When to seek medical advice  Call your healthcare provider right away if any of these occur:    Pain becomes worse or spreads to your legs    Weakness or numbness in one or both legs    Numbness in the groin or genital area    Fever of 100.4 F (38 C) or higher, or as directed by your healthcare provider    Burning or pain when passing urine  Date Last Reviewed: 6/1/2016 2000-2018 [employer]. [address] [place] [address]. All rights reserved. This information is not intended as a substitute for professional medical care. Always follow your healthcare professional's instructions.      Aide, discuss with your primary care provider doing some hematology testing to rule out specific clotting disorders.    I do not think that your shoulder/back pain is specifically from the blood clot but more of a progressive inflammatory muscle process that is creating spasms.  Start the Prednisone today.  Use warm packs to upper left back/shoulder.      Your next 10 appointments already scheduled     Nov 29, 2018  2:20 PM CST   Office Visit with Hakeem Reyes PA-C   [facility]    [address] [address] [phone]           Bring a current list of meds and any records pertaining to this visit. For Physicals, please bring immunization records and any forms needing to be filled out. Please arrive 10 minutes early to complete paperwork.               24 Hour Appointment Hotline       To make an appointment at any [facility], call [phone] ([phone]). If you don't have a family doctor or clinic, we will help you find one. [facility] are conveniently located to serve the needs of you and your family.             Review of your medicines      START taking        Dose / Directions Last dose taken    cyclobenzaprine 10 MG tablet   Commonly known as:  FLEXERIL   Dose:  10 mg   Quantity:  20 tablet        Take 1 tablet (10 mg) by mouth 3 times daily as needed for muscle spasms   Refills:  0        oxyCODONE-acetaminophen 5-325 MG tablet   Commonly known as:  PERCOCET   Dose:  1-2 tablet   Quantity:  12 tablet        Take 1-2 tablets by mouth every 6 hours as needed for moderate to severe pain   Refills:  0        predniSONE 20 MG tablet   Commonly known as:  DELTASONE   Quantity:  10 tablet        Take two tablets (= 40mg) each day for 5 (five) days   Refills:  0          Our records show that you are taking the medicines listed below. If these are incorrect, please call your family doctor or clinic.        Dose / Directions Last dose taken    cholecalciferol 5000 units (125 mcg) Caps capsule   Commonly known as:  vitamin D3        Take by mouth daily   Refills:  0        HYDROcodone-acetaminophen 5-325 MG tablet   Commonly known as:  NORCO   Dose:  1 tablet   Quantity:  8 tablet        Take 1 tablet by mouth every 6 hours as needed for pain   Refills:  0        rivaroxaban ANTICOAGULANT 15 MG Tabs tablet   Commonly known as:  XARELTO   Dose:  1 tablet        Take 1 tablet by mouth 2 times daily   Refills:  0        * venlafaxine 75 MG 24 hr capsule   Commonly known as:  EFFEXOR-XR   Quantity:  30 capsule        Take with 37.5 mg capsule to make a total dose of 112.5 mg daily   Refills:  1        * venlafaxine 37.5 MG 24 hr capsule   Commonly known as:  EFFEXOR-XR   Quantity:  30 capsule        Take with 75 mg capsule to make a total dose of  112.5 mg daily   Refills:  1        * Notice:  This list has 2 medication(s) that are the same as other medications prescribed for you. Read the directions carefully, and ask your doctor or other care provider to review them with you.            Information about OPIOIDS     PRESCRIPTION OPIOIDS: WHAT YOU NEED TO KNOW   We gave you an opioid (narcotic) pain medicine. It is important to manage your pain, but opioids are not always the best choice. You should first try all the other options your care team gave you. Take this medicine for as short a time (and as few doses) as possible.    Some activities can increase your pain, such as bandage changes or therapy sessions. It may help to take your pain medicine 30 to 60 minutes before these activities. Reduce your stress by getting enough sleep, working on hobbies you enjoy and practicing relaxation or meditation. Talk to your care team about ways to manage your pain beyond prescription opioids.    These medicines have risks:    DO NOT drive when on new or higher doses of pain medicine. These medicines can affect your alertness and reaction times, and you could be arrested for driving under the influence (DUI). If you need to use opioids long-term, talk to your care team about driving.    DO NOT operate heavy machinery    DO NOT do any other dangerous activities while taking these medicines.    DO NOT drink any alcohol while taking these medicines.     If the opioid prescribed includes acetaminophen, DO NOT take with any other medicines that contain acetaminophen. Read all labels carefully. Look for the word  acetaminophen  or  Tylenol.  Ask your pharmacist if you have questions or are unsure.    You can get addicted to pain medicines, especially if you have a history of addiction (chemical, alcohol or substance dependence). Talk to your care team about ways to reduce this risk.    All opioids tend to cause constipation. Drink plenty of water and eat foods that have a  lot of fiber, such as fruits, vegetables, prune juice, apple juice and high-fiber cereal. Take a laxative (Miralax, milk of magnesia, Colace, Senna) if you don t move your bowels at least every other day. Other side effects include upset stomach, sleepiness, dizziness, throwing up, tolerance (needing more of the medicine to have the same effect), physical dependence and slowed breathing.    Store your pills in a secure place, locked if possible. We will not replace any lost or stolen medicine. If you don t finish your medicine, please throw away (dispose) as directed by your pharmacist. The [place] [employer] has more information about safe disposal: https://www.pca.[facility].[place]/living-green/managing-unwanted-medications        Prescriptions were sent or printed at these locations (3 Prescriptions)                   [facility] [place], [place] - [address]   [address], [facility] [address]    Telephone:  [phone]   Fax:  [phone]   Hours:                  E-Prescribed (2 of 3)         cyclobenzaprine (FLEXERIL) 10 MG tablet               predniSONE (DELTASONE) 20 MG tablet                 Printed at Department/Unit printer (1 of 3)         oxyCODONE-acetaminophen (PERCOCET) 5-325 MG tablet                Orders Needing Specimen Collection     None      Pending Results     No orders found from 11/25/2018 to 11/28/2018.            Pending Culture Results     No orders found from 11/25/2018 to 11/28/2018.            Pending Results Instructions     If you had any lab results that were not finalized at the time of your Discharge, you can call the ED Lab Result RN at [phone]. You will be contacted by this team for any positive Lab results or changes in treatment. The nurses are available 7 days a week from 10A to 6:30P.  You can leave a message 24 hours per day and they will return your call.        Thank you for choosing [facility]       Thank you for choosing [facility] for your care.  Our goal is always to provide you with excellent care. Hearing back from our patients is one way we can continue to improve our services. Please take a few minutes to complete the written survey that you may receive in the mail after you visit with us. Thank you!        Care EveryWhere ID     This is your Care EveryWhere ID. This could be used by other organizations to access your [facility] medical records  [id number]        Equal Access to Services     MAGNUS INGRAM : Maria Alejandra [place], cam conde, pennie stein, lex durand . So [facility] [phone].    ATENCIÓN: Si habla español, tiene a norton disposición servicios gratuitos de asistencia lingüística. Lyndsay al [phone].    We comply with applicable federal civil rights laws and [place] laws. We do not discriminate on the basis of race, color, national origin, age, disability, sex, sexual orientation, or gender identity.            After Visit Summary       This is your record. Keep this with you and show to your community pharmacist(s) and doctor(s) at your next visit.

## 2018-11-27 NOTE — LETTER
November 27, 2018      To Whom It May Concern:      Aide Burroughs was seen in our Emergency Department today, 11/27/18.  Please excuse her from work today.    Thank you      Sincerely,        KYLEE Cosme CNP

## 2018-11-27 NOTE — TELEPHONE ENCOUNTER
Aide was in to check status of request for appointment today.  Please advise if she can be worked in.

## 2018-11-27 NOTE — TELEPHONE ENCOUNTER
Aide was in to check on status of requests, she needs to bring this paperwork in today or she is fearful she will lose her job.  Please advise.

## 2018-11-27 NOTE — ED PROVIDER NOTES
History     Chief Complaint   Patient presents with     Medication Reaction     HPI  Aide Burroughs is a 43 year old female who presents to the ED today with c/o ongoing left upper back and shoulder pain.  Patient has had ongoing issues with this since October 5, she was seen in clinic at that time and diagnosed with cervicalgia she was given muscle relaxers, Robaxin and went through physical therapy which initially was helping.  On November 15 patient was seen in the emergency department at [facility] and was diagnosed with a left lower lobe PE and started on Xarelto.  Patient is followed up in clinic since that time and it was thought that possibly her PE is intervening to her left upper back left shoulder pain.  Patient arrives here today as she has been taking Norco as prescribed but is down to her last pill and reports that it really has not been helping anyway.  Patient denies any trauma or injury, patient denies any shortness of breath or chest pain.  Patient was on birth control prior to being diagnosed with her PE and has since discontinued this.  Patient denies any known history of clotting or bleeding disorders or family history, she has not seen hematology nor has she had any testing specifically done to rule out any clotting disorders.    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 Provider to review and confirm.        Seasonal allergic rhinitis 04/26/2012     Priority: Medium        Past Medical History:    Past Medical History:   Diagnosis Date     Generalized anxiety disorder      MDD (major depressive disorder), recurrent, severe, with psychosis (H)        Past Surgical History:    Past Surgical History:   Procedure Laterality Date     ESOPHAGOSCOPY, GASTROSCOPY, DUODENOSCOPY (EGD), COMBINED N/A 5/15/2017    Procedure: COMBINED ESOPHAGOSCOPY, GASTROSCOPY, DUODENOSCOPY (EGD), BIOPSY SINGLE OR MULTIPLE;  ESOPHAGOSCOPY, GASTROSCOPY, DUODENOSCOPY (EGD) with biopsies by biopsy;  Surgeon: Robles Falk MD;  Location: [facility]     MYRINGOTOMY, INSERT TUBE, COMBINED Left 05/16/2018       Family History:    Family History   Problem Relation Age of Onset     Hypertension Mother      Hyperlipidemia Mother      Mental Illness Mother      Cancer - colorectal Father      Substance Abuse Father      Cerebrovascular Disease Maternal Grandmother      Diabetes Paternal Grandmother      HEART DISEASE Paternal Grandfather      Bipolar Disorder Maternal Aunt      Schizophrenia Maternal Aunt      Coronary Artery Disease Maternal Grandfather        Social History:  Marital Status:   [2]  Social History   Substance Use Topics     Smoking status: Never Smoker     Smokeless tobacco: Never Used     Alcohol use 0.0 oz/week     0 Standard drinks or equivalent per week      Comment: rare        Medications:      cyclobenzaprine (FLEXERIL) 10 MG tablet   oxyCODONE-acetaminophen (PERCOCET) 5-325 MG tablet   predniSONE (DELTASONE) 20 MG tablet   cholecalciferol (VITAMIN D3) 5000 UNITS CAPS capsule   HYDROcodone-acetaminophen (NORCO) 5-325 MG per tablet   rivaroxaban ANTICOAGULANT (XARELTO) 15 MG TABS tablet   venlafaxine (EFFEXOR-XR) 37.5 MG 24 hr capsule   venlafaxine (EFFEXOR-XR) 75 MG 24 hr capsule         Review of Systems   Constitutional: Positive for activity change.   Musculoskeletal: Positive for arthralgias and myalgias.   All other  systems reviewed and are negative.      Physical Exam   BP: (!) 146/98  Pulse: 72  Heart Rate: 79  Temp: 97.7  F (36.5  C)  Resp: 14  Weight: 69.4 kg (153 lb)  SpO2: 100 %      Physical Exam   Constitutional: She is oriented to person, place, and time. She appears well-developed and well-nourished.   HENT:   Head: Normocephalic.   Eyes: Conjunctivae are normal.   Neck: Normal range of motion. Neck supple.   Cardiovascular: Normal rate and intact distal pulses.    Hypertensive   Pulmonary/Chest: Effort normal and breath sounds normal. No respiratory distress. She exhibits no tenderness.   Abdominal: Soft. Bowel sounds are normal.   Musculoskeletal: She exhibits tenderness (Significant tenderness to left trap region as well as left lateral neck region lateral to C7 and top of left shoulder, CMS and cap refill intact left upper extremity).   Neurological: She is alert and oriented to person, place, and time. She has normal reflexes. No cranial nerve deficit.   Skin: Skin is warm and dry. She is not diaphoretic.   Psychiatric: She has a normal mood and affect.       ED Course     ED Course     Procedures    No results found for this or any previous visit (from the past 24 hour(s)).    Medications   bupivacaine (MARCAINE) 0.5% preservative free injection (not administered)       Assessments & Plan (with Medical Decision Making)  Aide is a 43-year-old female with a history of generalized anxiety disorder and recent diagnosis of PE who presents to the emergency department today with ongoing left upper back left shoulder pain.  Please refer to HPI and focused exam.  Patient arrives here mildly hypertensive but is otherwise hemodynamic is stable and afebrile.  Patient's exam is really more consistent with a musculoskeletal/muscle spasm presentation as patient is focally tender to left upper back, top of left shoulder and left lateral paraspinous region near C7.  It is uncertain if her PE is causing generalized inflammation  which is attributing to her spasming or if these are 2 separate issues.  I would like patient to have some specific blood test done because to rule out any clotting disorders which I discussed with patient.  I did discuss with patient her exam findings.  Given the fact that she is on Xarelto she cannot use any NSAIDs, given the exquisite tenderness she has I am going to put her on a burst course of prednisone for inflammation, I did give patient 12 tablets of Percocet so I did inform her that we would not be giving her any more pain medication from the emergency room and that any additional medication for this reason would have to be obtained through her primary care provider.  I will also give patient Flexeril for muscle spasms.  Narcotic safety and side effects were discussed in detail.  Patient was made aware that Flexeril could make her sleepy and to make sure she does okay while she is on this.  I did discuss with patient the option for a trigger point injection which she was agreeable to after risk/benefits were discussed.  Patient was injected to 3 different locations, lateral to C7 on the left, top of left shoulder and left trapezius region with a total of 10 mL's of 0.5% Marcaine, patient tolerated procedure well with no complications and is feeling improvement in her pain.  A follow-up appointment was scheduled for patient with her primary on Friday for reevaluation.  Patient was given a work note excusing her from work today.  Reasons to return to the emergency department were discussed in detail.  Ongoing supportive measures were discussed with patient for home care including warm packs and gentle stretching/range of motion exercises.  Patient was agreeable to plan of care and discharged in stable condition.     I have reviewed the nursing notes.    I have reviewed the findings, diagnosis, plan and need for follow up with the patient.    New Prescriptions    CYCLOBENZAPRINE (FLEXERIL) 10 MG TABLET     Take 1 tablet (10 mg) by mouth 3 times daily as needed for muscle spasms    OXYCODONE-ACETAMINOPHEN (PERCOCET) 5-325 MG TABLET    Take 1-2 tablets by mouth every 6 hours as needed for moderate to severe pain    PREDNISONE (DELTASONE) 20 MG TABLET    Take two tablets (= 40mg) each day for 5 (five) days       Final diagnoses:   Spasm of back muscles       11/27/2018   [facility] EMERGENCY DEPARTMENT     Raysa Nieto, KYLEE CNP  11/27/18 [id number]

## 2018-11-27 NOTE — TELEPHONE ENCOUNTER
Reason for Call:  Same Day Appointment, Requested Provider:  Hakeem    PCP: Rivka Rodrigues    Reason for visit: pain mgmt for blood clot    Duration of symptoms:     Have you been treated for this in the past? Yes    Additional comments:     Can we leave a detailed message on this number? YES    Phone number patient can be reached at: Home number on file [phone] (home)    Best Time: anytime today after 12pm    Call taken on 11/27/2018 at 7:43 AM by Gosia Craft

## 2018-11-27 NOTE — DISCHARGE INSTRUCTIONS
Back Spasm (No Trauma)    Spasm of the back muscles can occur after a sudden forceful twisting or bending force (such as in a car accident), after a simple awkward movement, or after lifting something heavy with poor body positioning. In any case, muscle spasm adds to the pain. Sleeping in an awkward position or on a poor quality mattress can also cause this. Some people respond to emotional stress by tensing the muscles of their back.  Pain that continues may need further evaluation or other types of treatment such as physical therapy.  You don't always need X-rays for the initial evaluation of back pain, unless you had a physical injury such as from a car accident or fall. If your pain continues and doesn't respond to medical treatment, X-rays and other tests may then be done.   Home care    As soon as possible, start sitting or walking again to avoid problems from prolonged bed rest (muscle weakness, worsening back stiffness and pain, blood clots in the legs).    When in bed, try to find a position of comfort. A firm mattress is best. Try lying flat on your back with pillows under your knees. You can also try lying on your side with your knees bent up toward your chest and a pillow between your knees.    Avoid prolonged sitting, long car rides, or travel. This puts more stress on the lower back than standing or walking.     During the first 24 to 72 hours after an injury or flare-up, apply an ice pack to the painful area for 20 minutes, then remove it for 20 minutes. Do this over a period of 60 to 90 minutes or several times a day. This will reduce swelling and pain. Always wrap ice packs in a thin towel.    You can start with ice, then switch to heat. Heat (hot shower, hot bath, or heating pad) reduces pain, and works well for muscle spasms. Apply heat to the painful area for 20 minutes, then remove it for 20 minutes. Do this over a period of 60 to 90 minutes or several times a day. Do not sleep on a heating  pad as it can burn or damage skin.    Alternate ice and heat therapies.    Be aware of safe lifting methods and do not lift anything over 15 pounds until all the pain is gone.  Gentle stretching will help your back heal faster. Do this simple routine 2 to 3 times a day until your back is feeling better.    Lie on your back with your knees bent and both feet on the ground    Slowly raise your left knee to your chest as you flatten your lower back against the floor. Hold for 20 to 30 seconds.    Relax and repeat the exercise with your right knee.    Do 2 to 3 of these exercises for each leg.    Repeat, hugging both knees to your chest at the same time.    Do not bounce, but use a gentle pull.  Medicines  Talk to your doctor before using medicine, especially if you have other medical problems or are taking other medicines.  You may use acetaminophen or ibuprofen to control pain, unless your healthcare provider prescribed another pain medicine. If you have a chronic condition such as diabetes, liver or kidney disease, stomach ulcer, or gastrointestinal bleeding, or are taking blood thinners, talk with your healthcare provider before taking any medicines.  Be careful if you are given prescription pain medicine, narcotics, or medicine for muscle spasm. They can cause drowsiness, affect your coordination, reflexes, or judgment. Do not drive or operate heavy machinery when taking these medicines. Take pain medicine only as prescribed by your healthcare provider.  Follow-up care  Follow up with your doctor, or as advised. Physical therapy or further tests may be needed.  If X-rays were taken, they may be reviewed by a radiologist. You will be notified of any new findings that may affect your care.  Call 911  Call 911 if any of these occur:    Trouble breathing    Confusion    Drowsiness or trouble awakening    Fainting or loss of consciousness    Rapid or very slow heart rate    Loss of bowel or bladder control  When to seek  medical advice  Call your healthcare provider right away if any of these occur:    Pain becomes worse or spreads to your legs    Weakness or numbness in one or both legs    Numbness in the groin or genital area    Fever of 100.4 F (38 C) or higher, or as directed by your healthcare provider    Burning or pain when passing urine  Date Last Reviewed: 6/1/2016 2000-2018 [employer]. [address] [place] [address]. All rights reserved. This information is not intended as a substitute for professional medical care. Always follow your healthcare professional's instructions.      Aide, discuss with your primary care provider doing some hematology testing to rule out specific clotting disorders.    I do not think that your shoulder/back pain is specifically from the blood clot but more of a progressive inflammatory muscle process that is creating spasms.  Start the Prednisone today.  Use warm packs to upper left back/shoulder.

## 2018-11-27 NOTE — ED AVS SNAPSHOT
[facility] Emergency Department    [address] [place] [address]    Phone:  [phone]    Fax:  [phone]                                       Aide Burroughs   MRN: [id number]    Department:  [facility] Emergency Department   Date of Visit:  11/27/2018           After Visit Summary Signature Page     I have received my discharge instructions, and my questions have been answered. I have discussed any challenges I see with this plan with the nurse or doctor.    ..........................................................................................................................................  Patient/Patient Representative Signature      ..........................................................................................................................................  Patient Representative Print Name and Relationship to Patient    ..................................................               ................................................  Date                                   Time    ..........................................................................................................................................  Reviewed by Signature/Title    ...................................................              ..............................................  Date                                               Time          22EPIC Rev 08/18

## 2018-11-29 ENCOUNTER — OFFICE VISIT (OUTPATIENT)
Dept: FAMILY MEDICINE | Facility: OTHER | Age: 43
End: 2018-11-29
Payer: COMMERCIAL

## 2018-11-29 VITALS
HEART RATE: 72 BPM | RESPIRATION RATE: 16 BRPM | SYSTOLIC BLOOD PRESSURE: 136 MMHG | WEIGHT: 156.4 LBS | DIASTOLIC BLOOD PRESSURE: 78 MMHG | BODY MASS INDEX: 24.72 KG/M2 | TEMPERATURE: 95.6 F

## 2018-11-29 DIAGNOSIS — I26.99 OTHER ACUTE PULMONARY EMBOLISM WITHOUT ACUTE COR PULMONALE (H): ICD-10-CM

## 2018-11-29 DIAGNOSIS — M54.2 CERVICALGIA: Primary | ICD-10-CM

## 2018-11-29 PROCEDURE — 99213 OFFICE O/P EST LOW 20 MIN: CPT | Performed by: PHYSICIAN ASSISTANT

## 2018-11-29 ASSESSMENT — PAIN SCALES - GENERAL: PAINLEVEL: MILD PAIN (3)

## 2018-11-29 NOTE — NURSING NOTE
Health Maintenance Due   Topic Date Due     HIV SCREEN (SYSTEM ASSIGNED)  10/18/1995     URINE DRUG SCREEN Q1 YR  10/01/2016     INFLUENZA VACCINE (1) 09/01/2018     Mine SALEH LPN

## 2018-11-29 NOTE — PROGRESS NOTES
SUBJECTIVE:   Aide Burroughs is a 43 year old female who presents to clinic today for the following health issues:      ED/UC Followup:    Facility:  [facility] Emergency room  Date of visit: 11/27/2018  Reason for visit: shoulder pain  Current Status: slight improvement       Patient is a 43 year old female who presents today for follow up of a recent ED visit. She was seen at the [facility] ED on 11/27 for evaluation of her upper back and shoulder pain. Review of the ED note shows that she was given trigger point injections which she felt were minimally helpful. Patient was discharged with diagnosis of muscle spasm and given muscle relaxants as well as one final refill of narcotic pain killers. Today she reports that the pain is improving, she feels that she has a small amount of increased motion in her neck. She attributes this to use of heat/ice as well as muscle relaxants. She brought a form with her to go over for her employer as she felt she should not be driving if she is taking muscle relaxants. Validated her concern about the sedative effects of muscle relaxants and the risk to impaired driving, but informed patient that if she is able to time taking the medication around when she has to drive she likely not have a problem. Forms were completed in office. ED note also recommended that she consult with hematologist for further workup given unprovoked PE. Consult has been placed.     Problem list and histories reviewed & adjusted, as indicated.  Additional history: as documented    Reviewed and updated as needed this visit by clinical staff  Tobacco  Allergies  Meds  Med Hx  Surg Hx  Fam Hx  Soc Hx      Reviewed and updated as needed this visit by Provider         ROS:  Constitutional, HEENT, cardiovascular, pulmonary, gi and gu systems are negative, except as otherwise noted.    OBJECTIVE:     /78 (BP Location: Right arm, Patient Position: Chair, Cuff Size: Adult Large)  Pulse 72  Temp  95.6  F (35.3  C) (Oral)  Resp 16  Wt 156 lb 6.4 oz (70.9 kg)  BMI 24.72 kg/m2  Body mass index is 24.72 kg/(m^2).  GENERAL: healthy, alert and no distress  NECK: no adenopathy, no asymmetry, masses, or scars and thyroid normal to palpation  CV: regular rate and rhythm, normal S1 S2, no S3 or S4, no murmur, click or rub, no peripheral edema and peripheral pulses strong  NEURO: Normal strength and tone, mentation intact and speech normal  PSYCH: mentation appears normal, affect normal/bright    Diagnostic Test Results:  none     ASSESSMENT/PLAN:     1. Cervicalgia  Encouraged the patient to return to PT as she is regaining her function to minimize chance of recurrence. Patient may stop PT when she feels that she no longer needs it.   - PHYSICAL THERAPY REFERRAL; Future    2. Other acute pulmonary embolism without acute cor pulmonale (H)  Hematology referral placed. Patient understands that OCP can increase the risk for PE/DVT development, but that there were no other risk factors identified. She will continue to be on anti-coagulation and will follow up in 3 months. Discussed with patient preference for 6 months of anticoagulation, but will defer to hematologist.   - ONC/HEME ADULT REFERRAL    Follow up with clinic as needed or sooner if conditions change, worsen or fail to improve as expected.      Hakeem Reyes PA-C  [facility]

## 2018-11-29 NOTE — PATIENT INSTRUCTIONS
Know Your Neck: The Cervical Spine  By learning about the parts of the neck, you can better understand your neck problem. The bones of the neck are called cervical vertebrae, commonly identified as C1 through C7. Together, they form a bony column called the spine. Vertebrae also protect the spinal cord, a pathway for messages to reach the brain. Surrounding the spine are soft tissues such as muscles, tendons, and nerves.           Flexibility Is Key  For the neck to function normally, it has to be flexible enough to move without discomfort. A healthy neck can move easily in six different directions.     Flexion and extension move the head forward and backward.      Rotation turns the head from left to right, with the chin over the shoulders.      Lateral bending moves the head from side to side.   Date Last Reviewed: 10/1/2015    1090-4493 [employer]. [address] [place] [address]. All rights reserved. This information is not intended as a substitute for professional medical care. Always follow your healthcare professional's instructions.        Neck Problems: Relieving Your Symptoms  The first goal of treatment is to relieve your symptoms. Your healthcare provider may recommend self-care treatments. These include resting, applying ice and heat, taking medicine, and doing exercises. Your healthcare provider may also recommend that you see a physical therapist who can teach you ways to care for and strengthen your neck.     Heat relaxes sore muscles and helps relieve spasms.   Self-care treatments  Pain can end quickly or last a while. Either way, you ll want relief as soon as possible. Your healthcare provider can tell you which treatments to do at home to help relieve your pain:    Lying down for a short time takes pressure from the head off the neck.    Ice and heat can help reduce pain. To bring down swelling, rest an ice pack wrapped in a thin towel on your neck for 10 to 15 minutes.  To relax sore muscles, apply a warm, wet towel to the area. Or you can take a warm bath or shower.    Over-the-counter medicines, such as ibuprofen, naproxen, and aspirin, can help reduce pain and swelling. Acetaminophen can help relieve pain. Use these only as directed.    Exercises can relax muscles and ease stiffness. To prepare, drape a warm, wet towel around your neck and shoulders for 5 minutes. Remove the towel. Then do any exercises recommended to you by your healthcare provider.  Physical therapy  If self-care treatments aren t helping relieve neck pain, your healthcare provider may suggest physical therapy. Physical therapy is done by a specialist trained to treat injuries and musculoskeletal disorders. Your physical therapist (PT) will teach you how to strengthen muscles, improve the spine s alignment, and help you move properly. Treatment methods used in physical therapy may include:    Heat. A special heating pad called a neck pack may be applied to your neck.    Exercises. Your PT will teach you exercises to help strengthen your neck and improve its range of motion.    Joint mobilization. The PT gently moves your vertebrae to help restore motion in your neck joints and reduce neck pain.    Soft tissue mobilization. The PT massages and stretches the muscles in your neck and shoulders.    Electrical stimulation. Electrical impulses are sent into your neck. This helps reduce soreness and inflammation.    Education in body mechanics. The PT shows you ways to position and move your body that protect the neck.  Other treatments  If physical therapy doesn t relieve your neck pain, your healthcare provider may suggest other treatments. For example, medicines or injections can help relieve pain and swelling. In some cases, surgery may be needed to treat neck problems.  Date Last Reviewed: 10/1/2017    8761-4105 [employer]. [address] [place] [address], [place] [address]. All rights reserved. This  information is not intended as a substitute for professional medical care. Always follow your healthcare professional's instructions.

## 2018-11-29 NOTE — MR AVS SNAPSHOT
After Visit Summary   11/29/2018    Aide Burroughs    MRN: [id number]           Patient Information     Date Of Birth          1975        Visit Information        Provider Department      11/29/2018 2:20 PM Hakeem Reyes PA-C [facility]        Today's Diagnoses     Cervicalgia    -  1    Other acute pulmonary embolism without acute cor pulmonale (H)          Care Instructions    Know Your Neck: The Cervical Spine  By learning about the parts of the neck, you can better understand your neck problem. The bones of the neck are called cervical vertebrae, commonly identified as C1 through C7. Together, they form a bony column called the spine. Vertebrae also protect the spinal cord, a pathway for messages to reach the brain. Surrounding the spine are soft tissues such as muscles, tendons, and nerves.           Flexibility Is Key  For the neck to function normally, it has to be flexible enough to move without discomfort. A healthy neck can move easily in six different directions.     Flexion and extension move the head forward and backward.      Rotation turns the head from left to right, with the chin over the shoulders.      Lateral bending moves the head from side to side.   Date Last Reviewed: 10/1/2015    [id number] [employer]. [address] [place], [place] [address]. All rights reserved. This information is not intended as a substitute for professional medical care. Always follow your healthcare professional's instructions.        Neck Problems: Relieving Your Symptoms  The first goal of treatment is to relieve your symptoms. Your healthcare provider may recommend self-care treatments. These include resting, applying ice and heat, taking medicine, and doing exercises. Your healthcare provider may also recommend that you see a physical therapist who can teach you ways to care for and strengthen your neck.     Heat relaxes sore muscles and helps relieve spasms.    Self-care treatments  Pain can end quickly or last a while. Either way, you ll want relief as soon as possible. Your healthcare provider can tell you which treatments to do at home to help relieve your pain:    Lying down for a short time takes pressure from the head off the neck.    Ice and heat can help reduce pain. To bring down swelling, rest an ice pack wrapped in a thin towel on your neck for 10 to 15 minutes. To relax sore muscles, apply a warm, wet towel to the area. Or you can take a warm bath or shower.    Over-the-counter medicines, such as ibuprofen, naproxen, and aspirin, can help reduce pain and swelling. Acetaminophen can help relieve pain. Use these only as directed.    Exercises can relax muscles and ease stiffness. To prepare, drape a warm, wet towel around your neck and shoulders for 5 minutes. Remove the towel. Then do any exercises recommended to you by your healthcare provider.  Physical therapy  If self-care treatments aren t helping relieve neck pain, your healthcare provider may suggest physical therapy. Physical therapy is done by a specialist trained to treat injuries and musculoskeletal disorders. Your physical therapist (PT) will teach you how to strengthen muscles, improve the spine s alignment, and help you move properly. Treatment methods used in physical therapy may include:    Heat. A special heating pad called a neck pack may be applied to your neck.    Exercises. Your PT will teach you exercises to help strengthen your neck and improve its range of motion.    Joint mobilization. The PT gently moves your vertebrae to help restore motion in your neck joints and reduce neck pain.    Soft tissue mobilization. The PT massages and stretches the muscles in your neck and shoulders.    Electrical stimulation. Electrical impulses are sent into your neck. This helps reduce soreness and inflammation.    Education in body mechanics. The PT shows you ways to position and move your body that  protect the neck.  Other treatments  If physical therapy doesn t relieve your neck pain, your healthcare provider may suggest other treatments. For example, medicines or injections can help relieve pain and swelling. In some cases, surgery may be needed to treat neck problems.  Date Last Reviewed: 10/1/2017    [id number] [employer]. [address] [place] [address]. All rights reserved. This information is not intended as a substitute for professional medical care. Always follow your healthcare professional's instructions.            Follow-ups after your visit        Additional Services     ONC/HEME ADULT REFERRAL       Your provider has referred you to: [facility]: Cancer Care/Hematology (All Cancer Related Services) - [place] [phone]   [email or web address]    Please be aware that coverage of these services is subject to the terms and limitations of your health insurance plan.  Call member services at your health plan with any benefit or coverage questions.      Please bring the following with you to your appointment:    (1) Any X-Rays, CTs or MRIs which have been performed.  Contact the facility where they were done to arrange for  prior to your scheduled appointment.   (2) List of current medications  (3) This referral request   (4) Any documents/labs given to you for this referral            PHYSICAL THERAPY REFERRAL       If you have not heard from the scheduling office within 2 business days, please call [phone] for all locations, with the exception of [place], please call [phone] and [place], please call [phone].    Please be aware that coverage of these services is subject to the terms and limitations of your health insurance plan.  Call member services at your health plan with any benefit or coverage questions.                  Your next 10 appointments already scheduled     Dec 03, 2018  8:00 AM [facility]   Office  Visit with Joseluis Wing MD   [facility]    [address] [place] [address] [id number] [phone]           Bring a current list of meds and any records pertaining to this visit. For Physicals, please bring immunization records and any forms needing to be filled out. Please arrive 10 minutes early to complete paperwork.            Dec 06, 2018  8:00 AM CST   New Visit with Stephon Holliday MD   [facility]    [address] [place] [id number] [phone]              Future tests that were ordered for you today     Open Future Orders        Priority Expected Expires Ordered    PHYSICAL THERAPY REFERRAL Routine  11/29/2019 11/29/2018            Who to contact     If you have questions or need follow up information about today's clinic visit or your schedule please contact [facility] directly at [phone].  Normal or non-critical lab and imaging results will be communicated to you by MyChart, letter or phone within 4 business days after the clinic has received the results. If you do not hear from us within 7 days, please contact the clinic through MyChart or phone. If you have a critical or abnormal lab result, we will notify you by phone as soon as possible.  Submit refill requests through [employer] or call your pharmacy and they will forward the refill request to us. Please allow 3 business days for your refill to be completed.          Additional Information About Your Visit        Care EveryWhere ID     This is your Care EveryWhere ID. This could be used by other organizations to access your [facility] medical records  [id number]        Your Vitals Were     Pulse Temperature Respirations BMI (Body Mass Index)          72 95.6  F (35.3  C) (Oral) 16 24.72 kg/m2         Blood Pressure from Last 3 Encounters:   11/29/18 136/78   11/27/18 (!) 149/91   11/19/18 122/78    Weight from Last 3  Encounters:   11/29/18 156 lb 6.4 oz (70.9 kg)   11/27/18 153 lb (69.4 kg)   11/19/18 153 lb 9.6 oz (69.7 kg)              We Performed the Following     ONC/HEME ADULT REFERRAL        Primary Care Provider Office Phone # Fax #    KYLEE Narvaez [phone]       [address] [facility] [address]        Equal Access to Services     MAGNUS INGRAM : Hadii aad ku hadasho Soomaali, waaxda luqadaha, qaybta kaalmada adeegyada, waxay idiin hayaan adeeg gillesarajosé miguel durand . So [facility] [phone].    ATENCIÓN: Si víctorla espkavitha, tiene a norton disposición servicios gratuitos de asistencia lingüística. [facility] [phone].    We comply with applicable federal civil rights laws and [place] laws. We do not discriminate on the basis of race, color, national origin, age, disability, sex, sexual orientation, or gender identity.            Thank you!     Thank you for choosing [facility]  for your care. Our goal is always to provide you with excellent care. Hearing back from our patients is one way we can continue to improve our services. Please take a few minutes to complete the written survey that you may receive in the mail after your visit with us. Thank you!             Your Updated Medication List - Protect others around you: Learn how to safely use, store and throw away your medicines at www.disposemymeds.org.          This list is accurate as of 11/29/18  2:47 PM.  Always use your most recent med list.                   Brand Name Dispense Instructions for use Diagnosis    cholecalciferol 5000 units (125 mcg) capsule    VITAMIN D3     Take by mouth daily        cyclobenzaprine 10 MG tablet    FLEXERIL    20 tablet    Take 1 tablet (10 mg) by mouth 3 times daily as needed for muscle spasms        oxyCODONE-acetaminophen 5-325 MG tablet    PERCOCET    12 tablet    Take 1-2 tablets by mouth every 6 hours as needed for moderate to severe pain        predniSONE 20 MG tablet    DELTASONE    10  tablet    Take two tablets (= 40mg) each day for 5 (five) days        rivaroxaban ANTICOAGULANT 15 MG Tabs tablet    XARELTO     Take 1 tablet by mouth 2 times daily        * venlafaxine 75 MG 24 hr capsule    EFFEXOR-XR    30 capsule    Take with 37.5 mg capsule to make a total dose of 112.5 mg daily    Recurrent major depression in partial remission (H), Generalized anxiety disorder       * venlafaxine 37.5 MG 24 hr capsule    EFFEXOR-XR    30 capsule    Take with 75 mg capsule to make a total dose of 112.5 mg daily    Recurrent major depression in partial remission (H), Generalized anxiety disorder       * Notice:  This list has 2 medication(s) that are the same as other medications prescribed for you. Read the directions carefully, and ask your doctor or other care provider to review them with you.

## 2018-12-03 ENCOUNTER — HOSPITAL ENCOUNTER (OUTPATIENT)
Facility: CLINIC | Age: 43
End: 2018-12-03
Attending: OBSTETRICS & GYNECOLOGY | Admitting: OBSTETRICS & GYNECOLOGY
Payer: COMMERCIAL

## 2018-12-03 ENCOUNTER — TELEPHONE (OUTPATIENT)
Dept: OBGYN | Facility: CLINIC | Age: 43
End: 2018-12-03

## 2018-12-03 ENCOUNTER — OFFICE VISIT (OUTPATIENT)
Dept: OBGYN | Facility: CLINIC | Age: 43
End: 2018-12-03
Payer: COMMERCIAL

## 2018-12-03 VITALS
BODY MASS INDEX: 25.13 KG/M2 | WEIGHT: 159 LBS | HEART RATE: 76 BPM | DIASTOLIC BLOOD PRESSURE: 72 MMHG | SYSTOLIC BLOOD PRESSURE: 132 MMHG

## 2018-12-03 DIAGNOSIS — Z30.2 ENCOUNTER FOR STERILIZATION: Primary | ICD-10-CM

## 2018-12-03 PROCEDURE — 99202 OFFICE O/P NEW SF 15 MIN: CPT | Performed by: OBSTETRICS & GYNECOLOGY

## 2018-12-03 NOTE — PROGRESS NOTES
Clinic Note    CC:  Sterilization consult     HPI:  Aide Burroughs is a 43 year old  woman with history of recent acute focal segmental LLL PE (11/15) in the setting of OCPs, now on Xarelto, who presents for sterilization consult. She has no prior hx of VTE, is healthy and active, involved in martial arts, no other known risk factors. She is certain she does not desire future fertility, youngest child is 15yo. Hx of two uncomplicated pregnancies and . She prefers female sterilization to vasectomy, not interested in an IUD.     Ob Hx:  s/p SVDx2   Gyn Hx: Patient's last menstrual period was 2018 (approximate).  Menses are normal, monthly   STI history: HSV, no outbreaks in years   Using condoms for birth control  PMH:   Past Medical History:   Diagnosis Date     Generalized anxiety disorder      MDD (major depressive disorder), recurrent, severe, with psychosis (H)      Pulmonary embolism (H) 11/15/2018    while on OCP     SurgHx:   Past Surgical History:   Procedure Laterality Date     ESOPHAGOSCOPY, GASTROSCOPY, DUODENOSCOPY (EGD), COMBINED N/A 5/15/2017    Procedure: COMBINED ESOPHAGOSCOPY, GASTROSCOPY, DUODENOSCOPY (EGD), BIOPSY SINGLE OR MULTIPLE;  ESOPHAGOSCOPY, GASTROSCOPY, DUODENOSCOPY (EGD) with biopsies by biopsy;  Surgeon: Robles Falk MD;  Location: [facility]     MYRINGOTOMY, INSERT TUBE, COMBINED Left 2018     FamHx:   Family History   Problem Relation Age of Onset     Hypertension Mother      Hyperlipidemia Mother      Mental Illness Mother      Cancer - colorectal Father      Substance Abuse Father      Cerebrovascular Disease Maternal Grandmother      Diabetes Paternal Grandmother      Heart Disease Paternal Grandfather      Bipolar Disorder Maternal Aunt      Schizophrenia Maternal Aunt      Coronary Artery Disease Maternal Grandfather      SocHx:   Social History     Social History     Marital status:      Spouse name: N/A     Number of children: N/A     Years of  education: N/A     Occupational History           [employer]     Social History Main Topics     Smoking status: Never Smoker     Smokeless tobacco: Never Used     Alcohol use 0.0 oz/week     0 Standard drinks or equivalent per week      Comment: rare     Drug use: No     Sexual activity: Yes     Partners: Male     Birth control/ protection: Condom     Allergies:   Erythromycin and Seasonal allergies    Current Medications:   Current Outpatient Prescriptions   Medication Sig Dispense Refill     cholecalciferol (VITAMIN D3) 5000 UNITS CAPS capsule Take by mouth daily       cyclobenzaprine (FLEXERIL) 10 MG tablet Take 1 tablet (10 mg) by mouth 3 times daily as needed for muscle spasms 20 tablet 0     oxyCODONE-acetaminophen (PERCOCET) 5-325 MG tablet Take 1-2 tablets by mouth every 6 hours as needed for moderate to severe pain 12 tablet 0     rivaroxaban ANTICOAGULANT (XARELTO) 15 MG TABS tablet Take 1 tablet by mouth 2 times daily       venlafaxine (EFFEXOR-XR) 37.5 MG 24 hr capsule Take with 75 mg capsule to make a total dose of 112.5 mg daily 30 capsule 1     venlafaxine (EFFEXOR-XR) 75 MG 24 hr capsule Take with 37.5 mg capsule to make a total dose of 112.5 mg daily 30 capsule 1     ROS: As described in HPI, otherwise negative for fever/chills, fatigue, dizziness, changes or new deficits in vision, weight changes, worrisome rashes, new lumps or masses, cough/SOB/CP, nausea/vomit, GI distress, dysuria, abnormal vaginal discharge, constipation/diarrhea, neurological deficits, changes in ADL, changes in skin or hair, heat or cold intolerance, or other systemic complaints    EXAM:  Vitals:    12/03/18 0755   BP: 132/72   BP Location: Right arm   Cuff Size: Adult Regular   Pulse: 76   Weight: 159 lb (72.1 kg)    Body mass index is 25.13 kg/(m^2).    Gen: Alert, oriented, appropriately interactive, NAD  CV: RRR, 2+ peripheral pulses  Resp: CTAB, good effort without distress   Abdomen: soft, non tender, non  distended, no masses, no hernias. No inguinal lymphadenopathy. No hepatosplenomegaly  Perineum: no lesions; normal appearing external genitalia  Bimanual exam:   Urethra nontender   Bladder- nontender and without massess , well supported   Uterus- midline , anteverted, mobile , no masses, non-tender  Adnexa - no masses or tenderness   No cervical motion tenderness  Lower extremities: non-tender, no edema  Skin: no lesions or rashes    Labs & Imaging:  No results found for this or any previous visit (from the past 24 hour(s)).    Lab Results   Component Value Date    PAP NIL 01/05/2018    PAP NIL 11/13/2014     ASSESSMENT/PLAN: Aide Burroughs is a 43 year old  woman with history of recent acute focal segmental LLL PE (11/15) in the setting of OCPs, now on Xarelto, who presents for sterilization consult. Discussed alternatives as noted above, and she desires surgical female sterilization. She is thin, healthy, no significant surgical hx, no Ob nor Gyn hx, no abnl PAP. We discussed risks of surgery including infection, bleeding, injury to surrounding structures, risks from anesthesia, further risks of VTE. She will see heme and have a preop prior to her sterilization for risk assessment and anticoagulation planning.     1. Encounter for sterilization  - Marleen-Operative Worksheet    Joseluis Wing MD  12/3/2018 8:18 AM

## 2018-12-03 NOTE — TELEPHONE ENCOUNTER
Type of surgery: laparoscopic bilateral salpingectomy  Location of surgery: [facility] [place]  Date and time of surgery: 12/12  Surgeon: Maciej  Pre-Op Appt Date: 12/6  Post-Op Appt Date: 1/9   Packet sent out: Yes  Pre-cert/Authorization completed:  Not Applicable  Date: na

## 2018-12-03 NOTE — MR AVS SNAPSHOT
After Visit Summary   12/3/2018    Aide Burroughs    MRN: [id number]           Patient Information     Date Of Birth          1975        Visit Information        Provider Department      12/3/2018 8:00 AM Joseluis Wing MD [facility]        Today's Diagnoses     Encounter for sterilization    -  1      Care Instructions    Surgery scheduler will call to schedule surgery  Make a pre op appointment with any family practice provider            Follow-ups after your visit        Your next 10 appointments already scheduled     Dec 06, 2018  8:00 AM CST   [facility]    [address] [place] [id number] [phone]              Who to contact     If you have questions or need follow up information about today's clinic visit or your schedule please contact [facility] directly at [phone].  Normal or non-critical lab and imaging results will be communicated to you by MyChart, letter or phone within 4 business days after the clinic has received the results. If you do not hear from us within 7 days, please contact the clinic through MyChart or phone. If you have a critical or abnormal lab result, we will notify you by phone as soon as possible.  Submit refill requests through [employer] or call your pharmacy and they will forward the refill request to us. Please allow 3 business days for your refill to be completed.          Additional Information About Your Visit        Care EveryWhere ID     This is your Care EveryWhere ID. This could be used by other organizations to access your [place] medical records  [id number]        Your Vitals Were     Pulse Last Period BMI (Body Mass Index)             76 11/17/2018 (Approximate) 25.13 kg/m2          Blood Pressure from Last 3 Encounters:   12/03/18 132/72   11/29/18 136/78   11/27/18 (!) 149/91    Weight from Last 3 Encounters:    12/03/18 159 lb (72.1 kg)   11/29/18 156 lb 6.4 oz (70.9 kg)   11/27/18 153 lb (69.4 kg)              We Performed the Following     Marleen-Operative Worksheet        Primary Care Provider Office Phone # Fax #    KYLEE Narvaez [phone]       [address] [facility] [address]        Equal Access to Services     MAGNUS INGRAM : Hadii aad ku hadasho Soomaali, waaxda luqadaha, qaybta kaalmada adeegyada, waxay idiin hayaan adeeg kharash lamabel . So [facility] [phone].    ATENCIÓN: Si víctorla espkavitha, tiene a norton disposición servicios gratuitos de asistencia lingüística. Llame al [phone].    We comply with applicable federal civil rights laws and [place] laws. We do not discriminate on the basis of race, color, national origin, age, disability, sex, sexual orientation, or gender identity.            Thank you!     Thank you for choosing [facility]  for your care. Our goal is always to provide you with excellent care. Hearing back from our patients is one way we can continue to improve our services. Please take a few minutes to complete the written survey that you may receive in the mail after your visit with us. Thank you!             Your Updated Medication List - Protect others around you: Learn how to safely use, store and throw away your medicines at www.disposemymeds.org.          This list is accurate as of 12/3/18  8:12 AM.  Always use your most recent med list.                   Brand Name Dispense Instructions for use Diagnosis    cholecalciferol 5000 units (125 mcg) capsule    VITAMIN D3     Take by mouth daily        cyclobenzaprine 10 MG tablet    FLEXERIL    20 tablet    Take 1 tablet (10 mg) by mouth 3 times daily as needed for muscle spasms        oxyCODONE-acetaminophen 5-325 MG tablet    PERCOCET    12 tablet    Take 1-2 tablets by mouth every 6 hours as needed for moderate to severe pain        rivaroxaban ANTICOAGULANT 15 MG Tabs tablet    XARELTO     Take  1 tablet by mouth 2 times daily        * venlafaxine 75 MG 24 hr capsule    EFFEXOR-XR    30 capsule    Take with 37.5 mg capsule to make a total dose of 112.5 mg daily    Recurrent major depression in partial remission (H), Generalized anxiety disorder       * venlafaxine 37.5 MG 24 hr capsule    EFFEXOR-XR    30 capsule    Take with 75 mg capsule to make a total dose of 112.5 mg daily    Recurrent major depression in partial remission (H), Generalized anxiety disorder       * Notice:  This list has 2 medication(s) that are the same as other medications prescribed for you. Read the directions carefully, and ask your doctor or other care provider to review them with you.

## 2018-12-03 NOTE — PATIENT INSTRUCTIONS
Surgery scheduler will call to schedule surgery  Make a pre op appointment with any family practice provider

## 2018-12-06 ENCOUNTER — ONCOLOGY VISIT (OUTPATIENT)
Dept: ONCOLOGY | Facility: CLINIC | Age: 43
End: 2018-12-06
Payer: COMMERCIAL

## 2018-12-06 ENCOUNTER — TELEPHONE (OUTPATIENT)
Dept: OBGYN | Facility: CLINIC | Age: 43
End: 2018-12-06

## 2018-12-06 ENCOUNTER — OFFICE VISIT (OUTPATIENT)
Dept: FAMILY MEDICINE | Facility: OTHER | Age: 43
End: 2018-12-06
Payer: COMMERCIAL

## 2018-12-06 VITALS
WEIGHT: 154 LBS | BODY MASS INDEX: 24.17 KG/M2 | HEIGHT: 67 IN | DIASTOLIC BLOOD PRESSURE: 80 MMHG | SYSTOLIC BLOOD PRESSURE: 116 MMHG | TEMPERATURE: 97.6 F | RESPIRATION RATE: 16 BRPM | HEART RATE: 97 BPM | OXYGEN SATURATION: 100 %

## 2018-12-06 VITALS
HEIGHT: 67 IN | RESPIRATION RATE: 16 BRPM | BODY MASS INDEX: 24.28 KG/M2 | TEMPERATURE: 97.6 F | SYSTOLIC BLOOD PRESSURE: 116 MMHG | HEART RATE: 97 BPM | DIASTOLIC BLOOD PRESSURE: 80 MMHG | WEIGHT: 154.7 LBS | OXYGEN SATURATION: 100 %

## 2018-12-06 DIAGNOSIS — Z01.818 PREOP GENERAL PHYSICAL EXAM: Primary | ICD-10-CM

## 2018-12-06 DIAGNOSIS — I26.99 OTHER PULMONARY EMBOLISM WITHOUT ACUTE COR PULMONALE, UNSPECIFIED CHRONICITY (H): ICD-10-CM

## 2018-12-06 DIAGNOSIS — I26.99 OTHER ACUTE PULMONARY EMBOLISM WITHOUT ACUTE COR PULMONALE (H): Primary | ICD-10-CM

## 2018-12-06 DIAGNOSIS — Z30.9 ENCOUNTER FOR CONTRACEPTIVE MANAGEMENT, UNSPECIFIED TYPE: ICD-10-CM

## 2018-12-06 PROCEDURE — 99214 OFFICE O/P EST MOD 30 MIN: CPT | Performed by: NURSE PRACTITIONER

## 2018-12-06 PROCEDURE — 86146 BETA-2 GLYCOPROTEIN ANTIBODY: CPT | Mod: 59 | Performed by: INTERNAL MEDICINE

## 2018-12-06 PROCEDURE — 99204 OFFICE O/P NEW MOD 45 MIN: CPT | Performed by: INTERNAL MEDICINE

## 2018-12-06 PROCEDURE — 81241 F5 GENE: CPT | Performed by: INTERNAL MEDICINE

## 2018-12-06 PROCEDURE — 86147 CARDIOLIPIN ANTIBODY EA IG: CPT | Mod: 59 | Performed by: INTERNAL MEDICINE

## 2018-12-06 PROCEDURE — 36415 COLL VENOUS BLD VENIPUNCTURE: CPT | Performed by: INTERNAL MEDICINE

## 2018-12-06 PROCEDURE — 81240 F2 GENE: CPT | Performed by: INTERNAL MEDICINE

## 2018-12-06 ASSESSMENT — PAIN SCALES - GENERAL
PAINLEVEL: NO PAIN (0)
PAINLEVEL: NO PAIN (0)

## 2018-12-06 NOTE — TELEPHONE ENCOUNTER
Spoke to pt and relayed Dr. Wing's message below.  Pt was aware and she states that if Dr. Wing agrees with that plan then she will wait 3 months before having her surgery.  I let her know that I will contact the surgery schedulers so they can cancel her surgery that was scheduled for 12/12/18 and can help her reschedule in 3 months.  Also let pt know that she will need to have another preop within 30 days of her new surgery date.  Pt verbalized understanding and agreed to plan.    Jory Cabrera RN

## 2018-12-06 NOTE — NURSING NOTE
DISCHARGE PLAN:  Next appointments: See patient instruction section  Departure Mode: Ambulatory  Accompanied by: self  3 minutes for nursing discharge (face to face time)     Aide Burroughs is here today for Hematology consult.  Writing nurse seen patient after Medical Oncology appointment to address questions/concerns/coordinate care. Patient on Xarelto and getting ready for surgery.  Patient will get labs today.  Instructed patient to hold Xarelto 24 hours prior to surgery and resume day after.  Follow up in 3 months.  Patient ambulated by nurse to  to schedule follow up and/or lab appointments.  Imaging scheduled if ordered.  See patient instructions and Oncologist's Progress note for further details. Questions and concerns addressed to patient's satisfaction. Patient verbalized and demonstrated understanding of plan.  Contact information provided and patient is encouraged to call with any that arise in the interim of care.    Barrett Perrin, RN, BSN, OCN   Oncology Care Coordinator RN  [facility]  [phone]  12/6/2018, 8:52 AM

## 2018-12-06 NOTE — PATIENT INSTRUCTIONS
Today:  Labs today  Stop Xarelto 24 hours prior to surgery and then resume day after surgery    Please follow up with Dr. Holliday in 3 months.      Office visit follow up with Dr. Holliday Date/Time:     If you have any questions or concerns please feel free to call.    If you need to reschedule please call:  Clinic or Lab Appointment - [phone]  Infusion - [phone]  Imaging - [phone]    Barrett Perrin RN, BSN, OCN  [facility]   Oncology/Hematology Care Coordinator RN  [facility]  [phone]

## 2018-12-06 NOTE — TELEPHONE ENCOUNTER
Left message for pt to return call to clinic to relay Dr. Wing's message below.    Pt's surgery is scheduled currently for 12/12/18.  Contacted the surgery schedulers to cancel this and assist pt in rescheduling for 3 months out.  She will also need to reschedule her preop appt as it needs to be within 30 days of the surgery.    Jory Cabrera RN

## 2018-12-06 NOTE — PROGRESS NOTES
DATE OF VISIT: Dec 6, 2018    REASON FOR REFERRAL:   Pulmonary embolism    HISTORY OF PRESENT ILLNESS:     43-year-old female with past medical history significant for anxiety/depression who presented to emergency room on 11/15/18 with symptoms of left upper back pain with associated tingling in the left arm. She was noted to have elevated d-dimer and has subsequently had a CT angiogram performed which revealed segmental pulmonary embolism in the left lower lobe. Ultrasound lower extremities were negative for DVT. Pulmonary embolism was suspected secondary to birth control pill that she started taking in July of this year. She was started on Xeralto and subsequently discharged home.    Presents to hematology clinic today to establish care. States that her symptoms have improved. Denies dyspnea, chest pain, palpitations, lower extremity swelling/edema, bleeding/bruising or any other complaints. She saw the earlier this week and is currently scheduled for a tubal ligation procedure next week under under general anesthesia.    Denies recent surgery, immobility, long distance travel, smoking. No prior history of blood clots. No family history of venous thromboembolism.  with no pregnancy-related complications.    REVIEW OF SYSTEMS:      ROS: 14 point ROS neg other than the symptoms noted above in the HPI.    PAST MEDICAL HISTORY:   Past Medical History:   Diagnosis Date     Generalized anxiety disorder      MDD (major depressive disorder), recurrent, severe, with psychosis (H)      Pulmonary embolism (H) 11/15/2018    while on OCP       PAST SURGICAL HISTORY:   Past Surgical History:   Procedure Laterality Date     ESOPHAGOSCOPY, GASTROSCOPY, DUODENOSCOPY (EGD), COMBINED N/A 5/15/2017    Procedure: COMBINED ESOPHAGOSCOPY, GASTROSCOPY, DUODENOSCOPY (EGD), BIOPSY SINGLE OR MULTIPLE;  ESOPHAGOSCOPY, GASTROSCOPY, DUODENOSCOPY (EGD) with biopsies by biopsy;  Surgeon: Robles Falk MD;  Location: [facility]      MYRINGOTOMY, INSERT TUBE, COMBINED Left 05/16/2018       ALLERGIES:   Allergies as of 12/06/2018 - Isaiah as Reviewed 12/06/2018   Allergen Reaction Noted     Erythromycin Nausea and Vomiting 11/18/2011     Seasonal allergies  11/18/2011       MEDICATIONS:   Current Outpatient Prescriptions   Medication Sig Dispense Refill     cholecalciferol (VITAMIN D3) 5000 UNITS CAPS capsule Take by mouth daily       venlafaxine (EFFEXOR-XR) 37.5 MG 24 hr capsule Take with 75 mg capsule to make a total dose of 112.5 mg daily 30 capsule 1     venlafaxine (EFFEXOR-XR) 75 MG 24 hr capsule Take with 37.5 mg capsule to make a total dose of 112.5 mg daily 30 capsule 1     oxyCODONE-acetaminophen (PERCOCET) 5-325 MG tablet Take 1-2 tablets by mouth every 6 hours as needed for moderate to severe pain (Patient not taking: Reported on 12/6/2018) 12 tablet 0     rivaroxaban ANTICOAGULANT (XARELTO) 15 MG TABS tablet Take 1 tablet (15 mg) by mouth 2 times daily (Patient not taking: Reported on 12/6/2018) 60 tablet 5     [DISCONTINUED] rivaroxaban ANTICOAGULANT (XARELTO) 15 MG TABS tablet Take 1 tablet by mouth 2 times daily          FAMILY HISTORY:   Family History   Problem Relation Age of Onset     Hypertension Mother      Hyperlipidemia Mother      Mental Illness Mother      Cancer - colorectal Father      Substance Abuse Father      Cerebrovascular Disease Maternal Grandmother      Diabetes Paternal Grandmother      Heart Disease Paternal Grandfather      Bipolar Disorder Maternal Aunt      Schizophrenia Maternal Aunt      Coronary Artery Disease Maternal Grandfather        SOCIAL HISTORY:   Social History     Social History     Marital status:      Spouse name: N/A     Number of children: N/A     Years of education: N/A     Occupational History           [employer]     Social History Main Topics     Smoking status: Never Smoker     Smokeless tobacco: Never Used     Alcohol use 0.0 oz/week     0 Standard drinks or equivalent  "per week      Comment: rare     Drug use: No     Sexual activity: Yes     Partners: Male     Birth control/ protection: Condom     Other Topics Concern     None     Social History Narrative       PHYSICAL EXAMINATION:   /80  Pulse 97  Temp 97.6  F (36.4  C) (Temporal)  Resp 16  Ht 1.694 m (5' 6.7\")  Wt 69.9 kg (154 lb)  LMP 11/17/2018 (Approximate)  SpO2 100%  BMI 24.34 kg/m2  Wt Readings from Last 10 Encounters:   12/06/18 69.9 kg (154 lb)   12/06/18 70.2 kg (154 lb 11.2 oz)   12/03/18 72.1 kg (159 lb)   11/29/18 70.9 kg (156 lb 6.4 oz)   11/27/18 69.4 kg (153 lb)   11/19/18 69.7 kg (153 lb 9.6 oz)   10/16/18 69.4 kg (153 lb)   10/05/18 69.1 kg (152 lb 4.8 oz)   07/13/18 67.1 kg (147 lb 14.4 oz)   07/02/18 68.4 kg (150 lb 11.2 oz)      Exam:  Constitutional: healthy, alert and no distress  Head: Normocephalic.   Neck: Neck supple. No adenopathy.  ENT: ENT exam normal, no neck nodes or sinus tenderness  Cardiovascular: RRR. No murmurs, clicks gallops or rub  Respiratory:  Lungs clear  Gastrointestinal: Abdomen soft, non-tender. BS normal. No masses, organomegaly  Musculoskeletal: extremities normal  Skin: no suspicious lesions or rashes  Neurologic: Gait normal. Sensation grossly WNL.  Psychiatric: mentation appears normal and affect normal/bright  Hematologic/Lymphatic/Immunologic: Normal cervical lymph nodes    IMAGING RESULTS:  US VENOUS LOWER EXTREMITY [id number]/15/2018  [facility]  Result Narrative   INDICATION:  Pulmonary embolism    TECHNIQUE: :  Ultrasound venous duplex lower extremity bilateral.  Compression venous exam was performed using gray-scale, color Doppler, and spectral Doppler imaging.    COMPARISON:      FINDINGS:  Sonographic imaging demonstrates the common femoral, deep femoral, superficial femoral, popliteal, posterior tibial and greater saphenous veins to be fully compressible with normal color Doppler blood flow in both lower extremities.   "     IMPRESSION:     CT CHEST PE STUDY11/15/2018  [facility]  Result Narrative   INDICATION:  Left shoulder pain.    TECHNIQUE:  61 mL Isovue-370 injected intravenously. Pulmonary embolism protocol chest CT examination performed.    FINDINGS:  There is a focal filling defect in a segmental left lower lobe pulmonary artery, best seen on coronal image 67 of series 9, measuring about 1.7 cm and consistent with pulmonary embolism. No other pulmonary arterial filling defects identified. Normal RV/LV ratio.   The lungs are clear.  Heart and mediastinum within normal limits. There is no adenopathy/mediastinal mass identified. No pleural/pericardial fluid accumulation.  Thoracic aorta is normal in course and caliber.  Upper abdomen is unremarkable.    IMPRESSION:  Focal, segmental pulmonary embolism within a left lower lobe segmental pulmonary artery. Chest CT examination otherwise unremarkable.    Please note that all CT scans at this facility use dose modulation, iterative reconstruction, and/or weight-based dosing when appropriate to reduce radiation dose to as low as reasonably achievable.    Dictated by Gurpreet Branch MD @ Nov 15 2018 10:34PM    (Electronically Signed)         ASSESSMENT AND PLAN:    43-year-old female with past history significant for anxiety depression who was diagnosed with acute segmental pulmonary embolism 3 weeks ago which was suspected due to the use of oral contraceptive pills       Acute pulmonary embolism   Likely secondary to use of oral contraceptive pill with no other obvious risk factors  Patient was started on Xeralto and has completed 3 weeks of 15 mg bid dosing with plans to start on 20 mg daily today. She was informed that for patients with venous thromboembolism provoked by oral contraceptive pill, risk of recurrence is estimated to be 5% in 1 year and 15% at 5 years.  Given intermediate risk of recurrence, would recommend hypercoagulability testing. We  will check for factor 2, factor V Leiden mutation as well as antiphospholipid antibodies. Protein C, protein S and antithrombin III levels are affected by use of direct oral anticoagulants/blood clot and so we will defer that testing for now.  We will plan on continuing anticoagulation for 6 months followed by d-dimer testing and if negative, will plan on discontinuing anticoagulation with repeat D-dimer test in another month along with checking for protein C, protein S and antithrombin III levels. If repeat d-dimer along with anticoagulation workup comes out to be negative, she would not require to continue anticoagulation indefinitly.      - Given very high post-operative thrombotic risk with recent PE, would recommend delaying the elective surgery until after at least 3 months of therapy. She was informed about this recommendation. She however had reservations about delay in surgery due to likely change in insurance starting next month. She will discuss it with her significant other and make a decision. If patient is adamant on getting surgery sooner, she will at least require bridging anticoagulation with Lovenox to minimize treatment interruption and continuous risk.       RTC in 3 months for follow-up.    The patient is ready to learn, no apparent learning barriers were identified, Diagnosis and treatment plans were explained to the patient. The patient expressed understanding of the content. The patient questions were answered to her satisfaction.    Chart documentation with Dragon Voice recognition Software. Although reviewed after completion, some words and grammatical errors may remain.    Stephon Holliday MD  Attending Physician   Hematology/Medical Oncology

## 2018-12-06 NOTE — NURSING NOTE
"Oncology Rooming Note    December 6, 2018 8:06 AM   Aide Burroughs is a 43 year old female who presents for:    Chief Complaint   Patient presents with     Hematology     Other acute pulmonary embolism without acute cor pulmonale      Consult     Ref: Hakeem Reyes, AMINAH     Initial Vitals: /80  Pulse 97  Temp 97.6  F (36.4  C) (Temporal)  Resp 16  Ht 1.694 m (5' 6.7\")  Wt 69.9 kg (154 lb)  LMP 11/17/2018 (Approximate)  SpO2 100%  BMI 24.34 kg/m2 Estimated body mass index is 24.34 kg/(m^2) as calculated from the following:    Height as of this encounter: 1.694 m (5' 6.7\").    Weight as of this encounter: 69.9 kg (154 lb). Body surface area is 1.81 meters squared.  No Pain (0) Comment: Data Unavailable   Patient's last menstrual period was 11/17/2018 (approximate).  Allergies reviewed: Yes  Medications reviewed: Yes    Medications: Medication refills not needed today.  Pharmacy name entered into That{img}:    [facility] - [place], [place] - [address] [place] [address] - [place], [place] - [address]      6 minutes for nursing intake (face to face time)     Ivonne PRICE CMA              "

## 2018-12-06 NOTE — PROGRESS NOTES
[facility]  [address] [facility] [address]  [phone]  Dept: [phone]    PRE-OP EVALUATION:  Today's date: 2018    Aide Burroughs (: 1975) presents for pre-operative evaluation assessment as requested by Dr. Wing.  She requires evaluation and anesthesia risk assessment prior to undergoing surgery/procedure for treatment of lap tubal .    Fax number for surgical facility: [facility]  Primary Physician: Rivka Rodrigues  Type of Anesthesia Anticipated: General    Patient has a Health Care Directive or Living Will:  NO    Preop Questions 2018   Who is doing your surgery? Maciej   What are you having done? Tubes tied   Date of Surgery/Procedure: 2019   Facility or Hospital where procedure/surgery will be performed: [facility]   1.  Do you have a history of Heart attack, stroke, stent, coronary bypass surgery, or other heart surgery? No   2.  Do you ever have any pain or discomfort in your chest? YES - history of PE.  Still having some pain from this.    3.  Do you have a history of  Heart Failure? No   4.   Are you troubled by shortness of breath when:  walking on a level surface, or up a slight hill, or at night? No   5.  Do you currently have a cold, bronchitis or other respiratory infection? No   6.  Do you have a cough, shortness of breath, or wheezing? No   7.  Do you sometimes get pains in the calves of your legs when you walk? No   8. Do you or anyone in your family have previous history of blood clots? YES - PE diagnosed 2 weeks ago   9.  Do you or does anyone in your family have a serious bleeding problem such as prolonged bleeding following surgeries or cuts? No   10. Have you ever had problems with anemia or been told to take iron pills? No   11. Have you had any abnormal blood loss such as black, tarry or bloody stools, or abnormal vaginal bleeding? No   12. Have you ever had a blood transfusion? No   13. Have you or any of your  relatives ever had problems with anesthesia? No   14. Do you have sleep apnea, excessive snoring or daytime drowsiness? no   15. Do you have any prosthetic heart valves? No   16. Do you have prosthetic joints? No   17. Is there any chance that you may be pregnant? No         HPI:     HPI related to upcoming procedure: tubal ligation.  She recently developed a PE that was felt to be due to her OCPs.  Is on Xarelto (has stopped now in anticipation of the upcoming surgery).  Plan for 6 months of anticoagulation.  No personal or family history of clots prior to this.         See problem list for active medical problems.  Problems all longstanding and stable, except as noted/documented.  See ROS for pertinent symptoms related to these conditions.                                                                                                                                                          .    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 Provider to review and confirm.       Seasonal allergic rhinitis 04/26/2012     Priority: Medium      Past Medical History:   Diagnosis Date     Generalized anxiety disorder      MDD (major depressive disorder), recurrent, severe, with psychosis (H)      Pulmonary embolism (H) 11/15/2018    while on OCP     Past Surgical History:   Procedure Laterality Date     ESOPHAGOSCOPY, GASTROSCOPY,  DUODENOSCOPY (EGD), COMBINED N/A 5/15/2017    Procedure: COMBINED ESOPHAGOSCOPY, GASTROSCOPY, DUODENOSCOPY (EGD), BIOPSY SINGLE OR MULTIPLE;  ESOPHAGOSCOPY, GASTROSCOPY, DUODENOSCOPY (EGD) with biopsies by biopsy;  Surgeon: Robles Falk MD;  Location: [facility]     MYRINGOTOMY, INSERT TUBE, COMBINED Left 05/16/2018     Current Outpatient Prescriptions   Medication Sig Dispense Refill     cholecalciferol (VITAMIN D3) 5000 UNITS CAPS capsule Take by mouth daily       venlafaxine (EFFEXOR-XR) 37.5 MG 24 hr capsule Take with 75 mg capsule to make a total dose of 112.5 mg daily 30 capsule 1     venlafaxine (EFFEXOR-XR) 75 MG 24 hr capsule Take with 37.5 mg capsule to make a total dose of 112.5 mg daily 30 capsule 1     oxyCODONE-acetaminophen (PERCOCET) 5-325 MG tablet Take 1-2 tablets by mouth every 6 hours as needed for moderate to severe pain 12 tablet 0     rivaroxaban ANTICOAGULANT (XARELTO) 15 MG TABS tablet Take 1 tablet by mouth 2 times daily       OTC products: none    Allergies   Allergen Reactions     Erythromycin Nausea and Vomiting     Seasonal Allergies      Molds, grass, multiple trees, plant pollen, cats, rabbits, dogs, hay.      Latex Allergy: NO    Social History   Substance Use Topics     Smoking status: Never Smoker     Smokeless tobacco: Never Used     Alcohol use 0.0 oz/week     0 Standard drinks or equivalent per week      Comment: rare     History   Drug Use No       REVIEW OF SYSTEMS:   CONSTITUTIONAL: NEGATIVE for fever, chills, change in weight  INTEGUMENTARY/SKIN: NEGATIVE for worrisome rashes, moles or lesions  EYES: NEGATIVE for vision changes or irritation  ENT/MOUTH: NEGATIVE for ear, mouth and throat problems  RESP: NEGATIVE for significant cough or SOB  BREAST: NEGATIVE for masses, tenderness or discharge  CV: NEGATIVE for chest pain, palpitations or peripheral edema  GI: NEGATIVE for nausea, abdominal pain, heartburn, or change in bowel habits  : NEGATIVE for frequency, dysuria,  "or hematuria  MUSCULOSKELETAL: NEGATIVE for significant arthralgias or myalgia  NEURO: NEGATIVE for weakness, dizziness or paresthesias  ENDOCRINE: NEGATIVE for temperature intolerance, skin/hair changes  HEME: NEGATIVE for bleeding problems  PSYCHIATRIC: NEGATIVE for changes in mood or affect    EXAM:   /80  Pulse 97  Temp 97.6  F (36.4  C) (Temporal)  Resp 16  Ht 5' 6.7\" (1.694 m)  Wt 154 lb 11.2 oz (70.2 kg)  LMP 11/17/2018 (Approximate)  SpO2 100%  Breastfeeding? No  BMI 24.45 kg/m2    GENERAL APPEARANCE: healthy, alert and no distress     EYES: EOMI, PERRL     HENT: ear canals and TM's normal and nose and mouth without ulcers or lesions     NECK: no adenopathy, no asymmetry, masses, or scars and thyroid normal to palpation     RESP: lungs clear to auscultation - no rales, rhonchi or wheezes     CV: regular rates and rhythm, normal S1 S2, no S3 or S4 and no murmur, click or rub     ABDOMEN:  soft, nontender, no HSM or masses and bowel sounds normal     MS: extremities normal- no gross deformities noted, no evidence of inflammation in joints, FROM in all extremities.     SKIN: no suspicious lesions or rashes     NEURO: Normal strength and tone, sensory exam grossly normal, mentation intact and speech normal     PSYCH: mentation appears normal. and affect normal/bright     LYMPHATICS: No cervical adenopathy    DIAGNOSTICS:   EKG done in the ED on 11/15/18 was normal.     Recent Labs   Lab Test  08/05/15   0851  01/06/15   0816   04/09/13   1114   HGB   --   13.5   --   14.6   PLT   --   242   --   250   NA  139  136   < >  147*   POTASSIUM  4.2  3.7   < >  4.2   CR  0.88  0.91   < >  0.88    < > = values in this interval not displayed.        IMPRESSION:   Reason for surgery/procedure: contraception     The proposed surgical procedure is considered INTERMEDIATE risk.    REVISED CARDIAC RISK INDEX  The patient has the following serious cardiovascular risks for perioperative complications such as (MI, " PE, VFib and 3  AV Block):  No serious cardiac risks  INTERPRETATION: 0 risks: Class I (very low risk - 0.4% complication rate)    The patient has the following additional risks for perioperative complications:  No identified additional risks      ICD-10-CM    1. Preop general physical exam Z01.818    2. Encounter for contraceptive management, unspecified type Z30.9    3. Other pulmonary embolism without acute cor pulmonale, unspecified chronicity (H) I26.99 rivaroxaban ANTICOAGULANT (XARELTO) 15 MG TABS tablet       RECOMMENDATIONS:           Anticoagulant or Antiplatelet Medication Use  XARELTO: Bleeding risk is at least moderate for this procedure and rivaroxaban (Xarelto) should be stopped at least 24 hours prior to procedure.  Restart post-operatively.         APPROVAL GIVEN to proceed with proposed procedure, without further diagnostic evaluation       Signed Electronically by: KYLEE Narvaez CNP    Copy of this evaluation report is provided to requesting physician.    [facility] Preop Guidelines    Revised Cardiac Risk Index

## 2018-12-06 NOTE — PATIENT INSTRUCTIONS
Restart your Xarelto after surgery.     Before Your Surgery      Call your surgeon if there is any change in your health. This includes signs of a cold or flu (such as a sore throat, runny nose, cough, rash or fever).    Do not smoke, drink alcohol or take over the counter medicine (unless your surgeon or primary care doctor tells you to) for the 24 hours before and after surgery.    If you take prescribed drugs: Follow your doctor s orders about which medicines to take and which to stop until after surgery.    Eating and drinking prior to surgery: follow the instructions from your surgeon    Take a shower or bath the night before surgery. Use the soap your surgeon gave you to gently clean your skin. If you do not have soap from your surgeon, use your regular soap. Do not shave or scrub the surgery site.  Wear clean pajamas and have clean sheets on your bed.

## 2018-12-06 NOTE — MR AVS SNAPSHOT
After Visit Summary   12/6/2018    Aide Burroughs    MRN: [id number]           Patient Information     Date Of Birth          1975        Visit Information        Provider Department      12/6/2018 8:00 AM Stephon Holliday MD [facility]        Today's Diagnoses     Other acute pulmonary embolism without acute cor pulmonale (H)    -  1      Care Instructions    Today:  Labs today  Stop Xarelto 24 hours prior to surgery and then resume day after surgery    Please follow up with Dr. Holliday in 3 months.      Office visit follow up with Dr. Holliday Date/Time:     If you have any questions or concerns please feel free to call.    If you need to reschedule please call:  Clinic or Lab Appointment - [phone]  Infusion - [phone]  Imaging - [phone]    Barrett Perrin RN, BSN, OCN  [facility]   Oncology/Hematology Care Coordinator RN  [facility]  [phone]                    Follow-ups after your visit        Your next 10 appointments already scheduled     Dec 12, 2018   Procedure with Joseluis Wing MD   [facility] ([facility])    [address] [place] [address]   [phone]            LAPAROSCOPIC SALPINGECTOMY Equipment hold:   Instrument holds:   Room Setup, Equipment Settings, Outside Resources Room setup:  Equipment Settings: Cautery 40/40  Outside Resources:  Gown & Gloves Dr. Wing 8 blue and 8 white  [employer]  0.25% Bupiv with Epi  Dressings  Dermabond  Hints, Surgeon    Hints, Circulator Vag prep (puts sponge stick in vagina to manipulate uterus) Straight cath with prep  Hints, Scrub Suture: 4-0 monocryl 496  Hints: 5mm balloon ports x3 goes in with visiport (no veress)  [place] Stand:     From [address]: Exit at [address] on [place] side of [place]. Turn right on [address]. Turn left at stoplight on [email or web address]. [facility] will be in view two blocks ahead          "   Jan 09, 2019  3:30 PM CST   Office Visit with Joseluis Wing MD   [facility] ([facility]    [address] [address] [phone]           Bring a current list of meds and any records pertaining to this visit. For Physicals, please bring immunization records and any forms needing to be filled out. Please arrive 10 minutes early to complete paperwork.              Who to contact     If you have questions or need follow up information about today's clinic visit or your schedule please contact [facility] directly at [phone].  Normal or non-critical lab and imaging results will be communicated to you by MyChart, letter or phone within 4 business days after the clinic has received the results. If you do not hear from us within 7 days, please contact the clinic through MyChart or phone. If you have a critical or abnormal lab result, we will notify you by phone as soon as possible.  Submit refill requests through [employer] or call your pharmacy and they will forward the refill request to us. Please allow 3 business days for your refill to be completed.          Additional Information About Your Visit        Care EveryWhere ID     This is your Care EveryWhere ID. This could be used by other organizations to access your [place] medical records  [id number]        Your Vitals Were     Pulse Temperature Respirations Height Last Period Pulse Oximetry    97 97.6  F (36.4  C) (Temporal) 16 1.694 m (5' 6.7\") 11/17/2018 (Approximate) 100%    BMI (Body Mass Index)                   24.34 kg/m2            Blood Pressure from Last 3 Encounters:   12/06/18 116/80   12/06/18 116/80   12/03/18 132/72    Weight from Last 3 Encounters:   12/06/18 69.9 kg (154 lb)   12/06/18 70.2 kg (154 lb 11.2 oz)   12/03/18 72.1 kg (159 lb)              Today, you had the following     No orders found for display         Where to get your medicines      These " medications were sent to [facility] - [place], [place] - [address]  [address], [facility] [address]     Phone:  [phone]     rivaroxaban ANTICOAGULANT 15 MG Tabs tablet          Primary Care Provider Office Phone # Fax #    KYLEE Narvaez [phone]       [address] [facility] [address]        Equal Access to Services     IMAN INGRAM : Hadii aad ku hadasho Soomaali, waaxda luqadaha, qaybta kaalmada adeegyada, waxay idiin hayaan adeeg kharash la'darling . So [facility] [phone].    ATENCIÓN: Si habla espkavitha, tiene a norton disposición servicios gratuitos de asistencia lingüística. Llame al [phone].    We comply with applicable federal civil rights laws and [place] laws. We do not discriminate on the basis of race, color, national origin, age, disability, sex, sexual orientation, or gender identity.            Thank you!     Thank you for choosing [facility]  for your care. Our goal is always to provide you with excellent care. Hearing back from our patients is one way we can continue to improve our services. Please take a few minutes to complete the written survey that you may receive in the mail after your visit with us. Thank you!             Your Updated Medication List - Protect others around you: Learn how to safely use, store and throw away your medicines at www.disposemymeds.org.          This list is accurate as of 12/6/18  8:46 AM.  Always use your most recent med list.                   Brand Name Dispense Instructions for use Diagnosis    cholecalciferol 5000 units (125 mcg) capsule    VITAMIN D3     Take by mouth daily        oxyCODONE-acetaminophen 5-325 MG tablet    PERCOCET    12 tablet    Take 1-2 tablets by mouth every 6 hours as needed for moderate to severe pain        rivaroxaban ANTICOAGULANT 15 MG Tabs tablet    XARELTO    60 tablet    Take 1 tablet (15 mg) by mouth 2 times daily    Other pulmonary embolism without acute cor pulmonale,  unspecified chronicity (H)       * venlafaxine 75 MG 24 hr capsule    EFFEXOR-XR    30 capsule    Take with 37.5 mg capsule to make a total dose of 112.5 mg daily    Recurrent major depression in partial remission (H), Generalized anxiety disorder       * venlafaxine 37.5 MG 24 hr capsule    EFFEXOR-XR    30 capsule    Take with 75 mg capsule to make a total dose of 112.5 mg daily    Recurrent major depression in partial remission (H), Generalized anxiety disorder       * Notice:  This list has 2 medication(s) that are the same as other medications prescribed for you. Read the directions carefully, and ask your doctor or other care provider to review them with you.

## 2018-12-06 NOTE — TELEPHONE ENCOUNTER
----- Message from Joseluis Wing MD sent at 12/6/2018  9:21 AM CST -----  Javi Corley, I just got a call from [facility] regarding this patient. They are requesting that we delay her tubal ligation for 3 months given her recent PE. They want to give her time to recover from the clot before a surgery that would risk another clot. They are also advising against any form of hormonal contraception including progesterone only. They prefer she used condoms for three months pending her tubal. Would you please contact her and surgery scheduling. She can be scheduled out 3 months, then will unfortunately need to renew her preop visit at that time. She should also resume/continue her blood thinners for now.   thanks

## 2018-12-06 NOTE — Clinical Note
2018         RE: Aide Burroughs  [address] [facility] [address]        Dear Colleague,    Thank you for referring your patient, Aide Burroughs, to the [facility]. Please see a copy of my visit note below.    DATE OF VISIT: Dec 6, 2018    REASON FOR REFERRAL:   Pulmonary embolism    HISTORY OF PRESENT ILLNESS:     43-year-old female with past medical history significant for anxiety/depression who presented to emergency room on 11/15/18 with symptoms of left upper back pain with associated tingling in the left arm. She was noted to have elevated d-dimer and has subsequently had a CT angiogram performed which revealed segmental pulmonary embolism in the left lower lobe. Ultrasound lower extremities were negative for DVT. Pulmonary embolism was suspected secondary to birth control pill that she started taking in July of this year. She was started on Xeralto and subsequently discharged home.    Presents to hematology clinic today to establish care. States that her symptoms have improved. Denies dyspnea, chest pain, palpitations, lower extremity swelling/edema, bleeding/bruising or any other complaints. She saw the earlier this week and is currently scheduled for a tubal ligation procedure next week under under general anesthesia.    Denies recent surgery, immobility, long distance travel, smoking. No prior history of blood clots. No family history of venous thromboembolism.  with no pregnancy-related complications.    REVIEW OF SYSTEMS:      ROS: 14 point ROS neg other than the symptoms noted above in the HPI.    PAST MEDICAL HISTORY:   Past Medical History:   Diagnosis Date     Generalized anxiety disorder      MDD (major depressive disorder), recurrent, severe, with psychosis (H)      Pulmonary embolism (H) 11/15/2018    while on OCP       PAST SURGICAL HISTORY:   Past Surgical History:   Procedure Laterality Date     ESOPHAGOSCOPY, GASTROSCOPY, DUODENOSCOPY (EGD), COMBINED N/A 5/15/2017     Procedure: COMBINED ESOPHAGOSCOPY, GASTROSCOPY, DUODENOSCOPY (EGD), BIOPSY SINGLE OR MULTIPLE;  ESOPHAGOSCOPY, GASTROSCOPY, DUODENOSCOPY (EGD) with biopsies by biopsy;  Surgeon: Robles Falk MD;  Location: [facility]     MYRINGOTOMY, INSERT TUBE, COMBINED Left 05/16/2018       ALLERGIES:   Allergies as of 12/06/2018 - Isaiah as Reviewed 12/06/2018   Allergen Reaction Noted     Erythromycin Nausea and Vomiting 11/18/2011     Seasonal allergies  11/18/2011       MEDICATIONS:   Current Outpatient Prescriptions   Medication Sig Dispense Refill     cholecalciferol (VITAMIN D3) 5000 UNITS CAPS capsule Take by mouth daily       venlafaxine (EFFEXOR-XR) 37.5 MG 24 hr capsule Take with 75 mg capsule to make a total dose of 112.5 mg daily 30 capsule 1     venlafaxine (EFFEXOR-XR) 75 MG 24 hr capsule Take with 37.5 mg capsule to make a total dose of 112.5 mg daily 30 capsule 1     oxyCODONE-acetaminophen (PERCOCET) 5-325 MG tablet Take 1-2 tablets by mouth every 6 hours as needed for moderate to severe pain (Patient not taking: Reported on 12/6/2018) 12 tablet 0     rivaroxaban ANTICOAGULANT (XARELTO) 15 MG TABS tablet Take 1 tablet (15 mg) by mouth 2 times daily (Patient not taking: Reported on 12/6/2018) 60 tablet 5     [DISCONTINUED] rivaroxaban ANTICOAGULANT (XARELTO) 15 MG TABS tablet Take 1 tablet by mouth 2 times daily          FAMILY HISTORY:   Family History   Problem Relation Age of Onset     Hypertension Mother      Hyperlipidemia Mother      Mental Illness Mother      Cancer - colorectal Father      Substance Abuse Father      Cerebrovascular Disease Maternal Grandmother      Diabetes Paternal Grandmother      Heart Disease Paternal Grandfather      Bipolar Disorder Maternal Aunt      Schizophrenia Maternal Aunt      Coronary Artery Disease Maternal Grandfather        SOCIAL HISTORY:   Social History     Social History     Marital status:      Spouse name: N/A     Number of children: N/A     Years of  "education: N/A     Occupational History           [employer]     Social History Main Topics     Smoking status: Never Smoker     Smokeless tobacco: Never Used     Alcohol use 0.0 oz/week     0 Standard drinks or equivalent per week      Comment: rare     Drug use: No     Sexual activity: Yes     Partners: Male     Birth control/ protection: Condom     Other Topics Concern     None     Social History Narrative       PHYSICAL EXAMINATION:   /80  Pulse 97  Temp 97.6  F (36.4  C) (Temporal)  Resp 16  Ht 1.694 m (5' 6.7\")  Wt 69.9 kg (154 lb)  LMP 11/17/2018 (Approximate)  SpO2 100%  BMI 24.34 kg/m2  Wt Readings from Last 10 Encounters:   12/06/18 69.9 kg (154 lb)   12/06/18 70.2 kg (154 lb 11.2 oz)   12/03/18 72.1 kg (159 lb)   11/29/18 70.9 kg (156 lb 6.4 oz)   11/27/18 69.4 kg (153 lb)   11/19/18 69.7 kg (153 lb 9.6 oz)   10/16/18 69.4 kg (153 lb)   10/05/18 69.1 kg (152 lb 4.8 oz)   07/13/18 67.1 kg (147 lb 14.4 oz)   07/02/18 68.4 kg (150 lb 11.2 oz)      Exam:  Constitutional: healthy, alert and no distress  Head: Normocephalic.   Neck: Neck supple. No adenopathy.  ENT: ENT exam normal, no neck nodes or sinus tenderness  Cardiovascular: RRR. No murmurs, clicks gallops or rub  Respiratory:  Lungs clear  Gastrointestinal: Abdomen soft, non-tender. BS normal. No masses, organomegaly  Musculoskeletal: extremities normal  Skin: no suspicious lesions or rashes  Neurologic: Gait normal. Sensation grossly WNL.  Psychiatric: mentation appears normal and affect normal/bright  Hematologic/Lymphatic/Immunologic: Normal cervical lymph nodes    IMAGING RESULTS:  US VENOUS LOWER EXTREMITY [id number]/15/2018  [facility]  Result Narrative   INDICATION:  Pulmonary embolism    TECHNIQUE: :  Ultrasound venous duplex lower extremity bilateral.  Compression venous exam was performed using gray-scale, color Doppler, and spectral Doppler " imaging.    COMPARISON:      FINDINGS:  Sonographic imaging demonstrates the common femoral, deep femoral, superficial femoral, popliteal, posterior tibial and greater saphenous veins to be fully compressible with normal color Doppler blood flow in both lower extremities.       IMPRESSION:     CT CHEST PE STUDY11/15/2018  [facility]  Result Narrative   INDICATION:  Left shoulder pain.    TECHNIQUE:  61 mL Isovue-370 injected intravenously. Pulmonary embolism protocol chest CT examination performed.    FINDINGS:  There is a focal filling defect in a segmental left lower lobe pulmonary artery, best seen on coronal image 67 of series 9, measuring about 1.7 cm and consistent with pulmonary embolism. No other pulmonary arterial filling defects identified. Normal RV/LV ratio.   The lungs are clear.  Heart and mediastinum within normal limits. There is no adenopathy/mediastinal mass identified. No pleural/pericardial fluid accumulation.  Thoracic aorta is normal in course and caliber.  Upper abdomen is unremarkable.    IMPRESSION:  Focal, segmental pulmonary embolism within a left lower lobe segmental pulmonary artery. Chest CT examination otherwise unremarkable.    Please note that all CT scans at this facility use dose modulation, iterative reconstruction, and/or weight-based dosing when appropriate to reduce radiation dose to as low as reasonably achievable.    Dictated by Gurpreet Branch MD @ Nov 15 2018 10:34PM    (Electronically Signed)         ASSESSMENT AND PLAN:    43-year-old female with past history significant for anxiety depression who was diagnosed with acute segmental pulmonary embolism 3 weeks ago which was suspected due to the use of oral contraceptive pills       Acute pulmonary embolism   Likely secondary to use of oral contraceptive pill with no other obvious risk factors  Patient was started on Xeralto and has completed 3 weeks of 15 mg bid dosing with plans to start on 20  mg daily today. She was informed that for patients with venous thromboembolism provoked by oral contraceptive pill, risk of recurrence is estimated to be 5% in 1 year and 15% at 5 years.  Given intermediate risk of recurrence, would recommend hypercoagulability testing. We will check for factor 2, factor V Leiden mutation as well as antiphospholipid antibodies. Protein C, protein S and antithrombin III levels are affected by use of direct oral anticoagulants/blood clot and so we will defer that testing for now.  We will plan on continuing anticoagulation for 6 months followed by d-dimer testing and if negative, will plan on discontinuing anticoagulation with repeat D-dimer test in another month along with checking for protein C, protein S and antithrombin III levels. If repeat d-dimer along with anticoagulation workup comes out to be negative, she would not require to continue anticoagulation indefinitly.      - Given very high post-operative thrombotic risk with recent PE, would recommend delaying the elective surgery until after at least 3 months of therapy. She was informed about this recommendation. She however had reservations about delay in surgery due to likely change in insurance starting next month. She will discuss it with her significant other and make a decision. If patient is adamant on getting surgery sooner, she will at least require bridging anticoagulation with Lovenox to minimize treatment interruption and continuous risk.       RTC in 3 months for follow-up.    The patient is ready to learn, no apparent learning barriers were identified, Diagnosis and treatment plans were explained to the patient. The patient expressed understanding of the content. The patient questions were answered to her satisfaction.    Chart documentation with Dragon Voice recognition Software. Although reviewed after completion, some words and grammatical errors may remain.    Stephon Holliday MD  Attending Physician    Hematology/Medical Oncology        Again, thank you for allowing me to participate in the care of your patient.        Sincerely,        Stephon Holliday MD

## 2018-12-06 NOTE — MR AVS SNAPSHOT
After Visit Summary   12/6/2018    Aide Burroughs    MRN: [id number]           Patient Information     Date Of Birth          1975        Visit Information        Provider Department      12/6/2018 7:00 AM Rivka Rodrigues APRN CNP [facility]        Today's Diagnoses     Preop general physical exam    -  1    Other pulmonary embolism without acute cor pulmonale, unspecified chronicity (H)          Care Instructions    Restart your Xarelto after surgery.     Before Your Surgery      Call your surgeon if there is any change in your health. This includes signs of a cold or flu (such as a sore throat, runny nose, cough, rash or fever).    Do not smoke, drink alcohol or take over the counter medicine (unless your surgeon or primary care doctor tells you to) for the 24 hours before and after surgery.    If you take prescribed drugs: Follow your doctor s orders about which medicines to take and which to stop until after surgery.    Eating and drinking prior to surgery: follow the instructions from your surgeon    Take a shower or bath the night before surgery. Use the soap your surgeon gave you to gently clean your skin. If you do not have soap from your surgeon, use your regular soap. Do not shave or scrub the surgery site.  Wear clean pajamas and have clean sheets on your bed.           Follow-ups after your visit        Follow-up notes from your care team     Return in about 6 days (around 12/12/2018) for Surgery.      Your next 10 appointments already scheduled     Dec 06, 2018  8:00 AM CST   New Visit with Stephon Holliday MD   [facility] ([facility])    [address] [id number] [phone]            Dec 12, 2018   Procedure with Joseluis Wing MD   [facility] ([facility])    [address] [place] [address]  [place] [id number] [phone]            LAPAROSCOPIC SALPINGECTOMY Equipment hold:    Instrument holds:   Room Setup, Equipment Settings, Outside Resources Room setup:  Equipment Settings: Cautery 40/40  Outside Resources:  Gown & Gloves Dr. Wing 8 blue and 8 white  [employer]  0.25% Bupiv with Epi  Dressings  Dermabond  Hints, Surgeon    Hints, Circulator Vag prep (puts sponge stick in vagina to manipulate uterus) Straight cath with prep  Hints, Scrub Suture: 4-0 monocryl 496  Hints: 5mm balloon ports x3 goes in with visiport (no veress)  [place] Stand:     From [address]: Exit at [address] on south side of [place]. Turn right on [address]. Turn left at stoplight on [address]. [facility] will be in view two blocks ahead            Jan 09, 2019  3:30 PM CST   Office Visit with Joseluis Wing MD   [facility] ([facility])    [address] [facility] [id number] [phone]           Bring a current list of meds and any records pertaining to this visit. For Physicals, please bring immunization records and any forms needing to be filled out. Please arrive 10 minutes early to complete paperwork.              Who to contact     If you have questions or need follow up information about today's clinic visit or your schedule please contact [facility] directly at [phone].  Normal or non-critical lab and imaging results will be communicated to you by MyChart, letter or phone within 4 business days after the clinic has received the results. If you do not hear from us within 7 days, please contact the clinic through MyChart or phone. If you have a critical or abnormal lab result, we will notify you by phone as soon as possible.  Submit refill requests through [email or web address] or call your pharmacy and they will forward the refill request to us. Please allow 3 business days for your refill to be completed.          Additional Information About Your Visit        Care EveryWhere ID     This is your Care EveryWhere ID.  "This could be used by other organizations to access your [facility] medical records  [id number]        Your Vitals Were     Pulse Temperature Respirations Height Last Period Pulse Oximetry    97 97.6  F (36.4  C) (Temporal) 16 5' 6.7\" (1.694 m) 11/17/2018 (Approximate) 100%    Breastfeeding? BMI (Body Mass Index)                No 24.45 kg/m2           Blood Pressure from Last 3 Encounters:   12/06/18 116/80   12/03/18 132/72   11/29/18 136/78    Weight from Last 3 Encounters:   12/06/18 154 lb 11.2 oz (70.2 kg)   12/03/18 159 lb (72.1 kg)   11/29/18 156 lb 6.4 oz (70.9 kg)              Today, you had the following     No orders found for display         Where to get your medicines      These medications were sent to [facility] [address] [place] [address] [address]  [address], [facility] [address]     Phone:  [phone]     rivaroxaban ANTICOAGULANT 15 MG Tabs tablet          Primary Care Provider Office Phone # Fax #    Rivka Rodrigues, APRN [phone]       [address] [facility] [address]        Equal Access to Services     MAGNUS INGRAM : Hadii kimberlee guerrero hadasho Soomaali, waaxda luqadaha, qaybta kaalmada adeegyada, lex coleman. So [facility] [phone].    ATENCIÓN: Si habla español, tiene a norton disposición servicios gratuitos de asistencia lingüística. Llame al [phone].    We comply with applicable federal civil rights laws and [place] laws. We do not discriminate on the basis of race, color, national origin, age, disability, sex, sexual orientation, or gender identity.            Thank you!     Thank you for choosing [facility]  for your care. Our goal is always to provide you with excellent care. Hearing back from our patients is one way we can continue to improve our services. Please take a few minutes to complete the written survey that you may receive in the mail after your visit with us. Thank you!             Your Updated Medication List - " Protect others around you: Learn how to safely use, store and throw away your medicines at www.disposemymeds.org.          This list is accurate as of 12/6/18  7:26 AM.  Always use your most recent med list.                   Brand Name Dispense Instructions for use Diagnosis    cholecalciferol 5000 units (125 mcg) capsule    VITAMIN D3     Take by mouth daily        oxyCODONE-acetaminophen 5-325 MG tablet    PERCOCET    12 tablet    Take 1-2 tablets by mouth every 6 hours as needed for moderate to severe pain        rivaroxaban ANTICOAGULANT 15 MG Tabs tablet    XARELTO    60 tablet    Take 1 tablet (15 mg) by mouth 2 times daily    Other pulmonary embolism without acute cor pulmonale, unspecified chronicity (H)       * venlafaxine 75 MG 24 hr capsule    EFFEXOR-XR    30 capsule    Take with 37.5 mg capsule to make a total dose of 112.5 mg daily    Recurrent major depression in partial remission (H), Generalized anxiety disorder       * venlafaxine 37.5 MG 24 hr capsule    EFFEXOR-XR    30 capsule    Take with 75 mg capsule to make a total dose of 112.5 mg daily    Recurrent major depression in partial remission (H), Generalized anxiety disorder       * Notice:  This list has 2 medication(s) that are the same as other medications prescribed for you. Read the directions carefully, and ask your doctor or other care provider to review them with you.

## 2018-12-07 LAB
B2 GLYCOPROT1 IGG SERPL IA-ACNC: 1 U/ML
B2 GLYCOPROT1 IGM SERPL IA-ACNC: 1.2 U/ML
CARDIOLIPIN ANTIBODY IGG: <1.6 GPL-U/ML (ref 0–19.9)
CARDIOLIPIN ANTIBODY IGM: 0.2 MPL-U/ML (ref 0–19.9)

## 2018-12-07 NOTE — TELEPHONE ENCOUNTER
Spoke to patient, she states she prefers to wait till Spring 2019 to schedule. She did not want to set up at this time. Informed her to call us when she is ready.

## 2018-12-07 NOTE — TELEPHONE ENCOUNTER
Per Jory surgery cancelled 12/12. She is to reschedule for middle of March per hemtologist.    Left message for return call to schedule surgery    Ksenia in OR notified to cancel 12/12

## 2018-12-10 DIAGNOSIS — F33.41 RECURRENT MAJOR DEPRESSION IN PARTIAL REMISSION (H): ICD-10-CM

## 2018-12-10 DIAGNOSIS — F41.1 GENERALIZED ANXIETY DISORDER: ICD-10-CM

## 2018-12-10 LAB — COPATH REPORT: NORMAL

## 2018-12-10 NOTE — TELEPHONE ENCOUNTER
VENLAFAXINE 37.5MG  Last Written Prescription Date:  10/16/18  Last Fill Quantity: 30,  # refills: 1   Last office visit: 12/6/2018 with prescribing provider:     Future Office Visit:   Next 5 appointments (look out 90 days)    Jan 09, 2019  3:30 PM CST  Office Visit with Joseluis Wing MD  [facility] ([address] [address]  [phone]   Mar 06, 2019  8:30 AM CST  Return Visit with Stephon Holliday MD  [facility] ([address] [address]  [phone]           VENLAFAXINE 75MG  Last Written Prescription Date:  10/16/18  Last Fill Quantity: 30,  # refills: 1   Last office visit: 12/6/2018 with prescribing provider:     Future Office Visit:   Next 5 appointments (look out 90 days)    Jan 09, 2019  3:30 PM CST  Office Visit with Joseluis Wing MD  [facility] ([address] [address]  [phone]   Mar 06, 2019  8:30 AM CST  Return Visit with Stephon Holliday MD  [facility] ([address] [address]  [phone]

## 2018-12-12 ENCOUNTER — APPOINTMENT (OUTPATIENT)
Dept: CARDIOLOGY | Facility: CLINIC | Age: 43
End: 2018-12-12
Attending: FAMILY MEDICINE
Payer: COMMERCIAL

## 2018-12-12 ENCOUNTER — ONCOLOGY VISIT (OUTPATIENT)
Dept: ONCOLOGY | Facility: CLINIC | Age: 43
End: 2018-12-12
Payer: COMMERCIAL

## 2018-12-12 ENCOUNTER — APPOINTMENT (OUTPATIENT)
Dept: CT IMAGING | Facility: CLINIC | Age: 43
End: 2018-12-12
Attending: FAMILY MEDICINE
Payer: COMMERCIAL

## 2018-12-12 ENCOUNTER — HOSPITAL ENCOUNTER (EMERGENCY)
Facility: CLINIC | Age: 43
Discharge: HOME | End: 2018-12-12
Attending: FAMILY MEDICINE | Admitting: FAMILY MEDICINE
Payer: COMMERCIAL

## 2018-12-12 VITALS
SYSTOLIC BLOOD PRESSURE: 130 MMHG | DIASTOLIC BLOOD PRESSURE: 80 MMHG | HEART RATE: 81 BPM | HEIGHT: 66 IN | OXYGEN SATURATION: 99 % | TEMPERATURE: 98.1 F | WEIGHT: 156 LBS | RESPIRATION RATE: 17 BRPM | BODY MASS INDEX: 25.07 KG/M2

## 2018-12-12 VITALS
SYSTOLIC BLOOD PRESSURE: 122 MMHG | RESPIRATION RATE: 24 BRPM | WEIGHT: 156.1 LBS | DIASTOLIC BLOOD PRESSURE: 80 MMHG | HEIGHT: 67 IN | BODY MASS INDEX: 24.5 KG/M2 | TEMPERATURE: 97.2 F | HEART RATE: 88 BPM | OXYGEN SATURATION: 100 %

## 2018-12-12 DIAGNOSIS — R06.02 SHORTNESS OF BREATH: ICD-10-CM

## 2018-12-12 DIAGNOSIS — I26.99 OTHER ACUTE PULMONARY EMBOLISM WITHOUT ACUTE COR PULMONALE (H): Primary | ICD-10-CM

## 2018-12-12 DIAGNOSIS — R07.89 CHEST WALL PAIN: ICD-10-CM

## 2018-12-12 LAB
ANION GAP SERPL CALCULATED.3IONS-SCNC: 5 MMOL/L (ref 3–14)
APTT PPP: 35 SEC (ref 22–37)
BASOPHILS # BLD AUTO: 0.1 10E9/L (ref 0–0.2)
BASOPHILS NFR BLD AUTO: 1 %
BUN SERPL-MCNC: 9 MG/DL (ref 7–30)
CALCIUM SERPL-MCNC: 8.7 MG/DL (ref 8.5–10.1)
CHLORIDE SERPL-SCNC: 104 MMOL/L (ref 94–109)
CO2 SERPL-SCNC: 29 MMOL/L (ref 20–32)
CREAT SERPL-MCNC: 0.86 MG/DL (ref 0.52–1.04)
DIFFERENTIAL METHOD BLD: NORMAL
EOSINOPHIL NFR BLD AUTO: 2.4 %
ERYTHROCYTE [DISTWIDTH] IN BLOOD BY AUTOMATED COUNT: 12.8 % (ref 10–15)
GFR SERPL CREATININE-BSD FRML MDRD: 73 ML/MIN/1.7M2
GLUCOSE SERPL-MCNC: 99 MG/DL (ref 70–99)
HCG SERPL QL: NEGATIVE
HCT VFR BLD AUTO: 38.5 % (ref 35–47)
HGB BLD-MCNC: 13.1 G/DL (ref 11.7–15.7)
IMM GRANULOCYTES # BLD: 0 10E9/L (ref 0–0.4)
IMM GRANULOCYTES NFR BLD: 0.2 %
INR PPP: 1.15 (ref 0.86–1.14)
LYMPHOCYTES # BLD AUTO: 1.6 10E9/L (ref 0.8–5.3)
LYMPHOCYTES NFR BLD AUTO: 30.7 %
MCH RBC QN AUTO: 30.5 PG (ref 26.5–33)
MCHC RBC AUTO-ENTMCNC: 34 G/DL (ref 31.5–36.5)
MCV RBC AUTO: 90 FL (ref 78–100)
MONOCYTES # BLD AUTO: 0.3 10E9/L (ref 0–1.3)
MONOCYTES NFR BLD AUTO: 6.5 %
NEUTROPHILS # BLD AUTO: 3 10E9/L (ref 1.6–8.3)
NEUTROPHILS NFR BLD AUTO: 59.2 %
NRBC # BLD AUTO: 0 10*3/UL
NRBC BLD AUTO-RTO: 0 /100
NT-PROBNP SERPL-MCNC: 21 PG/ML (ref 0–450)
PLATELET # BLD AUTO: 268 10E9/L (ref 150–450)
POTASSIUM SERPL-SCNC: 4 MMOL/L (ref 3.4–5.3)
RBC # BLD AUTO: 4.3 10E12/L (ref 3.8–5.2)
SODIUM SERPL-SCNC: 138 MMOL/L (ref 133–144)
TROPONIN I SERPL-MCNC: <0.015 UG/L (ref 0–0.04)
WBC # BLD AUTO: 5.1 10E9/L (ref 4–11)

## 2018-12-12 PROCEDURE — 93306 TTE W/DOPPLER COMPLETE: CPT | Mod: 26 | Performed by: INTERNAL MEDICINE

## 2018-12-12 PROCEDURE — 85025 COMPLETE CBC W/AUTO DIFF WBC: CPT | Performed by: FAMILY MEDICINE

## 2018-12-12 PROCEDURE — 83880 ASSAY OF NATRIURETIC PEPTIDE: CPT | Performed by: FAMILY MEDICINE

## 2018-12-12 PROCEDURE — 71260 CT THORAX DX C+: CPT

## 2018-12-12 PROCEDURE — 25000125 ZZHC RX 250: Performed by: FAMILY MEDICINE

## 2018-12-12 PROCEDURE — 93005 ELECTROCARDIOGRAM TRACING: CPT | Performed by: FAMILY MEDICINE

## 2018-12-12 PROCEDURE — 85730 THROMBOPLASTIN TIME PARTIAL: CPT | Performed by: FAMILY MEDICINE

## 2018-12-12 PROCEDURE — 99214 OFFICE O/P EST MOD 30 MIN: CPT | Performed by: INTERNAL MEDICINE

## 2018-12-12 PROCEDURE — 93010 ELECTROCARDIOGRAM REPORT: CPT | Mod: Z6 | Performed by: FAMILY MEDICINE

## 2018-12-12 PROCEDURE — 99285 EMERGENCY DEPT VISIT HI MDM: CPT | Mod: 25 | Performed by: FAMILY MEDICINE

## 2018-12-12 PROCEDURE — 25000128 H RX IP 250 OP 636: Performed by: FAMILY MEDICINE

## 2018-12-12 PROCEDURE — 84703 CHORIONIC GONADOTROPIN ASSAY: CPT | Performed by: FAMILY MEDICINE

## 2018-12-12 PROCEDURE — 84484 ASSAY OF TROPONIN QUANT: CPT | Performed by: FAMILY MEDICINE

## 2018-12-12 PROCEDURE — 85610 PROTHROMBIN TIME: CPT | Performed by: FAMILY MEDICINE

## 2018-12-12 PROCEDURE — 80048 BASIC METABOLIC PNL TOTAL CA: CPT | Performed by: FAMILY MEDICINE

## 2018-12-12 PROCEDURE — 93306 TTE W/DOPPLER COMPLETE: CPT

## 2018-12-12 RX ORDER — IOPAMIDOL 755 MG/ML
500 INJECTION, SOLUTION INTRAVASCULAR ONCE
Status: COMPLETED | OUTPATIENT
Start: 2018-12-12 | End: 2018-12-12

## 2018-12-12 RX ORDER — VENLAFAXINE HYDROCHLORIDE 75 MG/1
CAPSULE, EXTENDED RELEASE ORAL
Qty: 30 CAPSULE | Refills: 3 | Status: SHIPPED | OUTPATIENT
Start: 2018-12-12 | End: 2019-04-10

## 2018-12-12 RX ORDER — VENLAFAXINE HYDROCHLORIDE 37.5 MG/1
CAPSULE, EXTENDED RELEASE ORAL
Qty: 30 CAPSULE | Refills: 3 | Status: SHIPPED | OUTPATIENT
Start: 2018-12-12 | End: 2019-04-10

## 2018-12-12 RX ADMIN — IOPAMIDOL 65 ML: 755 INJECTION, SOLUTION INTRAVENOUS at 16:45

## 2018-12-12 RX ADMIN — SODIUM CHLORIDE 70 ML: 9 INJECTION, SOLUTION INTRAVENOUS at 16:45

## 2018-12-12 ASSESSMENT — MIFFLIN-ST. JEOR
SCORE: 1389.36
SCORE: 1400.92

## 2018-12-12 NOTE — NURSING NOTE
"Oncology Rooming Note    December 12, 2018 2:09 PM   Aide Burroughs is a 43 year old female who presents for:    Chief Complaint   Patient presents with     Symptoms     increased SOB     Hematology     Acute pulmonary embolism      Medication Therapy Management     rivaroxaban ANTICOAGULANT (XARELTO) 15 MG      Initial Vitals: /80 (BP Location: Right arm, Patient Position: Chair, Cuff Size: Adult Regular)   Pulse 88   Temp 97.2  F (36.2  C) (Temporal)   Resp 24   Ht 1.694 m (5' 6.7\")   Wt 70.8 kg (156 lb 1.6 oz)   LMP 11/17/2018 (Approximate)   SpO2 100%   BMI 24.67 kg/m   Estimated body mass index is 24.67 kg/m  as calculated from the following:    Height as of this encounter: 1.694 m (5' 6.7\").    Weight as of this encounter: 70.8 kg (156 lb 1.6 oz). Body surface area is 1.83 meters squared.  Data Unavailable Comment: Data Unavailable   Patient's last menstrual period was 11/17/2018 (approximate).  Allergies reviewed: Yes  Medications reviewed: Yes    Medications: Medication refills not needed today.  Pharmacy name entered into [employer]:    [facility] [place] - [place], [place] - [address] [place] [address] - [place], [place] - [address]      6 minutes for nursing intake (face to face time)     Ivonne PRICE CMA            '  "

## 2018-12-12 NOTE — ED PROVIDER NOTES
History     Chief Complaint   Patient presents with     Shortness of Breath     HPI  Aide Burroughs is a 43 year old female who presents with concerns of worsening shortness of breath.  Patient was diagnosed with a pulmonary embolism 1 month ago over at the [facility] in [place].  Patient was discharged on Xarelto.  Patient was told to follow-up with oncology/hematology for evaluation on why she was forming blood clots.  She was seen on 6 December and told that this was most likely secondary to birth control pills the patient was on.  She was doing relatively okay at that time.  Patient states in the last 3 days though she started feeling more shortness of breath.  The symptoms would just come and go.  Sometimes it would come on with activity, other times it would just come on randomly at bed.  Patient does have a history of anxiety but does not feel like this is secondary to her anxiety.  She is also having a gnawing pain in the middle of her chest.  She followed up with her oncologist today and they are worried about a new clot burden and the patient was sent to the emergency department for possible repeat imaging.  Patient denies any new fevers or chills.  Denies any nausea or vomiting.  Patient states she has been taking her Xarelto as previously prescribed.    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 Provider to  "review and confirm.       Seasonal allergic rhinitis 04/26/2012     Priority: Medium        Past Medical History:    Past Medical History:   Diagnosis Date     Generalized anxiety disorder      MDD (major depressive disorder), recurrent, severe, with psychosis (H)      Pulmonary embolism (H) 11/15/2018       Past Surgical History:    Past Surgical History:   Procedure Laterality Date     ESOPHAGOSCOPY, GASTROSCOPY, DUODENOSCOPY (EGD), COMBINED N/A 5/15/2017    Procedure: COMBINED ESOPHAGOSCOPY, GASTROSCOPY, DUODENOSCOPY (EGD), BIOPSY SINGLE OR MULTIPLE;  ESOPHAGOSCOPY, GASTROSCOPY, DUODENOSCOPY (EGD) with biopsies by biopsy;  Surgeon: Robles Falk MD;  Location: [facility]     MYRINGOTOMY, INSERT TUBE, COMBINED Left 05/16/2018       Family History:    Family History   Problem Relation Age of Onset     Hypertension Mother      Hyperlipidemia Mother      Mental Illness Mother      Cancer - colorectal Father      Substance Abuse Father      Cerebrovascular Disease Maternal Grandmother      Diabetes Paternal Grandmother      Heart Disease Paternal Grandfather      Bipolar Disorder Maternal Aunt      Schizophrenia Maternal Aunt      Coronary Artery Disease Maternal Grandfather        Social History:  Marital Status:   [2]  Social History     Tobacco Use     Smoking status: Never Smoker     Smokeless tobacco: Never Used   Substance Use Topics     Alcohol use: Yes     Alcohol/week: 0.0 oz     Comment: rare     Drug use: No        Medications:      cholecalciferol (VITAMIN D3) 5000 UNITS CAPS capsule   oxyCODONE-acetaminophen (PERCOCET) 5-325 MG tablet   rivaroxaban ANTICOAGULANT (XARELTO) 15 MG TABS tablet   venlafaxine (EFFEXOR-XR) 37.5 MG 24 hr capsule   venlafaxine (EFFEXOR-XR) 75 MG 24 hr capsule         Review of Systems   All other systems reviewed and are negative.      Physical Exam   BP: 127/87  Pulse: 81  Temp: 98.1  F (36.7  C)  Resp: 20  Height: 167.6 cm (5' 6\")  Weight: 70.8 kg (156 lb)  SpO2: 97 " %      Physical Exam   Constitutional: She is oriented to person, place, and time. She appears well-developed and well-nourished. No distress.   HENT:   Head: Normocephalic and atraumatic.   Nose: Nose normal.   Mouth/Throat: Oropharynx is clear and moist. No oropharyngeal exudate.   Eyes: Conjunctivae are normal.   Neck: Normal range of motion. Neck supple.   Cardiovascular: Normal rate, regular rhythm and normal heart sounds. Exam reveals no friction rub.   No murmur heard.  Pulmonary/Chest: Effort normal and breath sounds normal. No stridor. No respiratory distress. She has no wheezes. She has no rales. She exhibits tenderness.   Abdominal: Soft. Bowel sounds are normal. She exhibits no distension and no mass. There is no tenderness. There is no guarding.   Musculoskeletal: Normal range of motion. She exhibits no tenderness.   Neurological: She is alert and oriented to person, place, and time.   Skin: Skin is warm and dry. Capillary refill takes less than 2 seconds. She is not diaphoretic. No erythema.   Psychiatric: Judgment normal.   Nursing note and vitals reviewed.      ED Course        Procedures        EKG Interpretation:      Interpreted by Vinayak Yin  Time reviewed: now   Symptoms at time of EKG: now   Rhythm: normal sinus   Rate: normal  Axis: NORMAL  Ectopy: none  Conduction: normal  ST Segments/ T Waves: No ST-T wave changes  Q Waves: none  Comparison to prior: No old EKG available    Clinical Impression: normal EKG      Results for orders placed or performed during the hospital encounter of 12/12/18   CBC with platelets differential   Result Value Ref Range    WBC 5.1 4.0 - 11.0 10e9/L    RBC Count 4.30 3.8 - 5.2 10e12/L    Hemoglobin 13.1 11.7 - 15.7 g/dL    Hematocrit 38.5 35.0 - 47.0 %    MCV 90 78 - 100 fl    MCH 30.5 26.5 - 33.0 pg    MCHC 34.0 31.5 - 36.5 g/dL    RDW 12.8 10.0 - 15.0 %    Platelet Count 268 150 - 450 10e9/L    Diff Method Automated Method     % Neutrophils 59.2 %    %  Lymphocytes 30.7 %    % Monocytes 6.5 %    % Eosinophils 2.4 %    % Basophils 1.0 %    % Immature Granulocytes 0.2 %    Nucleated RBCs 0 0 /100    Absolute Neutrophil 3.0 1.6 - 8.3 10e9/L    Absolute Lymphocytes 1.6 0.8 - 5.3 10e9/L    Absolute Monocytes 0.3 0.0 - 1.3 10e9/L    Absolute Basophils 0.1 0.0 - 0.2 10e9/L    Abs Immature Granulocytes 0.0 0 - 0.4 10e9/L    Absolute Nucleated RBC 0.0    INR   Result Value Ref Range    INR 1.15 (H) 0.86 - 1.14   Partial thromboplastin time   Result Value Ref Range    PTT 35 22 - 37 sec   Basic metabolic panel   Result Value Ref Range    Sodium 138 133 - 144 mmol/L    Potassium 4.0 3.4 - 5.3 mmol/L    Chloride 104 94 - 109 mmol/L    Carbon Dioxide 29 20 - 32 mmol/L    Anion Gap 5 3 - 14 mmol/L    Glucose 99 70 - 99 mg/dL    Urea Nitrogen 9 7 - 30 mg/dL    Creatinine 0.86 0.52 - 1.04 mg/dL    GFR Estimate 73 >60 mL/min/1.7m2    GFR Estimate If Black 88 >60 mL/min/1.7m2    Calcium 8.7 8.5 - 10.1 mg/dL   Troponin I   Result Value Ref Range    Troponin I ES <0.015 0.000 - 0.045 ug/L   Nt probnp inpatient (BNP)   Result Value Ref Range    N-Terminal Pro BNP Inpatient 21 0 - 450 pg/mL   HCG qualitative Blood   Result Value Ref Range    HCG Qualitative Serum Negative NEG^Negative   Echocardiogram Complete    Narrative    [id number]  [id number]  [id number]  [id number]^AMOS^STEPHANIE^MUNA           [facility]  Echocardiography Laboratory  [address] [place] [address]        Name: VALORIE PRIETO  MRN: [id number]  : 1975  Study Date: 2018 03:33 PM  Age: 43 yrs  Gender: Female  Patient Location: [facility]  Reason For Study: Dyspnea  History: None  Ordering Physician: STEPHANIE TRINIDAD  Referring Physician: Rivka Rodrigues  Performed By: Christelle Cook     BSA: 1.8 m2  Height: 66 in  Weight: 156 lb  HR: 73  BP: 117/76 mmHg  _____________________________________________________________________________  __        Procedure  Complete Echo  Adult.  _____________________________________________________________________________  __        Interpretation Summary     1. Normal left ventricular size and systolic function. Biplane LVEF 57%.  2. No regional wall motion abnormalities.  3. Normal right ventricular size and systolic function.  4. No significant valve disease.     There is no comparison study available.  _____________________________________________________________________________  __        Left Ventricle  The left ventricle is normal in size. There is normal left ventricular wall  thickness. Left ventricular systolic function is normal. Biplane LVEF 57%.  Grade I or early diastolic dysfunction.     Right Ventricle  The right ventricle is normal in size and function.     Atria  Normal left atrial size. Right atrial size is normal. There is no color  Doppler evidence of an atrial shunt.     Mitral Valve  The mitral valve leaflets appear normal. There is no evidence of stenosis,  fluttering, or prolapse. There is trace mitral regurgitation.        Tricuspid Valve  Normal tricuspid valve. There is trace tricuspid regurgitation. The right  ventricular systolic pressure is approximated at 14.2 mmHg plus the right  atrial pressure.     Aortic Valve  The aortic valve is trileaflet. There is trace aortic regurgitation. No  hemodynamically significant valvular aortic stenosis.     Pulmonic Valve  The pulmonic valve is not well seen, but is grossly normal. There is trace  pulmonic valvular regurgitation.     Vessels  The aortic root is normal size. Normal size ascending aorta. (2.9 cm). The IVC  is normal in size and reactivity with respiration, suggesting normal central  venous pressure.     Pericardium  There is no pericardial effusion.        Rhythm  Sinus rhythm was noted.  _____________________________________________________________________________  __  MMode/2D Measurements & Calculations  IVSd: 0.89 cm     LVIDd: 4.4 cm  LVIDs: 3.2 cm  LVPWd: 1.0  cm  FS: 27.9 %  LV mass(C)d: 136.2 grams  LV mass(C)dI: 75.7 grams/m2  Ao root diam: 3.2 cm  LA dimension: 3.0 cm  asc Aorta Diam: 2.9 cm  LA/Ao: 0.94  LA Volume (BP): 35.3 ml  LA Volume Index (BP): 19.6 ml/m2  RWT: 0.46           Doppler Measurements & Calculations  MV E max faisal: 68.9 cm/sec  MV A max faisal: 73.0 cm/sec  MV E/A: 0.94  MV dec time: 0.29 sec  Ao V2 max: 118.1 cm/sec  Ao max P.0 mmHg  LV V1 max P.3 mmHg  LV V1 max: 115.1 cm/sec  PA acc time: 0.14 sec  TR max faisal: 188.2 cm/sec  TR max P.2 mmHg  AV Faisal Ratio (DI): 0.97  E/E' avg: 10.4  Lateral E/e': 9.3  Medial E/e': 11.5              _____________________________________________________________________________  __        Report approved by: Dr Marlon Bernal 2018 04:53 PM        Medications   sodium chloride (PF) 0.9% PF flush 3 mL (3 mLs Intravenous Given 18)   iopamidol (ISOVUE-370) solution 500 mL (65 mLs Intravenous Given 18)   Saline Bag 100mL  CT  flush use only (70 mLs Intravenous Given 18)       Labs are reviewed and were completely normal.  Echocardiogram showed no wall motion abnormality or any signs of right heart strain or any other obvious cause for her shortness of breath.  Repeat CT scan of the chest did not show any signs of a pulmonary embolism so it looks like the previous one has resorbed at this point.  At this point I do not have a good explanation for her shortness of breath or chest pain.  She does have a history of anxiety and she does state that she does not think it is her anxiety but I do think it most likely is related to that.  She certainly could be having a degree of some chest wall discomfort also as this was very tender to palpation on exam also.  Recommend patient apply ice to her chest wall, use Tylenol for pain.  Patient will follow up with her doctor if there is no improvement over the next few days.  The may want to discuss further treatment or evaluation for her  anxiety.    Assessments & Plan (with Medical Decision Making)  Chest wall pain     I have reviewed the nursing notes.    I have reviewed the findings, diagnosis, plan and need for follow up with the patient.              12/12/2018   [facility] EMERGENCY DEPARTMENT     Vinayak Yin MD  12/12/18 [id number]

## 2018-12-12 NOTE — ED AVS SNAPSHOT
[facility] Emergency Department  [address] [place] [address]  Phone:  [phone]  Fax:  [phone]                                    Aide Burroughs   MRN: [id number]    Department:  [facility] Emergency Department   Date of Visit:  12/12/2018           After Visit Summary Signature Page    I have received my discharge instructions, and my questions have been answered. I have discussed any challenges I see with this plan with the nurse or doctor.    ..........................................................................................................................................  Patient/Patient Representative Signature      ..........................................................................................................................................  Patient Representative Print Name and Relationship to Patient    ..................................................               ................................................  Date                                   Time    ..........................................................................................................................................  Reviewed by Signature/Title    ...................................................              ..............................................  Date                                               Time          22EPIC Rev 08/18

## 2018-12-12 NOTE — PROGRESS NOTES
FOLLOW-UP VISIT NOTE    PATIENT NAME: Aide Burroughs MRN # [id number]  DATE OF VISIT: Dec 12, 2018 YOB: 1975    REFERRING PROVIDER: No referring provider defined for this encounter.    Reason for follow-up  Pulmonary embolism     HISTORY:  43-year-old female with past medical history significant for anxiety/depression who presented to emergency room on 11/15/18 with symptoms of left upper back pain with associated tingling in the left arm. She was noted to have elevated d-dimer and has subsequently had a CT angiogram performed which revealed segmental pulmonary embolism in the left lower lobe. Ultrasound lower extremities were negative for DVT. Pulmonary embolism was suspected secondary to birth control pill that she started taking in July of this year. She was started on Xeralto and subsequently discharged home.    -12/06/18 referred tohematology.Clinical impression likely consistent with acute pulmonary embolism secondary to oral contraceptive pill use.An intermediate risk of recurrence, I'll recommend her upper quality testing and ordered factor II, factor V Leiden as well as an otherwise lipid antibodies while deferring other workup at later stage. Also recommended for  Elective surgery given high postoperative thrombotic risk with recent PE      SUBJECTIVE     Percent to the clinic today with complaints of worsening shortness of breath. Patient states that it has been going on over the last one week and progressively getting worse as now she becomes dyspneic on minimal exertion. Also complains of chest pressure. Denies chest pain, hemoptysis, cough, fever/chills, extremity swelling,/pain or other complaints. Patient is taking anticoagulation regularly although not sure about her current dose      PAST MEDICAL HISTORY     Past Medical History:   Diagnosis Date     Generalized anxiety disorder      MDD (major depressive disorder), recurrent, severe, with psychosis (H)      Pulmonary embolism (H)  11/15/2018    while on OCP         CURRENT OUTPATIENT MEDICATIONS     Current Outpatient Medications   Medication Sig Dispense Refill     cholecalciferol (VITAMIN D3) 5000 UNITS CAPS capsule Take by mouth daily       rivaroxaban ANTICOAGULANT (XARELTO) 15 MG TABS tablet Take 1 tablet (15 mg) by mouth 2 times daily 60 tablet 5     venlafaxine (EFFEXOR-XR) 37.5 MG 24 hr capsule Take with 75 mg capsule to make a total dose of 112.5 mg daily 30 capsule 1     venlafaxine (EFFEXOR-XR) 75 MG 24 hr capsule Take with 37.5 mg capsule to make a total dose of 112.5 mg daily 30 capsule 1     oxyCODONE-acetaminophen (PERCOCET) 5-325 MG tablet Take 1-2 tablets by mouth every 6 hours as needed for moderate to severe pain (Patient not taking: Reported on 12/6/2018) 12 tablet 0        ALLERGIES     Allergies   Allergen Reactions     Erythromycin Nausea and Vomiting     Seasonal Allergies      Molds, grass, multiple trees, plant pollen, cats, rabbits, dogs, hay.        REVIEW OF SYSTEMS   As above in the HPI, o/w complete 12-point ROS was negative.     PHYSICAL EXAM   B/P: 122/80, T: 97.2, P: 88, R: 24  @LASTSAO2(4)@  Wt Readings from Last 3 Encounters:   12/12/18 70.8 kg (156 lb 1.6 oz)   12/06/18 69.9 kg (154 lb)   12/06/18 70.2 kg (154 lb 11.2 oz)     GEN: NAD  Mouth/ENT: Oropharynx is clear.  NECK: no  lymphadenopathy  LUNGS: clear bilaterally  CV: regular, no murmurs, rubs, or gallops  EXT: warm, well perfused, no edema  NEURO: alert  SKIN: no rashes     LABORATORY AND IMAGING STUDIES     Component      Latest Ref Rng & Units 12/6/2018   Cardiolipin Antibody IgG      0.0 - 19.9 GPL-U/mL <1.6   Cardiolipin Antibody IgM      0.0 - 19.9 MPL-U/mL 0.2   Beta 2 Glycoprotein 1 Antibody IgG      <7 U/mL 1.0   Beta 2 Glycoprotein 1 Antibody IgM      <7 U/mL 1.2     The patient is negative for the Factor V 1691G>A (Leiden) and negative for the Factor 2 mutation.      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 She has completed 3 weeks of 15 mg bid dosing and should be on 20 mg daily now.  Given intermediate risk of recurrence, would recommend hypercoagulability testing. Factor 2, factor V Leiden mutation as well as antiphospholipid antibodies Came back negative. Protein C, protein S and antithrombin III levels are affected by use of direct oral anticoagulants/blood clot and so we will defer that testing for now.  We will plan on continuing anticoagulation for 6 months followed by d-dimer testing and if negative, will plan on discontinuing anticoagulation with repeat D-dimer test in another month along with checking for protein C, protein S and antithrombin III levels. If repeat d-dimer along with anticoagulation workup comes out to be negative, she would not require to continue anticoagulation indefinitly.   Given very high post-operative thrombotic risk with recent PE, recommended delaying the elective surgery until after at least 3 months of therapy.      RTC in 3 months for follow-up.     The patient is ready to learn, no apparent learning barriers were identified, Diagnosis and treatment plans were explained to the patient. The patient expressed understanding of the content. The patient questions were answered to her satisfaction.     Chart documentation with Dragon Voice recognition Software. Although reviewed after completion, some words and grammatical errors may remain.     Stephon Holliday MD  Attending Physician   Hematology/Medical Oncology

## 2018-12-12 NOTE — TELEPHONE ENCOUNTER
Venlafaxine 75 mg Last Written Prescription Date:  10/16/18  Last Fill Quantity: 30,  # refills: 1   Last office visit: 12/6/2018 with prescribing provider:  Rivka Rodrigues   Future Office Visit:   Next 5 appointments (look out 90 days)    Jan 09, 2019  3:30 PM CST  Office Visit with Joseluis Wing MD  [address] [address]  [phone]   Mar 06, 2019  8:30 AM CST  Return Visit with Stephon Holliday MD  [address] [address]  [phone]         Venlafaxine 37.5 mg Last Written Prescription Date:  10/16/18  Last Fill Quantity: 30,  # refills: 1   Last office visit: 12/6/2018 with prescribing provider:  Rivka Rodrigues   Future Office Visit:   Next 5 appointments (look out 90 days)    Jan 09, 2019  3:30 PM CST  Office Visit with Joseluis Wing MD  [facility] ([address] [address]  [phone]   Mar 06, 2019  8:30 AM CST  Return Visit with Stephon Holliday MD  [address] [address]  [phone]         Requested Prescriptions   Pending Prescriptions Disp Refills     venlafaxine (EFFEXOR-XR) 37.5 MG 24 hr capsule 30 capsule 1     Sig: Take with 75 mg capsule to make a total dose of 112.5 mg daily    Serotonin-Norepinephrine Reuptake Inhibitors  Failed - 12/10/2018  4:40 PM       Failed - Normal serum creatinine on file in past 12 months    Recent Labs   Lab Test 08/05/15  0851   CR 0.88            Passed - Blood pressure under 140/90 in past 12 months    BP Readings from Last 3 Encounters:   12/12/18 122/80   12/06/18 116/80   12/06/18 116/80                Passed - PHQ-9 score of less than 5 in past 6 months    Please review last PHQ-9 score.          Passed - Patient is age 18 or older       Passed - No active  "pregnancy on record       Passed - No positive pregnancy test in past 12 months       Passed - Recent (6 mo) or future (30 days) visit within the authorizing provider's specialty    Patient had office visit in the last 6 months or has a visit in the next 30 days with authorizing provider or within the authorizing provider's specialty.  See \"Patient Info\" tab in inbasket, or \"Choose Columns\" in Meds & Orders section of the refill encounter.            venlafaxine (EFFEXOR-XR) 75 MG 24 hr capsule 30 capsule 1     Sig: Take with 37.5 mg capsule to make a total dose of 112.5 mg daily    Serotonin-Norepinephrine Reuptake Inhibitors  Failed - 12/10/2018  4:40 PM       Failed - Normal serum creatinine on file in past 12 months    Recent Labs   Lab Test 08/05/15  0851   CR 0.88            Passed - Blood pressure under 140/90 in past 12 months    BP Readings from Last 3 Encounters:   12/12/18 122/80   12/06/18 116/80   12/06/18 116/80                Passed - PHQ-9 score of less than 5 in past 6 months    Please review last PHQ-9 score.          Passed - Patient is age 18 or older       Passed - No active pregnancy on record       Passed - No positive pregnancy test in past 12 months       Passed - Recent (6 mo) or future (30 days) visit within the authorizing provider's specialty    Patient had office visit in the last 6 months or has a visit in the next 30 days with authorizing provider or within the authorizing provider's specialty.  See \"Patient Info\" tab in inbasket, or \"Choose Columns\" in Meds & Orders section of the refill encounter.            Medication is being filled for 1 time refill only due to:  Patient needs labs Creatinine. Routing to provider to approve pended order and to scheduling to contact patient for lab appointment.     Olga Tejeda RN         "

## 2018-12-12 NOTE — Clinical Note
12/12/2018         RE: Aide Burroughs  [address] [facility] [address]        Dear Colleague,    Thank you for referring your patient, Aide Burroughs, to the [facility]. Please see a copy of my visit note below.      FOLLOW-UP VISIT NOTE    PATIENT NAME: Aide Burroughs MRN # [id number]  DATE OF VISIT: Dec 12, 2018 YOB: 1975    REFERRING PROVIDER: No referring provider defined for this encounter.    Reason for follow-up  Pulmonary embolism     HISTORY:  43-year-old female with past medical history significant for anxiety/depression who presented to emergency room on 11/15/18 with symptoms of left upper back pain with associated tingling in the left arm. She was noted to have elevated d-dimer and has subsequently had a CT angiogram performed which revealed segmental pulmonary embolism in the left lower lobe. Ultrasound lower extremities were negative for DVT. Pulmonary embolism was suspected secondary to birth control pill that she started taking in July of this year. She was started on Xeralto and subsequently discharged home.    -12/06/18 referred tohematology.Clinical impression likely consistent with acute pulmonary embolism secondary to oral contraceptive pill use.An intermediate risk of recurrence, I'll recommend her upper quality testing and ordered factor II, factor V Leiden as well as an otherwise lipid antibodies while deferring other workup at later stage. Also recommended for  Elective surgery given high postoperative thrombotic risk with recent PE      SUBJECTIVE     Percent to the clinic today with complaints of worsening shortness of breath. Patient states that it has been going on over the last one week and progressively getting worse as now she becomes dyspneic on minimal exertion. Also complains of chest pressure. Denies chest pain, hemoptysis, cough, fever/chills, extremity swelling,/pain or other complaints. Patient is taking anticoagulation regularly although not  sure about her current dose      PAST MEDICAL HISTORY     Past Medical History:   Diagnosis Date     Generalized anxiety disorder      MDD (major depressive disorder), recurrent, severe, with psychosis (H)      Pulmonary embolism (H) 11/15/2018    while on OCP         CURRENT OUTPATIENT MEDICATIONS     Current Outpatient Medications   Medication Sig Dispense Refill     cholecalciferol (VITAMIN D3) 5000 UNITS CAPS capsule Take by mouth daily       rivaroxaban ANTICOAGULANT (XARELTO) 15 MG TABS tablet Take 1 tablet (15 mg) by mouth 2 times daily 60 tablet 5     venlafaxine (EFFEXOR-XR) 37.5 MG 24 hr capsule Take with 75 mg capsule to make a total dose of 112.5 mg daily 30 capsule 1     venlafaxine (EFFEXOR-XR) 75 MG 24 hr capsule Take with 37.5 mg capsule to make a total dose of 112.5 mg daily 30 capsule 1     oxyCODONE-acetaminophen (PERCOCET) 5-325 MG tablet Take 1-2 tablets by mouth every 6 hours as needed for moderate to severe pain (Patient not taking: Reported on 12/6/2018) 12 tablet 0        ALLERGIES     Allergies   Allergen Reactions     Erythromycin Nausea and Vomiting     Seasonal Allergies      Molds, grass, multiple trees, plant pollen, cats, rabbits, dogs, hay.        REVIEW OF SYSTEMS   As above in the HPI, o/w complete 12-point ROS was negative.     PHYSICAL EXAM   B/P: 122/80, T: 97.2, P: 88, R: 24  @LASTSAO2(4)@  Wt Readings from Last 3 Encounters:   12/12/18 70.8 kg (156 lb 1.6 oz)   12/06/18 69.9 kg (154 lb)   12/06/18 70.2 kg (154 lb 11.2 oz)     GEN: NAD  Mouth/ENT: Oropharynx is clear.  NECK: no  lymphadenopathy  LUNGS: clear bilaterally  CV: regular, no murmurs, rubs, or gallops  EXT: warm, well perfused, no edema  NEURO: alert  SKIN: no rashes     LABORATORY AND IMAGING STUDIES     Component      Latest Ref Rng & Units 12/6/2018   Cardiolipin Antibody IgG      0.0 - 19.9 GPL-U/mL <1.6   Cardiolipin Antibody IgM      0.0 - 19.9 MPL-U/mL 0.2   Beta 2 Glycoprotein 1 Antibody IgG      <7 U/mL 1.0    Beta 2 Glycoprotein 1 Antibody IgM      <7 U/mL 1.2     The patient is negative for the Factor V 1691G>A (Leiden) and negative for the Factor 2 mutation.      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 She has completed 3 weeks of 15 mg bid dosing and should be on 20 mg daily now.  Given intermediate risk of recurrence, would recommend hypercoagulability testing. Factor 2, factor V Leiden mutation as well as antiphospholipid antibodies Came back negative. Protein C, protein S and antithrombin III levels are affected by use of direct oral anticoagulants/blood clot and so we will defer that testing for now.  We will plan on continuing anticoagulation for 6 months followed by d-dimer testing and if negative, will plan on discontinuing anticoagulation with repeat D-dimer test in another month along with checking for protein C, protein S and antithrombin III levels. If repeat d-dimer along with anticoagulation workup comes out to be negative, she would not require to continue anticoagulation indefinitly.   Given very high post-operative thrombotic risk with recent PE, recommend ed delaying the elective surgery until after at least 3 months of therapy.      RTC in 3 months for follow-up.     The patient is ready to learn, no apparent learning barriers were identified, Diagnosis and treatment plans were explained to the patient. The patient expressed understanding of the content. The patient questions were answered to her satisfaction.     Chart documentation with Dragon Voice recognition  Software. Although reviewed after completion, some words and grammatical errors may remain.     Stephon Holliday MD  Attending Physician   Hematology/Medical Oncology         Again, thank you for allowing me to participate in the care of your patient.        Sincerely,        Stephon Holliday MD

## 2018-12-12 NOTE — ED TRIAGE NOTES
"States has been soa past week. Went in to see MD at clinic today who sent pt over her for poss PE. Pt had a PE Nov 15th. Has been on xarelto. Resp were labored at triage. Having a hard time talking. Pt in bed now and able to talk sentences. States breathing feels \" tight\"  "

## 2018-12-12 NOTE — DISCHARGE INSTRUCTIONS
1.  Please use Tylenol, extra strength every 4-6 hours as needed for the discomfort.  I think if your pain is better, this will help your breathing.  2.  Please follow-up with your primary care doctor if you have any other questions or concerns.

## 2018-12-12 NOTE — LETTER
December 12, 2018      To Whom It May Concern:      Aide uBrroughs was seen in our Emergency Department today, 12/12/18.    Patient may return to work tomorrow without any limitations.    Sincerely,        Vinayak Yin MD

## 2019-01-21 ENCOUNTER — TELEPHONE (OUTPATIENT)
Dept: OBGYN | Facility: CLINIC | Age: 44
End: 2019-01-21

## 2019-01-21 NOTE — TELEPHONE ENCOUNTER
Patient is calling requesting to reschedule surgery that was previously cancelled. Please call when available

## 2019-01-21 NOTE — TELEPHONE ENCOUNTER
Type of surgery: laparoscopic bilateral salpingectomy  Location of surgery: [facility]  Date and time of surgery: 3/13  Surgeon: Maciej  Pre-Op Appt Date: Ting  Post-Op Appt Date: 4/10   Packet sent out: Yes  Pre-cert/Authorization completed:  Not Applicable  Date: na

## 2019-02-04 ENCOUNTER — TELEPHONE (OUTPATIENT)
Dept: ONCOLOGY | Facility: CLINIC | Age: 44
End: 2019-02-04

## 2019-02-04 NOTE — TELEPHONE ENCOUNTER
Chart reviewed and patient to continue Xeralto for 6 months.  Will do d-dimer at that time and determine plan.  Call patient back and reviewed recommendations from dictation.  Patient reports that she really doesn't want to continue the Xeralto.  She is willing to see provider sooner if needed.  Will review when he is in clinic.    Barrett Perrin RN, BSN, OCN  2/4/2019, 5:11 PM

## 2019-02-04 NOTE — TELEPHONE ENCOUNTER
Reason for Call:  Other call back    Detailed comments: Aide is calling inquiring about whether or not she needs to refill her rivaroxaban ANTICOAGULANT (XARELTO) 15 MG TABS tablet. States that she has been symptom free for one month. Please advise      Phone Number Patient can be reached at: Home number on file [phone] (home)    Best Time: any    Can we leave a detailed message on this number? YES    Call taken on 2/4/2019 at 2:19 PM by Renata Hartmann

## 2019-02-12 NOTE — TELEPHONE ENCOUNTER
VM left for patient to return call.  Will schedule patient for follow up on Thursday as recommended by Dr. Holliday.    Barrett Perrin RN, BSN, OCN  2/12/2019, 2:55 PM

## 2019-02-14 ENCOUNTER — ONCOLOGY VISIT (OUTPATIENT)
Dept: ONCOLOGY | Facility: CLINIC | Age: 44
End: 2019-02-14
Payer: COMMERCIAL

## 2019-02-14 VITALS
HEART RATE: 73 BPM | DIASTOLIC BLOOD PRESSURE: 72 MMHG | RESPIRATION RATE: 18 BRPM | HEIGHT: 66 IN | SYSTOLIC BLOOD PRESSURE: 148 MMHG | BODY MASS INDEX: 25.43 KG/M2 | OXYGEN SATURATION: 98 % | WEIGHT: 158.2 LBS

## 2019-02-14 DIAGNOSIS — I26.99 OTHER ACUTE PULMONARY EMBOLISM WITHOUT ACUTE COR PULMONALE (H): Primary | ICD-10-CM

## 2019-02-14 LAB — D DIMER PPP FEU-MCNC: 0.5 UG/ML FEU (ref 0–0.5)

## 2019-02-14 PROCEDURE — 36415 COLL VENOUS BLD VENIPUNCTURE: CPT | Performed by: INTERNAL MEDICINE

## 2019-02-14 PROCEDURE — 99214 OFFICE O/P EST MOD 30 MIN: CPT | Performed by: INTERNAL MEDICINE

## 2019-02-14 PROCEDURE — 85379 FIBRIN DEGRADATION QUANT: CPT | Performed by: INTERNAL MEDICINE

## 2019-02-14 ASSESSMENT — MIFFLIN-ST. JEOR: SCORE: 1399.34

## 2019-02-14 ASSESSMENT — PAIN SCALES - GENERAL: PAINLEVEL: NO PAIN (0)

## 2019-02-14 NOTE — Clinical Note
2/14/2019         RE: Aide Burroughs  [address] [facility] [address]        Dear Colleague,    Thank you for referring your patient, Aide Burroughs, to the [facility]. Please see a copy of my visit note below.      FOLLOW-UP VISIT NOTE    PATIENT NAME: Aide Burroughs MRN # [id number]  DATE OF VISIT: Feb 14, 2019 YOB: 1975    REFERRING PROVIDER: No referring provider defined for this encounter.    Reason for follow-up  Pulmonary embolism     HISTORY:  43-year-old female with past medical history significant for anxiety/depression who presented to emergency room on 11/15/18 with symptoms of left upper back pain with associated tingling in the left arm. She was noted to have elevated d-dimer and has subsequently had a CT angiogram performed which revealed segmental pulmonary embolism in the left lower lobe. Ultrasound lower extremities were negative for DVT. Pulmonary embolism was suspected secondary to birth control pill that she started taking in July of this year. She was started on Xeralto and subsequently discharged home.    -12/06/18 referred to hematology.Clinical impression likely consistent with acute pulmonary embolism secondary to oral contraceptive pill use.Given intermediate risk of recurrence,  recommended hypercoagulability testing.   Factor 2, factor V Leiden mutation as well as antiphospholipid antibodies Came back negative.       SUBJECTIVE     Since the clinic for follow-up. Patient has completed about 3 months of Anticoagulation therapy and sunsequently stopped it a week ago as, she doesn't want to continue until she has to.     Denies dyspnea, productive cough, chest pain, palpitations or lower extremity swelling. Did have an episode of ear bleeding while on blood thinner last month which has subsequently resolved.      PAST MEDICAL HISTORY     Past Medical History:   Diagnosis Date     Generalized anxiety disorder      MDD (major depressive disorder), recurrent,  severe, with psychosis (H)      Pulmonary embolism (H) 11/15/2018    while on OCP         CURRENT OUTPATIENT MEDICATIONS     Current Outpatient Medications   Medication Sig Dispense Refill     cholecalciferol (VITAMIN D3) 5000 UNITS CAPS capsule Take by mouth daily       venlafaxine (EFFEXOR-XR) 37.5 MG 24 hr capsule Take with 75 mg capsule to make a total dose of 112.5 mg daily 30 capsule 3     venlafaxine (EFFEXOR-XR) 75 MG 24 hr capsule Take with 37.5 mg capsule to make a total dose of 112.5 mg daily 30 capsule 3     rivaroxaban ANTICOAGULANT (XARELTO) 15 MG TABS tablet Take 1 tablet (15 mg) by mouth 2 times daily (Patient not taking: Reported on 2/14/2019) 60 tablet 5        ALLERGIES     Allergies   Allergen Reactions     Erythromycin Nausea and Vomiting     Seasonal Allergies      Molds, grass, multiple trees, plant pollen, cats, rabbits, dogs, hay.        REVIEW OF SYSTEMS   As above in the HPI, o/w complete 12-point ROS was negative.     PHYSICAL EXAM   B/P: 122/80, T: 97.2, P: 88, R: 24  @LASTSAO2(4)@  Wt Readings from Last 3 Encounters:   02/14/19 71.8 kg (158 lb 3.2 oz)   12/12/18 70.8 kg (156 lb)   12/12/18 70.8 kg (156 lb 1.6 oz)     GEN: NAD  Mouth/ENT: Oropharynx is clear.  NECK: no  lymphadenopathy  LUNGS: clear bilaterally  CV: regular, no murmurs, rubs, or gallops  EXT: warm, well perfused, no edema  NEURO: alert  SKIN: no rashes     LABORATORY AND IMAGING STUDIES     Component      Latest Ref Rng & Units 12/6/2018   Cardiolipin Antibody IgG      0.0 - 19.9 GPL-U/mL <1.6   Cardiolipin Antibody IgM      0.0 - 19.9 MPL-U/mL 0.2   Beta 2 Glycoprotein 1 Antibody IgG      <7 U/mL 1.0   Beta 2 Glycoprotein 1 Antibody IgM      <7 U/mL 1.2     The patient is negative for the Factor V 1691G>A (Leiden) and negative for the Factor 2 mutation.      ASSESSMENT AND PLAN     43-year-old female with     -Acute pulmonary embolism 11/2018   Likely secondary to use of oral contraceptive pill with no other obvious  risk factors  Patient was started on Xeralto. Given intermediate risk of recurrence,  recommended hypercoagulability testing.   Factor 2, factor V Leiden mutation as well as antiphospholipid antibodies - negative.   Patient completed about 3 months of anticoagulation therapy and subsequently discontinued about a week ago as she does not want to continue Unless needed.  We will Check d-dimer testing and if negative, will Recommend discontinuing anticoagulation with repeat D-dimer test in another month along with checking for protein C, protein S and antithrombin III levels. If repeat d-dimer along with anticoagulation workup comes out to be negative, she would not require to continue anticoagulation indefinitly.    The patient is ready to learn, no apparent learning barriers were identified, Diagnosis and treatment plans were explained to the patient. The patient expressed understanding of the content. The patient questions were answered to her satisfaction.     Chart documentation with Dragon Voice recognition Software. Although reviewed after completion, some words and grammatical errors may remain.     Stephon Holliday MD  Attending Physician   Hematology/Medical Oncology         Again, thank you for allowing me to participate in the care of your patient.        Sincerely,        Stephon Holliday MD

## 2019-02-14 NOTE — PATIENT INSTRUCTIONS
-Labs today, will call with results & plan.    If you have any questions or concerns please feel free to call.    If you need to reschedule please call:  Clinic or Lab Appointment - [phone]  Infusion - [phone]  Imaging - [phone]    Ivonne PRICE CMA  [facility]  Oncology/Hematology at [facility]  [phone]

## 2019-02-14 NOTE — PROGRESS NOTES
FOLLOW-UP VISIT NOTE    PATIENT NAME: Aide Burroughs MRN # [id number]  DATE OF VISIT: Feb 14, 2019 YOB: 1975    REFERRING PROVIDER: No referring provider defined for this encounter.    Reason for follow-up  Pulmonary embolism     HISTORY:  43-year-old female with past medical history significant for anxiety/depression who presented to emergency room on 11/15/18 with symptoms of left upper back pain with associated tingling in the left arm. She was noted to have elevated d-dimer and has subsequently had a CT angiogram performed which revealed segmental pulmonary embolism in the left lower lobe. Ultrasound lower extremities were negative for DVT. Pulmonary embolism was suspected secondary to birth control pill that she started taking in July of this year. She was started on Xeralto and subsequently discharged home.    -12/06/18 referred to hematology.Clinical impression likely consistent with acute pulmonary embolism secondary to oral contraceptive pill use.Given intermediate risk of recurrence,  recommended hypercoagulability testing.   Factor 2, factor V Leiden mutation as well as antiphospholipid antibodies Came back negative.       SUBJECTIVE     Since the clinic for follow-up. Patient has completed about 3 months of Anticoagulation therapy and sunsequently stopped it a week ago as, she doesn't want to continue until she has to.     Denies dyspnea, productive cough, chest pain, palpitations or lower extremity swelling. Did have an episode of ear bleeding while on blood thinner last month which has subsequently resolved.      PAST MEDICAL HISTORY     Past Medical History:   Diagnosis Date     Generalized anxiety disorder      MDD (major depressive disorder), recurrent, severe, with psychosis (H)      Pulmonary embolism (H) 11/15/2018    while on OCP         CURRENT OUTPATIENT MEDICATIONS     Current Outpatient Medications   Medication Sig Dispense Refill     cholecalciferol (VITAMIN D3) 5000 UNITS  CAPS capsule Take by mouth daily       venlafaxine (EFFEXOR-XR) 37.5 MG 24 hr capsule Take with 75 mg capsule to make a total dose of 112.5 mg daily 30 capsule 3     venlafaxine (EFFEXOR-XR) 75 MG 24 hr capsule Take with 37.5 mg capsule to make a total dose of 112.5 mg daily 30 capsule 3     rivaroxaban ANTICOAGULANT (XARELTO) 15 MG TABS tablet Take 1 tablet (15 mg) by mouth 2 times daily (Patient not taking: Reported on 2/14/2019) 60 tablet 5        ALLERGIES     Allergies   Allergen Reactions     Erythromycin Nausea and Vomiting     Seasonal Allergies      Molds, grass, multiple trees, plant pollen, cats, rabbits, dogs, hay.        REVIEW OF SYSTEMS   As above in the HPI, o/w complete 12-point ROS was negative.     PHYSICAL EXAM   B/P: 122/80, T: 97.2, P: 88, R: 24  @LASTSAO2(4)@  Wt Readings from Last 3 Encounters:   02/14/19 71.8 kg (158 lb 3.2 oz)   12/12/18 70.8 kg (156 lb)   12/12/18 70.8 kg (156 lb 1.6 oz)     GEN: NAD  Mouth/ENT: Oropharynx is clear.  NECK: no  lymphadenopathy  LUNGS: clear bilaterally  CV: regular, no murmurs, rubs, or gallops  EXT: warm, well perfused, no edema  NEURO: alert  SKIN: no rashes     LABORATORY AND IMAGING STUDIES     Component      Latest Ref Rng & Units 12/6/2018   Cardiolipin Antibody IgG      0.0 - 19.9 GPL-U/mL <1.6   Cardiolipin Antibody IgM      0.0 - 19.9 MPL-U/mL 0.2   Beta 2 Glycoprotein 1 Antibody IgG      <7 U/mL 1.0   Beta 2 Glycoprotein 1 Antibody IgM      <7 U/mL 1.2     The patient is negative for the Factor V 1691G>A (Leiden) and negative for the Factor 2 mutation.      ASSESSMENT AND PLAN     43-year-old female with     -Acute pulmonary embolism 11/2018   Likely secondary to use of oral contraceptive pill with no other obvious risk factors  Patient was started on Xeralto. Given intermediate risk of recurrence,  recommended hypercoagulability testing.   Factor 2, factor V Leiden mutation as well as antiphospholipid antibodies - negative.   Patient completed  about 3 months of anticoagulation therapy and subsequently discontinued about a week ago as she does not want to continue Unless needed.  We will Check d-dimer testing and if negative, will Recommend discontinuing anticoagulation with repeat D-dimer test in another month along with checking for protein C, protein S and antithrombin III levels. If repeat d-dimer along with anticoagulation workup comes out to be negative, she would not require to continue anticoagulation indefinitly.    The patient is ready to learn, no apparent learning barriers were identified, Diagnosis and treatment plans were explained to the patient. The patient expressed understanding of the content. The patient questions were answered to her satisfaction.     Chart documentation with Dragon Voice recognition Software. Although reviewed after completion, some words and grammatical errors may remain.     Stephon Holliday MD  Attending Physician   Hematology/Medical Oncology

## 2019-02-15 ENCOUNTER — TELEPHONE (OUTPATIENT)
Dept: ONCOLOGY | Facility: CLINIC | Age: 44
End: 2019-02-15

## 2019-02-15 NOTE — TELEPHONE ENCOUNTER
"Result note from Mg:    \"D DIMER normal. Ok with discontinuing xeralto for now. Will check it again in 4 weeks along with other lab tests.     RTC after results to discuss.\"      for return call.    Kaila Claudio RN on 2/15/2019 at 11:24 AM    "

## 2019-02-15 NOTE — TELEPHONE ENCOUNTER
Patient returned call. Coordinated next appts with patient. Patient verbalizes understanding and will call back if needed.    Kaila Claudio RN on 2/15/2019 at 12:43 PM

## 2019-02-25 ENCOUNTER — OFFICE VISIT (OUTPATIENT)
Dept: FAMILY MEDICINE | Facility: CLINIC | Age: 44
End: 2019-02-25
Payer: COMMERCIAL

## 2019-02-25 VITALS
HEIGHT: 67 IN | SYSTOLIC BLOOD PRESSURE: 128 MMHG | OXYGEN SATURATION: 97 % | WEIGHT: 154 LBS | TEMPERATURE: 97.8 F | RESPIRATION RATE: 16 BRPM | DIASTOLIC BLOOD PRESSURE: 84 MMHG | HEART RATE: 84 BPM | BODY MASS INDEX: 24.17 KG/M2

## 2019-02-25 DIAGNOSIS — Z01.818 PREOP GENERAL PHYSICAL EXAM: Primary | ICD-10-CM

## 2019-02-25 DIAGNOSIS — Z30.2 ENCOUNTER FOR STERILIZATION: ICD-10-CM

## 2019-02-25 PROCEDURE — 99214 OFFICE O/P EST MOD 30 MIN: CPT | Performed by: FAMILY MEDICINE

## 2019-02-25 ASSESSMENT — MIFFLIN-ST. JEOR: SCORE: 1389.82

## 2019-02-25 ASSESSMENT — PAIN SCALES - GENERAL: PAINLEVEL: NO PAIN (0)

## 2019-02-25 NOTE — PROGRESS NOTES
[address] [address]  [phone]  Dept: [phone]    PRE-OP EVALUATION:  Today's date: 2019    Aide Burroughs (: 1975) presents for pre-operative evaluation assessment as requested by Dr. Wing.  She requires evaluation and anesthesia risk assessment prior to undergoing surgery/procedure for treatment of  LAPAROSCOPIC BILATERAL SALPINGECTOMY .    Proposed Surgery/ Procedure: LAPAROSCOPIC BILATERAL SALPINGECTOMY  Date of Surgery/ Procedure: 3/13/2019  Time of Surgery/ Procedure:   Hospital/Surgical Facility: [facility]  Fax number for surgical facility:   Primary Physician: Rivka Rodrigues  Type of Anesthesia Anticipated: General    Patient has a Health Care Directive or Living Will:  NO    1. NO - Do you have a history of heart attack, stroke, stent, bypass or surgery on an artery in the head, neck, heart or legs?  2. NO - Do you ever have any pain or discomfort in your chest?  3. NO - Do you have a history of  Heart Failure?  4. NO - Are you troubled by shortness of breath when: walking on the level, up a slight hill or at night?  5. NO - Do you currently have a cold, bronchitis or other respiratory infection?  6. NO - Do you have a cough, shortness of breath or wheezing?  7. NO - Do you sometimes get pains in the calves of your legs when you walk?  8. YES - DO YOU OR ANYONE IN YOUR FAMILY HAVE PREVIOUS HISTORY OF BLOOD CLOTS? Had a pulmonary embolism 3 months ago.  9. NO - Do you or does anyone in your family have a serious bleeding problem such as prolonged bleeding following surgeries or cuts?  10. NO - Have you ever had problems with anemia or been told to take iron pills?  11. NO - Have you had any abnormal blood loss such as black, tarry or bloody stools, or abnormal vaginal bleeding?  12. NO - Have you ever had a blood transfusion?  13. NO - Have you or any of your relatives ever had problems with anesthesia?  14. NO - Do you have  sleep apnea, excessive snoring or daytime drowsiness?  15. NO - Do you have any prosthetic heart valves?  16. NO - Do you have prosthetic joints?  17. NO - Is there any chance that you may be pregnant?      HPI:     HPI related to upcoming procedure:       See problem list for active medical problems.  Problems all longstanding and stable, except as noted/documented.  See ROS for pertinent symptoms related to these conditions.                                                                                                                                                          .    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 Provider to review and confirm.       Seasonal allergic rhinitis 04/26/2012     Priority: Medium      Past Medical History:   Diagnosis Date     Generalized anxiety disorder      MDD (major depressive disorder), recurrent, severe, with psychosis (H)      Pulmonary embolism (H) 11/15/2018    while on OCP     Past Surgical History:   Procedure Laterality Date     ESOPHAGOSCOPY, GASTROSCOPY, DUODENOSCOPY (EGD), COMBINED N/A 5/15/2017    Procedure: COMBINED ESOPHAGOSCOPY, GASTROSCOPY, DUODENOSCOPY (EGD), BIOPSY SINGLE OR MULTIPLE;  ESOPHAGOSCOPY, GASTROSCOPY, DUODENOSCOPY (EGD) with biopsies by biopsy;  Surgeon: Robles Falk MD;  Location: [facility]     MYRINGOTOMY, INSERT TUBE, COMBINED Left 05/16/2018  "    Current Outpatient Medications   Medication Sig Dispense Refill     cholecalciferol (VITAMIN D3) 5000 UNITS CAPS capsule Take by mouth daily       venlafaxine (EFFEXOR-XR) 37.5 MG 24 hr capsule Take with 75 mg capsule to make a total dose of 112.5 mg daily 30 capsule 3     venlafaxine (EFFEXOR-XR) 75 MG 24 hr capsule Take with 37.5 mg capsule to make a total dose of 112.5 mg daily 30 capsule 3     OTC products: None, except as noted above    Allergies   Allergen Reactions     Erythromycin Nausea and Vomiting     Seasonal Allergies      Molds, grass, multiple trees, plant pollen, cats, rabbits, dogs, hay.      Latex Allergy: NO    Social History     Tobacco Use     Smoking status: Never Smoker     Smokeless tobacco: Never Used   Substance Use Topics     Alcohol use: Yes     Alcohol/week: 0.0 oz     Comment: rare     History   Drug Use No       REVIEW OF SYSTEMS:   CONSTITUTIONAL: NEGATIVE for fever, chills, change in weight  INTEGUMENTARY/SKIN: NEGATIVE for worrisome rashes, moles or lesions  EYES: NEGATIVE for vision changes or irritation  ENT/MOUTH: NEGATIVE for ear, mouth and throat problems  RESP: NEGATIVE for significant cough or SOB  BREAST: NEGATIVE for masses, tenderness or discharge  CV: NEGATIVE for chest pain, palpitations or peripheral edema  GI: NEGATIVE for nausea, abdominal pain, heartburn, or change in bowel habits  : NEGATIVE for frequency, dysuria, or hematuria  MUSCULOSKELETAL: NEGATIVE for significant arthralgias or myalgia  NEURO: NEGATIVE for weakness, dizziness or paresthesias  ENDOCRINE: NEGATIVE for temperature intolerance, skin/hair changes  HEME: NEGATIVE for bleeding problems  PSYCHIATRIC: NEGATIVE for changes in mood or affect    EXAM:   /84   Pulse 84   Temp 97.8  F (36.6  C) (Temporal)   Resp 16   Ht 1.692 m (5' 6.6\")   Wt 69.9 kg (154 lb)   SpO2 97%   BMI 24.41 kg/m      GENERAL APPEARANCE: healthy, alert and no distress     EYES: EOMI, PERRL     HENT: ear canals and " TM's normal and nose and mouth without ulcers or lesions     NECK: no adenopathy, no asymmetry, masses, or scars and thyroid normal to palpation     RESP: lungs clear to auscultation - no rales, rhonchi or wheezes     CV: regular rates and rhythm, normal S1 S2, no S3 or S4 and no murmur, click or rub     ABDOMEN:  soft, nontender, no HSM or masses and bowel sounds normal     MS: extremities normal- no gross deformities noted, no evidence of inflammation in joints, FROM in all extremities.     SKIN: no suspicious lesions or rashes     NEURO: Normal strength and tone, sensory exam grossly normal, mentation intact and speech normal     PSYCH: mentation appears normal. and affect normal/bright     LYMPHATICS: No cervical adenopathy    DIAGNOSTICS:   HCG the morning of the procedure.    Recent Labs   Lab Test 12/12/18  1543 08/05/15  0851 01/06/15  0816   HGB 13.1  --  13.5     --  242   INR 1.15*  --   --     139 136   POTASSIUM 4.0 4.2 3.7   CR 0.86 0.88 0.91        IMPRESSION:   Reason for surgery/procedure: Undesired fertility, having a bilateral salpingectomy laparoscopically on 3/14/19 with Dr. Wing.    Diagnosis/reason for consult: Preoperative evaluation for surgery and anesthesia    The proposed surgical procedure is considered INTERMEDIATE risk.    REVISED CARDIAC RISK INDEX  The patient has the following serious cardiovascular risks for perioperative complications such as (MI, PE, VFib and 3  AV Block):  No serious cardiac risks  INTERPRETATION: 0 risks: Class I (very low risk - 0.4% complication rate)    The patient has the following additional risks for perioperative complications:  No identified additional risks      ICD-10-CM    1. Preop general physical exam Z01.818    2. Encounter for sterilization Z30.2        RECOMMENDATIONS:       Pulmonary Risk  Had a history of PE see her hematology/oncology consultation from 2/14/19.  Her labs have been negative and the thought is that her PE was  related to her OCPs.  She is off all anticoagulants at this time and according to her Oncologist/Hematologist will not need to resume them at this time if all of her follow up labs that are due now are still normal.  The patient will stop by later this week to have those labs drawn.      She will hold her medication (venlafaxine) the morning of surgery.     APPROVAL GIVEN to proceed with proposed procedure, without further diagnostic evaluation       Signed Electronically by: Jamarcus Maguire MD    Copy of this evaluation report is provided to requesting physician.    [facility] Preop Guidelines    Revised Cardiac Risk Index

## 2019-02-25 NOTE — NURSING NOTE
Chief Complaint   Patient presents with     Pre-Op Exam     LAPAROSCOPIC BILATERAL SALPINGECTOMY   3/13/2019     MP/MA

## 2019-02-28 DIAGNOSIS — I26.99 OTHER ACUTE PULMONARY EMBOLISM WITHOUT ACUTE COR PULMONALE (H): ICD-10-CM

## 2019-02-28 LAB — D DIMER PPP FEU-MCNC: 0.6 UG/ML FEU (ref 0–0.5)

## 2019-02-28 PROCEDURE — 85300 ANTITHROMBIN III ACTIVITY: CPT | Performed by: INTERNAL MEDICINE

## 2019-02-28 PROCEDURE — 85306 CLOT INHIBIT PROT S FREE: CPT | Performed by: INTERNAL MEDICINE

## 2019-02-28 PROCEDURE — 36415 COLL VENOUS BLD VENIPUNCTURE: CPT | Performed by: INTERNAL MEDICINE

## 2019-02-28 PROCEDURE — 85303 CLOT INHIBIT PROT C ACTIVITY: CPT | Performed by: INTERNAL MEDICINE

## 2019-02-28 PROCEDURE — 85379 FIBRIN DEGRADATION QUANT: CPT | Performed by: INTERNAL MEDICINE

## 2019-03-01 LAB
AT III ACT/NOR PPP CHRO: 104 % (ref 85–135)
PROT C ACT/NOR PPP CHRO: 90 % (ref 70–170)
PROT S FREE AG ACT/NOR PPP IA: 84 % (ref 55–125)

## 2019-03-12 ENCOUNTER — OFFICE VISIT (OUTPATIENT)
Dept: FAMILY MEDICINE | Facility: CLINIC | Age: 44
End: 2019-03-12
Payer: COMMERCIAL

## 2019-03-12 ENCOUNTER — TELEPHONE (OUTPATIENT)
Dept: FAMILY MEDICINE | Facility: OTHER | Age: 44
End: 2019-03-12

## 2019-03-12 VITALS
OXYGEN SATURATION: 100 % | HEART RATE: 85 BPM | RESPIRATION RATE: 18 BRPM | BODY MASS INDEX: 23.89 KG/M2 | TEMPERATURE: 96.9 F | WEIGHT: 152.2 LBS | DIASTOLIC BLOOD PRESSURE: 72 MMHG | SYSTOLIC BLOOD PRESSURE: 118 MMHG | HEIGHT: 67 IN

## 2019-03-12 DIAGNOSIS — Z12.31 ENCOUNTER FOR SCREENING MAMMOGRAM FOR BREAST CANCER: ICD-10-CM

## 2019-03-12 DIAGNOSIS — Z00.00 ENCOUNTER FOR ROUTINE ADULT HEALTH EXAMINATION WITHOUT ABNORMAL FINDINGS: ICD-10-CM

## 2019-03-12 DIAGNOSIS — E78.2 MIXED HYPERLIPIDEMIA: ICD-10-CM

## 2019-03-12 DIAGNOSIS — Z30.2 ENCOUNTER FOR STERILIZATION: ICD-10-CM

## 2019-03-12 LAB
B-HCG SERPL-ACNC: <1 IU/L (ref 0–5)
CHOLEST SERPL-MCNC: 260 MG/DL
HDLC SERPL-MCNC: 43 MG/DL
LDLC SERPL CALC-MCNC: 190 MG/DL
NONHDLC SERPL-MCNC: 217 MG/DL
TRIGL SERPL-MCNC: 135 MG/DL

## 2019-03-12 PROCEDURE — 36415 COLL VENOUS BLD VENIPUNCTURE: CPT | Performed by: FAMILY MEDICINE

## 2019-03-12 PROCEDURE — 99214 OFFICE O/P EST MOD 30 MIN: CPT | Mod: 25 | Performed by: FAMILY MEDICINE

## 2019-03-12 PROCEDURE — 80061 LIPID PANEL: CPT | Performed by: FAMILY MEDICINE

## 2019-03-12 PROCEDURE — 99396 PREV VISIT EST AGE 40-64: CPT | Performed by: FAMILY MEDICINE

## 2019-03-12 PROCEDURE — 84702 CHORIONIC GONADOTROPIN TEST: CPT | Performed by: FAMILY MEDICINE

## 2019-03-12 RX ORDER — ATORVASTATIN CALCIUM 10 MG/1
10 TABLET, FILM COATED ORAL DAILY
Qty: 90 TABLET | Refills: 3 | Status: SHIPPED | OUTPATIENT
Start: 2019-03-12 | End: 2020-05-05

## 2019-03-12 SDOH — SOCIAL STABILITY: SOCIAL INSECURITY: WITHIN THE LAST YEAR, HAVE YOU BEEN AFRAID OF YOUR PARTNER OR EX-PARTNER?: NO

## 2019-03-12 SDOH — SOCIAL STABILITY: SOCIAL INSECURITY: WITHIN THE LAST YEAR, HAVE YOU BEEN HUMILIATED OR EMOTIONALLY ABUSED IN OTHER WAYS BY YOUR PARTNER OR EX-PARTNER?: YES

## 2019-03-12 SDOH — HEALTH STABILITY: MENTAL HEALTH: HOW MANY STANDARD DRINKS CONTAINING ALCOHOL DO YOU HAVE ON A TYPICAL DAY?: 1 OR 2

## 2019-03-12 SDOH — SOCIAL STABILITY: SOCIAL INSECURITY
WITHIN THE LAST YEAR, HAVE TO BEEN RAPED OR FORCED TO HAVE ANY KIND OF SEXUAL ACTIVITY BY YOUR PARTNER OR EX-PARTNER?: NO

## 2019-03-12 SDOH — HEALTH STABILITY: MENTAL HEALTH: HOW OFTEN DO YOU HAVE 6 OR MORE DRINKS ON ONE OCCASION?: NEVER

## 2019-03-12 SDOH — SOCIAL STABILITY: SOCIAL NETWORK: HOW OFTEN DO YOU ATTEND CHURCH OR RELIGIOUS SERVICES?: MORE THAN 4 TIMES PER YEAR

## 2019-03-12 SDOH — SOCIAL STABILITY: SOCIAL NETWORK: HOW OFTEN DO YOU GET TOGETHER WITH FRIENDS OR RELATIVES?: TWICE A WEEK

## 2019-03-12 SDOH — SOCIAL STABILITY: SOCIAL NETWORK
DO YOU BELONG TO ANY CLUBS OR ORGANIZATIONS SUCH AS CHURCH GROUPS UNIONS, FRATERNAL OR ATHLETIC GROUPS, OR SCHOOL GROUPS?: YES

## 2019-03-12 SDOH — ECONOMIC STABILITY: TRANSPORTATION INSECURITY
IN THE PAST 12 MONTHS, HAS LACK OF TRANSPORTATION KEPT YOU FROM MEETINGS, WORK, OR FROM GETTING THINGS NEEDED FOR DAILY LIVING?: NO

## 2019-03-12 SDOH — HEALTH STABILITY: MENTAL HEALTH: HOW OFTEN DO YOU HAVE A DRINK CONTAINING ALCOHOL?: MONTHLY OR LESS

## 2019-03-12 SDOH — SOCIAL STABILITY: SOCIAL NETWORK: ARE YOU MARRIED, WIDOWED, DIVORCED, SEPARATED, NEVER MARRIED, OR LIVING WITH A PARTNER?: LIVING WITH PARTNER

## 2019-03-12 SDOH — SOCIAL STABILITY: SOCIAL INSECURITY
WITHIN THE LAST YEAR, HAVE YOU BEEN KICKED, HIT, SLAPPED, OR OTHERWISE PHYSICALLY HURT BY YOUR PARTNER OR EX-PARTNER?: NO

## 2019-03-12 SDOH — ECONOMIC STABILITY: TRANSPORTATION INSECURITY
IN THE PAST 12 MONTHS, HAS THE LACK OF TRANSPORTATION KEPT YOU FROM MEDICAL APPOINTMENTS OR FROM GETTING MEDICATIONS?: NO

## 2019-03-12 SDOH — HEALTH STABILITY: PHYSICAL HEALTH: ON AVERAGE, HOW MANY MINUTES DO YOU ENGAGE IN EXERCISE AT THIS LEVEL?: 50 MIN

## 2019-03-12 SDOH — ECONOMIC STABILITY: FOOD INSECURITY: WITHIN THE PAST 12 MONTHS, THE FOOD YOU BOUGHT JUST DIDN'T LAST AND YOU DIDN'T HAVE MONEY TO GET MORE.: NEVER TRUE

## 2019-03-12 SDOH — ECONOMIC STABILITY: FOOD INSECURITY: WITHIN THE PAST 12 MONTHS, YOU WORRIED THAT YOUR FOOD WOULD RUN OUT BEFORE YOU GOT MONEY TO BUY MORE.: NEVER TRUE

## 2019-03-12 SDOH — ECONOMIC STABILITY: INCOME INSECURITY: HOW HARD IS IT FOR YOU TO PAY FOR THE VERY BASICS LIKE FOOD, HOUSING, MEDICAL CARE, AND HEATING?: NOT HARD AT ALL

## 2019-03-12 SDOH — HEALTH STABILITY: MENTAL HEALTH
STRESS IS WHEN SOMEONE FEELS TENSE, NERVOUS, ANXIOUS, OR CAN'T SLEEP AT NIGHT BECAUSE THEIR MIND IS TROUBLED. HOW STRESSED ARE YOU?: TO SOME EXTENT

## 2019-03-12 SDOH — HEALTH STABILITY: PHYSICAL HEALTH: ON AVERAGE, HOW MANY DAYS PER WEEK DO YOU ENGAGE IN MODERATE TO STRENUOUS EXERCISE (LIKE A BRISK WALK)?: 3 DAYS

## 2019-03-12 SDOH — SOCIAL STABILITY: SOCIAL NETWORK: HOW OFTEN DO YOU ATTENT MEETINGS OF THE CLUB OR ORGANIZATION YOU BELONG TO?: MORE THAN 4 TIMES PER YEAR

## 2019-03-12 SDOH — SOCIAL STABILITY: SOCIAL NETWORK
IN A TYPICAL WEEK, HOW MANY TIMES DO YOU TALK ON THE PHONE WITH FAMILY, FRIENDS, OR NEIGHBORS?: MORE THAN THREE TIMES A WEEK

## 2019-03-12 ASSESSMENT — ENCOUNTER SYMPTOMS
ARTHRALGIAS: 0
HEMATOCHEZIA: 0
PALPITATIONS: 0
FREQUENCY: 0
CHILLS: 0
ABDOMINAL PAIN: 0
DIZZINESS: 0
COUGH: 0
MYALGIAS: 0
CONSTIPATION: 0
NAUSEA: 0
SHORTNESS OF BREATH: 0
JOINT SWELLING: 0
BREAST MASS: 0
EYE PAIN: 0
HEMATURIA: 0
SORE THROAT: 0
WEAKNESS: 0
FEVER: 0
PARESTHESIAS: 0
DIARRHEA: 0
HEARTBURN: 0
DYSURIA: 0
NERVOUS/ANXIOUS: 0

## 2019-03-12 ASSESSMENT — MIFFLIN-ST. JEOR: SCORE: 1380.06

## 2019-03-12 ASSESSMENT — PAIN SCALES - GENERAL: PAINLEVEL: NO PAIN (0)

## 2019-03-12 NOTE — LETTER
[address]   [address]  Tel. [phone]/ Fax [phone]    June 2, 2019        Aide Abadegler  [address] [place], [place] [address]          Dear Ms. Aide Burroughs:    Your previous orders for lab work have been extended.  Please call to schedule a lab appointment and return to the clinic to have the labs drawn at your earliest convenience.    You are required to be fasting for these tests,  remember to have nothing by mouth (except water) after midnight on the night before your test.    If you have any questions or concerns, please contact our office.    Sincerely,       Jamarcus Maguire M.D.
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
## 2019-03-12 NOTE — TELEPHONE ENCOUNTER
----- Message from Jamarcus Maguire MD sent at 3/12/2019  2:18 PM CDT -----  Please call patient with following results.  Let the patient know that her cholesterol has gone up even higher than it was a year ago.  Her LDL cholesterol is 190 which is 1 of the indications to start a cholesterol-lowering medication.  Due to her family history of high cholesterol and heart disease, I would recommend that she start on Lipitor 10 mg daily and recheck her labs in 2 months to see how her cholesterol is responding and how she is tolerating the medication.  Please place an order for lipid profile, AST, and an ALT for 2 months from now.  I sent the prescription to the pharmacy for her.    Electronically signed by:  Jamarcus Maguire M.D.  3/12/2019

## 2019-03-13 ENCOUNTER — HOSPITAL ENCOUNTER (OUTPATIENT)
Facility: CLINIC | Age: 44
Discharge: HOME | End: 2019-03-13
Attending: OBSTETRICS & GYNECOLOGY | Admitting: OBSTETRICS & GYNECOLOGY
Payer: COMMERCIAL

## 2019-03-13 ENCOUNTER — ANESTHESIA (OUTPATIENT)
Dept: SURGERY | Facility: CLINIC | Age: 44
End: 2019-03-13
Payer: COMMERCIAL

## 2019-03-13 ENCOUNTER — ANESTHESIA EVENT (OUTPATIENT)
Dept: SURGERY | Facility: CLINIC | Age: 44
End: 2019-03-13
Payer: COMMERCIAL

## 2019-03-13 VITALS
OXYGEN SATURATION: 92 % | HEART RATE: 73 BPM | RESPIRATION RATE: 8 BRPM | TEMPERATURE: 97.8 F | SYSTOLIC BLOOD PRESSURE: 109 MMHG | DIASTOLIC BLOOD PRESSURE: 68 MMHG

## 2019-03-13 DIAGNOSIS — Z98.890 S/P LAPAROSCOPY: Primary | ICD-10-CM

## 2019-03-13 LAB
ABO + RH BLD: NORMAL
ABO + RH BLD: NORMAL
BLD GP AB SCN SERPL QL: NORMAL
BLOOD BANK CMNT PATIENT-IMP: NORMAL
ERYTHROCYTE [DISTWIDTH] IN BLOOD BY AUTOMATED COUNT: 12.6 % (ref 10–15)
HCT VFR BLD AUTO: 38.6 % (ref 35–47)
HGB BLD-MCNC: 12.6 G/DL (ref 11.7–15.7)
MCH RBC QN AUTO: 29 PG (ref 26.5–33)
MCHC RBC AUTO-ENTMCNC: 32.6 G/DL (ref 31.5–36.5)
MCV RBC AUTO: 89 FL (ref 78–100)
PLATELET # BLD AUTO: 243 10E9/L (ref 150–450)
RBC # BLD AUTO: 4.34 10E12/L (ref 3.8–5.2)
SPECIMEN EXP DATE BLD: NORMAL
WBC # BLD AUTO: 3.7 10E9/L (ref 4–11)

## 2019-03-13 PROCEDURE — 25000128 H RX IP 250 OP 636: Performed by: NURSE ANESTHETIST, CERTIFIED REGISTERED

## 2019-03-13 PROCEDURE — 25800030 ZZH RX IP 258 OP 636: Performed by: NURSE ANESTHETIST, CERTIFIED REGISTERED

## 2019-03-13 PROCEDURE — 25000125 ZZHC RX 250: Performed by: OBSTETRICS & GYNECOLOGY

## 2019-03-13 PROCEDURE — 36415 COLL VENOUS BLD VENIPUNCTURE: CPT | Performed by: OBSTETRICS & GYNECOLOGY

## 2019-03-13 PROCEDURE — 36000056 ZZH SURGERY LEVEL 3 1ST 30 MIN: Performed by: OBSTETRICS & GYNECOLOGY

## 2019-03-13 PROCEDURE — 40000306 ZZH STATISTIC PRE PROC ASSESS II: Performed by: OBSTETRICS & GYNECOLOGY

## 2019-03-13 PROCEDURE — 58661 LAPAROSCOPY REMOVE ADNEXA: CPT | Performed by: OBSTETRICS & GYNECOLOGY

## 2019-03-13 PROCEDURE — 36000058 ZZH SURGERY LEVEL 3 EA 15 ADDTL MIN: Performed by: OBSTETRICS & GYNECOLOGY

## 2019-03-13 PROCEDURE — 71000014 ZZH RECOVERY PHASE 1 LEVEL 2 FIRST HR: Performed by: OBSTETRICS & GYNECOLOGY

## 2019-03-13 PROCEDURE — 25000566 ZZH SEVOFLURANE, EA 15 MIN: Performed by: OBSTETRICS & GYNECOLOGY

## 2019-03-13 PROCEDURE — 37000009 ZZH ANESTHESIA TECHNICAL FEE, EACH ADDTL 15 MIN: Performed by: OBSTETRICS & GYNECOLOGY

## 2019-03-13 PROCEDURE — 86850 RBC ANTIBODY SCREEN: CPT | Performed by: OBSTETRICS & GYNECOLOGY

## 2019-03-13 PROCEDURE — 86900 BLOOD TYPING SEROLOGIC ABO: CPT | Performed by: OBSTETRICS & GYNECOLOGY

## 2019-03-13 PROCEDURE — 88302 TISSUE EXAM BY PATHOLOGIST: CPT | Performed by: OBSTETRICS & GYNECOLOGY

## 2019-03-13 PROCEDURE — 27210794 ZZH OR GENERAL SUPPLY STERILE: Performed by: OBSTETRICS & GYNECOLOGY

## 2019-03-13 PROCEDURE — 37000008 ZZH ANESTHESIA TECHNICAL FEE, 1ST 30 MIN: Performed by: OBSTETRICS & GYNECOLOGY

## 2019-03-13 PROCEDURE — 88302 TISSUE EXAM BY PATHOLOGIST: CPT | Mod: 26,59 | Performed by: OBSTETRICS & GYNECOLOGY

## 2019-03-13 PROCEDURE — 86901 BLOOD TYPING SEROLOGIC RH(D): CPT | Performed by: OBSTETRICS & GYNECOLOGY

## 2019-03-13 PROCEDURE — 25000125 ZZHC RX 250: Performed by: NURSE ANESTHETIST, CERTIFIED REGISTERED

## 2019-03-13 PROCEDURE — 71000027 ZZH RECOVERY PHASE 2 EACH 15 MINS: Performed by: OBSTETRICS & GYNECOLOGY

## 2019-03-13 PROCEDURE — 85027 COMPLETE CBC AUTOMATED: CPT | Performed by: OBSTETRICS & GYNECOLOGY

## 2019-03-13 RX ORDER — LIDOCAINE 40 MG/G
CREAM TOPICAL
Status: DISCONTINUED | OUTPATIENT
Start: 2019-03-13 | End: 2019-03-13 | Stop reason: HOSPADM

## 2019-03-13 RX ORDER — FENTANYL CITRATE 50 UG/ML
25-50 INJECTION, SOLUTION INTRAMUSCULAR; INTRAVENOUS
Status: DISCONTINUED | OUTPATIENT
Start: 2019-03-13 | End: 2019-03-13 | Stop reason: HOSPADM

## 2019-03-13 RX ORDER — PROPOFOL 10 MG/ML
INJECTION, EMULSION INTRAVENOUS CONTINUOUS PRN
Status: DISCONTINUED | OUTPATIENT
Start: 2019-03-13 | End: 2019-03-13

## 2019-03-13 RX ORDER — DIMENHYDRINATE 50 MG/ML
INJECTION, SOLUTION INTRAMUSCULAR; INTRAVENOUS PRN
Status: DISCONTINUED | OUTPATIENT
Start: 2019-03-13 | End: 2019-03-13

## 2019-03-13 RX ORDER — SCOLOPAMINE TRANSDERMAL SYSTEM 1 MG/1
PATCH, EXTENDED RELEASE TRANSDERMAL PRN
Status: DISCONTINUED | OUTPATIENT
Start: 2019-03-13 | End: 2019-03-13

## 2019-03-13 RX ORDER — ONDANSETRON 4 MG/1
4-8 TABLET, ORALLY DISINTEGRATING ORAL EVERY 8 HOURS PRN
Qty: 4 TABLET | Refills: 0 | Status: SHIPPED | OUTPATIENT
Start: 2019-03-13 | End: 2019-11-19

## 2019-03-13 RX ORDER — ONDANSETRON 2 MG/ML
4 INJECTION INTRAMUSCULAR; INTRAVENOUS EVERY 30 MIN PRN
Status: DISCONTINUED | OUTPATIENT
Start: 2019-03-13 | End: 2019-03-13 | Stop reason: HOSPADM

## 2019-03-13 RX ORDER — MEPERIDINE HYDROCHLORIDE 25 MG/ML
12.5 INJECTION INTRAMUSCULAR; INTRAVENOUS; SUBCUTANEOUS
Status: DISCONTINUED | OUTPATIENT
Start: 2019-03-13 | End: 2019-03-13 | Stop reason: HOSPADM

## 2019-03-13 RX ORDER — DIMENHYDRINATE 50 MG/ML
25 INJECTION, SOLUTION INTRAMUSCULAR; INTRAVENOUS
Status: DISCONTINUED | OUTPATIENT
Start: 2019-03-13 | End: 2019-03-13 | Stop reason: HOSPADM

## 2019-03-13 RX ORDER — ACETAMINOPHEN 325 MG/1
650 TABLET ORAL
Status: DISCONTINUED | OUTPATIENT
Start: 2019-03-13 | End: 2019-03-13 | Stop reason: HOSPADM

## 2019-03-13 RX ORDER — SODIUM CHLORIDE, SODIUM LACTATE, POTASSIUM CHLORIDE, CALCIUM CHLORIDE 600; 310; 30; 20 MG/100ML; MG/100ML; MG/100ML; MG/100ML
INJECTION, SOLUTION INTRAVENOUS CONTINUOUS
Status: DISCONTINUED | OUTPATIENT
Start: 2019-03-13 | End: 2019-03-13 | Stop reason: HOSPADM

## 2019-03-13 RX ORDER — ONDANSETRON 2 MG/ML
INJECTION INTRAMUSCULAR; INTRAVENOUS PRN
Status: DISCONTINUED | OUTPATIENT
Start: 2019-03-13 | End: 2019-03-13

## 2019-03-13 RX ORDER — HYDROCODONE BITARTRATE AND ACETAMINOPHEN 5; 325 MG/1; MG/1
1 TABLET ORAL
Status: DISCONTINUED | OUTPATIENT
Start: 2019-03-13 | End: 2019-03-13 | Stop reason: HOSPADM

## 2019-03-13 RX ORDER — KETOROLAC TROMETHAMINE 30 MG/ML
INJECTION, SOLUTION INTRAMUSCULAR; INTRAVENOUS PRN
Status: DISCONTINUED | OUTPATIENT
Start: 2019-03-13 | End: 2019-03-13

## 2019-03-13 RX ORDER — IBUPROFEN 600 MG/1
600 TABLET, FILM COATED ORAL EVERY 6 HOURS PRN
Qty: 30 TABLET | Refills: 0 | Status: SHIPPED | OUTPATIENT
Start: 2019-03-13 | End: 2019-11-19

## 2019-03-13 RX ORDER — ONDANSETRON 4 MG/1
4 TABLET, ORALLY DISINTEGRATING ORAL EVERY 30 MIN PRN
Status: DISCONTINUED | OUTPATIENT
Start: 2019-03-13 | End: 2019-03-13 | Stop reason: HOSPADM

## 2019-03-13 RX ORDER — ONDANSETRON 4 MG/1
4 TABLET, ORALLY DISINTEGRATING ORAL
Status: DISCONTINUED | OUTPATIENT
Start: 2019-03-13 | End: 2019-03-13 | Stop reason: HOSPADM

## 2019-03-13 RX ORDER — DEXAMETHASONE SODIUM PHOSPHATE 10 MG/ML
INJECTION, SOLUTION INTRAMUSCULAR; INTRAVENOUS PRN
Status: DISCONTINUED | OUTPATIENT
Start: 2019-03-13 | End: 2019-03-13

## 2019-03-13 RX ORDER — HYDROMORPHONE HYDROCHLORIDE 1 MG/ML
.3-.5 INJECTION, SOLUTION INTRAMUSCULAR; INTRAVENOUS; SUBCUTANEOUS EVERY 10 MIN PRN
Status: DISCONTINUED | OUTPATIENT
Start: 2019-03-13 | End: 2019-03-13 | Stop reason: HOSPADM

## 2019-03-13 RX ORDER — BUPIVACAINE HYDROCHLORIDE AND EPINEPHRINE 5; 5 MG/ML; UG/ML
INJECTION, SOLUTION PERINEURAL PRN
Status: DISCONTINUED | OUTPATIENT
Start: 2019-03-13 | End: 2019-03-13 | Stop reason: HOSPADM

## 2019-03-13 RX ORDER — ALBUTEROL SULFATE 0.83 MG/ML
2.5 SOLUTION RESPIRATORY (INHALATION) EVERY 4 HOURS PRN
Status: DISCONTINUED | OUTPATIENT
Start: 2019-03-13 | End: 2019-03-13 | Stop reason: HOSPADM

## 2019-03-13 RX ORDER — LIDOCAINE HYDROCHLORIDE 20 MG/ML
INJECTION, SOLUTION INFILTRATION; PERINEURAL PRN
Status: DISCONTINUED | OUTPATIENT
Start: 2019-03-13 | End: 2019-03-13

## 2019-03-13 RX ORDER — HYDRALAZINE HYDROCHLORIDE 20 MG/ML
2.5-5 INJECTION INTRAMUSCULAR; INTRAVENOUS EVERY 10 MIN PRN
Status: DISCONTINUED | OUTPATIENT
Start: 2019-03-13 | End: 2019-03-13 | Stop reason: HOSPADM

## 2019-03-13 RX ORDER — NALOXONE HYDROCHLORIDE 0.4 MG/ML
.1-.4 INJECTION, SOLUTION INTRAMUSCULAR; INTRAVENOUS; SUBCUTANEOUS
Status: DISCONTINUED | OUTPATIENT
Start: 2019-03-13 | End: 2019-03-13 | Stop reason: HOSPADM

## 2019-03-13 RX ORDER — FENTANYL CITRATE 50 UG/ML
INJECTION, SOLUTION INTRAMUSCULAR; INTRAVENOUS PRN
Status: DISCONTINUED | OUTPATIENT
Start: 2019-03-13 | End: 2019-03-13

## 2019-03-13 RX ORDER — PROPOFOL 10 MG/ML
INJECTION, EMULSION INTRAVENOUS PRN
Status: DISCONTINUED | OUTPATIENT
Start: 2019-03-13 | End: 2019-03-13

## 2019-03-13 RX ADMIN — ONDANSETRON 4 MG: 2 INJECTION INTRAMUSCULAR; INTRAVENOUS at 07:46

## 2019-03-13 RX ADMIN — DIMENHYDRINATE 25 MG: 50 INJECTION, SOLUTION INTRAMUSCULAR; INTRAVENOUS at 07:46

## 2019-03-13 RX ADMIN — FENTANYL CITRATE 100 MCG: 50 INJECTION, SOLUTION INTRAMUSCULAR; INTRAVENOUS at 07:30

## 2019-03-13 RX ADMIN — LIDOCAINE HYDROCHLORIDE 40 MG: 20 INJECTION, SOLUTION INFILTRATION; PERINEURAL at 07:36

## 2019-03-13 RX ADMIN — LIDOCAINE HYDROCHLORIDE 2 ML: 10 INJECTION, SOLUTION EPIDURAL; INFILTRATION; INTRACAUDAL; PERINEURAL at 07:01

## 2019-03-13 RX ADMIN — HYDROMORPHONE HYDROCHLORIDE 0.5 MG: 1 INJECTION, SOLUTION INTRAMUSCULAR; INTRAVENOUS; SUBCUTANEOUS at 07:44

## 2019-03-13 RX ADMIN — SODIUM CHLORIDE, POTASSIUM CHLORIDE, SODIUM LACTATE AND CALCIUM CHLORIDE: 600; 310; 30; 20 INJECTION, SOLUTION INTRAVENOUS at 07:28

## 2019-03-13 RX ADMIN — PROPOFOL 130 MG: 10 INJECTION, EMULSION INTRAVENOUS at 07:36

## 2019-03-13 RX ADMIN — SODIUM CHLORIDE, POTASSIUM CHLORIDE, SODIUM LACTATE AND CALCIUM CHLORIDE: 600; 310; 30; 20 INJECTION, SOLUTION INTRAVENOUS at 08:09

## 2019-03-13 RX ADMIN — DEXAMETHASONE SODIUM PHOSPHATE 10 MG: 10 INJECTION, SOLUTION INTRAMUSCULAR; INTRAVENOUS at 07:46

## 2019-03-13 RX ADMIN — SODIUM CHLORIDE, POTASSIUM CHLORIDE, SODIUM LACTATE AND CALCIUM CHLORIDE: 600; 310; 30; 20 INJECTION, SOLUTION INTRAVENOUS at 07:01

## 2019-03-13 RX ADMIN — SUGAMMADEX 160 MG: 100 INJECTION, SOLUTION INTRAVENOUS at 08:09

## 2019-03-13 RX ADMIN — PROPOFOL 150 MCG/KG/MIN: 10 INJECTION, EMULSION INTRAVENOUS at 07:34

## 2019-03-13 RX ADMIN — ROCURONIUM BROMIDE 30 MG: 10 INJECTION INTRAVENOUS at 07:36

## 2019-03-13 RX ADMIN — SCOPALAMINE 1 PATCH: 1 PATCH, EXTENDED RELEASE TRANSDERMAL at 07:29

## 2019-03-13 RX ADMIN — MIDAZOLAM 2 MG: 1 INJECTION INTRAMUSCULAR; INTRAVENOUS at 07:30

## 2019-03-13 RX ADMIN — KETOROLAC TROMETHAMINE 30 MG: 30 INJECTION, SOLUTION INTRAMUSCULAR at 08:09

## 2019-03-13 NOTE — ANESTHESIA CARE TRANSFER NOTE
Patient: Aide Burroughs    Procedure(s):  LAPAROSCOPIC BILATERAL SALPINGECTOMY    Diagnosis: sterilization  Diagnosis Additional Information: No value filed.    Anesthesia Type:   General, ETT     Note:  Airway :Face Mask  Patient transferred to:PACU  Handoff Report: Identifed the Patient, Identified the Reponsible Provider, Reviewed the pertinent medical history, Discussed the surgical course, Reviewed Intra-OP anesthesia mangement and issues during anesthesia, Set expectations for post-procedure period and Allowed opportunity for questions and acknowledgement of understanding      Vitals: (Last set prior to Anesthesia Care Transfer)    CRNA VITALS  3/13/2019 0801 - 3/13/2019 0840      3/13/2019             Pulse:  50    SpO2:  98 %                Electronically Signed By: KYLEE Call CRNA  March 13, 2019  8:40 AM

## 2019-03-13 NOTE — DISCHARGE INSTRUCTIONS
2:30pm next time for Ibuprofen    WAIT UNTIL TOMORROW, Thursday 3/14, TO TAKE SHOWER. NO BATHS OR SWIMMING FOR 1 WEEK.    DISCHARGE INSTRUCTIONS FOR PATIENT   SCOPOLAMINE TRANSDERMAL PATCH  You may leave the patch on behind your ear for three days, but NO LONGER. You may have withdrawal symptoms (nausea, vomiting, headache, dizziness) if used longer.  When you remove the patch, you may wash and dry your hands thoroughly and before touching your eyes, as pupil may dilate.  Discard patch (away from children and pets).  You may develop some urinary hesitancy or urine retention.  [facility]   Adult Discharge Orders & Instructions     For 24 hours after surgery    1. Get plenty of rest.  A responsible adult must stay with you for at least 24 hours after you leave the hospital.   2. Do not drive or use heavy equipment.  If you have weakness or tingling, don't drive or use heavy equipment until this feeling goes away.  3. Do not drink alcohol.  4. Avoid strenuous or risky activities.  Ask for help when climbing stairs.   5. You may feel lightheaded.  If so, sit for a few minutes before standing.  Have someone help you get up.   6. You may have a slight fever. Call the doctor if your fever is over 100 F (37.7 C) (taken under the tongue) or lasts longer than 24 hours.  7. You may have a dry mouth, a sore throat, muscle aches or trouble sleeping.  These should go away after 24 hours.  8. Do not make important or legal decisions.  We don t expect you to have any problems from the surgery or treatment you had today. Just in case, here s what to do if you have pain, upset stomach (nausea), bleeding or infection:  Pain:  Take medicines your doctor has prescribed or over-the-counter medicine they have suggested. Resting and using ice packs can help, too. For surgery on an arm or leg, raise it on a pillow to ease swelling. Call your doctor if these methods don t work.  Copyright Tae Goldberg, Licensed  under [employer]  Upset stomach (nausea):  Take anti-nausea medicine approved by your doctor. Drink clear liquids like apple juice, ginger ale, broth or 7-Up. Be sure to drink enough fluids. Rest can help, too. Move to normal foods when you re ready. Bleeding:  In the first 24 hours, you may see a little blood on your dressing, about the size of a quarter. You don t need to worry about this much blood, but if the blood spot keeps getting bigger:    Put pressure on the wound if you can, AND    Call your doctor.  Copyright [employer], Licensed under [employer]  Fever/Infection: Please call your doctor if you have any of these signs:    Redness    Swelling    Wound feels warm    Pain gets worse    Bad-smelling fluid leaks from wound    Fever or chills  Call your doctor for any of the followin.  It has been over 8 to 10 hours since surgery and you are still not able to urinate (pass water).    2.  Headache for over 24 hours.    3.  Numbness, tingling or weakness in your legs the day after surgery (if you had spinal anesthesia).    Nurse advice line: [phone]

## 2019-03-13 NOTE — ANESTHESIA PREPROCEDURE EVALUATION
Anesthesia Pre-Procedure Evaluation    Patient: Aide Burroughs   MRN: [id number] : 1975          Preoperative Diagnosis: sterilization    Procedure(s):  LAPAROSCOPIC BILATERAL SALPINGECTOMY    Past Medical History:   Diagnosis Date     Generalized anxiety disorder      MDD (major depressive disorder), recurrent, severe, with psychosis (H)      Pulmonary embolism (H) 11/15/2018    while on OCP     Past Surgical History:   Procedure Laterality Date     ESOPHAGOSCOPY, GASTROSCOPY, DUODENOSCOPY (EGD), COMBINED N/A 5/15/2017    Procedure: COMBINED ESOPHAGOSCOPY, GASTROSCOPY, DUODENOSCOPY (EGD), BIOPSY SINGLE OR MULTIPLE;  ESOPHAGOSCOPY, GASTROSCOPY, DUODENOSCOPY (EGD) with biopsies by biopsy;  Surgeon: Robles Falk MD;  Location: [facility]     MYRINGOTOMY, INSERT TUBE, COMBINED Left 2018       Anesthesia Evaluation     . Pt has had prior anesthetic. Type: General and MAC           ROS/MED HX    ENT/Pulmonary:       Neurologic:  - neg neurologic ROS     Cardiovascular:     (+) Dyslipidemia, ----. : . . . :. .       METS/Exercise Tolerance:     Hematologic:     (+) History of blood clots pt is not anticoagulated, -      Musculoskeletal:  - neg musculoskeletal ROS       GI/Hepatic:  - neg GI/hepatic ROS       Renal/Genitourinary:  - ROS Renal section negative       Endo:  - neg endo ROS       Psychiatric: Comment: PTSD, panic attacks    (+) psychiatric history anxiety and depression      Infectious Disease:  - neg infectious disease ROS       Malignancy:      - no malignancy   Other:    - neg other ROS                      Physical Exam  Normal systems: cardiovascular, pulmonary and dental    Airway   Mallampati: II  TM distance: >3 FB  Neck ROM: full    Dental     Cardiovascular   Rhythm and rate: regular and normal      Pulmonary    breath sounds clear to auscultation            Lab Results   Component Value Date    WBC 5.1 2018    HGB 13.1 2018    HCT 38.5 2018     2018     " 12/12/2018    POTASSIUM 4.0 12/12/2018    CHLORIDE 104 12/12/2018    CO2 29 12/12/2018    BUN 9 12/12/2018    CR 0.86 12/12/2018    GLC 99 12/12/2018    RYANN 8.7 12/12/2018    ALBUMIN 3.9 01/06/2015    PROTTOTAL 7.7 01/06/2015    ALT 23 01/06/2015    AST 18 01/06/2015    ALKPHOS 79 01/06/2015    BILITOTAL 0.8 01/06/2015    PTT 35 12/12/2018    INR 1.15 (H) 12/12/2018    TSH 1.12 08/05/2015    HCG Negative 05/15/2017    HCGS Negative 12/12/2018       Preop Vitals  BP Readings from Last 3 Encounters:   03/12/19 118/72   02/25/19 128/84   02/14/19 148/72    Pulse Readings from Last 3 Encounters:   03/12/19 85   02/25/19 84   02/14/19 73      Resp Readings from Last 3 Encounters:   03/12/19 18   02/25/19 16   02/14/19 18    SpO2 Readings from Last 3 Encounters:   03/12/19 100%   02/25/19 97%   02/14/19 98%      Temp Readings from Last 1 Encounters:   03/12/19 96.9  F (36.1  C) (Temporal)    Ht Readings from Last 1 Encounters:   03/12/19 1.689 m (5' 6.5\")      Wt Readings from Last 1 Encounters:   03/12/19 69 kg (152 lb 3.2 oz)    Estimated body mass index is 24.2 kg/m  as calculated from the following:    Height as of 3/12/19: 1.689 m (5' 6.5\").    Weight as of 3/12/19: 69 kg (152 lb 3.2 oz).       Anesthesia Plan      History & Physical Review  History and physical reviewed and following examination; no interval change.    ASA Status:  2 .    NPO Status:  > 8 hours    Plan for General and ETT with Intravenous and Propofol induction. Maintenance will be Balanced.    PONV prophylaxis:  Ondansetron (or other 5HT-3) and Dexamethasone or Solumedrol       Postoperative Care  Postoperative pain management:  IV analgesics and Oral pain medications.      Consents  Anesthetic plan, risks, benefits and alternatives discussed with:  Patient.  Use of blood products discussed: No .   .                 KYLEE Call CRNA  "

## 2019-03-13 NOTE — OP NOTE
[facility] Operative Report    Pre-operative diagnosis: Desires sterilization   Post-operative diagnosis: Same   Procedure: laparoscopic, bilateral sapingectomy  Surgeon: Joseluis Wing MD   Anesthesia:  GETA  Estimated blood loss: 5 ml   Total IV fluids: 1200cc crystalloid   Total urine output: 100 cc clear drained at onset of procedure   Drains: none  Specimens: Right and left fallopian tube  Findings: Normal genitalia, vagina, cervix, small mobile anteverted uterus. On laparoscopy, normal abdominal survey, normal uterus, tubes, ovaries, no adhesions.   Complications: None apparent  Condition: Stable, to PACU  Indications: Aide Burroughs is a 43 year old  woman with history of acute focal segmental LLL PE (11/15) in the setting of OCPs, who desires sterilization. She is thin, healthy, no significant surgical hx, no Ob nor Gyn hx, no abnl PAP. We discussed risks of surgery including infection, bleeding, injury to surrounding structures, risks from anesthesia, further risks of VTE. Informed consent was signed prior to the procedure.   Procedure:   The patient was taken to the operating room where she was prepped and draped in the usual sterile fashion. She then underwent GETA, and was positioned to the dorsal lithotomy position with yellow-fin stirrups. A time out was completed  Attention was then turned to the umbilicus, which was elevated manually. 10 ml of 0.5% marcaine was injected. A stab incision was made with scalpel. Entry made directly using the 5 mm Visi-port and insufflation achieved. No entry trauma visualized. The patient was placed in steep Trendelenburg. Two additional 5 mm ports were placed in the LLQ & RLQ under direct observation. The bowels were swept away using an atraumatic grasper. The tubes were then sequentially elevated and ligated laterally at the fimbriated ends to medially at the cornua using the Ligasure. Each tube was passed out through a 5 mm port and sent for pathology.  Pelvis examined with good hemostasis noted. Abdomen was desufflated and. Camera and ports were then removed. The port site skin incisions were closed in a subcuticular fashion with 4.0 monocryl and dermabond.  There were no complications during the procedure. The patient went to the postanesthesia recovery room in stable condition.  Joseluis Wing MD  Obstetrics and Gynecology  March 13, 2019 8:26 AM

## 2019-03-13 NOTE — ANESTHESIA POSTPROCEDURE EVALUATION
Patient: Aide Burroughs    Procedure(s):  LAPAROSCOPIC BILATERAL SALPINGECTOMY    Diagnosis:sterilization  Diagnosis Additional Information: No value filed.    Anesthesia Type:  General, ETT    Note:  Anesthesia Post Evaluation    Patient location during evaluation: Phase 2  Patient participation: Able to fully participate in evaluation  Level of consciousness: awake  Pain management: adequate  Airway patency: patent  Cardiovascular status: acceptable and hemodynamically stable  Respiratory status: acceptable, room air and nonlabored ventilation  Hydration status: stable  PONV: none     Anesthetic complications: None    Comments: Patient was happy with the anesthesia care received and no anesthesia related complications were noted.  I will follow up with the patient again if it is needed.        Last vitals:  Vitals:    03/13/19 0914 03/13/19 0915 03/13/19 0930   BP:   101/66   Pulse:   65   Resp:      Temp:      SpO2: (!) 88% 97% 100%         Electronically Signed By: KYLEE Call CRNA  March 13, 2019  10:13 AM

## 2019-03-15 LAB — COPATH REPORT: NORMAL

## 2019-03-18 ENCOUNTER — HOSPITAL ENCOUNTER (OUTPATIENT)
Dept: MAMMOGRAPHY | Facility: CLINIC | Age: 44
Discharge: HOME | End: 2019-03-18
Attending: FAMILY MEDICINE | Admitting: FAMILY MEDICINE
Payer: COMMERCIAL

## 2019-03-18 DIAGNOSIS — Z12.31 VISIT FOR SCREENING MAMMOGRAM: ICD-10-CM

## 2019-03-18 PROCEDURE — 77063 BREAST TOMOSYNTHESIS BI: CPT

## 2019-04-10 ENCOUNTER — OFFICE VISIT (OUTPATIENT)
Dept: OBGYN | Facility: CLINIC | Age: 44
End: 2019-04-10
Payer: COMMERCIAL

## 2019-04-10 VITALS
BODY MASS INDEX: 23.99 KG/M2 | SYSTOLIC BLOOD PRESSURE: 128 MMHG | DIASTOLIC BLOOD PRESSURE: 86 MMHG | HEART RATE: 56 BPM | WEIGHT: 150.9 LBS

## 2019-04-10 DIAGNOSIS — Z98.890 POSTOPERATIVE STATE: Primary | ICD-10-CM

## 2019-04-10 DIAGNOSIS — F33.41 RECURRENT MAJOR DEPRESSION IN PARTIAL REMISSION (H): ICD-10-CM

## 2019-04-10 DIAGNOSIS — F41.1 GENERALIZED ANXIETY DISORDER: ICD-10-CM

## 2019-04-10 PROCEDURE — 99024 POSTOP FOLLOW-UP VISIT: CPT | Performed by: OBSTETRICS & GYNECOLOGY
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
## 2019-04-11 RX ORDER — VENLAFAXINE HYDROCHLORIDE 37.5 MG/1
CAPSULE, EXTENDED RELEASE ORAL
Qty: 30 CAPSULE | Refills: 5 | Status: SHIPPED | OUTPATIENT
Start: 2019-04-11 | End: 2020-03-24

## 2019-04-11 RX ORDER — VENLAFAXINE HYDROCHLORIDE 75 MG/1
CAPSULE, EXTENDED RELEASE ORAL
Qty: 30 CAPSULE | Refills: 5 | Status: SHIPPED | OUTPATIENT
Start: 2019-04-11 | End: 2019-12-27

## 2019-04-11 NOTE — TELEPHONE ENCOUNTER
"Effexor 37.5mg  Last Written Prescription Date:  12/12/2018  Last Fill Quantity: 30,  # refills: 3   Last office visit: 3/12/2019 with prescribing provider:  Ting Taylor Office Visit:  None  Prescription approved per [facility] Refill Protocol.    Effexor 75mg  Last Written Prescription Date:  12/12/2018  Last Fill Quantity: 30,  # refills: 3   Last office visit: 3/12/2019 with prescribing provider:  Ting Taylor Office Visit:  None  Prescription approved per [facility] Refill Protocol.    Requested Prescriptions   Pending Prescriptions Disp Refills     venlafaxine (EFFEXOR-XR) 75 MG 24 hr capsule [Pharmacy Med Name: VENLAFAXINE HCL ER 75MG CP24] 30 capsule 2     Sig: TAKE 1 CAPSULE BY MOUTH DAILY (TAKE ALONG WITH 37.5 MG CAPSULE FOR TOTAL DAILY DOSE .5 DAILY)       Serotonin-Norepinephrine Reuptake Inhibitors  Passed - 4/10/2019 11:20 AM        Passed - Blood pressure under 140/90 in past 12 months     BP Readings from Last 3 Encounters:   04/10/19 128/86   03/13/19 109/68   03/12/19 118/72           Passed - PHQ-9 score of less than 5 in past 6 months     Please review last PHQ-9 score.         Passed - Medication is active on med list        Passed - Patient is age 18 or older        Passed - No active pregnancy on record        Passed - Normal serum creatinine on file in past 12 months     Recent Labs   Lab Test 12/12/18  1543   CR 0.86           Passed - No positive pregnancy test in past 12 months        Passed - Recent (6 mo) or future (30 days) visit within the authorizing provider's specialty     Patient had office visit in the last 6 months or has a visit in the next 30 days with authorizing provider or within the authorizing provider's specialty.  See \"Patient Info\" tab in inbasket, or \"Choose Columns\" in Meds & Orders section of the refill encounter.          venlafaxine (EFFEXOR-XR) 37.5 MG 24 hr capsule [Pharmacy Med Name: VENLAFAXINE ER 37.5MG CP24] 30 capsule 2     Sig: TAKE 1 CAPSULE BY MOUTH DAILY " "(TAKE ALONG WITH 75 MG CAPSULES FOR TOTAL DAILY DOSE .5 MG)       Serotonin-Norepinephrine Reuptake Inhibitors  Passed - 4/10/2019 11:20 AM        Passed - Blood pressure under 140/90 in past 12 months     BP Readings from Last 3 Encounters:   04/10/19 128/86   03/13/19 109/68   03/12/19 118/72           Passed - PHQ-9 score of less than 5 in past 6 months     Please review last PHQ-9 score.         Passed - Medication is active on med list        Passed - Patient is age 18 or older        Passed - No active pregnancy on record        Passed - Normal serum creatinine on file in past 12 months     Recent Labs   Lab Test 12/12/18  1543   CR 0.86           Passed - No positive pregnancy test in past 12 months        Passed - Recent (6 mo) or future (30 days) visit within the authorizing provider's specialty     Patient had office visit in the last 6 months or has a visit in the next 30 days with authorizing provider or within the authorizing provider's specialty.  See \"Patient Info\" tab in inbasket, or \"Choose Columns\" in Meds & Orders section of the refill encounter.        Alka Godoy RN   "

## 2019-08-16 ENCOUNTER — FCC EXTENDED DOCUMENTATION (OUTPATIENT)
Dept: PSYCHOLOGY | Facility: CLINIC | Age: 44
End: 2019-08-16

## 2019-08-16 NOTE — PROGRESS NOTES
Discharge Summary  Error discharge note already complete       Leeann Gandara, TH   8/16/2019

## 2019-11-19 ENCOUNTER — OFFICE VISIT (OUTPATIENT)
Dept: URGENT CARE | Facility: RETAIL CLINIC | Age: 44
End: 2019-11-19
Payer: COMMERCIAL

## 2019-11-19 VITALS
TEMPERATURE: 97.7 F | DIASTOLIC BLOOD PRESSURE: 84 MMHG | OXYGEN SATURATION: 98 % | HEART RATE: 77 BPM | SYSTOLIC BLOOD PRESSURE: 145 MMHG

## 2019-11-19 DIAGNOSIS — J06.9 VIRAL UPPER RESPIRATORY TRACT INFECTION WITH COUGH: Primary | ICD-10-CM

## 2019-11-19 DIAGNOSIS — J04.0 LARYNGITIS: ICD-10-CM

## 2019-11-19 PROCEDURE — 99213 OFFICE O/P EST LOW 20 MIN: CPT | Performed by: NURSE PRACTITIONER

## 2019-11-19 ASSESSMENT — ENCOUNTER SYMPTOMS
MYALGIAS: 1
VOICE CHANGE: 1
WHEEZING: 0
DIZZINESS: 0
ADENOPATHY: 0
SINUS PAIN: 0
FATIGUE: 0
SHORTNESS OF BREATH: 0
EYE ITCHING: 0
HEADACHES: 0
DIAPHORESIS: 0
CHEST TIGHTNESS: 0
FEVER: 1
SLEEP DISTURBANCE: 0
HEARTBURN: 0
CHILLS: 0
SORE THROAT: 0
COUGH: 1

## 2019-11-19 NOTE — PROGRESS NOTES
Chief Complaint   Patient presents with     Cough     x 6 days needs note for works      Fever     Generalized Body Aches     SUBJECTIVE:  Aide Burroughs is a 44 year old female who presents to the clinic today with a chief complaint of cough, fever, myalgias. Symptoms are getting better, but work is requiring her to be seen.  Her cough is described as occasional.  The patient's symptoms are moderate and improving.  The patient's symptoms are exacerbated by no particular triggers.  Patient has been using nothing to improve symptoms.  Predisposing factors include: none.    Past Medical History:   Diagnosis Date     Generalized anxiety disorder      MDD (major depressive disorder), recurrent, severe, with psychosis (H)      Pulmonary embolism (H) 11/15/2018    while on OCP     atorvastatin (LIPITOR) 10 MG tablet, Take 1 tablet (10 mg) by mouth daily  cholecalciferol (VITAMIN D3) 5000 UNITS CAPS capsule, Take by mouth daily  Pseudoeph-Doxylamine-DM-APAP (DAYQUIL/NYQUIL COLD/FLU RELIEF OR),   venlafaxine (EFFEXOR-XR) 37.5 MG 24 hr capsule, TAKE 1 CAPSULE BY MOUTH DAILY (TAKE ALONG WITH 75 MG CAPSULES FOR TOTAL DAILY DOSE .5 MG)  venlafaxine (EFFEXOR-XR) 75 MG 24 hr capsule, TAKE 1 CAPSULE BY MOUTH DAILY (TAKE ALONG WITH 37.5 MG CAPSULE FOR TOTAL DAILY DOSE .5 DAILY)    No current facility-administered medications on file prior to visit.     Social History     Tobacco Use     Smoking status: Never Smoker     Smokeless tobacco: Never Used   Substance Use Topics     Alcohol use: Yes     Alcohol/week: 0.0 standard drinks     Frequency: Monthly or less     Drinks per session: 1 or 2     Binge frequency: Never     Comment: rare     Allergies   Allergen Reactions     Erythromycin Nausea and Vomiting     Seasonal Allergies      Molds, grass, multiple trees, plant pollen, cats, rabbits, dogs, hay.     Review of Systems   Constitutional: Positive for fever. Negative for chills, diaphoresis and fatigue.   HENT: Positive  for voice change (lost voice). Negative for congestion, ear pain, postnasal drip, sinus pain and sore throat.    Eyes: Negative for itching.   Respiratory: Positive for cough. Negative for chest tightness, shortness of breath and wheezing.    Gastrointestinal: Negative for heartburn.   Musculoskeletal: Positive for myalgias.   Allergic/Immunologic: Negative for environmental allergies.   Neurological: Negative for dizziness and headaches.   Hematological: Negative for adenopathy.   Psychiatric/Behavioral: Negative for sleep disturbance.     BP (!) 145/84   Pulse 77   Temp 97.7  F (36.5  C) (Oral)   SpO2 98%     Physical Exam  Vitals signs reviewed.   Constitutional:       General: She is not in acute distress.     Appearance: She is well-developed. She is not diaphoretic.   HENT:      Head: Normocephalic and atraumatic.      Right Ear: External ear normal.      Left Ear: External ear normal.      Nose: Nose normal.   Eyes:      Pupils: Pupils are equal, round, and reactive to light.   Neck:      Musculoskeletal: Normal range of motion and neck supple.   Cardiovascular:      Rate and Rhythm: Normal rate.   Pulmonary:      Effort: Respiratory distress (occasional cough with upper airway congestion) present.      Breath sounds: No stridor. No wheezing, rhonchi or rales.      Comments: No crackles.  Chest:      Chest wall: No tenderness.   Musculoskeletal: Normal range of motion.   Lymphadenopathy:      Cervical: No cervical adenopathy.   Skin:     General: Skin is warm and dry.      Capillary Refill: Capillary refill takes less than 2 seconds.      Coloration: Skin is not pale.      Findings: No rash.   Neurological:      General: No focal deficit present.      Mental Status: She is alert and oriented to person, place, and time.      Coordination: Coordination normal.   Psychiatric:         Mood and Affect: Mood normal.         Behavior: Behavior normal.       ASSESSMENT:    ICD-10-CM    1. Viral upper respiratory  tract infection with cough J06.9     B97.89    2. Laryngitis J04.0      PLAN:   Patient Instructions   Viral upper respiratory infection, flulike, runs course of about 7-10days  Rest! Your body needs more rest to heal.  Drink plenty of fluids (warm fluids like tea or soup are soothing and reduce cough)  Sit in the bathroom with a hot shower running and breathe in the steam.  Honey may soothe your sore throat and help manage your cough- may take straight or in warm water with lemon juice.  Avoid smoke (cigarettes, bonfires, fireplace, wood burning stoves).  Take Tylenol or an NSAID such as ibuprofen or naproxen as needed for pain.  Delsym (dextromethorphan polistirex) is an over the counter cough medication that lasts 12 hours.   Mucinex or Robitussin (guiafenesin) thin mucus and may help it to loosen more quickly  Good handwashing is the best way to prevent spread of germs  Present to emergency room if you develop trouble breathing, swallowing or cough-up blood, chest pain.  Follow up with your primary care provider if symptoms worsen or fail to improve as expected.    Follow up with primary care provider with any problems, questions or concerns or if symptoms worsen or fail to improve. Patient agreed to plan and verbalized understanding.    MOISÉS Vega-BC  [facility]

## 2019-11-19 NOTE — PATIENT INSTRUCTIONS
Viral upper respiratory infection, flulike, runs course of about 7-10days  Rest! Your body needs more rest to heal.  Drink plenty of fluids (warm fluids like tea or soup are soothing and reduce cough)  Sit in the bathroom with a hot shower running and breathe in the steam.  Honey may soothe your sore throat and help manage your cough- may take straight or in warm water with lemon juice.  Avoid smoke (cigarettes, bonfires, fireplace, wood burning stoves).  Take Tylenol or an NSAID such as ibuprofen or naproxen as needed for pain.  Delsym (dextromethorphan polistirex) is an over the counter cough medication that lasts 12 hours.   Mucinex or Robitussin (guiafenesin) thin mucus and may help it to loosen more quickly  Good handwashing is the best way to prevent spread of germs  Present to emergency room if you develop trouble breathing, swallowing or cough-up blood, chest pain.  Follow up with your primary care provider if symptoms worsen or fail to improve as expected.

## 2019-11-19 NOTE — LETTER
[facility]  [address] [facility] [address]  Phone: [phone]        2019    Aide Burroughs  [address] [facility] [address]  [phone] (home)     :     1975      To Whom it May Concern:    This patient was seen in clinic 2019 due to illness. Recommend returning no earlier than  or for 24 hours after break of fever.    Please contact me for questions or concerns.    Sincerely,    Magaly Dumont, FNP-BC

## 2019-12-25 DIAGNOSIS — F33.41 RECURRENT MAJOR DEPRESSION IN PARTIAL REMISSION (H): ICD-10-CM

## 2019-12-25 DIAGNOSIS — F41.1 GENERALIZED ANXIETY DISORDER: ICD-10-CM

## 2019-12-26 NOTE — TELEPHONE ENCOUNTER
"PCP currently out of office, routing to covering provider.    Effexor  Last Written Prescription Date:  04/11/2019  Last Fill Quantity: 30,  # refills: 5   Last office visit: 3/12/2019 with prescribing provider:  Ting   Future Office Visit:  None  Routing refill request to provider for review/approval because:  Labs not current:  Creatinine  PHQ 9 not completed since 10/16/2018    Requested Prescriptions   Pending Prescriptions Disp Refills     venlafaxine (EFFEXOR-XR) 75 MG 24 hr capsule [Pharmacy Med Name: VENLAFAXINE HCL ER 75MG CP24] 30 capsule 5     Sig: TAKE ONE CAPSULE BY MOUTH ONCE DAILY TAKE WITH 37.5 MG CAPSULE       Serotonin-Norepinephrine Reuptake Inhibitors  Failed - 12/25/2019  1:08 AM        Failed - Blood pressure under 140/90 in past 12 months     BP Readings from Last 3 Encounters:   11/19/19 (!) 145/84   04/10/19 128/86   03/13/19 109/68           Failed - PHQ-9 score of less than 5 in past 6 months     Please review last PHQ-9 score.         Failed - Normal serum creatinine on file in past 12 months     Recent Labs   Lab Test 12/12/18  1543   CR 0.86           Failed - Recent (6 mo) or future (30 days) visit within the authorizing provider's specialty     Patient had office visit in the last 6 months or has a visit in the next 30 days with authorizing provider or within the authorizing provider's specialty.  See \"Patient Info\" tab in inbasket, or \"Choose Columns\" in Meds & Orders section of the refill encounter.          Passed - Medication is active on med list        Passed - Patient is age 18 or older        Passed - No active pregnancy on record        Passed - No positive pregnancy test in past 12 months      PHQ-9 score:    PHQ-9 SCORE 10/16/2018   PHQ-9 Total Score -   PHQ-9 Total Score 0     Alka Godoy RN   "

## 2019-12-27 RX ORDER — VENLAFAXINE HYDROCHLORIDE 75 MG/1
CAPSULE, EXTENDED RELEASE ORAL
Qty: 30 CAPSULE | Refills: 0 | Status: SHIPPED | OUTPATIENT
Start: 2019-12-27 | End: 2020-01-31

## 2020-01-19 ENCOUNTER — APPOINTMENT (OUTPATIENT)
Dept: GENERAL RADIOLOGY | Facility: CLINIC | Age: 45
End: 2020-01-19
Attending: EMERGENCY MEDICINE
Payer: COMMERCIAL

## 2020-01-19 ENCOUNTER — HOSPITAL ENCOUNTER (EMERGENCY)
Facility: CLINIC | Age: 45
Discharge: HOME | End: 2020-01-19
Attending: EMERGENCY MEDICINE | Admitting: EMERGENCY MEDICINE
Payer: COMMERCIAL

## 2020-01-19 VITALS
SYSTOLIC BLOOD PRESSURE: 123 MMHG | DIASTOLIC BLOOD PRESSURE: 79 MMHG | RESPIRATION RATE: 20 BRPM | OXYGEN SATURATION: 95 % | TEMPERATURE: 100 F | HEART RATE: 67 BPM

## 2020-01-19 DIAGNOSIS — F41.0 PANIC ATTACK: ICD-10-CM

## 2020-01-19 DIAGNOSIS — J06.9 VIRAL UPPER RESPIRATORY TRACT INFECTION: ICD-10-CM

## 2020-01-19 LAB
ANION GAP SERPL CALCULATED.3IONS-SCNC: 9 MMOL/L (ref 3–14)
BASOPHILS # BLD AUTO: 0 10E9/L (ref 0–0.2)
BASOPHILS NFR BLD AUTO: 0.7 %
BUN SERPL-MCNC: 6 MG/DL (ref 7–30)
CALCIUM SERPL-MCNC: 8.9 MG/DL (ref 8.5–10.1)
CHLORIDE SERPL-SCNC: 109 MMOL/L (ref 94–109)
CO2 SERPL-SCNC: 25 MMOL/L (ref 20–32)
CREAT SERPL-MCNC: 0.75 MG/DL (ref 0.52–1.04)
DIFFERENTIAL METHOD BLD: NORMAL
EOSINOPHIL NFR BLD AUTO: 2.4 %
ERYTHROCYTE [DISTWIDTH] IN BLOOD BY AUTOMATED COUNT: 13.2 % (ref 10–15)
GFR SERPL CREATININE-BSD FRML MDRD: >90 ML/MIN/{1.73_M2}
GLUCOSE SERPL-MCNC: 98 MG/DL (ref 70–99)
HCT VFR BLD AUTO: 39.7 % (ref 35–47)
HGB BLD-MCNC: 13.5 G/DL (ref 11.7–15.7)
IMM GRANULOCYTES # BLD: 0 10E9/L (ref 0–0.4)
IMM GRANULOCYTES NFR BLD: 0.2 %
LYMPHOCYTES # BLD AUTO: 2.6 10E9/L (ref 0.8–5.3)
LYMPHOCYTES NFR BLD AUTO: 47.5 %
MCH RBC QN AUTO: 30.6 PG (ref 26.5–33)
MCHC RBC AUTO-ENTMCNC: 34 G/DL (ref 31.5–36.5)
MCV RBC AUTO: 90 FL (ref 78–100)
MONOCYTES # BLD AUTO: 0.4 10E9/L (ref 0–1.3)
MONOCYTES NFR BLD AUTO: 7.7 %
NEUTROPHILS # BLD AUTO: 2.3 10E9/L (ref 1.6–8.3)
NEUTROPHILS NFR BLD AUTO: 41.5 %
NRBC # BLD AUTO: 0 10*3/UL
NRBC BLD AUTO-RTO: 0 /100
PLATELET # BLD AUTO: 267 10E9/L (ref 150–450)
POTASSIUM SERPL-SCNC: 3.3 MMOL/L (ref 3.4–5.3)
RBC # BLD AUTO: 4.41 10E12/L (ref 3.8–5.2)
SODIUM SERPL-SCNC: 143 MMOL/L (ref 133–144)
WBC # BLD AUTO: 5.4 10E9/L (ref 4–11)

## 2020-01-19 PROCEDURE — 85025 COMPLETE CBC W/AUTO DIFF WBC: CPT | Performed by: EMERGENCY MEDICINE

## 2020-01-19 PROCEDURE — 80048 BASIC METABOLIC PNL TOTAL CA: CPT | Performed by: EMERGENCY MEDICINE

## 2020-01-19 PROCEDURE — 71046 X-RAY EXAM CHEST 2 VIEWS: CPT | Mod: TC

## 2020-01-19 PROCEDURE — 96374 THER/PROPH/DIAG INJ IV PUSH: CPT | Performed by: EMERGENCY MEDICINE

## 2020-01-19 PROCEDURE — 25000128 H RX IP 250 OP 636: Performed by: EMERGENCY MEDICINE

## 2020-01-19 PROCEDURE — 99285 EMERGENCY DEPT VISIT HI MDM: CPT | Mod: Z6 | Performed by: EMERGENCY MEDICINE

## 2020-01-19 PROCEDURE — 99285 EMERGENCY DEPT VISIT HI MDM: CPT | Mod: 25 | Performed by: EMERGENCY MEDICINE

## 2020-01-19 PROCEDURE — 96375 TX/PRO/DX INJ NEW DRUG ADDON: CPT | Performed by: EMERGENCY MEDICINE

## 2020-01-19 RX ORDER — SODIUM CHLORIDE 9 MG/ML
1000 INJECTION, SOLUTION INTRAVENOUS CONTINUOUS
Status: DISCONTINUED | OUTPATIENT
Start: 2020-01-19 | End: 2020-01-19 | Stop reason: HOSPADM

## 2020-01-19 RX ORDER — LORAZEPAM 2 MG/ML
1 INJECTION INTRAMUSCULAR ONCE
Status: COMPLETED | OUTPATIENT
Start: 2020-01-19 | End: 2020-01-19

## 2020-01-19 RX ORDER — HYDROMORPHONE HYDROCHLORIDE 1 MG/ML
0.5 INJECTION, SOLUTION INTRAMUSCULAR; INTRAVENOUS; SUBCUTANEOUS
Status: DISCONTINUED | OUTPATIENT
Start: 2020-01-19 | End: 2020-01-19 | Stop reason: HOSPADM

## 2020-01-19 RX ADMIN — HYDROMORPHONE HYDROCHLORIDE 0.5 MG: 1 INJECTION, SOLUTION INTRAMUSCULAR; INTRAVENOUS; SUBCUTANEOUS at 18:33

## 2020-01-19 RX ADMIN — LORAZEPAM 1 MG: 2 INJECTION INTRAMUSCULAR; INTRAVENOUS at 18:33

## 2020-01-19 NOTE — ED AVS SNAPSHOT
[facility]  [address] [place] [address]  Phone:  [phone]  Fax:  [phone]                                    Aide Burroughs   MRN: [id number]    Department:  [facility]   Date of Visit:  1/19/2020           After Visit Summary Signature Page    I have received my discharge instructions, and my questions have been answered. I have discussed any challenges I see with this plan with the nurse or doctor.    ..........................................................................................................................................  Patient/Patient Representative Signature      ..........................................................................................................................................  Patient Representative Print Name and Relationship to Patient    ..................................................               ................................................  Date                                   Time    ..........................................................................................................................................  Reviewed by Signature/Title    ...................................................              ..............................................  Date                                               Time          22EPIC Rev 08/18

## 2020-01-20 NOTE — ED TRIAGE NOTES
"Presents to ED with ongoing cough and cold sx x3 weeks. Patient presented to registration desk hyperventilating and not answering questions. When in room, patient was not answering many questions, and when provider pushed on abdominal wall, patient screamed, \"Ow fuck.\" Patient's  in room and patient continues to hyperventilate despite efforts to de-escalate.   "

## 2020-01-20 NOTE — ED PROVIDER NOTES
History     Chief Complaint   Patient presents with     Cough     Abdominal Pain     The history is provided by the patient.     Aide Burroughs is a 44 year old female who presents to the emergency department with a cough. The patient developed a cough three weeks ago. She also complains of a fever, headache, abdominal pain, and diffuse myalgias. She denies pharyngitis or vomiting. The patient has a history of anxiety.    Allergies:  Allergies   Allergen Reactions     Erythromycin Nausea and Vomiting     Seasonal Allergies      Molds, grass, multiple trees, plant pollen, cats, rabbits, dogs, hay.       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 Provider to review and confirm.       Seasonal allergic rhinitis 04/26/2012     Priority: Medium        Past Medical History:    Past Medical History:   Diagnosis Date     Generalized anxiety disorder      MDD (major depressive disorder), recurrent, severe, with psychosis (H)      Pulmonary embolism (H) 11/15/2018       Past Surgical History:    Past Surgical History:   Procedure Laterality Date     ESOPHAGOSCOPY, GASTROSCOPY, DUODENOSCOPY (EGD), COMBINED N/A 5/15/2017    Procedure: COMBINED ESOPHAGOSCOPY, GASTROSCOPY, DUODENOSCOPY (EGD), BIOPSY SINGLE OR MULTIPLE;  ESOPHAGOSCOPY, GASTROSCOPY, DUODENOSCOPY (EGD) with biopsies by biopsy;  Surgeon: Robles Falk MD;  Location: [facility]      LAPAROSCOPIC SALPINGECTOMY Bilateral 3/13/2019    Procedure: LAPAROSCOPIC BILATERAL SALPINGECTOMY;  Surgeon: Joseluis Wing MD;  Location: [facility]     MYRINGOTOMY, INSERT TUBE, COMBINED Left 05/16/2018       Family History:    Family History   Problem Relation Age of Onset     Hypertension Mother      Hyperlipidemia Mother      Mental Illness Mother      Cancer - colorectal Father      Substance Abuse Father         alcoholic recovering     Cerebrovascular Disease Maternal Grandmother      Diabetes Paternal Grandmother      Heart Disease Paternal Grandfather         MI     Bipolar Disorder Maternal Aunt      Schizophrenia Maternal Aunt      Coronary Artery Disease Maternal Grandfather      Cerebrovascular Disease Maternal Grandfather      Other - See Comments Sister         2 yrs older     Multiple Sclerosis Maternal Aunt 45     Other - See Comments Daughter         9/14/2000     Other - See Comments Son         8/16/2004       Social History:  Marital Status:  Single [1]  Social History     Tobacco Use     Smoking status: Never Smoker     Smokeless tobacco: Never Used   Substance Use Topics     Alcohol use: Yes     Alcohol/week: 0.0 standard drinks     Frequency: Monthly or less     Drinks per session: 1 or 2     Binge frequency: Never     Comment: rare     Drug use: No        Medications:    atorvastatin (LIPITOR) 10 MG tablet  cholecalciferol (VITAMIN D3) 5000 UNITS CAPS capsule  Pseudoeph-Doxylamine-DM-APAP (DAYQUIL/NYQUIL COLD/FLU RELIEF OR)  venlafaxine (EFFEXOR-XR) 37.5 MG 24 hr capsule  venlafaxine (EFFEXOR-XR) 75 MG 24 hr capsule          Review of Systems   All other systems reviewed and are negative.      Physical Exam   BP: (!) 185/125  Pulse: 142  Temp: 100  F (37.8  C)  Resp: 20  SpO2: 97 %      Physical Exam  Vitals signs and nursing note reviewed.   Constitutional:       General: She is not in acute distress.     Appearance: She is well-developed. She is not diaphoretic.      Comments:  "Hyperventilating, anxious, tearful, crouched over.   HENT:      Head: Normocephalic and atraumatic.   Eyes:      General: No scleral icterus.  Neck:      Musculoskeletal: Normal range of motion and neck supple.   Cardiovascular:      Rate and Rhythm: Tachycardia present.   Pulmonary:      Breath sounds: No stridor. No wheezing or rhonchi.      Comments: Hyperventilation.  Difficulty hear secondary to  crying and hyperventilation  Abdominal:      General: There is no distension.      Tenderness: There is generalized abdominal tenderness.      Comments: Pain response to light palpation of abdominal muscle is extreme with yelling \"that fucking hurts\"   Skin:     General: Skin is warm and dry.      Coloration: Skin is not pale.      Findings: No erythema or rash.   Neurological:      General: No focal deficit present.      Mental Status: She is alert and oriented to person, place, and time.   Psychiatric:         Mood and Affect: Mood is anxious.      Comments: Very anxious, hyperventilating          ED Course        Procedures                   Results for orders placed or performed during the hospital encounter of 01/19/20 (from the past 24 hour(s))   CBC with platelets differential   Result Value Ref Range    WBC 5.4 4.0 - 11.0 10e9/L    RBC Count 4.41 3.8 - 5.2 10e12/L    Hemoglobin 13.5 11.7 - 15.7 g/dL    Hematocrit 39.7 35.0 - 47.0 %    MCV 90 78 - 100 fl    MCH 30.6 26.5 - 33.0 pg    MCHC 34.0 31.5 - 36.5 g/dL    RDW 13.2 10.0 - 15.0 %    Platelet Count 267 150 - 450 10e9/L    Diff Method Automated Method     % Neutrophils 41.5 %    % Lymphocytes 47.5 %    % Monocytes 7.7 %    % Eosinophils 2.4 %    % Basophils 0.7 %    % Immature Granulocytes 0.2 %    Nucleated RBCs 0 0 /100    Absolute Neutrophil 2.3 1.6 - 8.3 10e9/L    Absolute Lymphocytes 2.6 0.8 - 5.3 10e9/L    Absolute Monocytes 0.4 0.0 - 1.3 10e9/L    Absolute Basophils 0.0 0.0 - 0.2 10e9/L    Abs Immature Granulocytes 0.0 0 - 0.4 10e9/L    Absolute " Nucleated RBC 0.0    Basic metabolic panel   Result Value Ref Range    Sodium 143 133 - 144 mmol/L    Potassium 3.3 (L) 3.4 - 5.3 mmol/L    Chloride 109 94 - 109 mmol/L    Carbon Dioxide 25 20 - 32 mmol/L    Anion Gap 9 3 - 14 mmol/L    Glucose 98 70 - 99 mg/dL    Urea Nitrogen 6 (L) 7 - 30 mg/dL    Creatinine 0.75 0.52 - 1.04 mg/dL    GFR Estimate >90 >60 mL/min/[1.73_m2]    GFR Estimate If Black >90 >60 mL/min/[1.73_m2]    Calcium 8.9 8.5 - 10.1 mg/dL   XR Chest 2 Views    Narrative    XR CHEST 2 VW 1/19/2020 7:08 PM     HISTORY: cough      Impression    IMPRESSION: Negative exam.    ISACC DELUCA MD       Medications   HYDROmorphone (PF) (DILAUDID) injection 0.5 mg (0.5 mg Intravenous Given 1/19/20 1833)   0.9% sodium chloride BOLUS (1,000 mLs Intravenous Not Given 1/19/20 1931)     Followed by   sodium chloride 0.9% infusion (has no administration in time range)   LORazepam (ATIVAN) injection 1 mg (1 mg Intravenous Given 1/19/20 1833)       Assessments & Plan (with Medical Decision Making)  44-year-old female who presented with 3 weeks of coughing, hyperventilation, anxiety, body aches.  She was given Dilaudid, Ativan and IV fluids with improvement.  Chest x-ray is negative for pneumonia.  She completely stopped coughing and was completely comfortable and stopped hyperventilating and looked calm after the Ativan was given.  She feels well enough to go home.  She declined medications for anxiety.  She will use her 's albuterol if she has wheezing.  No evidence for bacterial infection.  Return to ER precautions discussed.     I have reviewed the nursing notes.    I have reviewed the findings, diagnosis, plan and need for follow up with the patient.      Discharge Medication List as of 1/19/2020  7:35 PM          Final diagnoses:   Viral upper respiratory tract infection   Panic attack   This document serves as a record of services personally performed by Cam Blake MD. It was created on their  behalf by Zenobia Doshi, a trained medical scribe. The creation of this record is based on the provider's personal observations and the statements of the patient. This document has been checked and approved by the attending provider.  Note: Chart documentation done in part with Dragon Voice Recognition software. Although reviewed after completion, some word and grammatical errors may remain.    1/19/2020   [facility] EMERGENCY DEPARTMENT     Cam Blake MD  01/19/20 2019

## 2020-01-20 NOTE — DISCHARGE INSTRUCTIONS
Return to the ER if you develop new or worsening symptoms.  Use albuterol if you have wheezing.  Follow-up with your doctor if no improvement in a few days.

## 2020-01-21 ENCOUNTER — OFFICE VISIT (OUTPATIENT)
Dept: FAMILY MEDICINE | Facility: OTHER | Age: 45
End: 2020-01-21
Payer: COMMERCIAL

## 2020-01-21 VITALS
TEMPERATURE: 98 F | HEART RATE: 68 BPM | HEIGHT: 67 IN | DIASTOLIC BLOOD PRESSURE: 86 MMHG | SYSTOLIC BLOOD PRESSURE: 132 MMHG | WEIGHT: 141.5 LBS | BODY MASS INDEX: 22.21 KG/M2 | RESPIRATION RATE: 16 BRPM

## 2020-01-21 DIAGNOSIS — R05.9 COUGH: Primary | ICD-10-CM

## 2020-01-21 DIAGNOSIS — J40 BRONCHITIS: ICD-10-CM

## 2020-01-21 LAB
FLUAV+FLUBV AG SPEC QL: NEGATIVE
FLUAV+FLUBV AG SPEC QL: NEGATIVE
SPECIMEN SOURCE: NORMAL

## 2020-01-21 PROCEDURE — 87804 INFLUENZA ASSAY W/OPTIC: CPT | Performed by: FAMILY MEDICINE

## 2020-01-21 PROCEDURE — 99213 OFFICE O/P EST LOW 20 MIN: CPT | Performed by: FAMILY MEDICINE

## 2020-01-21 RX ORDER — PREDNISONE 20 MG/1
20 TABLET ORAL DAILY
Qty: 7 TABLET | Refills: 0 | Status: SHIPPED | OUTPATIENT
Start: 2020-01-21 | End: 2020-07-23

## 2020-01-21 RX ORDER — ALBUTEROL SULFATE 90 UG/1
2 AEROSOL, METERED RESPIRATORY (INHALATION) EVERY 6 HOURS
Qty: 1 INHALER | Refills: 0 | Status: SHIPPED | OUTPATIENT
Start: 2020-01-21 | End: 2020-07-23

## 2020-01-21 RX ORDER — AMOXICILLIN 500 MG/1
500 CAPSULE ORAL 2 TIMES DAILY
Qty: 20 CAPSULE | Refills: 0 | Status: SHIPPED | OUTPATIENT
Start: 2020-01-21 | End: 2020-07-23

## 2020-01-21 ASSESSMENT — MIFFLIN-ST. JEOR: SCORE: 1330.5

## 2020-01-21 ASSESSMENT — PAIN SCALES - GENERAL: PAINLEVEL: MODERATE PAIN (4)

## 2020-01-21 NOTE — LETTER
[facility]  [address] [facility] [address]  Phone: [phone]    January 21, 2020        Aide Burroughs  [address] [facility] [address]          To whom it may concern:    RE: Aide Burroughs    Patient was seen and treated today at our clinic and missed work.  Patient may return to work on 1/27/2020 with the following:  No restrictions    Please contact me for questions or concerns.      Sincerely,        Cole Connell MD

## 2020-01-21 NOTE — PROGRESS NOTES
"Subjective     Aide Burroughs is a 44 year old female who presents to clinic today for the following health issues:    HPI   ED/UC Followup:    Facility:  [facility]  Date of visit: 01/19/20  Reason for visit: URI  Current Status: same       RESPIRATORY SYMPTOMS      Duration: 10 days    Description  nasal congestion, rhinorrhea, facial pain/pressure, cough, wheezing, fever, chills, headache, fatigue/malaise, myalgias, nausea, stomach ache and diarrhea    Severity: moderate    Accompanying signs and symptoms: None    History (predisposing factors):  none    Precipitating or alleviating factors: None    Therapies tried and outcome:  rest and fluids day quil and night quil and inhaler    SUBJECTIVE:    Aide is a 44 year old female presenting with uri complaints as noted above  See also ED encounter from 1/19    Past Medical History:   Diagnosis Date     Generalized anxiety disorder      MDD (major depressive disorder), recurrent, severe, with psychosis (H)      Pulmonary embolism (H) 11/15/2018    while on OCP       Current Outpatient Medications   Medication Sig Dispense Refill     atorvastatin (LIPITOR) 10 MG tablet Take 1 tablet (10 mg) by mouth daily 90 tablet 3     cholecalciferol (VITAMIN D3) 5000 UNITS CAPS capsule Take by mouth daily       Pseudoeph-Doxylamine-DM-APAP (DAYQUIL/NYQUIL COLD/FLU RELIEF OR)        venlafaxine (EFFEXOR-XR) 75 MG 24 hr capsule TAKE ONE CAPSULE BY MOUTH ONCE DAILY TAKE WITH 37.5 MG CAPSULE - appointment needed for additional refills 30 capsule 0     venlafaxine (EFFEXOR-XR) 37.5 MG 24 hr capsule TAKE 1 CAPSULE BY MOUTH DAILY (TAKE ALONG WITH 75 MG CAPSULES FOR TOTAL DAILY DOSE .5 MG) 30 capsule 5       OBJECTIVE:  /86   Pulse 68   Temp 98  F (36.7  C) (Temporal)   Resp 16   Ht 1.695 m (5' 6.75\")   Wt 64.2 kg (141 lb 8 oz)   BMI 22.33 kg/m      General appearance: alert and in no apparent distress  Skin color is pink  Hydration status appears adequate with " normal skin turgor and moist mucous membranes.    HEENT:     Left TM is normal: no effusions, no erythema, and normal landmarks.  Right TM is normal: no effusions, no erythema, and normal landmarks.  Oropharyngeal exam is normal: no lesions, erythema, adenopathy or exudate.  Neck is supple with no adenopathy    CARDIAC:NORMAL - regular rate and rhythm without murmur.  RESP: a few rhonchi  In LLL  ABDOMEN: neg    CXR Findings: neg in ED  Rapid strept:  CBC:nl in ED  Influenza:neg    ASSESSMENT:  Bronchitis superimposed on viral syndrome Viral syndrome    PLAN:  Tylenol, Ibuprofen, Fluids, Rest, OTC cough suppressant/expectorant and Rx Asthma exacerbation  Albuterol MDI, amoxacillin Prednisone  Pt to call back if no improvement in 3-4 days, call back sooner if new or increased symptoms.    dbue

## 2020-01-31 DIAGNOSIS — F41.1 GENERALIZED ANXIETY DISORDER: ICD-10-CM

## 2020-01-31 DIAGNOSIS — F33.41 RECURRENT MAJOR DEPRESSION IN PARTIAL REMISSION (H): ICD-10-CM

## 2020-01-31 RX ORDER — VENLAFAXINE HYDROCHLORIDE 75 MG/1
CAPSULE, EXTENDED RELEASE ORAL
Qty: 30 CAPSULE | Refills: 0 | Status: SHIPPED | OUTPATIENT
Start: 2020-01-31 | End: 2020-02-03

## 2020-01-31 NOTE — TELEPHONE ENCOUNTER
"Requested Prescriptions   Pending Prescriptions Disp Refills     venlafaxine (EFFEXOR-XR) 75 MG 24 hr capsule [Pharmacy Med Name: VENLAFAXINE HCL ER 75MG CP24] 30 capsule 0     Sig: TAKE ONE CAPSULE BY MOUTH ONCE DAILY ALONG WITH 37.5MG CAPSULE   Last Written Prescription Date:  12/27/19  Last Fill Quantity: 30,  # refills: 0   Last office visit: 1/21/2020 with prescribing provider:  Becki   Future Office Visit:   Next 5 appointments (look out 90 days)    Mar 31, 2020  1:20 PM CDT  PHYSICAL with Jamarcus Maguire MD  [facility] [address] [place] [id number] [phone]             Serotonin-Norepinephrine Reuptake Inhibitors  Failed - 1/31/2020  1:05 AM        Failed - PHQ-9 score of less than 5 in past 6 months     Please review last PHQ-9 score.   PHQ-9 score:    PHQ-9 SCORE 10/16/2018   PHQ-9 Total Score -   PHQ-9 Total Score 0           Passed - Blood pressure under 140/90 in past 12 months     BP Readings from Last 3 Encounters:   01/21/20 132/86   01/19/20 123/79   11/19/19 (!) 145/84           Passed - Medication is active on med list        Passed - Patient is age 18 or older        Passed - No active pregnancy on record        Passed - Normal serum creatinine on file in past 12 months     Recent Labs   Lab Test 01/19/20  1829   CR 0.75             Passed - No positive pregnancy test in past 12 months        Passed - Recent (6 mo) or future (30 days) visit within the authorizing provider's specialty     Patient had office visit in the last 6 months or has a visit in the next 30 days with authorizing provider or within the authorizing provider's specialty.  See \"Patient Info\" tab in inbasket, or \"Choose Columns\" in Meds & Orders section of the refill encounter.              "

## 2020-01-31 NOTE — TELEPHONE ENCOUNTER
Routing refill request to provider for review/approval because:  Labs not current:  phq9    Elisha Lemos RN on 1/31/2020 at 9:38 AM

## 2020-02-01 DIAGNOSIS — F41.1 GENERALIZED ANXIETY DISORDER: ICD-10-CM

## 2020-02-01 DIAGNOSIS — F33.41 RECURRENT MAJOR DEPRESSION IN PARTIAL REMISSION (H): ICD-10-CM

## 2020-02-03 RX ORDER — VENLAFAXINE HYDROCHLORIDE 75 MG/1
CAPSULE, EXTENDED RELEASE ORAL
Qty: 30 CAPSULE | Refills: 0 | Status: SHIPPED | OUTPATIENT
Start: 2020-02-03 | End: 2020-07-23

## 2020-02-03 NOTE — TELEPHONE ENCOUNTER
Routing refill request to provider for review/approval because:  PHQ-9 overdue    JOE HurtadoN, RN  [facility]

## 2020-02-03 NOTE — TELEPHONE ENCOUNTER
"Requested Prescriptions   Pending Prescriptions Disp Refills     venlafaxine (EFFEXOR-XR) 75 MG 24 hr capsule [Pharmacy Med Name: VENLAFAXINE HCL ER 75MG CP24] 30 capsule 0     Sig: TAKE ONE CAPSULE BY MOUTH ONCE DAILY ALONG WITH 37.5MG CAPSULE   Last Written Prescription Date:  1/31/2020  Last Fill Quantity: 30,  # refills: 0   Last office visit: 1/21/2020 with prescribing provider:  3/12/19   Future Office Visit:   Next 5 appointments (look out 90 days)    Mar 31, 2020  1:20 PM CDT  PHYSICAL with Jamarcus Maguire MD  [facility]) [address] [place] [id number] [phone]           Serotonin-Norepinephrine Reuptake Inhibitors  Failed - 2/1/2020  1:06 AM        Failed - PHQ-9 score of less than 5 in past 6 months     Please review last PHQ-9 score.   PHQ-9 score:    PHQ-9 SCORE 10/16/2018   PHQ-9 Total Score -   PHQ-9 Total Score 0           Passed - Blood pressure under 140/90 in past 12 months     BP Readings from Last 3 Encounters:   01/21/20 132/86   01/19/20 123/79   11/19/19 (!) 145/84                 Passed - Medication is active on med list        Passed - Patient is age 18 or older        Passed - No active pregnancy on record        Passed - Normal serum creatinine on file in past 12 months     Recent Labs   Lab Test 01/19/20  1829   CR 0.75             Passed - No positive pregnancy test in past 12 months        Passed - Recent (6 mo) or future (30 days) visit within the authorizing provider's specialty     Patient had office visit in the last 6 months or has a visit in the next 30 days with authorizing provider or within the authorizing provider's specialty.  See \"Patient Info\" tab in inbasket, or \"Choose Columns\" in Meds & Orders section of the refill encounter.            "

## 2020-03-21 DIAGNOSIS — F41.1 GENERALIZED ANXIETY DISORDER: ICD-10-CM

## 2020-03-21 DIAGNOSIS — F33.41 RECURRENT MAJOR DEPRESSION IN PARTIAL REMISSION (H): ICD-10-CM

## 2020-03-23 NOTE — TELEPHONE ENCOUNTER
"Effexor  Last Written Prescription Date:  04/11/2019  Last Fill Quantity: 30,  # refills: 5   Last office visit: 1/21/2020 with prescribing provider:  Becki   Future Office Visit:   Next 5 appointments (look out 90 days)    Mar 31, 2020  1:20 PM CDT  PHYSICAL with Jamarcus Maguire MD  [facility] ([facility]) [address] [place] [id number] [phone]      Routing refill request to provider for review/approval because:  PHQ 9 completed 10/2018    Requested Prescriptions   Pending Prescriptions Disp Refills     venlafaxine (EFFEXOR-XR) 37.5 MG 24 hr capsule [Pharmacy Med Name: VENLAFAXINE HCL ER 37.5MG CP24] 90 capsule 1     Sig: TAKE THREE CAPSULES BY MOUTH EVERY DAY       Serotonin-Norepinephrine Reuptake Inhibitors  Failed - 3/21/2020 12:10 PM        Failed - PHQ-9 score of less than 5 in past 6 months     Please review last PHQ-9 score.         Passed - Blood pressure under 140/90 in past 12 months     BP Readings from Last 3 Encounters:   01/21/20 132/86   01/19/20 123/79   11/19/19 (!) 145/84           Passed - Medication is active on med list        Passed - Patient is age 18 or older        Passed - No active pregnancy on record        Passed - Normal serum creatinine on file in past 12 months     Recent Labs   Lab Test 01/19/20  1829   CR 0.75     Ok to refill medication if creatinine is low        Passed - No positive pregnancy test in past 12 months        Passed - Recent (6 mo) or future (30 days) visit within the authorizing provider's specialty     Patient had office visit in the last 6 months or has a visit in the next 30 days with authorizing provider or within the authorizing provider's specialty.  See \"Patient Info\" tab in inbasket, or \"Choose Columns\" in Meds & Orders section of the refill encounter.           PHQ-9 score:    PHQ 10/16/2018   PHQ-9 Total Score 0   Q9: Thoughts of better off dead/self-harm past 2 weeks Not at all     Alka Godoy RN   "

## 2020-03-24 RX ORDER — VENLAFAXINE HYDROCHLORIDE 37.5 MG/1
CAPSULE, EXTENDED RELEASE ORAL
Qty: 90 CAPSULE | Refills: 3 | Status: SHIPPED | OUTPATIENT
Start: 2020-03-24 | End: 2020-07-01

## 2020-07-23 ENCOUNTER — OFFICE VISIT (OUTPATIENT)
Dept: FAMILY MEDICINE | Facility: CLINIC | Age: 45
End: 2020-07-23
Payer: COMMERCIAL

## 2020-07-23 VITALS
WEIGHT: 143 LBS | HEART RATE: 59 BPM | HEIGHT: 67 IN | DIASTOLIC BLOOD PRESSURE: 76 MMHG | RESPIRATION RATE: 20 BRPM | SYSTOLIC BLOOD PRESSURE: 111 MMHG | OXYGEN SATURATION: 100 % | TEMPERATURE: 98 F | BODY MASS INDEX: 22.44 KG/M2

## 2020-07-23 DIAGNOSIS — Z00.00 ROUTINE GENERAL MEDICAL EXAMINATION AT A HEALTH CARE FACILITY: Primary | ICD-10-CM

## 2020-07-23 DIAGNOSIS — E78.2 MIXED HYPERLIPIDEMIA: ICD-10-CM

## 2020-07-23 DIAGNOSIS — F33.41 RECURRENT MAJOR DEPRESSION IN PARTIAL REMISSION (H): ICD-10-CM

## 2020-07-23 DIAGNOSIS — F41.1 GENERALIZED ANXIETY DISORDER: ICD-10-CM

## 2020-07-23 DIAGNOSIS — Z12.31 ENCOUNTER FOR SCREENING MAMMOGRAM FOR BREAST CANCER: ICD-10-CM

## 2020-07-23 DIAGNOSIS — Z13.1 SCREENING FOR DIABETES MELLITUS: ICD-10-CM

## 2020-07-23 LAB
CHOLEST SERPL-MCNC: 209 MG/DL
GLUCOSE SERPL-MCNC: 96 MG/DL (ref 70–99)
HDLC SERPL-MCNC: 49 MG/DL
LDLC SERPL CALC-MCNC: 135 MG/DL
NONHDLC SERPL-MCNC: 160 MG/DL
TRIGL SERPL-MCNC: 124 MG/DL

## 2020-07-23 PROCEDURE — 82947 ASSAY GLUCOSE BLOOD QUANT: CPT | Performed by: NURSE PRACTITIONER

## 2020-07-23 PROCEDURE — 90714 TD VACC NO PRESV 7 YRS+ IM: CPT | Performed by: NURSE PRACTITIONER

## 2020-07-23 PROCEDURE — 36415 COLL VENOUS BLD VENIPUNCTURE: CPT | Performed by: NURSE PRACTITIONER

## 2020-07-23 PROCEDURE — 90471 IMMUNIZATION ADMIN: CPT | Performed by: NURSE PRACTITIONER

## 2020-07-23 PROCEDURE — 80061 LIPID PANEL: CPT | Performed by: NURSE PRACTITIONER

## 2020-07-23 PROCEDURE — 99396 PREV VISIT EST AGE 40-64: CPT | Mod: 25 | Performed by: NURSE PRACTITIONER

## 2020-07-23 RX ORDER — VENLAFAXINE HYDROCHLORIDE 75 MG/1
CAPSULE, EXTENDED RELEASE ORAL
Qty: 90 CAPSULE | Refills: 3 | Status: SHIPPED | OUTPATIENT
Start: 2020-07-23 | End: 2021-04-14

## 2020-07-23 RX ORDER — ATORVASTATIN CALCIUM 10 MG/1
10 TABLET, FILM COATED ORAL DAILY
Qty: 90 TABLET | Refills: 3 | Status: SHIPPED | OUTPATIENT
Start: 2020-07-23 | End: 2021-04-14

## 2020-07-23 ASSESSMENT — ENCOUNTER SYMPTOMS
NERVOUS/ANXIOUS: 0
SORE THROAT: 0
DIARRHEA: 0
COUGH: 0
HEARTBURN: 0
PARESTHESIAS: 0
CONSTIPATION: 0
ARTHRALGIAS: 0
FREQUENCY: 0
JOINT SWELLING: 0
WEAKNESS: 0
DIZZINESS: 0
EYE PAIN: 0
FEVER: 0
DYSURIA: 0
HEMATOCHEZIA: 0
ABDOMINAL PAIN: 0
HEADACHES: 0
PALPITATIONS: 0
CHILLS: 0
NAUSEA: 0
SHORTNESS OF BREATH: 0
BREAST MASS: 0
HEMATURIA: 0
MYALGIAS: 0

## 2020-07-23 ASSESSMENT — PATIENT HEALTH QUESTIONNAIRE - PHQ9: SUM OF ALL RESPONSES TO PHQ QUESTIONS 1-9: 0

## 2020-07-23 ASSESSMENT — MIFFLIN-ST. JEOR: SCORE: 1336.5

## 2020-07-23 ASSESSMENT — PAIN SCALES - GENERAL: PAINLEVEL: NO PAIN (0)

## 2020-07-23 NOTE — LETTER
July 23, 2020      Aide Burroughs  [address] [place] [id number]        Dear ,    We are writing to inform you of your test results.    Your cholesterol is improved. Your glucose is normal.  Continue on the same medications.  Recheck labs in 1 year.       Resulted Orders   Lipid panel reflex to direct LDL Fasting   Result Value Ref Range    Cholesterol 209 (H) <200 mg/dL      Comment:      Desirable:       <200 mg/dl    Triglycerides 124 <150 mg/dL      Comment:      Fasting specimen    HDL Cholesterol 49 (L) >49 mg/dL    LDL Cholesterol Calculated 135 (H) <100 mg/dL      Comment:      Above desirable:  100-129 mg/dl  Borderline High:  130-159 mg/dL  High:             160-189 mg/dL  Very high:       >189 mg/dl      Non HDL Cholesterol 160 (H) <130 mg/dL      Comment:      Above Desirable:  130-159 mg/dl  Borderline high:  160-189 mg/dl  High:             190-219 mg/dl  Very high:       >219 mg/dl     Glucose   Result Value Ref Range    Glucose 96 70 - 99 mg/dL      Comment:      Fasting specimen       If you have any questions or concerns, please call the clinic at the number listed above.       Sincerely,        KYLEE Narvaez CNP

## 2020-07-23 NOTE — PATIENT INSTRUCTIONS
Labs will be done today.   For normal results, you will receive a letter with the results in about 2 weeks.  If anything is abnormal or unexpected, someone from the clinic will call you.      Medications have been refilled.     Schedule a mammogram     Preventive Health Recommendations  Female Ages 40 to 49    Yearly exam:     See your health care provider every year in order to  1. Review health changes.   2. Discuss preventive care.    3. Review your medicines if your doctor prescribed any.      Get a Pap test every three years (unless you have an abnormal result and your provider advises testing more often).      If you get Pap tests with HPV test, you only need to test every 5 years, unless you have an abnormal result. You do not need a Pap test if your uterus was removed (hysterectomy) and you have not had cancer.      You should be tested each year for STDs (sexually transmitted diseases), if you're at risk.     Ask your doctor if you should have a mammogram.      Have a colonoscopy (test for colon cancer) if someone in your family has had colon cancer or polyps before age 50.       Have a cholesterol test every 5 years.       Have a diabetes test (fasting glucose) after age 45. If you are at risk for diabetes, you should have this test every 3 years.    Shots: Get a flu shot each year. Get a tetanus shot every 10 years.     Nutrition:     Eat at least 5 servings of fruits and vegetables each day.    Eat whole-grain bread, whole-wheat pasta and brown rice instead of white grains and rice.    Get adequate Calcium and Vitamin D.      Lifestyle    Exercise at least 150 minutes a week (an average of 30 minutes a day, 5 days a week). This will help you control your weight and prevent disease.    Limit alcohol to one drink per day.    No smoking.     Wear sunscreen to prevent skin cancer.    See your dentist every six months for an exam and cleaning.

## 2020-07-23 NOTE — NURSING NOTE
Prior to injection verified patient identity using patient's name and date of birth.  Per orders from the provider, injection(s) given by Moe Gonzalez LPN. Patient instructed to remain in clinic for 15 minutes afterwards, and to report any adverse reaction to me immediately. VIS given.  ................Moe Gonzalez LPN,   July 23, 2020,      8:03 AM,   [facility]

## 2020-07-23 NOTE — PROGRESS NOTES
SUBJECTIVE:   CC: Aide Burroughs is an 44 year old woman who presents for preventive health visit.     Healthy Habits:     Getting at least 3 servings of Calcium per day:  NO    Bi-annual eye exam:  Yes    Dental care twice a year:  Yes    Sleep apnea or symptoms of sleep apnea:  None    Diet:  Regular (no restrictions)    Frequency of exercise:  2-3 days/week    Duration of exercise:  30-45 minutes    Taking medications regularly:  Yes    Medication side effects:  None    PHQ-2 Total Score: 0    Additional concerns today:  No    Needs her Venlafaxine refilled.  Has been adjusting her dose based on symptoms from .5 mg daily.  Wants to go back down to 75 mg daily now.  Mood has improved in the last few weeks.   Some stress with her job and COVID, but she thinks that will improve once the school recommendations are announced this week.     No other concerns today.  Needs fasting labs.         Today's PHQ-2 Score:   PHQ-2 ( 1999 Pfizer) 7/23/2020   Q1: Little interest or pleasure in doing things 0   Q2: Feeling down, depressed or hopeless 0   PHQ-2 Score 0   Q1: Little interest or pleasure in doing things Not at all   Q2: Feeling down, depressed or hopeless Not at all   PHQ-2 Score 0       Abuse: Current or Past(Physical, Sexual or Emotional)- No  Do you feel safe in your environment? Yes        Social History     Tobacco Use     Smoking status: Never Smoker     Smokeless tobacco: Never Used   Substance Use Topics     Alcohol use: Yes     Alcohol/week: 0.0 standard drinks     Frequency: Monthly or less     Drinks per session: 1 or 2     Binge frequency: Never     Comment: rare       Alcohol Use 7/23/2020   Prescreen: >3 drinks/day or >7 drinks/week? No   Prescreen: >3 drinks/day or >7 drinks/week? -   No flowsheet data found.    Reviewed orders with patient.  Reviewed health maintenance and updated orders accordingly - Yes  Lab work is in process    Mammogram Screening: Patient under age 50, mutual decision  "reflected in health maintenance.      Pertinent mammograms are reviewed under the imaging tab.  History of abnormal Pap smear: NO - age 30-65 PAP every 5 years with negative HPV co-testing recommended  PAP / HPV Latest Ref Rng & Units 1/5/2018 11/13/2014   PAP - NIL NIL   HPV 16 DNA NEG:Negative Negative -   HPV 18 DNA NEG:Negative Negative -   OTHER HR HPV NEG:Negative Negative -     Reviewed and updated as needed this visit by clinical staff  Tobacco  Allergies  Meds         Reviewed and updated as needed this visit by Provider            Review of Systems   Constitutional: Negative for chills and fever.   HENT: Negative for congestion, ear pain, hearing loss and sore throat.    Eyes: Negative for pain and visual disturbance.   Respiratory: Negative for cough and shortness of breath.    Cardiovascular: Negative for chest pain, palpitations and peripheral edema.   Gastrointestinal: Negative for abdominal pain, constipation, diarrhea, heartburn, hematochezia and nausea.   Breasts:  Negative for tenderness, breast mass and discharge.   Genitourinary: Negative for dysuria, frequency, genital sores, hematuria, pelvic pain, urgency, vaginal bleeding and vaginal discharge.   Musculoskeletal: Negative for arthralgias, joint swelling and myalgias.   Skin: Negative for rash.   Neurological: Negative for dizziness, weakness, headaches and paresthesias.   Psychiatric/Behavioral: Negative for mood changes. The patient is not nervous/anxious.           OBJECTIVE:   /76   Pulse 59   Temp 98  F (36.7  C) (Temporal)   Resp 20   Ht 1.694 m (5' 6.7\")   Wt 64.9 kg (143 lb)   LMP 07/07/2020 (Approximate)   SpO2 100%   Breastfeeding No   BMI 22.60 kg/m    Physical Exam  GENERAL: healthy, alert and no distress  EYES: Eyes grossly normal to inspection, PERRL and conjunctivae and sclerae normal  HENT: ear canals and TM's normal, nose and mouth without ulcers or lesions  NECK: no adenopathy, no asymmetry, masses, or scars " "and thyroid normal to palpation  RESP: lungs clear to auscultation - no rales, rhonchi or wheezes  BREAST: normal without masses, tenderness or nipple discharge and no palpable axillary masses or adenopathy  CV: regular rate and rhythm, normal S1 S2, no S3 or S4, no murmur, click or rub, no peripheral edema and peripheral pulses strong  ABDOMEN: soft, nontender, no hepatosplenomegaly, no masses and bowel sounds normal  MS: no gross musculoskeletal defects noted, no edema  SKIN: no suspicious lesions or rashes  NEURO: Normal strength and tone, mentation intact and speech normal  PSYCH: mentation appears normal, affect normal/bright    Diagnostic Test Results:  Pending     ASSESSMENT/PLAN:   1. Routine general medical examination at a health care facility    2. Mixed hyperlipidemia  Chronic, controlled.  No change in treatment plan.   - atorvastatin (LIPITOR) 10 MG tablet; Take 1 tablet (10 mg) by mouth daily  Dispense: 90 tablet; Refill: 3  - Lipid panel reflex to direct LDL Fasting    3. Recurrent major depression in partial remission (H)  Chronic, controlled.  No change in treatment plan.   - venlafaxine (EFFEXOR-XR) 75 MG 24 hr capsule; TAKE ONE CAPSULE BY MOUTH ONCE DAILY  Dispense: 90 capsule; Refill: 3    4. Generalized anxiety disorder  Chronic, controlled.  No change in treatment plan.   - venlafaxine (EFFEXOR-XR) 75 MG 24 hr capsule; TAKE ONE CAPSULE BY MOUTH ONCE DAILY  Dispense: 90 capsule; Refill: 3    5. Screening for diabetes mellitus  - Glucose    6. Encounter for screening mammogram for breast cancer  - *MA Screening Digital Bilateral; Future    COUNSELING:  Reviewed preventive health counseling, as reflected in patient instructions    Estimated body mass index is 22.6 kg/m  as calculated from the following:    Height as of this encounter: 1.694 m (5' 6.7\").    Weight as of this encounter: 64.9 kg (143 lb).         reports that she has never smoked. She has never used smokeless " tobacco.      Counseling Resources:  ATP IV Guidelines  Pooled Cohorts Equation Calculator  Breast Cancer Risk Calculator  FRAX Risk Assessment  ICSI Preventive Guidelines  Dietary Guidelines for Americans, 2010  USDA's MyPlate  ASA Prophylaxis  Lung CA Screening    KYLEE Narvaez CNP  [facility]

## 2020-10-29 ENCOUNTER — VIRTUAL VISIT (OUTPATIENT)
Dept: FAMILY MEDICINE | Facility: OTHER | Age: 45
End: 2020-10-29

## 2020-10-29 NOTE — PROGRESS NOTES
"Date: 10/29/2020 10:50:42  Clinician: Boris Haddad  Clinician NPI: [id number]  Patient: Aide Burroughs  Patient : 1975  Patient Address: [address] [place] [address]  Patient Phone: [phone]  Visit Protocol: ADRIANNA  Patient Summary:  Aide is a 45 year old ( : 1975 ) female who initiated a [facility] Visit for COVID-19 (Coronavirus) evaluation and screening. When asked the question \"Please sign me up to receive news, health information and promotions. \", Aide responded \"No\".    Aide states her symptoms started 1-2 days ago.   Her symptoms consist of a headache, nausea, myalgia, chills, and malaise.   Symptom details   Headache: She states the headache is moderate (4-6 on a 10 point pain scale).    Aide denies having ear pain, wheezing, fever, cough, nasal congestion, facial pain or pressure, sore throat, teeth pain, ageusia, diarrhea, anosmia, vomiting, and rhinitis. She also denies having recent facial or sinus surgery in the past 60 days and taking antibiotic medication in the past month. She is not experiencing dyspnea.   Precipitating events  She has not recently been exposed to someone with influenza. Aide has been in close contact with the following high risk individuals: children under the age of 5, adults 65 or older, and people with asthma, heart disease or diabetes.   Pertinent COVID-19 (Coronavirus) information  Aide does not work or volunteer as healthcare worker or a . In the past 14 days, Aide has not worked or volunteered at a healthcare facility or group living setting.   In the past 14 days, she also has not lived in a congregate living setting.   Aide has not had a close contact with a laboratory-confirmed COVID-19 patient within 14 days of symptom onset.    Since 2019, Aide has not been diagnosed with lab-confirmed COVID-19 test and has had upper respiratory infection (URI) or influenza-like illness.      Date(s) of previous URI or influenza-like " illness (free-text): January 2020     Symptoms Aide experienced during previous URI or influenza-like illness as reported by the patient (free-text): difficulty breathing, headache, chills, wheezing, fever, cough, body aches        Pertinent medical history  Aide does not get yeast infections when she takes antibiotics.   Aide needs a return to work/school note.   Weight: 145 lbs   Aide does not smoke or use smokeless tobacco.   She denies pregnancy and denies breastfeeding. She is currently menstruating.   Weight: 145 lbs    MEDICATIONS: Nasacort nasal, atorvastatin oral, venlafaxine oral, ALLERGIES: erythromycin base  Clinician Response:  Dear Aide,   Your symptoms show that you may have coronavirus (COVID-19). This illness can cause fever, cough and trouble breathing. Many people get a mild case and get better on their own. Some people can get very sick.  What should I do?  We would like to test you for this virus.   1. Please call [phone] to schedule your visit. Explain that you were referred by [facility] to have a COVID-19 test. Be ready to share your [facility] visit ID number.  The following will serve as your written order for this COVID Test, ordered by me, for the indication of suspected COVID [Z20.828]: The test will be ordered in [employer], our electronic health record, after you are scheduled. It will show as ordered and authorized by Gal Calhoun MD.  Order: COVID-19 (Coronavirus) PCR for SYMPTOMATIC testing from [facility].   2. When it's time for your COVID test:  Stay at least 6 feet away from others. (If someone will drive you to your test, stay in the backseat, as far away from the  as you can.)   Cover your mouth and nose with a mask, tissue or washcloth.  Go straight to the testing site. Don't make any stops on the way there or back.      3.Starting now: Stay home and away from others (self-isolate) until:   You've had no fever---and no medicine that reduces fever---for one full day (24 hours). And...  "  Your other symptoms have gotten better. For example, your cough or breathing has improved. And...   At least 10 days have passed since your symptoms started.       During this time, don't leave the house except for testing or medical care.   Stay in your own room, even for meals. Use your own bathroom if you can.   Stay away from others in your home. No hugging, kissing or shaking hands. No visitors.  Don't go to work, school or anywhere else.    Clean \"high touch\" surfaces often (doorknobs, counters, handles, etc.). Use a household cleaning spray or wipes. You'll find a full list of  on the EPA website: www.epa.gov/pesticide-registration/list-n-disinfectants-use-against-sars-cov-2.   Cover your mouth and nose with a mask, tissue or washcloth to avoid spreading germs.  Wash your hands and face often. Use soap and water.  Caregivers in these groups are at risk for severe illness due to COVID-19:  o People 65 years and older  o People who live in a nursing home or long-term care facility  o People with chronic disease (lung, heart, cancer, diabetes, kidney, liver, immunologic)  o People who have a weakened immune system, including those who:   Are in cancer treatment  Take medicine that weakens the immune system, such as corticosteroids  Had a bone marrow or organ transplant  Have an immune deficiency  Have poorly controlled HIV or AIDS  Are obese (body mass index of 40 or higher)  Smoke regularly   o Caregivers should wear gloves while washing dishes, handling laundry and cleaning bedrooms and bathrooms.  o Use caution when washing and drying laundry: Don't shake dirty laundry, and use the warmest water setting that you can.  o For more tips, go to www.cdc.gov/coronavirus/2019-ncov/downloads/10Things.pdf.    4.Sign up for Maggie [facility]. We know it's scary to hear that you might have COVID-19. We want to track your symptoms to make sure you're okay over the next 2 weeks. Please look for an email from Maggie " Loop---this is a free, online program that we'll use to keep in touch. To sign up, follow the link in the email. Learn more at [email or web address]  How can I take care of myself?   Get lots of rest. Drink extra fluids (unless a doctor has told you not to).   Take Tylenol (acetaminophen) for fever or pain. If you have liver or kidney problems, ask your family doctor if it's okay to take Tylenol.   Adults can take either:    650 mg (two 325 mg pills) every 4 to 6 hours, or...   1,000 mg (two 500 mg pills) every 8 hours as needed.    Note: Don't take more than 3,000 mg in one day. Acetaminophen is found in many medicines (both prescribed and over-the-counter medicines). Read all labels to be sure you don't take too much.   For children, check the Tylenol bottle for the right dose. The dose is based on the child's age or weight.    If you have other health problems (like cancer, heart failure, an organ transplant or severe kidney disease): Call your specialty clinic if you don't feel better in the next 2 days.       Know when to call 911. Emergency warning signs include:    Trouble breathing or shortness of breath Pain or pressure in the chest that doesn't go away Feeling confused like you haven't felt before, or not being able to wake up Bluish-colored lips or face.  Where can I get more information?   [facility] -- About COVID-19: [email or web address]   CDC -- What to Do If You're Sick: www.cdc.gov/coronavirus/2019-ncov/about/steps-when-sick.html   CDC -- Ending Home Isolation: www.cdc.gov/coronavirus/2019-ncov/hcp/disposition-in-home-patients.html   CDC -- Caring for Someone: www.cdc.gov/coronavirus/2019-ncov/if-you-are-sick/care-for-someone.html   [facility] -- Interim Guidance for Hospital Discharge to Home: www.health.[facility].mn.us/diseases/coronavirus/hcp/hospdischarge.pdf   [facility] clinical trials (COVID-19 research studies): clinicalaffairs.[facility]-clinical-trials     Below are the COVID-19 hotlines at the [place] Department of Health ([facility]). Interpreters are available.    For health questions: Call 615-641-3364 or 1-567.652.9340 (7 a.m. to 7 p.m.) For questions about schools and childcare: Call 121-861-4570 or 1-108.822.9831 (7 a.m. to 7 p.m.)    Diagnosis: Contact with and (suspected) exposure to other viral communicable diseases  Diagnosis ICD: Z20.828  Additional Clinician Notes:   If your symptoms are not improving or worsen, please go to one of our urgent care locations for evaluation and possible lab work.&nbsp; Or see your regular doctor.

## 2020-11-02 DIAGNOSIS — U07.1 2019 NOVEL CORONAVIRUS DISEASE (COVID-19): ICD-10-CM

## 2020-11-02 DIAGNOSIS — U07.1 2019 NOVEL CORONAVIRUS DISEASE (COVID-19): Primary | ICD-10-CM

## 2020-11-02 PROCEDURE — U0003 INFECTIOUS AGENT DETECTION BY NUCLEIC ACID (DNA OR RNA); SEVERE ACUTE RESPIRATORY SYNDROME CORONAVIRUS 2 (SARS-COV-2) (CORONAVIRUS DISEASE [COVID-19]), AMPLIFIED PROBE TECHNIQUE, MAKING USE OF HIGH THROUGHPUT TECHNOLOGIES AS DESCRIBED BY CMS-2020-01-R: HCPCS | Performed by: FAMILY MEDICINE

## 2020-11-03 ENCOUNTER — NURSE TRIAGE (OUTPATIENT)
Dept: NURSING | Facility: CLINIC | Age: 45
End: 2020-11-03

## 2020-11-03 LAB
SARS-COV-2 RNA SPEC QL NAA+PROBE: NOT DETECTED
SPECIMEN SOURCE: NORMAL

## 2020-11-03 NOTE — TELEPHONE ENCOUNTER
Aide calls and says that she was at a [facility] clinic and was tested for Covid 19. Pt. Says that she was told that her result is negative and says that she needs a written copy of that result so she can go back to work. Pt. Says that she does not have my chart. Pt. Says that she is going to call her [facility] in the am to see if she can get a written copy of this result. COVID 19 Nurse Triage Plan/Patient Instructions    Please be aware that novel coronavirus (COVID-19) may be circulating in the community. If you develop symptoms such as fever, cough, or SOB or if you have concerns about the presence of another infection including coronavirus (COVID-19), please contact your health care provider or visit [email or web address].     Disposition/Instructions    Home care recommended. Follow home care protocol based instructions.    Thank you for taking steps to prevent the spread of this virus.  o Limit your contact with others.  o Wear a simple mask to cover your cough.  o Wash your hands well and often.    Resources    [facility]: About COVID-19: [email or web address]    CDC: What to Do If You're Sick: www.cdc.gov/coronavirus/2019-ncov/about/steps-when-sick.html    CDC: Ending Home Isolation: www.cdc.gov/coronavirus/2019-ncov/hcp/disposition-in-home-patients.html     CDC: Caring for Someone: www.cdc.gov/coronavirus/2019-ncov/if-you-are-sick/care-for-someone.html     [facility]: Interim Guidance for Hospital Discharge to Home: www.health.[facility].mn.us/diseases/coronavirus/hcp/hospdischarge.pdf    [facility] clinical trials (COVID-19 research studies): clinicalaffairs.[facility]/um-clinical-trials     Below are the COVID-19 hotlines at the [place] Department of [facility] ([facility]). Interpreters are available.   o For health questions: Call 434-287-4653 or 1-963.249.1653 (7 a.m. to 7 p.m.)  o For questions about schools and childcare: Call 422-649-2125 or 1-610.675.1021 (7 a.m. to 7 p.m.)                        Additional Information    Negative: [1] Caller is not with the adult (patient) AND [2] reporting urgent symptoms    Negative: Lab result questions    Negative: Medication questions    Negative: Caller can't be reached by phone    Negative: Caller has already spoken to PCP or another triager    Negative: RN needs further essential information from caller in order to complete triage    Negative: Requesting regular office appointment    Negative: [1] Caller requesting NON-URGENT health information AND [2] PCP's office is the best resource    Negative: Health Information question, no triage required and triager able to answer question    Negative: General information question, no triage required and triager able to answer question    Negative: Question about upcoming scheduled test, no triage required and triager able to answer question    Negative: [1] Caller is not with the adult (patient) AND [2] probable NON-URGENT symptoms    [1] Follow-up call to recent contact AND [2] information only call, no triage required    Protocols used: INFORMATION ONLY CALL-A-

## 2020-11-04 ENCOUNTER — TELEPHONE (OUTPATIENT)
Dept: FAMILY MEDICINE | Facility: OTHER | Age: 45
End: 2020-11-04

## 2020-11-04 NOTE — TELEPHONE ENCOUNTER
Pt requesting letter  confirming negative covid for employer. Please call pt to advise.      Thank you,   Riley GALDAMEZ   Central Scheduling  [phone]

## 2020-11-04 NOTE — TELEPHONE ENCOUNTER
I called pt and left a msg to call us back. We need to know if she wants us to fax the results or emailed.  Kaila Fong MA

## 2020-12-14 ENCOUNTER — HEALTH MAINTENANCE LETTER (OUTPATIENT)
Age: 45
End: 2020-12-14
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
## 2021-04-14 ENCOUNTER — OFFICE VISIT (OUTPATIENT)
Dept: FAMILY MEDICINE | Facility: CLINIC | Age: 46
End: 2021-04-14
Payer: COMMERCIAL

## 2021-04-14 VITALS
DIASTOLIC BLOOD PRESSURE: 88 MMHG | HEART RATE: 70 BPM | RESPIRATION RATE: 18 BRPM | SYSTOLIC BLOOD PRESSURE: 134 MMHG | BODY MASS INDEX: 23.7 KG/M2 | WEIGHT: 151 LBS | HEIGHT: 67 IN | TEMPERATURE: 98.4 F

## 2021-04-14 DIAGNOSIS — I26.99 OTHER ACUTE PULMONARY EMBOLISM WITHOUT ACUTE COR PULMONALE (H): ICD-10-CM

## 2021-04-14 DIAGNOSIS — Z12.31 VISIT FOR SCREENING MAMMOGRAM: ICD-10-CM

## 2021-04-14 DIAGNOSIS — F41.1 GENERALIZED ANXIETY DISORDER: ICD-10-CM

## 2021-04-14 DIAGNOSIS — E78.2 MIXED HYPERLIPIDEMIA: ICD-10-CM

## 2021-04-14 DIAGNOSIS — E78.5 HYPERLIPIDEMIA LDL GOAL <160: ICD-10-CM

## 2021-04-14 DIAGNOSIS — Z11.59 NEED FOR HEPATITIS C SCREENING TEST: ICD-10-CM

## 2021-04-14 DIAGNOSIS — Z00.00 ANNUAL PHYSICAL EXAM: Primary | ICD-10-CM

## 2021-04-14 DIAGNOSIS — Z13.1 SCREENING FOR DIABETES MELLITUS: ICD-10-CM

## 2021-04-14 DIAGNOSIS — F33.41 RECURRENT MAJOR DEPRESSION IN PARTIAL REMISSION (H): ICD-10-CM

## 2021-04-14 LAB
ANION GAP SERPL CALCULATED.3IONS-SCNC: 6 MMOL/L (ref 3–14)
BUN SERPL-MCNC: 14 MG/DL (ref 7–30)
CALCIUM SERPL-MCNC: 9 MG/DL (ref 8.5–10.1)
CHLORIDE SERPL-SCNC: 105 MMOL/L (ref 94–109)
CHOLEST SERPL-MCNC: 256 MG/DL
CO2 SERPL-SCNC: 28 MMOL/L (ref 20–32)
CREAT SERPL-MCNC: 0.88 MG/DL (ref 0.52–1.04)
GFR SERPL CREATININE-BSD FRML MDRD: 80 ML/MIN/{1.73_M2}
GLUCOSE SERPL-MCNC: 96 MG/DL (ref 70–99)
HDLC SERPL-MCNC: 49 MG/DL
LDLC SERPL CALC-MCNC: 161 MG/DL
NONHDLC SERPL-MCNC: 207 MG/DL
POTASSIUM SERPL-SCNC: 3.6 MMOL/L (ref 3.4–5.3)
SODIUM SERPL-SCNC: 139 MMOL/L (ref 133–144)
TRIGL SERPL-MCNC: 228 MG/DL

## 2021-04-14 PROCEDURE — 99213 OFFICE O/P EST LOW 20 MIN: CPT | Mod: 25 | Performed by: PHYSICIAN ASSISTANT

## 2021-04-14 PROCEDURE — 99396 PREV VISIT EST AGE 40-64: CPT | Performed by: PHYSICIAN ASSISTANT

## 2021-04-14 PROCEDURE — 86803 HEPATITIS C AB TEST: CPT | Performed by: PHYSICIAN ASSISTANT

## 2021-04-14 PROCEDURE — 36415 COLL VENOUS BLD VENIPUNCTURE: CPT | Performed by: PHYSICIAN ASSISTANT

## 2021-04-14 PROCEDURE — 80061 LIPID PANEL: CPT | Performed by: PHYSICIAN ASSISTANT

## 2021-04-14 PROCEDURE — 80048 BASIC METABOLIC PNL TOTAL CA: CPT | Performed by: PHYSICIAN ASSISTANT

## 2021-04-14 RX ORDER — ATORVASTATIN CALCIUM 10 MG/1
10 TABLET, FILM COATED ORAL DAILY
Qty: 90 TABLET | Refills: 3 | Status: SHIPPED | OUTPATIENT
Start: 2021-04-14 | End: 2022-04-25

## 2021-04-14 RX ORDER — VENLAFAXINE HYDROCHLORIDE 37.5 MG/1
37.5 CAPSULE, EXTENDED RELEASE ORAL DAILY
Qty: 90 CAPSULE | Refills: 3 | Status: SHIPPED | OUTPATIENT
Start: 2021-04-14 | End: 2022-04-25

## 2021-04-14 RX ORDER — VENLAFAXINE HYDROCHLORIDE 75 MG/1
CAPSULE, EXTENDED RELEASE ORAL
Qty: 90 CAPSULE | Refills: 3 | Status: CANCELLED | OUTPATIENT
Start: 2021-04-14

## 2021-04-14 ASSESSMENT — ENCOUNTER SYMPTOMS
ABDOMINAL PAIN: 0
JOINT SWELLING: 0
COUGH: 0
NERVOUS/ANXIOUS: 0
FEVER: 0
PARESTHESIAS: 0
FREQUENCY: 0
HEMATOCHEZIA: 0
MYALGIAS: 0
SORE THROAT: 0
BREAST MASS: 0
SHORTNESS OF BREATH: 0
HEARTBURN: 0
PALPITATIONS: 0
ARTHRALGIAS: 0
CONSTIPATION: 0
DYSURIA: 0
HEMATURIA: 0
NAUSEA: 0
WEAKNESS: 0
HEADACHES: 1
DIARRHEA: 0
CHILLS: 0
DIZZINESS: 0
EYE PAIN: 0

## 2021-04-14 ASSESSMENT — PATIENT HEALTH QUESTIONNAIRE - PHQ9
SUM OF ALL RESPONSES TO PHQ QUESTIONS 1-9: 0
SUM OF ALL RESPONSES TO PHQ QUESTIONS 1-9: 0

## 2021-04-14 ASSESSMENT — MIFFLIN-ST. JEOR: SCORE: 1368.59

## 2021-04-14 NOTE — LETTER
April 14, 2021      RE: Aide Charles  [address] [place] [id number]       To whom it may concern:    Aide Burroughs was seen in our clinic today. She has been known to have severe reactions to medications/vaccinations in the past. At this time it is recommended that she forego the COVID-19 vaccine.     Sincerely,      Suzi Barnett PA-C

## 2021-04-15 LAB — HCV AB SERPL QL IA: NONREACTIVE

## 2021-04-25 ENCOUNTER — HOSPITAL ENCOUNTER (EMERGENCY)
Facility: CLINIC | Age: 46
Discharge: HOME | End: 2021-04-25
Attending: FAMILY MEDICINE | Admitting: FAMILY MEDICINE
Payer: COMMERCIAL

## 2021-04-25 ENCOUNTER — APPOINTMENT (OUTPATIENT)
Dept: CT IMAGING | Facility: CLINIC | Age: 46
End: 2021-04-25
Attending: FAMILY MEDICINE
Payer: COMMERCIAL

## 2021-04-25 VITALS
DIASTOLIC BLOOD PRESSURE: 84 MMHG | OXYGEN SATURATION: 98 % | RESPIRATION RATE: 18 BRPM | TEMPERATURE: 98.9 F | SYSTOLIC BLOOD PRESSURE: 141 MMHG | WEIGHT: 154.8 LBS | HEART RATE: 61 BPM | BODY MASS INDEX: 24.43 KG/M2

## 2021-04-25 DIAGNOSIS — S06.0X0A CONCUSSION WITHOUT LOSS OF CONSCIOUSNESS, INITIAL ENCOUNTER: ICD-10-CM

## 2021-04-25 PROCEDURE — 70450 CT HEAD/BRAIN W/O DYE: CPT

## 2021-04-25 PROCEDURE — 250N000013 HC RX MED GY IP 250 OP 250 PS 637: Performed by: FAMILY MEDICINE

## 2021-04-25 PROCEDURE — 250N000011 HC RX IP 250 OP 636: Performed by: FAMILY MEDICINE

## 2021-04-25 PROCEDURE — 99284 EMERGENCY DEPT VISIT MOD MDM: CPT | Mod: 25 | Performed by: FAMILY MEDICINE

## 2021-04-25 PROCEDURE — 99284 EMERGENCY DEPT VISIT MOD MDM: CPT | Performed by: FAMILY MEDICINE

## 2021-04-25 RX ORDER — ACETAMINOPHEN 500 MG
500 TABLET ORAL EVERY 6 HOURS PRN
COMMUNITY
End: 2021-10-05

## 2021-04-25 RX ORDER — ACETAMINOPHEN 500 MG
500 TABLET ORAL ONCE
Status: COMPLETED | OUTPATIENT
Start: 2021-04-25 | End: 2021-04-25

## 2021-04-25 RX ORDER — ONDANSETRON 4 MG/1
4 TABLET, ORALLY DISINTEGRATING ORAL ONCE
Status: COMPLETED | OUTPATIENT
Start: 2021-04-25 | End: 2021-04-25

## 2021-04-25 RX ORDER — ONDANSETRON 4 MG/1
4 TABLET, ORALLY DISINTEGRATING ORAL EVERY 8 HOURS PRN
Qty: 10 TABLET | Refills: 0 | Status: SHIPPED | OUTPATIENT
Start: 2021-04-25 | End: 2021-04-28

## 2021-04-25 RX ADMIN — ACETAMINOPHEN 500 MG: 500 TABLET, FILM COATED ORAL at 10:42

## 2021-04-25 RX ADMIN — ONDANSETRON 4 MG: 4 TABLET, ORALLY DISINTEGRATING ORAL at 10:42

## 2021-04-25 NOTE — Clinical Note
Aide Charles was seen and treated in our emergency department on 4/25/2021.  She may return to work on 04/27/2021.       If you have any questions or concerns, please don't hesitate to call.      Vinayak Yin MD

## 2021-04-25 NOTE — ED TRIAGE NOTES
She was helping get an oak table top down from their garage rafters yesterday and it hit her on the L side of her head.  She is dizzy and has trouble following conversations. She has a headache.

## 2021-04-25 NOTE — ED PROVIDER NOTES
History     Chief Complaint   Patient presents with     Head Injury     HPI  Aide Charles is a 45 year old female who presents with concerns of a head injury.  She was trying to move a table top when it hit her on the left side of her head.  Since then she started having headaches, she has been having trouble concentrating, she has had some dizziness and light sensitivity.  Patient has a history of headaches but has never formally been diagnosed with migraines.  She only takes Tylenol for headaches because she says she is ultrasensitive the medications.  Ibuprofen even knocks her out.    Allergies:  Allergies   Allergen Reactions     Erythromycin Nausea and Vomiting     Seasonal Allergies      Molds, grass, multiple trees, plant pollen, cats, rabbits, dogs, hay.       Problem List:    Patient Active Problem List    Diagnosis Date Noted     Other acute pulmonary embolism without acute cor pulmonale (H) 04/14/2021     Priority: Medium     Recurrent major depression in partial remission (H) 10/16/2018     Priority: Medium     Genital warts 10/27/2017     Priority: Medium     Panic disorder with agoraphobia 08/28/2015     Priority: Medium     PTSD (post-traumatic stress disorder) 08/28/2015     Priority: Medium     Hyperlipidemia LDL goal <160 11/13/2014     Priority: Medium     Fracture of great toe 11/11/2013     Priority: Medium     Transient global amnesia 05/19/2013     Priority: Medium     Generalized anxiety disorder 11/06/2012     Priority: Medium     Diagnosis updated by automated process. Provider to review and confirm.       Seasonal allergic rhinitis 04/26/2012     Priority: Medium        Past Medical History:    Past Medical History:   Diagnosis Date     Generalized anxiety disorder      MDD (major depressive disorder), recurrent, severe, with psychosis (H)      Pulmonary embolism (H) 11/15/2018       Past Surgical History:    Past Surgical History:   Procedure Laterality Date     ESOPHAGOSCOPY,  GASTROSCOPY, DUODENOSCOPY (EGD), COMBINED N/A 05/15/2017    Procedure: COMBINED ESOPHAGOSCOPY, GASTROSCOPY, DUODENOSCOPY (EGD), BIOPSY SINGLE OR MULTIPLE;  ESOPHAGOSCOPY, GASTROSCOPY, DUODENOSCOPY (EGD) with biopsies by biopsy;  Surgeon: Robles Falk MD;  [facility]     LAPAROSCOPIC SALPINGECTOMY Bilateral 03/13/2019    Procedure: LAPAROSCOPIC BILATERAL SALPINGECTOMY;  Surgeon: Joseluis Wing MD;  [facility]     MYRINGOTOMY, INSERT TUBE, COMBINED Left 05/16/2018    cholesteatoma and surgical repair       Family History:    Family History   Problem Relation Age of Onset     Hypertension Mother      Hyperlipidemia Mother      Mental Illness Mother      Cancer - colorectal Father      Substance Abuse Father         alcoholic recovering     Cerebrovascular Disease Maternal Grandmother      Diabetes Paternal Grandmother      Heart Disease Paternal Grandfather         MI     Bipolar Disorder Maternal Aunt      Schizophrenia Maternal Aunt      Coronary Artery Disease Maternal Grandfather      Cerebrovascular Disease Maternal Grandfather      Other - See Comments Sister         2 yrs older     Multiple Sclerosis Maternal Aunt 45     Other - See Comments Daughter         9/14/2000     Other - See Comments Son         8/16/2004       Social History:  Marital Status:  Single [1]  Social History     Tobacco Use     Smoking status: Never Smoker     Smokeless tobacco: Never Used   Substance Use Topics     Alcohol use: Yes     Alcohol/week: 0.0 standard drinks     Frequency: Monthly or less     Drinks per session: 1 or 2     Binge frequency: Never     Comment: rare     Drug use: No        Medications:    acetaminophen (TYLENOL) 500 MG tablet  atorvastatin (LIPITOR) 10 MG tablet  cholecalciferol (VITAMIN D3) 5000 UNITS CAPS capsule  venlafaxine (EFFEXOR-XR) 37.5 MG 24 hr capsule          Review of Systems   All other systems reviewed and are negative.      Physical Exam   BP: (!) 169/85  Pulse: 62  Temp:  98.9  F (37.2  C)  Resp: 18  Weight: 70.2 kg (154 lb 12.8 oz)  SpO2: 99 %      Physical Exam  Vitals signs and nursing note reviewed.   Constitutional:       General: She is not in acute distress.     Appearance: She is well-developed. She is not diaphoretic.   Eyes:      Conjunctiva/sclera: Conjunctivae normal.   Neck:      Musculoskeletal: Normal range of motion and neck supple.   Cardiovascular:      Rate and Rhythm: Normal rate and regular rhythm.      Heart sounds: Normal heart sounds. No murmur. No friction rub. No gallop.    Pulmonary:      Effort: Pulmonary effort is normal. No respiratory distress.      Breath sounds: Normal breath sounds. No wheezing or rales.   Chest:      Chest wall: No tenderness.   Abdominal:      General: Bowel sounds are normal. There is no distension.      Palpations: Abdomen is soft. There is no mass.      Tenderness: There is no abdominal tenderness. There is no guarding.   Musculoskeletal: Normal range of motion.         General: No tenderness.   Skin:     General: Skin is warm and dry.      Findings: No rash.   Neurological:      Mental Status: She is alert and oriented to person, place, and time.   Psychiatric:         Judgment: Judgment normal.         ED Course        Procedures      Results for orders placed or performed during the hospital encounter of 04/25/21 (from the past 24 hour(s))   CT Head w/o Contrast    Narrative    CT SCAN OF THE HEAD WITHOUT CONTRAST April 25, 2021 10:56 AM     HISTORY: Fall, head injury.    TECHNIQUE: Axial images of the head and coronal reformations without  IV contrast material. Radiation dose for this scan was reduced using  automated exposure control, adjustment of the mA and/or kV according  to patient size, or iterative reconstruction technique.    COMPARISON: CT head 8/18/2015. MRI head 1/6/2015.    FINDINGS: There is no evidence of intracranial hemorrhage, mass, acute  infarct or anomaly. The ventricles are normal in size, shape  and  configuration. The brain parenchyma and subarachnoid spaces are  normal.     The visualized portions of the sinuses and mastoids appear normal. The  bony calvarium and bones of the skull base appear intact. Left  temporomandibular joint degenerative change.      Impression    IMPRESSION: No evidence of acute intracranial hemorrhage, mass, or  herniation.         Medications   ondansetron (ZOFRAN-ODT) ODT tab 4 mg (has no administration in time range)   acetaminophen (TYLENOL) tablet 500 mg (has no administration in time range)     CT scan was normal.  Patient appears to have just postconcussion-like symptoms.  At this point I think it is safe to discharge the patient home.  We will put a referral into the concussion clinic.  Patient was sent home with a prescription for Zofran.  Patient is also given a note to be off work.    Assessments & Plan (with Medical Decision Making)  Concussion     I have reviewed the nursing notes.    I have reviewed the findings, diagnosis, plan and need for follow up with the patient.          Final diagnoses:   Concussion without loss of consciousness, initial encounter       4/25/2021   [facility] EMERGENCY DEPT     Vinayak Yin MD  04/25/21 [id number]

## 2021-04-27 NOTE — PROGRESS NOTES
SUBJECTIVE:  Aide Charles is a 45 year old female who is seen as an ER referral for evaluation of a possible concussion that occurred on 4/24/2021 or 5 days ago. History of left ear surgery and has had some drainage from left ear. Has a follow-up next week for that. Not sure if that is causing some of the dizziness. Unable to stand without holding onto something at this time due to dizziness. Speed of processing delayed. Instructions repeated multiple times for testing tasks. Has tried Zofran and did not like feeling of it.    Mechanism of injury: Oak table top hit the left side of her head.    Immediate Symptoms:  No LOC.  Positive headache, excessive sleepiness, behavior changes, light sensitivity, noise sensitivity, poor concentration, nausea, dizziness, confusion, and blurred vision    Works as an  with preschoolers.  Not working due to injury.     Since your injury, level of activity is: Not working. ER took off of work for one day and work wanted her to stay off until today's appointment.    Since your injury, have you continued with your normal cognitive activity (text, computer, school):  Ovestimulated with motion, screens, light    Concussion Symptom Assessment      Headache or Pressure In Head: 4 - moderate to severe  Upset Stomach or Throwing Up: 2 - mild to moderate  Problems with Balance: 5 - severe  Feeling Dizzy: 5 - severe  Sensitivity to Light: 5 - severe  Sensitivity to Noise: 4 - moderate to severe  Mood Changes: 2 - mild to moderate  Feeling sluggish, hazy, or foggy: 5 - severe  Trouble Concentrating, Lack of Focus: 5 - severe  Motion Sickness: 2 - mild to moderate  Vision Changes: 2 - mild to moderate  Memory Problems: 3 - moderate  Feeling Confused: 3 - moderate  Neck Pain: 0 - none  Trouble Sleeping: 3 - moderate  Total Number of Symptoms: 14  Symptom Severity Score: 50      Sleep: Sleeping more than usual    Past pertinent history: Migraines: Yes:      Depression: Yes:       Anxiety: Yes:      Learning disability: no     ADHD: no     Past History of concussions: Yes:  Number of concussions: 1  Hit by volleyball at game and hit brick wall behind her. About 10 years ago.      Patient's past medical, surgical, social and family histories reviewed.        REVIEW OF SYSTEMS:  Skin: no bruising, no swelling  Musculoskeletal: as above  Neurologic: no numbness, paresthesias  Remainder of review of systems is negative including constitutional, CV, pulmonary, GI, except as noted in HPI or medical history.    OBJECTIVE:  /84 (BP Location: Right arm, Patient Position: Sitting, Cuff Size: Adult Regular)   Wt 69.9 kg (154 lb)   LMP 2021 (Exact Date)   BMI 24.30 kg/m      EXAM:  General: Alert and in mild distress    Head: Normocephalic, atraumatic  Neck:  Full ROM  Eyes: no scleral icterus or conjunctival erythema.  EOMI intact but blinking a lot.  Oropharynx:  Mucous membranes moist  Skin: no erythema, ecchymosis, petechiae, or jaundice  Resp: normal respiratory effort without conversational dyspnea   Psych: normal mood and affect    Neuro:   Slowed processing.      Balance Testing: Romberg: unable   Backward Tandem: unable   Single-leg stance: unable      Cognitive:  Immediate object recall: /4  4 Object Recall at 5 minutes:4/4  Reverse months of the year:   Spell world backwards: Unable  Backwards number strin numbers: closes eyes to accomplish   4-9-3                  Alternate:  6-2-9   3-8-1-4               3-2-7-9    6-2-9-7-1   1-5-2-8-6    7-1-8-4-6-2   5-3-9-1-4-8       Impact Testing Scores: ImPACT Testing not performed      RADIOLOGY:  Independent visualization of images performed  CT SCAN OF THE HEAD WITHOUT CONTRAST 2021 10:56 AM      HISTORY: Fall, head injury.     TECHNIQUE: Axial images of the head and coronal reformations without  IV contrast material. Radiation dose for this scan was reduced using  automated exposure control, adjustment of  the mA and/or kV according  to patient size, or iterative reconstruction technique.     COMPARISON: CT head 8/18/2015. MRI head 1/6/2015.     FINDINGS: There is no evidence of intracranial hemorrhage, mass, acute  infarct or anomaly. The ventricles are normal in size, shape and  configuration. The brain parenchyma and subarachnoid spaces are  normal.      The visualized portions of the sinuses and mastoids appear normal. The  bony calvarium and bones of the skull base appear intact. Left  temporomandibular joint degenerative change.                                                                      IMPRESSION: No evidence of acute intracranial hemorrhage, mass, or  herniation.        ARLEY PARIKH MD      ASSESSMENT:  Concussion without loss of consciousness, initial encounter    PLAN:  -Explained the pathophysiology of concussion and the role of physical and cognitive rest in the treatment process.  -Avoid intense physical activity, activities with the risk of falling, or contact sports. Okay to do light walking.  -Limit screen time: computers, iPads, cell phones, video games, TV  -Rest physically and cognitively. Avoid things that worsen symptoms.  -No driving.  -Work:  No work. Letter provided.     -Follow up with ENT team as already scheduled.    -Follow Up: 1 week or sooner if symptoms fail to improve or worsen.  Please call with any questions or concerns.       Nicole Santos MD, [facility]  [facility]

## 2021-04-27 NOTE — PATIENT INSTRUCTIONS
-Explained the pathophysiology of concussion and the role of physical and cognitive rest in the treatment process.  -Avoid intense physical activity, activities with the risk of falling, or contact sports. Okay to do light walking.  -Limit screen time: computers, iPads, cell phones, video games, TV  -Rest physically and cognitively. Avoid things that worsen symptoms.  -No driving.  -Work:  No work. Letter provided.       -Follow Up: 1 week or sooner if symptoms fail to improve or worsen.  Please call with any questions or concerns.       Healing After a Concussion     Watch symptoms closely  After a concussion, you may have a headache, stomach upset, motion sickness, personality changes or feel confused or dizzy.    Each day, write down any symptoms you have along with how often it occurs, how long it lasts and what makes it better or worse. This log will help your doctor see how well you are healing.    Rest  Rest is the best treatment for a concussion. You should avoid activities that cause your symptoms to get worse or make you feel tired. This would include physical activities as well as watching TV, texting or playing video games.    Don t nap during the day. If you do nap, make sure it is for less than an hour and takes place before 3 p.m.    If you find it is hard to fall asleep, talk to your doctor.    You do not need to be awakened during the night, unless your doctor tells you otherwise.    Treating pain  It is best to avoid taking medicine, but if needed, you may take Tylenol (acetaminophen). Follow the directions on the label. If you cannot manage your pain with Tylenol, call your doctor or go to the emergency room.    Do not take other over-the-counter pain relievers (ibuprofen, Advil, Motrin, Aleve) If you find it is hard to fall asleep, talk to your doctor.    Do not take medicines to help you sleep (Benadryl, Tylenol PM). They may cause new problems.    Returning to activity  Doing light non-contact  "physical activity (walking or stationary biking) has been shown to help with recovery, as long as there is no risk of re-injury. Some tips to keep in mind:    Keep the level of exercise light so that you don t aggravate or increase your concussion symptoms.    Take your time returning to activity. A doctor can help determine the activity level that is best for you.    See a healthcare provider before returning to a sport. They can help guide you through a safe process for returning to play.    Returning to school or work  You can rest your brain by staying at home for a time. The length of time you stay away from school or work will depend on the injury and symptoms. Often it is no more than 1 to 2 full days.    Once you are back, stay away from activities that increase your symptoms. This may mean changing your routine, avoiding noise and asking for more time to complete tests and projects.    Your doctor can help you create a plan for the conditions at your job and can work with your school to help you succeed.      If you have questions, call:  During business hours  (Monday through Friday, 6:30 a.m. - 5 p.m.)  Concussion  (appointments): [phone]  After hours, weekends and holidays  Athletic Medicine hotline: [phone]          For informational purposes only.  Not to replace the advice of your health care provider.  Copyright   2014 [facility].  All rights reserved.    Clinically reviewed by the [place] [employer]. Admatic 108309 - Rev 06/20.            Sleep Hygiene     What is it?    \"Sleep hygiene\" means having good sleep habits. Follow the tips below to sleep better at night.      Get on a schedule. Go to bed and get up at about the same time every day.    Listen to your body. Only try to sleep when you actually feel tired or sleepy.    Be patient. If you haven't been able to get to sleep after about 20 minutes or more, get up and do something calming or boring " "until you feel sleepy. Then, return to bed and try again.      Avoid caffeine (coffee, tea, cola drinks, chocolate and some medicines) for at least 4 to 6 hours before going to bed. We also suggest you don't use alcohol or nicotine (cigarettes) during this time. Both can make it harder for you to fall asleep and stay asleep.    Use your bed for sleeping only. That means no TV, computer or homework in bed!    Don't nap during the day. If you do nap, make sure it is for less than an hour and before 3 p.m.    Create sleep rituals that remind your body that it is time to sleep. Examples include breathing exercises, stretching, or reading a book.     Try a bath or shower before bed. Having a hot bath 1 to 2 hours before bedtime can help you feel sleepy.    Don't watch the clock. Checking the clock during the night can wake you up. It can also lead to negative thoughts such as \"I will never fall asleep.\"    Use a sleep diary. Track your sleep schedule to know your sleep patterns and to see where you can improve.    Get regular exercise. But try not to do heavy exercise in the 4 hours before bedtime.      Eat a healthy, balanced diet. Try eating a light, healthy snack before bed, but avoid eating a heavy meal.    Create the right sleeping area. A cool, dark, quiet room is best. If needed, try earplugs, fans and blackout curtains.      Keep your daytime routine the same even if you have a bad night sleep. Avoiding activities the next day can make it harder to sleep.          For informational purposes only. Not to replace the advice of your health care provider. Copyright   2013  [employer]. All rights reserved.    "

## 2021-04-29 ENCOUNTER — OFFICE VISIT (OUTPATIENT)
Dept: ORTHOPEDICS | Facility: CLINIC | Age: 46
End: 2021-04-29
Payer: COMMERCIAL

## 2021-04-29 VITALS — DIASTOLIC BLOOD PRESSURE: 84 MMHG | WEIGHT: 154 LBS | BODY MASS INDEX: 24.3 KG/M2 | SYSTOLIC BLOOD PRESSURE: 110 MMHG

## 2021-04-29 DIAGNOSIS — S06.0X0A CONCUSSION WITHOUT LOSS OF CONSCIOUSNESS, INITIAL ENCOUNTER: Primary | ICD-10-CM

## 2021-04-29 PROCEDURE — 99204 OFFICE O/P NEW MOD 45 MIN: CPT | Performed by: PHYSICAL MEDICINE & REHABILITATION

## 2021-04-29 NOTE — LETTER
April 29, 2021      Aide Charles  [address]  [facility] [address]        To Whom It May Concern:    Aide Charles  was seen on 4/29/2021 for a concussion.  She should be out of work for the next week, at which point we will re-evaluate.        Sincerely,        Ginny Santos MD

## 2021-04-29 NOTE — LETTER
4/29/2021         RE: Aide Charles  [address]  [facility] [address]        Dear Colleague,    Thank you for referring your patient, Aide Charles, to the [facility] [place]. Please see a copy of my visit note below.      SUBJECTIVE:  Aide Charles is a 45 year old female who is seen as an ER referral for evaluation of a possible concussion that occurred on 4/24/2021 or 5 days ago. History of left ear surgery and has had some drainage from left ear. Has a follow-up next week for that. Not sure if that is causing some of the dizziness. Unable to stand without holding onto something at this time due to dizziness. Speed of processing delayed. Instructions repeated multiple times for testing tasks. Has tried Zofran and did not like feeling of it.    Mechanism of injury: Oak table top hit the left side of her head.    Immediate Symptoms:  No LOC.  Positive headache, excessive sleepiness, behavior changes, light sensitivity, noise sensitivity, poor concentration, nausea, dizziness, confusion, and blurred vision    Works as an  with preschoolers.  Not working due to injury.     Since your injury, level of activity is: Not working. ER took off of work for one day and work wanted her to stay off until today's appointment.    Since your injury, have you continued with your normal cognitive activity (text, computer, school):  Ovestimulated with motion, screens, light    Concussion Symptom Assessment      Headache or Pressure In Head: 4 - moderate to severe  Upset Stomach or Throwing Up: 2 - mild to moderate  Problems with Balance: 5 - severe  Feeling Dizzy: 5 - severe  Sensitivity to Light: 5 - severe  Sensitivity to Noise: 4 - moderate to severe  Mood Changes: 2 - mild to moderate  Feeling sluggish, hazy, or foggy: 5 - severe  Trouble Concentrating, Lack of Focus: 5 - severe  Motion Sickness: 2 - mild to moderate  Vision Changes: 2 - mild to moderate  Memory Problems: 3 -  moderate  Feeling Confused: 3 - moderate  Neck Pain: 0 - none  Trouble Sleeping: 3 - moderate  Total Number of Symptoms: 14  Symptom Severity Score: 50      Sleep: Sleeping more than usual    Past pertinent history: Migraines: Yes:      Depression: Yes:      Anxiety: Yes:      Learning disability: no     ADHD: no     Past History of concussions: Yes:  Number of concussions: 1  Hit by volleyball at game and hit brick wall behind her. About 10 years ago.      Patient's past medical, surgical, social and family histories reviewed.        REVIEW OF SYSTEMS:  Skin: no bruising, no swelling  Musculoskeletal: as above  Neurologic: no numbness, paresthesias  Remainder of review of systems is negative including constitutional, CV, pulmonary, GI, except as noted in HPI or medical history.    OBJECTIVE:  /84 (BP Location: Right arm, Patient Position: Sitting, Cuff Size: Adult Regular)   Wt 69.9 kg (154 lb)   LMP 2021 (Exact Date)   BMI 24.30 kg/m      EXAM:  General: Alert and in mild no distress    Head: Normocephalic, atraumatic  Neck:  Full ROM  Eyes: no scleral icterus or conjunctival erythema.  EOMI intact but blinking a lot.  Oropharynx:  Mucous membranes moist  Skin: no erythema, ecchymosis, petechiae, or jaundice  Resp: normal respiratory effort without conversational dyspnea   Psych: normal mood and affect    Neuro:   Slowed processing.      Balance Testing: Romberg: unable   Backward Tandem: unable   Single-leg stance: unable      Cognitive:  Immediate object recall: 4/4  4 Object Recall at 5 minutes:4/4  Reverse months of the year:   Spell world backwards: Unable  Backwards number strin numbers: closes eyes to accomplish   4-9-3                  Alternate:  6-2-9   3-8-1-4               3-2-7-9    6-2-9-7-1   1-5-2-8-6    7-1-8-4-6-2   5-3-9-1-4-8       Impact Testing Scores: ImPACT Testing not performed      RADIOLOGY:  Independent visualization of images performed  CT SCAN OF THE HEAD  WITHOUT CONTRAST April 25, 2021 10:56 AM      HISTORY: Fall, head injury.     TECHNIQUE: Axial images of the head and coronal reformations without  IV contrast material. Radiation dose for this scan was reduced using  automated exposure control, adjustment of the mA and/or kV according  to patient size, or iterative reconstruction technique.     COMPARISON: CT head 8/18/2015. MRI head 1/6/2015.     FINDINGS: There is no evidence of intracranial hemorrhage, mass, acute  infarct or anomaly. The ventricles are normal in size, shape and  configuration. The brain parenchyma and subarachnoid spaces are  normal.      The visualized portions of the sinuses and mastoids appear normal. The  bony calvarium and bones of the skull base appear intact. Left  temporomandibular joint degenerative change.                                                                      IMPRESSION: No evidence of acute intracranial hemorrhage, mass, or  herniation.        ARLEY PARIKH MD      ASSESSMENT:  Concussion without loss of consciousness, initial encounter    PLAN:  -Explained the pathophysiology of concussion and the role of physical and cognitive rest in the treatment process.  -Avoid intense physical activity, activities with the risk of falling, or contact sports. Okay to do light walking.  -Limit screen time: computers, iPads, cell phones, video games, TV  -Rest physically and cognitively. Avoid things that worsen symptoms.  -No driving.  -Work:  No work. Letter provided.     -Follow up with ENT team as already scheduled.    -Follow Up: 1 week or sooner if symptoms fail to improve or worsen.  Please call with any questions or concerns.       Nicole Santos MD, [facility]  [facility]          Again, thank you for allowing me to participate in the care of your patient.        Sincerely,        Ginny Santos MD

## 2021-05-03 NOTE — PATIENT INSTRUCTIONS
-Can gradually return to baseline activities.    -Can return to short distance daytime driving.  Avoid nighttime driving.    -Work:  Cleared to begin a gradual return to work starting May 10th.  Letter provided.    -Follow Up: 2 weeks or sooner if symptoms fail to improve or worsen.  Please call with any questions or concerns.

## 2021-05-04 ENCOUNTER — HOSPITAL ENCOUNTER (OUTPATIENT)
Dept: MAMMOGRAPHY | Facility: CLINIC | Age: 46
Discharge: HOME | End: 2021-05-04
Attending: PHYSICIAN ASSISTANT | Admitting: PHYSICIAN ASSISTANT
Payer: COMMERCIAL

## 2021-05-04 DIAGNOSIS — Z12.31 VISIT FOR SCREENING MAMMOGRAM: ICD-10-CM

## 2021-05-04 PROCEDURE — 77063 BREAST TOMOSYNTHESIS BI: CPT

## 2021-05-06 ENCOUNTER — OFFICE VISIT (OUTPATIENT)
Dept: ORTHOPEDICS | Facility: CLINIC | Age: 46
End: 2021-05-06
Payer: COMMERCIAL

## 2021-05-06 VITALS
WEIGHT: 154 LBS | BODY MASS INDEX: 24.17 KG/M2 | SYSTOLIC BLOOD PRESSURE: 138 MMHG | DIASTOLIC BLOOD PRESSURE: 82 MMHG | HEIGHT: 67 IN

## 2021-05-06 DIAGNOSIS — S06.0X0D CONCUSSION WITHOUT LOSS OF CONSCIOUSNESS, SUBSEQUENT ENCOUNTER: Primary | ICD-10-CM

## 2021-05-06 PROCEDURE — 99214 OFFICE O/P EST MOD 30 MIN: CPT | Performed by: PHYSICAL MEDICINE & REHABILITATION

## 2021-05-06 ASSESSMENT — MIFFLIN-ST. JEOR: SCORE: 1382.2

## 2021-05-06 NOTE — LETTER
May 6, 2021      Aide Charles  [address] [facility] [address]        To Whom It May Concern,       Aide is under my care for post-concussion treatment. At this time, she is able to return to work starting May 10, 2021 for up to 4 hours a day. She may need to take breaks depending on her symptoms. Starting May 17, 2021, she can return to full work hours, as tolerated, while still being able to take breaks, as needed. Will follow-up in-clinic in 2 weeks.      Sincerely,        Ginny Santos MD

## 2021-05-06 NOTE — LETTER
5/6/2021         RE: Aide Charles  [address]  [facility] [address]        Dear Colleague,    Thank you for referring your patient, Aide Charles, to the [facility] [place]. Please see a copy of my visit note below.      Aide Charles is a 45 year old female who presents in follow up for a concussion that occurred on 4/24/2021. Since last visit on 4/29/2021 patient notes feeling better for last 3 days. Was sleeping, napping and cognitive rest last week. Noise sensitivity has gone down. Speed of processing has improved. Had been taking Tylenol but has not taken any since 5/3/2021.  Ear drainage stopped over a week ago.      Since your last visit, level of activity is:  Stage 1 - very light.     Since your last visit, have you continued with your normal cognitive activity (text, computer, school): Not working. Feels like will be able to tolerate getting back to some work. Not sure how much she would be able to tolerate due to the lack of stimuli she has subjected to herself to in the past week.      Current Symptoms:  CONCUSSION SYMPTOMS ASSESSMENT 4/29/2021 5/6/2021   Headache or Pressure In Head 4 - moderate to severe 2 - mild to moderate   Upset Stomach or Throwing Up 2 - mild to moderate 0 - none   Problems with Balance 5 - severe 1 - mild   Feeling Dizzy 5 - severe 1 - mild   Sensitivity to Light 5 - severe 3 - moderate   Sensitivity to Noise 4 - moderate to severe 2 - mild to moderate   Mood Changes 2 - mild to moderate 1 - mild   Feeling sluggish, hazy, or foggy 5 - severe 2 - mild to moderate   Trouble Concentrating, Lack of Focus 5 - severe 3 - moderate   Motion Sickness 2 - mild to moderate 1 - mild   Vision Changes 2 - mild to moderate 1 - mild   Memory Problems 3 - moderate 2 - mild to moderate   Feeling Confused 3 - moderate 2 - mild to moderate   Neck Pain 0 - none 0 - none   Trouble Sleeping 3 - moderate 1 - mild   Total Number of Symptoms 14 13   Symptom Severity Score 50  "22       Patient's past medical, surgical, social and family histories are reviewed today.    Past Medical History:   Diagnosis Date     Generalized anxiety disorder      MDD (major depressive disorder), recurrent, severe, with psychosis (H)      Pulmonary embolism (H) 11/15/2018    while on OCP     Past Surgical History:   Procedure Laterality Date     ESOPHAGOSCOPY, GASTROSCOPY, DUODENOSCOPY (EGD), COMBINED N/A 05/15/2017    Procedure: COMBINED ESOPHAGOSCOPY, GASTROSCOPY, DUODENOSCOPY (EGD), BIOPSY SINGLE OR MULTIPLE;  ESOPHAGOSCOPY, GASTROSCOPY, DUODENOSCOPY (EGD) with biopsies by biopsy;  Surgeon: Robles Falk MD;  Location: [facility]     LAPAROSCOPIC SALPINGECTOMY Bilateral 2019    Procedure: LAPAROSCOPIC BILATERAL SALPINGECTOMY;  Surgeon: Joseluis Wing MD;  Location: [facility]     MYRINGOTOMY, INSERT TUBE, COMBINED Left 2018    cholesteatoma and surgical repair       OBJECTIVE:  /82 (BP Location: Right arm, Patient Position: Sitting, Cuff Size: Adult Regular)   Ht 1.695 m (5' 6.75\")   Wt 69.9 kg (154 lb)   LMP 2021 (Exact Date)   BMI 24.30 kg/m      General: Healthy, well-appearing, and in no acute distress.  Pleasant.    Skin: no suspicious lesions or rashes  Psych: mentation appears normal, and affect is appropriate/bright  Head: Normocephalic, atraumatic  Eyes: Glasses, good eye contact  Neuro: Conversing appropriately    Balance Testing:       - Romberg: normal       - Backward Tandem: normal       - Single-leg stance: normal    Cognitive:  Immediate object recall: /  4 Object Recall at 5 minutes:4/4  Reverse months of the year:   Spell world backwards: Able  Backwards number strin numbers   4-9-3                  Alternate:  6-2-9   3-8-1-4               3-2-7-9    6-2-9-7-1   1-5-2-8-6    7-1-8-4-6-2   5-3-9-1-4-8       Impact Testing Scores: ImPACT Testing not performed    ASSESSMENT:  Concussion without loss of consciousness, subsequent " encounter    PLAN:  -Aide is doing very well compared to last week.  Can gradually return to baseline activities.    -Can return to short distance daytime driving.  Avoid nighttime driving.    -Work:  Cleared to begin a gradual return to work starting May 10th.  Letter provided.    -Follow Up: 2 weeks or sooner if symptoms fail to improve or worsen.  Please call with any questions or concerns.       Nicole Santos MD, [facility]  [facility]        Again, thank you for allowing me to participate in the care of your patient.        Sincerely,        Ginny Santos MD

## 2021-05-12 NOTE — PATIENT INSTRUCTIONS
-Cleared for baseline activities - screen time, driving, exercise.  -Work:  Cleared for full work without restrictions.      -Follow up as needed if symptoms fail to improve or worsen.  Please call with questions or concerns.

## 2021-05-12 NOTE — PROGRESS NOTES
Aide Charles is a 45 year old female who presents in follow up for a concussion that occurred on 4/24/2021.  Since last visit on 5/6/2021, Aide notes she has been back to work for 8 hours last three days and notes no symptoms as of late. Notes she feels back to normal.    Since your last visit, level of activity is:  Stage 3 - moderately aggressive.    Since your last visit, have you continued with your normal cognitive activity (text, computer, school):  Back to normal cognitive ability without symptoms.      Current Symptoms:  CONCUSSION SYMPTOMS ASSESSMENT 4/29/2021 5/6/2021 5/20/2021   Headache or Pressure In Head 4 - moderate to severe 2 - mild to moderate 0 - none   Upset Stomach or Throwing Up 2 - mild to moderate 0 - none 0 - none   Problems with Balance 5 - severe 1 - mild 0 - none   Feeling Dizzy 5 - severe 1 - mild 0 - none   Sensitivity to Light 5 - severe 3 - moderate 0 - none   Sensitivity to Noise 4 - moderate to severe 2 - mild to moderate 0 - none   Mood Changes 2 - mild to moderate 1 - mild 0 - none   Feeling sluggish, hazy, or foggy 5 - severe 2 - mild to moderate 0 - none   Trouble Concentrating, Lack of Focus 5 - severe 3 - moderate 0 - none   Motion Sickness 2 - mild to moderate 1 - mild 0 - none   Vision Changes 2 - mild to moderate 1 - mild 0 - none   Memory Problems 3 - moderate 2 - mild to moderate 0 - none   Feeling Confused 3 - moderate 2 - mild to moderate 0 - none   Neck Pain 0 - none 0 - none 0 - none   Trouble Sleeping 3 - moderate 1 - mild 0 - none   Total Number of Symptoms 14 13 0   Symptom Severity Score 50 22 0       Sleep: No Issues    Patient's past medical, surgical, social and family histories are reviewed today.    Past Medical History:   Diagnosis Date     Generalized anxiety disorder      MDD (major depressive disorder), recurrent, severe, with psychosis (H)      Pulmonary embolism (H) 11/15/2018    while on OCP     Past Surgical History:   Procedure Laterality Date      "ESOPHAGOSCOPY, GASTROSCOPY, DUODENOSCOPY (EGD), COMBINED N/A 05/15/2017    Procedure: COMBINED ESOPHAGOSCOPY, GASTROSCOPY, DUODENOSCOPY (EGD), BIOPSY SINGLE OR MULTIPLE;  ESOPHAGOSCOPY, GASTROSCOPY, DUODENOSCOPY (EGD) with biopsies by biopsy;  Surgeon: Robles Falk MD;  [facility]     LAPAROSCOPIC SALPINGECTOMY Bilateral 2019    Procedure: LAPAROSCOPIC BILATERAL SALPINGECTOMY;  Surgeon: Joseluis Wing MD;  [facility]: [facility]     MYRINGOTOMY, INSERT TUBE, COMBINED Left 2018    cholesteatoma and surgical repair       OBJECTIVE:  /80 (BP Location: Right arm, Patient Position: Sitting, Cuff Size: Adult Regular)   Ht 1.676 m (5' 6\")   Wt 69.9 kg (154 lb)   BMI 24.86 kg/m      General: Healthy, well-appearing, and in no acute distress.  Skin: no suspicious lesions or rashes  Psych: mentation appears normal, and affect is appropriate/bright  Head:  Normocephalic, atraumatic  Eyes:  Good eye contact  Neuro:  Conversing appropriately    Balance Testing:       - Romberg: normal       - Backward Tandem: normal       - Single-leg stance: normal    Cognitive:  Immediate object recall:   4 Object Recall at 5 minutes: 3/4  Reverse months of the year:   Spell world backwards: Able  Backwards number strin numbers   4-9-3                  Alternate:  6-2-9   3-8-1-4               3-2-7-9    6-2-9-7-1   1-5-2-8-6    7-1-8-4-6-2   5-3-9-1-4-8       Impact Testing Scores: ImPACT Testing not performed    ASSESSMENT:  Concussion without loss of consciousness, subsequent encounter    PLAN:  -Cleared for baseline activities - screen time, driving, exercise.  -Work:  Cleared for full work without restrictions.  Letter provided.    -Follow up as needed if symptoms fail to improve or worsen.  Please call with questions or concerns.      Nicole Santos MD, [facility]  [facility]      "

## 2021-05-20 ENCOUNTER — OFFICE VISIT (OUTPATIENT)
Dept: ORTHOPEDICS | Facility: CLINIC | Age: 46
End: 2021-05-20
Payer: COMMERCIAL

## 2021-05-20 VITALS
SYSTOLIC BLOOD PRESSURE: 132 MMHG | BODY MASS INDEX: 24.75 KG/M2 | WEIGHT: 154 LBS | DIASTOLIC BLOOD PRESSURE: 80 MMHG | HEIGHT: 66 IN

## 2021-05-20 DIAGNOSIS — S06.0X0D CONCUSSION WITHOUT LOSS OF CONSCIOUSNESS, SUBSEQUENT ENCOUNTER: Primary | ICD-10-CM

## 2021-05-20 PROCEDURE — 99213 OFFICE O/P EST LOW 20 MIN: CPT | Performed by: PHYSICAL MEDICINE & REHABILITATION

## 2021-05-20 ASSESSMENT — MIFFLIN-ST. JEOR: SCORE: 1370.29

## 2021-05-20 NOTE — LETTER
May 20, 2021      Aide Charles  [address] [place] [address]        To Whom It May Concern,      Aide is cleared for full work without restrictions.            Sincerely,        Ginny Santos MD

## 2021-05-20 NOTE — LETTER
5/20/2021         RE: Aide Charles  [address]  [facility] [address]        Dear Colleague,    Thank you for referring your patient, Aide Charles, to the [facility] [place]. Please see a copy of my visit note below.      Aide Charles is a 45 year old female who presents in follow up for a concussion that occurred on 4/24/2021.  Since last visit on 5/6/2021, Aide notes she has been back to work for 8 hours last three days and notes no symptoms as of late. Notes she feels back to normal.    Since your last visit, level of activity is:  Stage 3 - moderately aggressive.    Since your last visit, have you continued with your normal cognitive activity (text, computer, school):  Back to normal cognitive ability without symptoms.      Current Symptoms:  CONCUSSION SYMPTOMS ASSESSMENT 4/29/2021 5/6/2021 5/20/2021   Headache or Pressure In Head 4 - moderate to severe 2 - mild to moderate 0 - none   Upset Stomach or Throwing Up 2 - mild to moderate 0 - none 0 - none   Problems with Balance 5 - severe 1 - mild 0 - none   Feeling Dizzy 5 - severe 1 - mild 0 - none   Sensitivity to Light 5 - severe 3 - moderate 0 - none   Sensitivity to Noise 4 - moderate to severe 2 - mild to moderate 0 - none   Mood Changes 2 - mild to moderate 1 - mild 0 - none   Feeling sluggish, hazy, or foggy 5 - severe 2 - mild to moderate 0 - none   Trouble Concentrating, Lack of Focus 5 - severe 3 - moderate 0 - none   Motion Sickness 2 - mild to moderate 1 - mild 0 - none   Vision Changes 2 - mild to moderate 1 - mild 0 - none   Memory Problems 3 - moderate 2 - mild to moderate 0 - none   Feeling Confused 3 - moderate 2 - mild to moderate 0 - none   Neck Pain 0 - none 0 - none 0 - none   Trouble Sleeping 3 - moderate 1 - mild 0 - none   Total Number of Symptoms 14 13 0   Symptom Severity Score 50 22 0       Sleep: No Issues    Patient's past medical, surgical, social and family histories are reviewed today.    Past Medical  "History:   Diagnosis Date     Generalized anxiety disorder      MDD (major depressive disorder), recurrent, severe, with psychosis (H)      Pulmonary embolism (H) 11/15/2018    while on OCP     Past Surgical History:   Procedure Laterality Date     ESOPHAGOSCOPY, GASTROSCOPY, DUODENOSCOPY (EGD), COMBINED N/A 05/15/2017    Procedure: COMBINED ESOPHAGOSCOPY, GASTROSCOPY, DUODENOSCOPY (EGD), BIOPSY SINGLE OR MULTIPLE;  ESOPHAGOSCOPY, GASTROSCOPY, DUODENOSCOPY (EGD) with biopsies by biopsy;  Surgeon: Robles Falk MD;  Location: [facility]     LAPAROSCOPIC SALPINGECTOMY Bilateral 2019    Procedure: LAPAROSCOPIC BILATERAL SALPINGECTOMY;  Surgeon: Joseluis Wing MD;  Location: [facility]     MYRINGOTOMY, INSERT TUBE, COMBINED Left 2018    cholesteatoma and surgical repair       OBJECTIVE:  /80 (BP Location: Right arm, Patient Position: Sitting, Cuff Size: Adult Regular)   Ht 1.676 m (5' 6\")   Wt 69.9 kg (154 lb)   BMI 24.86 kg/m      General: Healthy, well-appearing, and in no acute distress.  Skin: no suspicious lesions or rashes  Psych: mentation appears normal, and affect is appropriate/bright  Head:  Normocephalic, atraumatic  Eyes:  Good eye contact  Neuro:  Conversing appropriately    Balance Testing:       - Romberg: normal       - Backward Tandem: normal       - Single-leg stance: normal    Cognitive:  Immediate object recall: 4/4  4 Object Recall at 5 minutes: 3/4  Reverse months of the year:   Spell world backwards: Able  Backwards number strin numbers   4-9-3                  Alternate:  6-2-9   3-8-1-4               3-2-7-9    6-2-9-7-1   1-5-2-8-6    7-1-8-4-6-2   5-3-9-1-4-8       Impact Testing Scores: ImPACT Testing not performed    ASSESSMENT:  Concussion without loss of consciousness, subsequent encounter    PLAN:  -Cleared for baseline activities - screen time, driving, exercise.  -Work:  Cleared for full work without restrictions.  Letter provided.    -Follow up as " needed if symptoms fail to improve or worsen.  Please call with questions or concerns.      Nicole Santos MD, [facility]  [facility]          Again, thank you for allowing me to participate in the care of your patient.        Sincerely,        Ginny Santos MD

## 2021-07-27 ENCOUNTER — TELEPHONE (OUTPATIENT)
Dept: FAMILY MEDICINE | Facility: CLINIC | Age: 46
End: 2021-07-27

## 2021-07-29 ENCOUNTER — HOSPITAL ENCOUNTER (EMERGENCY)
Facility: CLINIC | Age: 46
Discharge: HOME | End: 2021-07-29
Attending: PHYSICIAN ASSISTANT | Admitting: PHYSICIAN ASSISTANT
Payer: COMMERCIAL

## 2021-07-29 ENCOUNTER — APPOINTMENT (OUTPATIENT)
Dept: GENERAL RADIOLOGY | Facility: CLINIC | Age: 46
End: 2021-07-29
Attending: PHYSICIAN ASSISTANT
Payer: COMMERCIAL

## 2021-07-29 VITALS
HEIGHT: 66 IN | BODY MASS INDEX: 24.91 KG/M2 | HEART RATE: 62 BPM | RESPIRATION RATE: 20 BRPM | TEMPERATURE: 98.4 F | WEIGHT: 155 LBS | OXYGEN SATURATION: 98 % | DIASTOLIC BLOOD PRESSURE: 94 MMHG | SYSTOLIC BLOOD PRESSURE: 151 MMHG

## 2021-07-29 DIAGNOSIS — J06.9 VIRAL URI WITH COUGH: ICD-10-CM

## 2021-07-29 LAB
BASOPHILS # BLD AUTO: 0.1 10E3/UL (ref 0–0.2)
BASOPHILS NFR BLD AUTO: 1 %
EOSINOPHIL # BLD AUTO: 0.1 10E3/UL (ref 0–0.7)
EOSINOPHIL NFR BLD AUTO: 2 %
ERYTHROCYTE [DISTWIDTH] IN BLOOD BY AUTOMATED COUNT: 13.1 % (ref 10–15)
HCT VFR BLD AUTO: 37.4 % (ref 35–47)
HGB BLD-MCNC: 12.8 G/DL (ref 11.7–15.7)
HOLD SPECIMEN: NORMAL
HOLD SPECIMEN: NORMAL
IMM GRANULOCYTES # BLD: 0 10E3/UL
IMM GRANULOCYTES NFR BLD: 0 %
LYMPHOCYTES # BLD AUTO: 3 10E3/UL (ref 0.8–5.3)
LYMPHOCYTES NFR BLD AUTO: 39 %
MCH RBC QN AUTO: 30.3 PG (ref 26.5–33)
MCHC RBC AUTO-ENTMCNC: 34.2 G/DL (ref 31.5–36.5)
MCV RBC AUTO: 89 FL (ref 78–100)
MONOCYTES # BLD AUTO: 0.4 10E3/UL (ref 0–1.3)
MONOCYTES NFR BLD AUTO: 5 %
NEUTROPHILS # BLD AUTO: 4.1 10E3/UL (ref 1.6–8.3)
NEUTROPHILS NFR BLD AUTO: 53 %
NRBC # BLD AUTO: 0 10E3/UL
NRBC BLD AUTO-RTO: 0 /100
PLATELET # BLD AUTO: 257 10E3/UL (ref 150–450)
RBC # BLD AUTO: 4.22 10E6/UL (ref 3.8–5.2)
WBC # BLD AUTO: 7.7 10E3/UL (ref 4–11)

## 2021-07-29 PROCEDURE — 250N000013 HC RX MED GY IP 250 OP 250 PS 637: Performed by: PHYSICIAN ASSISTANT

## 2021-07-29 PROCEDURE — 71045 X-RAY EXAM CHEST 1 VIEW: CPT

## 2021-07-29 PROCEDURE — 94640 AIRWAY INHALATION TREATMENT: CPT | Performed by: PHYSICIAN ASSISTANT

## 2021-07-29 PROCEDURE — 36415 COLL VENOUS BLD VENIPUNCTURE: CPT | Performed by: PHYSICIAN ASSISTANT

## 2021-07-29 PROCEDURE — 99284 EMERGENCY DEPT VISIT MOD MDM: CPT | Performed by: PHYSICIAN ASSISTANT

## 2021-07-29 PROCEDURE — 85025 COMPLETE CBC W/AUTO DIFF WBC: CPT | Performed by: PHYSICIAN ASSISTANT

## 2021-07-29 PROCEDURE — 99284 EMERGENCY DEPT VISIT MOD MDM: CPT | Mod: 25 | Performed by: PHYSICIAN ASSISTANT

## 2021-07-29 RX ORDER — BENZONATATE 200 MG/1
200 CAPSULE ORAL 3 TIMES DAILY PRN
Qty: 20 CAPSULE | Refills: 0 | Status: SHIPPED | OUTPATIENT
Start: 2021-07-29 | End: 2021-10-05

## 2021-07-29 RX ORDER — BENZONATATE 100 MG/1
200 CAPSULE ORAL ONCE
Status: COMPLETED | OUTPATIENT
Start: 2021-07-29 | End: 2021-07-29

## 2021-07-29 RX ORDER — PREDNISONE 20 MG/1
20 TABLET ORAL 2 TIMES DAILY
Qty: 10 TABLET | Refills: 0 | Status: SHIPPED | OUTPATIENT
Start: 2021-07-29 | End: 2021-07-29

## 2021-07-29 RX ORDER — ALBUTEROL SULFATE 90 UG/1
2 AEROSOL, METERED RESPIRATORY (INHALATION) ONCE
Status: COMPLETED | OUTPATIENT
Start: 2021-07-29 | End: 2021-07-29

## 2021-07-29 RX ORDER — PREDNISONE 20 MG/1
20 TABLET ORAL 2 TIMES DAILY
Qty: 10 TABLET | Refills: 0 | Status: SHIPPED | OUTPATIENT
Start: 2021-07-29 | End: 2021-08-03

## 2021-07-29 RX ADMIN — BENZONATATE 200 MG: 100 CAPSULE ORAL at 20:44

## 2021-07-29 RX ADMIN — ALBUTEROL SULFATE 2 PUFF: 90 AEROSOL, METERED RESPIRATORY (INHALATION) at 20:44

## 2021-07-29 ASSESSMENT — MIFFLIN-ST. JEOR: SCORE: 1374.83

## 2021-07-29 NOTE — ED TRIAGE NOTES
Productive cough,congestion, chest congestion and facial pressure.  Coughing so hard she is not sleeping.  Recently had negative covid test through drive thru did not see provider for rest of the symptoms.

## 2021-07-30 NOTE — DISCHARGE INSTRUCTIONS
It was a pleasure working with you today!  I hope your condition improves rapidly!     Thankfully, your chest x-ray looks great. No sign of pneumonia. Your blood cell count was quite balanced as well. You are struggling with a virus, and your immune system should britt this off over the next number of days. Start the prednisone right away to help with the airway inflammation. I think this will help with your cough. You were given your first dose of cough medicine, Tessalon, here in the emergency department. A prescription for this was provided at [facility] here in [place]. You can also use the albuterol inhaler 2 puffs every 4 hours as needed. Make sure that you're drinking at least 10 glasses of water daily to maintain adequate hydration. I would recommend taking up to 1000 mg of vitamin C daily to help boost your immune system. Try and get increased amounts of sleep.

## 2021-07-30 NOTE — ED PROVIDER NOTES
History     Chief Complaint   Patient presents with     Cough     HPI  Aide Charles is a 45 year old female who presents for evaluation of URI symptoms for the past 1 week. Started with congestion, cough, generalized malaise, and occasional rhinorrhea. Cough has increased. Very occasionally productive, but she has not looked at the color of the production. Reports some chest pressure with coughing spasms. Underwent negative Covid screen 4 days ago. No ill contacts. She has not had the Covid vaccination. No recent travel. She does work with early childhood education. Denies any fever or chills. Non-smoker. No prior history of asthma. She did try her 's albuterol inhaler and this did seem to help her cough.        Allergies:  Allergies   Allergen Reactions     Erythromycin Nausea and Vomiting     Seasonal Allergies      Molds, grass, multiple trees, plant pollen, cats, rabbits, dogs, hay.       Problem List:    Patient Active Problem List    Diagnosis Date Noted     Other acute pulmonary embolism without acute cor pulmonale (H) 04/14/2021     Priority: Medium     Recurrent major depression in partial remission (H) 10/16/2018     Priority: Medium     Genital warts 10/27/2017     Priority: Medium     Panic disorder with agoraphobia 08/28/2015     Priority: Medium     PTSD (post-traumatic stress disorder) 08/28/2015     Priority: Medium     Hyperlipidemia LDL goal <160 11/13/2014     Priority: Medium     Fracture of great toe 11/11/2013     Priority: Medium     Transient global amnesia 05/19/2013     Priority: Medium     Generalized anxiety disorder 11/06/2012     Priority: Medium     Diagnosis updated by automated process. Provider to review and confirm.       Seasonal allergic rhinitis 04/26/2012     Priority: Medium        Past Medical History:    Past Medical History:   Diagnosis Date     Generalized anxiety disorder      MDD (major depressive disorder), recurrent, severe, with psychosis (H)      Pulmonary  embolism (H) 11/15/2018       Past Surgical History:    Past Surgical History:   Procedure Laterality Date     ESOPHAGOSCOPY, GASTROSCOPY, DUODENOSCOPY (EGD), COMBINED N/A 05/15/2017    Procedure: COMBINED ESOPHAGOSCOPY, GASTROSCOPY, DUODENOSCOPY (EGD), BIOPSY SINGLE OR MULTIPLE;  ESOPHAGOSCOPY, GASTROSCOPY, DUODENOSCOPY (EGD) with biopsies by biopsy;  Surgeon: Robles Falk MD;  [facility]: [facility]     LAPAROSCOPIC SALPINGECTOMY Bilateral 03/13/2019    Procedure: LAPAROSCOPIC BILATERAL SALPINGECTOMY;  Surgeon: Joseluis Wing MD;  [facility]: [facility]     MYRINGOTOMY, INSERT TUBE, COMBINED Left 05/16/2018    cholesteatoma and surgical repair       Family History:    Family History   Problem Relation Age of Onset     Hypertension Mother      Hyperlipidemia Mother      Mental Illness Mother      Cancer - colorectal Father      Substance Abuse Father         alcoholic recovering     Cerebrovascular Disease Maternal Grandmother      Diabetes Paternal Grandmother      Heart Disease Paternal Grandfather         MI     Bipolar Disorder Maternal Aunt      Schizophrenia Maternal Aunt      Coronary Artery Disease Maternal Grandfather      Cerebrovascular Disease Maternal Grandfather      Other - See Comments Sister         2 yrs older     Multiple Sclerosis Maternal Aunt 45     Other - See Comments Daughter         9/14/2000     Other - See Comments Son         8/16/2004       Social History:  Marital Status:   [2]  Social History     Tobacco Use     Smoking status: Never Smoker     Smokeless tobacco: Never Used   Substance Use Topics     Alcohol use: Yes     Alcohol/week: 0.0 standard drinks     Comment: rare     Drug use: No        Medications:    benzonatate (TESSALON) 200 MG capsule  predniSONE (DELTASONE) 20 MG tablet  acetaminophen (TYLENOL) 500 MG tablet  atorvastatin (LIPITOR) 10 MG tablet  cholecalciferol (VITAMIN D3) 5000 UNITS CAPS capsule  venlafaxine (EFFEXOR-XR) 37.5 MG 24 hr  "capsule          Review of Systems   All other systems reviewed and are negative.      Physical Exam   BP: (!) 175/103  Pulse: 67  Temp: 98.4  F (36.9  C)  Resp: 20  Height: 167.6 cm (5' 6\")  Weight: 70.3 kg (155 lb)  SpO2: 98 %      Physical Exam  Vitals and nursing note reviewed.   Constitutional:       General: She is not in acute distress.     Appearance: She is not diaphoretic.      Comments: Dry sounding cough.   HENT:      Head: Normocephalic and atraumatic.      Right Ear: Tympanic membrane, ear canal and external ear normal.      Left Ear: Tympanic membrane, ear canal and external ear normal.      Nose: Nose normal. No congestion or rhinorrhea.      Mouth/Throat:      Mouth: Mucous membranes are moist.      Pharynx: No oropharyngeal exudate.   Eyes:      General: No scleral icterus.        Right eye: No discharge.         Left eye: No discharge.      Conjunctiva/sclera: Conjunctivae normal.      Pupils: Pupils are equal, round, and reactive to light.   Neck:      Thyroid: No thyromegaly.   Cardiovascular:      Rate and Rhythm: Normal rate and regular rhythm.      Heart sounds: Normal heart sounds. No murmur heard.     Pulmonary:      Effort: Pulmonary effort is normal. No respiratory distress.      Breath sounds: Rhonchi (Occasional faint rhonchi in the right lower posterior) present. No wheezing or rales.   Chest:      Chest wall: No tenderness.   Abdominal:      General: Bowel sounds are normal. There is no distension.      Palpations: Abdomen is soft. There is no mass.      Tenderness: There is no abdominal tenderness. There is no guarding or rebound.   Musculoskeletal:         General: No tenderness or deformity. Normal range of motion.      Cervical back: Normal range of motion and neck supple.   Lymphadenopathy:      Cervical: No cervical adenopathy.   Skin:     General: Skin is warm and dry.      Capillary Refill: Capillary refill takes less than 2 seconds.      Findings: No erythema or rash. "   Neurological:      Mental Status: She is alert and oriented to person, place, and time.      Cranial Nerves: No cranial nerve deficit.   Psychiatric:         Behavior: Behavior normal.         Thought Content: Thought content normal.         ED Course        Procedures              Critical Care time:  none               Results for orders placed or performed during the hospital encounter of 07/29/21 (from the past 24 hour(s))   CBC with platelets differential    Narrative    The following orders were created for panel order CBC with platelets differential.  Procedure                               Abnormality         Status                     ---------                               -----------         ------                     CBC with platelets and d...[[id number]]                      Final result                 Please view results for these tests on the individual orders.   CBC with platelets and differential   Result Value Ref Range    WBC Count 7.7 4.0 - 11.0 10e3/uL    RBC Count 4.22 3.80 - 5.20 10e6/uL    Hemoglobin 12.8 11.7 - 15.7 g/dL    Hematocrit 37.4 35.0 - 47.0 %    MCV 89 78 - 100 fL    MCH 30.3 26.5 - 33.0 pg    MCHC 34.2 31.5 - 36.5 g/dL    RDW 13.1 10.0 - 15.0 %    Platelet Count 257 150 - 450 10e3/uL    % Neutrophils 53 %    % Lymphocytes 39 %    % Monocytes 5 %    % Eosinophils 2 %    % Basophils 1 %    % Immature Granulocytes 0 %    NRBCs per 100 WBC 0 <1 /100    Absolute Neutrophils 4.1 1.6 - 8.3 10e3/uL    Absolute Lymphocytes 3.0 0.8 - 5.3 10e3/uL    Absolute Monocytes 0.4 0.0 - 1.3 10e3/uL    Absolute Eosinophils 0.1 0.0 - 0.7 10e3/uL    Absolute Basophils 0.1 0.0 - 0.2 10e3/uL    Absolute Immature Granulocytes 0.0 <=0.0 10e3/uL    Absolute NRBCs 0.0 10e3/uL   Wing Draw    Narrative    The following orders were created for panel order Wing Draw.  Procedure                               Abnormality         Status                     ---------                                -----------         ------                     Extra Red Top Tube[[id number]]                               In process                 Extra Green Top (Lithium...[[id number]]                      In process                   Please view results for these tests on the individual orders.   XR Chest Port 1 View    Narrative    CHEST ONE VIEW PORTABLE   7/29/2021 8:15 PM     HISTORY: Cough, right lower posterior rhonchi.    COMPARISON: 1/19/2020      Impression    IMPRESSION: The lungs are clear. No pneumothorax or pleural effusion.    DENG MOSCOSO MD         SYSTEM ID:  [id number]       Medications   benzonatate (TESSALON) capsule 200 mg (has no administration in time range)   albuterol (PROAIR HFA/PROVENTIL HFA/VENTOLIN HFA) 108 (90 Base) MCG/ACT inhaler 2 puff (has no administration in time range)       Assessments & Plan (with Medical Decision Making)  Viral URI with cough     45 year old female presents for increasing cough. URI symptoms for the last week with congestion, headache, generalized malaise, and cough. No fever or chills. Some chest pressure with coughing episodes. See HPI for details. On exam she is afebrile. Temperature 98.4. Respiration 20 and oxygen saturation 98% on room air. Faint occasional rhonchi in the right lower posterior. Dry cough noted during exam. Remainder the exam is otherwise normal. Chest x-ray without infiltrate or other abnormality. CBC with normal white blood cell count at 7700 with normal differential. Hemoglobin stable at 12.8. Discussed with the patient that her symptoms, exam findings, and x-ray/laboratory work-up are consistent with a viral etiology. Antibiotics would not be indicated for this condition. Prednisone burst for the airway inflammation hoping to help with the cough. Tessalon Perles as needed for cough. Albuterol MDI to use for cough as well. Indications for return reviewed. Patient was in agreement with this plan and was suitable for discharge.     I have reviewed  the nursing notes.    I have reviewed the findings, diagnosis, plan and need for follow up with the patient.       New Prescriptions    BENZONATATE (TESSALON) 200 MG CAPSULE    Take 1 capsule (200 mg) by mouth 3 times daily as needed for cough    PREDNISONE (DELTASONE) 20 MG TABLET    Take 1 tablet (20 mg) by mouth 2 times daily       Final diagnoses:   Viral URI with cough     Disclaimer: This note consists of symbols derived from keyboarding, dictation and/or voice recognition software. As a result, there may be errors in the script that have gone undetected. Please consider this when interpreting information found in this chart.      7/29/2021   [facility] EMERGENCY DEPT     Lex Morales PA-C  07/29/21 2036

## 2021-09-22 ENCOUNTER — TELEPHONE (OUTPATIENT)
Dept: FAMILY MEDICINE | Facility: CLINIC | Age: 46
End: 2021-09-22

## 2021-09-22 NOTE — TELEPHONE ENCOUNTER
Patient returned call and was given message below. She would like to keep her appointment to discuss.

## 2021-09-22 NOTE — TELEPHONE ENCOUNTER
Patient has a visit scheduled tomorrow for medical exemption letter for the COVID vaccine. Please let patient know that [facility] policy is that we will not be completing these letters unless patient has a contraindication as listed by the CDC. If she has other questions/concerns I can certainly talk with her about that at the time of her visit.    Suzi Barnett PA-C

## 2021-09-23 ENCOUNTER — VIRTUAL VISIT (OUTPATIENT)
Dept: FAMILY MEDICINE | Facility: CLINIC | Age: 46
End: 2021-09-23
Payer: COMMERCIAL

## 2021-09-23 DIAGNOSIS — I26.99 OTHER ACUTE PULMONARY EMBOLISM WITHOUT ACUTE COR PULMONALE (H): ICD-10-CM

## 2021-09-23 DIAGNOSIS — Z71.89 COUNSELED ABOUT COVID-19 VIRUS INFECTION: Primary | ICD-10-CM

## 2021-09-23 DIAGNOSIS — G45.4 TRANSIENT GLOBAL AMNESIA: ICD-10-CM

## 2021-09-23 PROCEDURE — 99212 OFFICE O/P EST SF 10 MIN: CPT | Mod: TEL | Performed by: PHYSICIAN ASSISTANT

## 2021-09-23 NOTE — PROGRESS NOTES
Aide is a 45 year old who is being evaluated via a billable video visit.      How would you like to obtain your AVS? MyChart  If the video visit is dropped, the invitation should be resent by:   Will anyone else be joining your video visit? No      Difficultly connecting via video so this was changed to a telephone visit.       Subjective   Aide is a 45 year old who presents for the following health issues     HPI     Discuss vaccination     Patient scheduled visit today discuss concerns and options regarding the COVID vaccine. She is being required to have this by her workplace. Must be vaccinated by the first of the year. Has a lot of reservations as she has history of significant vaccine reactions in the past, blood clots/stroke, transient global amnesia. She has been having weekly testing at work as well we wears a mask routinely.     Review of Systems   Constitutional, HEENT, cardiovascular, pulmonary, gi and gu systems are negative, except as otherwise noted.      Objective           Vitals:  No vitals were obtained today due to virtual visit.    Physical Exam   No exam due to telephone visit.     Assessment & Plan     Counseled about COVID-19 virus infection    Other acute pulmonary embolism without acute cor pulmonale (H)    Transient global amnesia    Discussed contraindications regarding the COVID vaccine as well as safety guidelines and known reactions with each of the vaccines. Patient will talk further with her employer. Will purse Pfizer if she needs the immunization to keep her job. Encouraged her to continue with mask wearing and routine testing.    The patient indicates understanding of these issues and agrees with the plan.    Suzi Barnett PA-C  [facility]    Telephone duration: 11 minutes

## 2021-09-23 NOTE — LETTER
September 23, 2021      RE: Aide Charles  [address]  [facility] [address]       To whom it may concern:    Aide Charles is a patient currently under my care. Please note that she has had severe reactions with vaccinations in the past and has history of blood clots and stroke. For this reason she should be allowed to defer the COVID vaccine. I would strongly recommend she continue to wear a mask, have routine testing and continue good hand hygiene.     Sincerely,      Suzi Barnett PA-C

## 2021-10-02 ENCOUNTER — HEALTH MAINTENANCE LETTER (OUTPATIENT)
Age: 46
End: 2021-10-02

## 2021-10-05 ENCOUNTER — VIRTUAL VISIT (OUTPATIENT)
Dept: FAMILY MEDICINE | Facility: CLINIC | Age: 46
End: 2021-10-05
Payer: COMMERCIAL

## 2021-10-05 ENCOUNTER — NURSE TRIAGE (OUTPATIENT)
Dept: NURSING | Facility: CLINIC | Age: 46
End: 2021-10-05

## 2021-10-05 DIAGNOSIS — R11.2 NON-INTRACTABLE VOMITING WITH NAUSEA, UNSPECIFIED VOMITING TYPE: ICD-10-CM

## 2021-10-05 DIAGNOSIS — R19.7 DIARRHEA, UNSPECIFIED TYPE: ICD-10-CM

## 2021-10-05 DIAGNOSIS — U07.1 INFECTION DUE TO 2019 NOVEL CORONAVIRUS: Primary | ICD-10-CM

## 2021-10-05 PROBLEM — H90.12 CONDUCTIVE HEARING LOSS OF LEFT EAR WITH UNRESTRICTED HEARING OF RIGHT EAR: Status: ACTIVE | Noted: 2018-02-20

## 2021-10-05 PROBLEM — H71.92 CHOLESTEATOMA OF LEFT MIDDLE EAR: Status: ACTIVE | Noted: 2018-02-20

## 2021-10-05 PROBLEM — H73.892 RETRACTION OF TYMPANIC MEMBRANE OF LEFT EAR: Status: ACTIVE | Noted: 2018-02-20

## 2021-10-05 PROBLEM — H74.22: Status: ACTIVE | Noted: 2018-02-20

## 2021-10-05 PROCEDURE — 99213 OFFICE O/P EST LOW 20 MIN: CPT | Mod: TEL | Performed by: NURSE PRACTITIONER

## 2021-10-05 RX ORDER — ONDANSETRON 4 MG/1
4 TABLET, ORALLY DISINTEGRATING ORAL EVERY 8 HOURS PRN
Qty: 20 TABLET | Refills: 0 | Status: SHIPPED | OUTPATIENT
Start: 2021-10-05 | End: 2022-10-19

## 2021-10-05 ASSESSMENT — PATIENT HEALTH QUESTIONNAIRE - PHQ9: SUM OF ALL RESPONSES TO PHQ QUESTIONS 1-9: 0

## 2021-10-05 NOTE — TELEPHONE ENCOUNTER
Was diagnosed with COVID19 on [facility] positive test on Wednesday. Tested on Monday 9/27/21  Vomiting and diarrhea for one week.   Dry heaving and taking imodium for her diarrhea.   Non bloody/tarry stools, non bloody vomit  Per patient she is vomiting 1-2 times per day.   Diarrhea is 3 times per day.   NO fever since last Friday.   Patient is drinking Pedialyte. Urinating fine.   Sometimes lightheaded with standing.   Constant abdominal pain in the lower abdomen across the whole stomach.   Patient stated the pain is better with sleeping. Eating makes the pain worse.   Patient rates the pain a 3-4/10 in the abdomen.   After eating the pain is a 7/10  After the patient eats she has diarrhea 20-30 minutes later.   Pain subsides a little after a diarrhea stool.   She stated the pain never goes away.  Per RN protocol patient should be seen in ED/UCC NOW (OR TO OFFICE WITH PCP APPROVAL):      Second Level Triage requested.     Please advise.     Home care suggestions reviewed with caller per RN protocol.         Reason for Disposition    Constant abdominal pain lasting > 2 hours    Constant abdominal pain lasting > 2 hours    Constant abdominal pain lasting > 2 hours    Additional Information    Negative: Shock suspected (e.g., cold/pale/clammy skin, too weak to stand, low BP, rapid pulse)    Negative: Difficult to awaken or acting confused (e.g., disoriented, slurred speech)    Negative: Sounds like a life-threatening emergency to the triager    Negative: Vomiting occurs only while coughing    Negative: Pregnant < 20 Weeks and nausea/vomiting began in early pregnancy (i.e., 4-8 weeks pregnant)    Negative: Chest pain    Negative: Headache is main symptom    Negative: SEVERE vomiting (e.g., 6 or more times/day) (Exception: patient sounds well, is drinking liquids, does not sound dehydrated, and vomiting has lasted less than 24 hours)    Negative: MODERATE vomiting (e.g., 3 - 5 times/day) and age > 60    Negative: Vomiting  contains bile (green color)    Negative: Vomiting red blood or black (coffee ground) material    Negative: Insulin-dependent diabetes and glucose > 240 mg/dL (13 mmol/L)    Negative: Recent head injury (within 3 days)    Negative: Recent abdominal injury (within 7 days)    Negative: Drinking very little and has signs of dehydration (e.g., no urine > 12 hours, very dry mouth, very lightheaded)    Negative: Shock suspected (e.g., cold/pale/clammy skin, too weak to stand, low BP, rapid pulse)    Negative: Difficult to awaken or acting confused (e.g., disoriented, slurred speech)    Negative: Sounds like a life-threatening emergency to the triager    Negative: Vomiting also present and worse than the diarrhea    Negative: Blood in stool and without diarrhea    Negative: SEVERE abdominal pain (e.g., excruciating) and present > 1 hour    Negative: SEVERE abdominal pain and age > 60    Negative: Bloody, black, or tarry bowel movements (Exception: chronic-unchanged black-grey bowel movements and is taking iron pills or Pepto-bismol)    Negative: SEVERE diarrhea (e.g., 7 or more times / day more than normal) and age > 60 years    Negative: Passed out (i.e., fainted, collapsed and was not responding)    Negative: Shock suspected (e.g., cold/pale/clammy skin, too weak to stand, low BP, rapid pulse)    Negative: Sounds like a life-threatening emergency to the triager    Negative: Chest pain    Negative: Pain is mainly in upper abdomen (if needed ask: 'is it mainly above the belly button?')    Negative: Abdominal pain and pregnant > 20 weeks    Negative: Abdominal pain and pregnant < 20 weeks    Negative: SEVERE abdominal pain (e.g., excruciating)    Negative: Vomiting red blood or black (coffee ground) material    Negative: Bloody, black, or tarry bowel movements (Exception: chronic-unchanged black-grey bowel movements and is taking iron pills or Pepto-bismol)    Protocols used: VOMITING-A-OH, DIARRHEA-A-OH, ABDOMINAL PAIN -  FEMALE-A-OH

## 2021-10-05 NOTE — PROGRESS NOTES
Aide is a 45 year old who is being evaluated via a billable telephone visit.      What phone number would you like to be contacted at? [phone]  How would you like to obtain your AVS? MyChart    Assessment & Plan     Infection due to 2019 novel coronavirus  Discussed plan of care to continue as she has been with small bits of food, electrolyte replacement fluids and water prescription sent for Zofran to help with her nausea she has been keeping food down today we will continue to monitor this closely.  May continue bowel rest and Imodium for diarrhea hopefully this will follow suit and improved with as her vomiting has.  Did not recommend going back to work until diarrhea has cleared.  Please see AVS for further recommendations.  Patient verbalized understanding  Side effects of medications, proper use, the associated risk/benefits and other options were discussed. Patient understands these risks and agrees to take the medication as instructed.       Non-intractable vomiting with nausea, unspecified vomiting type    - ondansetron (ZOFRAN ODT) 4 MG ODT tab; Take 1 tablet (4 mg) by mouth every 8 hours as needed for nausea or vomiting    Diarrhea, unspecified type        Patient Instructions   Recommend continuation of self isolation until Oct. 11 then if symptoms improved may return to work Mon. Oct. 12th.   If continue to have diarrhea, vomiting after this recommend you be seen in clinic for further evaluation.     Recommend continuation of small bland bits of food for bowel rest sipping on electrolyte replacement beverages making sure that you are having urine output that is adequate.  May continue Imodium for diarrhea.  Prescription sent for Zofran to use as needed for nausea    Thank you  Vickie Alatorre CNP'      Return in about 6 days (around 10/11/2021), or if symptoms worsen or fail to improve, for acute illness symptoms.    KYLEE Jones CNP  [facility] [place]    Aide is a 45 year old who presents for the following health issues {    HPI     Patient has a virtual visit on 9/23/2021 for possible covid. Test positive on 9/29/2021 still not feeling better. Patient is still having issues with vomiting and unable to keep things down.     Patient with a history of nausea vomiting for approximately 1 week's time.  She was diagnosed with Covid on September 29th.  She has been quarantining at home is drinking electrolyte replacement beverage water and small bland amounts of food.  Today she states was the first day she has not had any vomiting however continues to have diarrhea and nausea.  She feels she is voiding adequate amounts of urine.  Is concerned about going back to work she should continue to quarantine we discussed this in detail October 8th unless symptoms of diarrhea and/or vomiting continue there would recommend the patient be seen in clinic.  We discussed use of Zofran to help with her nausea and she can continue her Imodium.    Review of Systems   Constitutional, HEENT, cardiovascular, pulmonary, GI, , musculoskeletal, neuro, skin, endocrine and psych systems are negative, except as otherwise noted.      Objective           Vitals:  No vitals were obtained today due to virtual visit.    Physical Exam   alert and no distress  PSYCH: Alert and oriented times 3; coherent speech, normal   rate and volume, able to articulate logical thoughts, able   to abstract reason, no tangential thoughts, no hallucinations   or delusions  Her affect is normal  RESP: No cough, no audible wheezing, able to talk in full sentences  Remainder of exam unable to be completed due to telephone visits        Phone call duration: 13   minutes

## 2021-10-05 NOTE — PATIENT INSTRUCTIONS
Recommend continuation of self isolation until Oct. 11 then if symptoms improved may return to work Mon. Oct. 12th.   If continue to have diarrhea, vomiting after this recommend you be seen in clinic for further evaluation.     Recommend continuation of small bland bits of food for bowel rest sipping on electrolyte replacement beverages making sure that you are having urine output that is adequate.  May continue Imodium for diarrhea.  Prescription sent for Zofran to use as needed for nausea    Thank you  Vickie Alatorre CNP'

## 2021-10-05 NOTE — TELEPHONE ENCOUNTER
Triage note reviewed will discuss further with patient during visit.  Thank you  Vickie Alatorre CNP

## 2021-10-05 NOTE — TELEPHONE ENCOUNTER
Patient has appointment today with Vickie Alatorre at 1:40 today.  Will route to her as a FYI for appointment.    JOE KwongN, RN

## 2021-12-14 ENCOUNTER — TELEPHONE (OUTPATIENT)
Dept: FAMILY MEDICINE | Facility: CLINIC | Age: 46
End: 2021-12-14
Payer: COMMERCIAL

## 2021-12-14 NOTE — TELEPHONE ENCOUNTER
Reason for Call:  Form, our goal is to have forms completed with 72 hours, however, some forms may require a visit or additional information.    Type of letter, form or note:  employer -exemption form    Who is the form from?: Patient    Where did the form come from: Patient or family brought in       What clinic location was the form placed at?: [facility]     Where the form was placed: [employer] Box/Folder    What number is listed as a contact on the form?: [phone]       Additional comments: The patient is asking that this be completed by 12/16/2021 and would like a call to pick it up. A detailed message is ok.    Call taken on 12/14/2021 at 5:54 PM by Emily Cowden

## 2022-01-15 ENCOUNTER — LAB (OUTPATIENT)
Dept: LAB | Facility: CLINIC | Age: 47
End: 2022-01-15
Attending: FAMILY MEDICINE
Payer: COMMERCIAL

## 2022-01-15 DIAGNOSIS — Z20.822 ENCOUNTER FOR LABORATORY TESTING FOR COVID-19 VIRUS: ICD-10-CM

## 2022-01-15 PROCEDURE — U0005 INFEC AGEN DETEC AMPLI PROBE: HCPCS

## 2022-01-15 PROCEDURE — U0003 INFECTIOUS AGENT DETECTION BY NUCLEIC ACID (DNA OR RNA); SEVERE ACUTE RESPIRATORY SYNDROME CORONAVIRUS 2 (SARS-COV-2) (CORONAVIRUS DISEASE [COVID-19]), AMPLIFIED PROBE TECHNIQUE, MAKING USE OF HIGH THROUGHPUT TECHNOLOGIES AS DESCRIBED BY CMS-2020-01-R: HCPCS

## 2022-01-16 LAB — SARS-COV-2 RNA RESP QL NAA+PROBE: NEGATIVE

## 2022-01-22 ENCOUNTER — APPOINTMENT (OUTPATIENT)
Dept: LAB | Facility: CLINIC | Age: 47
End: 2022-01-22
Attending: FAMILY MEDICINE
Payer: COMMERCIAL

## 2022-01-22 ENCOUNTER — LAB (OUTPATIENT)
Dept: LAB | Facility: CLINIC | Age: 47
End: 2022-01-22
Payer: COMMERCIAL

## 2022-01-22 DIAGNOSIS — Z20.822 ENCOUNTER FOR LABORATORY TESTING FOR COVID-19 VIRUS: ICD-10-CM

## 2022-01-22 PROCEDURE — U0003 INFECTIOUS AGENT DETECTION BY NUCLEIC ACID (DNA OR RNA); SEVERE ACUTE RESPIRATORY SYNDROME CORONAVIRUS 2 (SARS-COV-2) (CORONAVIRUS DISEASE [COVID-19]), AMPLIFIED PROBE TECHNIQUE, MAKING USE OF HIGH THROUGHPUT TECHNOLOGIES AS DESCRIBED BY CMS-2020-01-R: HCPCS

## 2022-01-22 PROCEDURE — U0005 INFEC AGEN DETEC AMPLI PROBE: HCPCS

## 2022-01-24 LAB — SARS-COV-2 RNA RESP QL NAA+PROBE: NEGATIVE

## 2022-01-29 ENCOUNTER — LAB (OUTPATIENT)
Dept: LAB | Facility: CLINIC | Age: 47
End: 2022-01-29
Payer: COMMERCIAL

## 2022-01-29 DIAGNOSIS — Z20.822 ENCOUNTER FOR LABORATORY TESTING FOR COVID-19 VIRUS: ICD-10-CM

## 2022-01-29 PROCEDURE — U0005 INFEC AGEN DETEC AMPLI PROBE: HCPCS

## 2022-01-29 PROCEDURE — U0003 INFECTIOUS AGENT DETECTION BY NUCLEIC ACID (DNA OR RNA); SEVERE ACUTE RESPIRATORY SYNDROME CORONAVIRUS 2 (SARS-COV-2) (CORONAVIRUS DISEASE [COVID-19]), AMPLIFIED PROBE TECHNIQUE, MAKING USE OF HIGH THROUGHPUT TECHNOLOGIES AS DESCRIBED BY CMS-2020-01-R: HCPCS

## 2022-01-31 LAB — SARS-COV-2 RNA RESP QL NAA+PROBE: NEGATIVE

## 2022-03-17 NOTE — PROGRESS NOTES
Aide Charles is a 45 year old female who presents in follow up for a concussion that occurred on 4/24/2021. Since last visit on 4/29/2021 patient notes feeling better for last 3 days. Was sleeping, napping and cognitive rest last week. Noise sensitivity has gone down. Speed of processing has improved. Had been taking Tylenol but has not taken any since 5/3/2021.  Ear drainage stopped over a week ago.      Since your last visit, level of activity is:  Stage 1 - very light.     Since your last visit, have you continued with your normal cognitive activity (text, computer, school): Not working. Feels like will be able to tolerate getting back to some work. Not sure how much she would be able to tolerate due to the lack of stimuli she has subjected to herself to in the past week.      Current Symptoms:  CONCUSSION SYMPTOMS ASSESSMENT 4/29/2021 5/6/2021   Headache or Pressure In Head 4 - moderate to severe 2 - mild to moderate   Upset Stomach or Throwing Up 2 - mild to moderate 0 - none   Problems with Balance 5 - severe 1 - mild   Feeling Dizzy 5 - severe 1 - mild   Sensitivity to Light 5 - severe 3 - moderate   Sensitivity to Noise 4 - moderate to severe 2 - mild to moderate   Mood Changes 2 - mild to moderate 1 - mild   Feeling sluggish, hazy, or foggy 5 - severe 2 - mild to moderate   Trouble Concentrating, Lack of Focus 5 - severe 3 - moderate   Motion Sickness 2 - mild to moderate 1 - mild   Vision Changes 2 - mild to moderate 1 - mild   Memory Problems 3 - moderate 2 - mild to moderate   Feeling Confused 3 - moderate 2 - mild to moderate   Neck Pain 0 - none 0 - none   Trouble Sleeping 3 - moderate 1 - mild   Total Number of Symptoms 14 13   Symptom Severity Score 50 22       Patient's past medical, surgical, social and family histories are reviewed today.    Past Medical History:   Diagnosis Date     Generalized anxiety disorder      MDD (major depressive disorder), recurrent, severe, with psychosis (H)       "Pulmonary embolism (H) 11/15/2018    while on OCP     Past Surgical History:   Procedure Laterality Date     ESOPHAGOSCOPY, GASTROSCOPY, DUODENOSCOPY (EGD), COMBINED N/A 05/15/2017    Procedure: COMBINED ESOPHAGOSCOPY, GASTROSCOPY, DUODENOSCOPY (EGD), BIOPSY SINGLE OR MULTIPLE;  ESOPHAGOSCOPY, GASTROSCOPY, DUODENOSCOPY (EGD) with biopsies by biopsy;  Surgeon: Robles Falk MD;  Location: [facility]     LAPAROSCOPIC SALPINGECTOMY Bilateral 2019    Procedure: LAPAROSCOPIC BILATERAL SALPINGECTOMY;  Surgeon: Joseluis Wing MD;  Location: [facility]     MYRINGOTOMY, INSERT TUBE, COMBINED Left 2018    cholesteatoma and surgical repair       OBJECTIVE:  /82 (BP Location: Right arm, Patient Position: Sitting, Cuff Size: Adult Regular)   Ht 1.695 m (5' 6.75\")   Wt 69.9 kg (154 lb)   LMP 2021 (Exact Date)   BMI 24.30 kg/m      General: Healthy, well-appearing, and in no acute distress.  Pleasant.    Skin: no suspicious lesions or rashes  Psych: mentation appears normal, and affect is appropriate/bright  Head: Normocephalic, atraumatic  Eyes: Glasses, good eye contact  Neuro: Conversing appropriately    Balance Testing:       - Romberg: normal       - Backward Tandem: normal       - Single-leg stance: normal    Cognitive:  Immediate object recall: /  4 Object Recall at 5 minutes:4/4  Reverse months of the year:   Spell world backwards: Able  Backwards number strin numbers   4-9-3                  Alternate:  6-2-9   3-8-1-4               3-2-7-9    6-2-9-7-1   1-5-2-8-6    7-1-8-4-6-2   5-3-9-1-4-8       Impact Testing Scores: ImPACT Testing not performed    ASSESSMENT:  Concussion without loss of consciousness, subsequent encounter    PLAN:  -Aide is doing very well compared to last week.  Can gradually return to baseline activities.    -Can return to short distance daytime driving.  Avoid nighttime driving.    -Work:  Cleared to begin a gradual return to work starting May 10th.  " Letter provided.    -Follow Up: 2 weeks or sooner if symptoms fail to improve or worsen.  Please call with any questions or concerns.       Nicole Santos MD, CAQ Sports Medicine  [facility]     Improved

## 2022-04-21 DIAGNOSIS — F41.1 GENERALIZED ANXIETY DISORDER: ICD-10-CM

## 2022-04-21 DIAGNOSIS — E78.2 MIXED HYPERLIPIDEMIA: ICD-10-CM

## 2022-04-21 DIAGNOSIS — F33.41 RECURRENT MAJOR DEPRESSION IN PARTIAL REMISSION (H): ICD-10-CM

## 2022-04-25 RX ORDER — VENLAFAXINE HYDROCHLORIDE 37.5 MG/1
CAPSULE, EXTENDED RELEASE ORAL
Qty: 90 CAPSULE | Refills: 0 | Status: SHIPPED | OUTPATIENT
Start: 2022-04-25 | End: 2022-08-09

## 2022-04-25 RX ORDER — ATORVASTATIN CALCIUM 10 MG/1
TABLET, FILM COATED ORAL
Qty: 90 TABLET | Refills: 0 | Status: SHIPPED | OUTPATIENT
Start: 2022-04-25 | End: 2022-08-09

## 2022-04-25 NOTE — TELEPHONE ENCOUNTER
lipitor  effexor   Routing refill request to provider for review/approval because:  Labs not current:  CR, LDL  Patient needs to be seen because it has been more than 1 year since last office visit.  BP and PHQ9 failed RN protocol    Sending to scheduling for yearly office visit due    Charisma Milligan RN

## 2022-05-14 ENCOUNTER — HEALTH MAINTENANCE LETTER (OUTPATIENT)
Age: 47
End: 2022-05-14

## 2022-08-06 DIAGNOSIS — E78.2 MIXED HYPERLIPIDEMIA: ICD-10-CM

## 2022-08-06 DIAGNOSIS — F41.1 GENERALIZED ANXIETY DISORDER: ICD-10-CM

## 2022-08-06 DIAGNOSIS — F33.41 RECURRENT MAJOR DEPRESSION IN PARTIAL REMISSION (H): ICD-10-CM

## 2022-08-09 RX ORDER — ATORVASTATIN CALCIUM 10 MG/1
TABLET, FILM COATED ORAL
Qty: 30 TABLET | Refills: 0 | Status: SHIPPED | OUTPATIENT
Start: 2022-08-09 | End: 2022-09-30

## 2022-08-09 RX ORDER — VENLAFAXINE HYDROCHLORIDE 37.5 MG/1
CAPSULE, EXTENDED RELEASE ORAL
Qty: 30 CAPSULE | Refills: 0 | Status: SHIPPED | OUTPATIENT
Start: 2022-08-09 | End: 2022-10-07

## 2022-08-09 NOTE — TELEPHONE ENCOUNTER
Patient informed via mychart to schedule, 8/12 reminder if not read to send letter.   Tiffany Hernández MA

## 2022-08-09 NOTE — TELEPHONE ENCOUNTER
Lipitor  Effexor XR  Routing refill request to provider for review/approval because:  Labs not current:  CR, LDL  Patient needs to be seen because it has been more than 1 year since last office visit.  BP failed RN protocol    Sending to scheduling for yearly office visit due    Charisma Milligan RN

## 2022-09-03 ENCOUNTER — HEALTH MAINTENANCE LETTER (OUTPATIENT)
Age: 47
End: 2022-09-03

## 2022-09-27 DIAGNOSIS — E78.2 MIXED HYPERLIPIDEMIA: ICD-10-CM

## 2022-09-30 RX ORDER — ATORVASTATIN CALCIUM 10 MG/1
TABLET, FILM COATED ORAL
Qty: 30 TABLET | Refills: 0 | Status: SHIPPED | OUTPATIENT
Start: 2022-09-30 | End: 2022-10-19

## 2022-10-05 DIAGNOSIS — F33.41 RECURRENT MAJOR DEPRESSION IN PARTIAL REMISSION (H): ICD-10-CM

## 2022-10-05 DIAGNOSIS — F41.1 GENERALIZED ANXIETY DISORDER: ICD-10-CM

## 2022-10-07 RX ORDER — VENLAFAXINE HYDROCHLORIDE 37.5 MG/1
CAPSULE, EXTENDED RELEASE ORAL
Qty: 30 CAPSULE | Refills: 0 | Status: SHIPPED | OUTPATIENT
Start: 2022-10-07 | End: 2022-10-19

## 2022-10-07 NOTE — TELEPHONE ENCOUNTER
"Requested Prescriptions   Pending Prescriptions Disp Refills    venlafaxine (EFFEXOR XR) 37.5 MG 24 hr capsule [Pharmacy Med Name: VENLAFAXINE HCL ER 37.5MG CP24] 30 capsule 0     Sig: TAKE ONE CAPSULE BY MOUTH ONCE DAILY        Serotonin-Norepinephrine Reuptake Inhibitors  Failed - 10/5/2022  6:03 PM        Failed - Blood pressure under 140/90 in past 12 months       BP Readings from Last 3 Encounters:   07/29/21 (!) 151/94   05/20/21 132/80   05/06/21 138/82                 Failed - PHQ-9 score of less than 5 in past 6 months     Please review last PHQ-9 score.           Failed - Normal serum creatinine on file in past 12 months     Recent Labs   Lab Test 04/14/21  1103   CR 0.88       Ok to refill medication if creatinine is low          Passed - Medication is active on med list        Passed - Patient is age 18 or older        Passed - No active pregnancy on record        Passed - No positive pregnancy test in past 12 months        Passed - Recent (6 mo) or future (30 days) visit within the authorizing provider's specialty     Patient had office visit in the last 6 months or has a visit in the next 30 days with authorizing provider or within the authorizing provider's specialty.  See \"Patient Info\" tab in inbasket, or \"Choose Columns\" in Meds & Orders section of the refill encounter.                    NEGRITO Kwong, RN          "

## 2022-10-19 ENCOUNTER — OFFICE VISIT (OUTPATIENT)
Dept: FAMILY MEDICINE | Facility: CLINIC | Age: 47
End: 2022-10-19
Payer: COMMERCIAL

## 2022-10-19 VITALS
BODY MASS INDEX: 22.6 KG/M2 | DIASTOLIC BLOOD PRESSURE: 82 MMHG | HEIGHT: 67 IN | OXYGEN SATURATION: 100 % | WEIGHT: 144 LBS | SYSTOLIC BLOOD PRESSURE: 134 MMHG | HEART RATE: 90 BPM | TEMPERATURE: 98.4 F

## 2022-10-19 DIAGNOSIS — F41.1 GENERALIZED ANXIETY DISORDER: ICD-10-CM

## 2022-10-19 DIAGNOSIS — Z12.31 VISIT FOR SCREENING MAMMOGRAM: ICD-10-CM

## 2022-10-19 DIAGNOSIS — F33.41 RECURRENT MAJOR DEPRESSION IN PARTIAL REMISSION (H): ICD-10-CM

## 2022-10-19 DIAGNOSIS — Z13.1 SCREENING FOR DIABETES MELLITUS: ICD-10-CM

## 2022-10-19 DIAGNOSIS — Z12.4 CERVICAL CANCER SCREENING: ICD-10-CM

## 2022-10-19 DIAGNOSIS — I26.99 OTHER ACUTE PULMONARY EMBOLISM WITHOUT ACUTE COR PULMONALE (H): ICD-10-CM

## 2022-10-19 DIAGNOSIS — E78.5 HYPERLIPIDEMIA LDL GOAL <160: ICD-10-CM

## 2022-10-19 DIAGNOSIS — Z00.00 ROUTINE GENERAL MEDICAL EXAMINATION AT A HEALTH CARE FACILITY: Primary | ICD-10-CM

## 2022-10-19 LAB
ANION GAP SERPL CALCULATED.3IONS-SCNC: 2 MMOL/L (ref 3–14)
BUN SERPL-MCNC: 12 MG/DL (ref 7–30)
CALCIUM SERPL-MCNC: 8.5 MG/DL (ref 8.5–10.1)
CHLORIDE BLD-SCNC: 112 MMOL/L (ref 94–109)
CHOLEST SERPL-MCNC: 207 MG/DL
CO2 SERPL-SCNC: 30 MMOL/L (ref 20–32)
CREAT SERPL-MCNC: 0.81 MG/DL (ref 0.52–1.04)
FASTING STATUS PATIENT QL REPORTED: YES
GFR SERPL CREATININE-BSD FRML MDRD: >90 ML/MIN/1.73M2
GLUCOSE BLD-MCNC: 107 MG/DL (ref 70–99)
HDLC SERPL-MCNC: 40 MG/DL
LDLC SERPL CALC-MCNC: 133 MG/DL
NONHDLC SERPL-MCNC: 167 MG/DL
POTASSIUM BLD-SCNC: 4 MMOL/L (ref 3.4–5.3)
SODIUM SERPL-SCNC: 144 MMOL/L (ref 133–144)
TRIGL SERPL-MCNC: 169 MG/DL

## 2022-10-19 PROCEDURE — 99396 PREV VISIT EST AGE 40-64: CPT | Performed by: PHYSICIAN ASSISTANT

## 2022-10-19 PROCEDURE — G0145 SCR C/V CYTO,THINLAYER,RESCR: HCPCS | Performed by: PHYSICIAN ASSISTANT

## 2022-10-19 PROCEDURE — 80061 LIPID PANEL: CPT | Performed by: PHYSICIAN ASSISTANT

## 2022-10-19 PROCEDURE — 87624 HPV HI-RISK TYP POOLED RSLT: CPT | Performed by: PHYSICIAN ASSISTANT

## 2022-10-19 PROCEDURE — 80048 BASIC METABOLIC PNL TOTAL CA: CPT | Performed by: PHYSICIAN ASSISTANT

## 2022-10-19 PROCEDURE — 99214 OFFICE O/P EST MOD 30 MIN: CPT | Mod: 25 | Performed by: PHYSICIAN ASSISTANT

## 2022-10-19 PROCEDURE — 36415 COLL VENOUS BLD VENIPUNCTURE: CPT | Performed by: PHYSICIAN ASSISTANT

## 2022-10-19 RX ORDER — LORATADINE 10 MG/1
10 TABLET ORAL DAILY
COMMUNITY

## 2022-10-19 RX ORDER — ATORVASTATIN CALCIUM 10 MG/1
10 TABLET, FILM COATED ORAL DAILY
Qty: 90 TABLET | Refills: 3 | Status: SHIPPED | OUTPATIENT
Start: 2022-10-19 | End: 2022-11-07

## 2022-10-19 RX ORDER — VENLAFAXINE HYDROCHLORIDE 37.5 MG/1
37.5 CAPSULE, EXTENDED RELEASE ORAL DAILY
Qty: 90 CAPSULE | Refills: 3 | Status: SHIPPED | OUTPATIENT
Start: 2022-10-19 | End: 2022-11-07

## 2022-10-19 RX ORDER — FLUTICASONE PROPIONATE 50 MCG
1 SPRAY, SUSPENSION (ML) NASAL DAILY
COMMUNITY

## 2022-10-19 ASSESSMENT — ANXIETY QUESTIONNAIRES
3. WORRYING TOO MUCH ABOUT DIFFERENT THINGS: NOT AT ALL
7. FEELING AFRAID AS IF SOMETHING AWFUL MIGHT HAPPEN: NOT AT ALL
1. FEELING NERVOUS, ANXIOUS, OR ON EDGE: SEVERAL DAYS
5. BEING SO RESTLESS THAT IT IS HARD TO SIT STILL: SEVERAL DAYS
IF YOU CHECKED OFF ANY PROBLEMS ON THIS QUESTIONNAIRE, HOW DIFFICULT HAVE THESE PROBLEMS MADE IT FOR YOU TO DO YOUR WORK, TAKE CARE OF THINGS AT HOME, OR GET ALONG WITH OTHER PEOPLE: NOT DIFFICULT AT ALL
6. BECOMING EASILY ANNOYED OR IRRITABLE: SEVERAL DAYS
7. FEELING AFRAID AS IF SOMETHING AWFUL MIGHT HAPPEN: NOT AT ALL
8. IF YOU CHECKED OFF ANY PROBLEMS, HOW DIFFICULT HAVE THESE MADE IT FOR YOU TO DO YOUR WORK, TAKE CARE OF THINGS AT HOME, OR GET ALONG WITH OTHER PEOPLE?: NOT DIFFICULT AT ALL
4. TROUBLE RELAXING: NOT AT ALL
GAD7 TOTAL SCORE: 3
2. NOT BEING ABLE TO STOP OR CONTROL WORRYING: NOT AT ALL

## 2022-10-19 ASSESSMENT — ENCOUNTER SYMPTOMS
PARESTHESIAS: 0
SORE THROAT: 0
WEAKNESS: 0
HEARTBURN: 0
DIZZINESS: 0
PALPITATIONS: 0
BREAST MASS: 0
MYALGIAS: 0
FEVER: 0
JOINT SWELLING: 0
HEADACHES: 0
CHILLS: 0
SHORTNESS OF BREATH: 0
DIARRHEA: 0
HEMATOCHEZIA: 0
FREQUENCY: 0
HEMATURIA: 0
ARTHRALGIAS: 0
DYSURIA: 0
EYE PAIN: 0
CONSTIPATION: 0
NAUSEA: 0
COUGH: 0
NERVOUS/ANXIOUS: 0
ABDOMINAL PAIN: 0

## 2022-10-19 ASSESSMENT — PAIN SCALES - GENERAL: PAINLEVEL: NO PAIN (0)

## 2022-10-19 ASSESSMENT — PATIENT HEALTH QUESTIONNAIRE - PHQ9
10. IF YOU CHECKED OFF ANY PROBLEMS, HOW DIFFICULT HAVE THESE PROBLEMS MADE IT FOR YOU TO DO YOUR WORK, TAKE CARE OF THINGS AT HOME, OR GET ALONG WITH OTHER PEOPLE: SOMEWHAT DIFFICULT
SUM OF ALL RESPONSES TO PHQ QUESTIONS 1-9: 1
SUM OF ALL RESPONSES TO PHQ QUESTIONS 1-9: 1

## 2022-10-19 NOTE — PROGRESS NOTES
SUBJECTIVE:   CC: Aide is an 47 year old who presents for preventive health visit.   Patient has been advised of split billing requirements and indicates understanding: Yes  Healthy Habits:     Getting at least 3 servings of Calcium per day:  NO    Bi-annual eye exam:  Yes    Dental care twice a year:  NO    Sleep apnea or symptoms of sleep apnea:  None    Diet:  Regular (no restrictions) and Breakfast skipped    Frequency of exercise:  1 day/week    Duration of exercise:  Less than 15 minutes    Taking medications regularly:  Yes    Medication side effects:  Not applicable    PHQ-2 Total Score: 0    Additional concerns today:  No    Moods continue to be well balanced on current dose of Effexor. Tried going off but felt mind racing a bit too much so went back on at 37.5 mg daily. This has been working well.     Takes Lipitor daily without any issues.     Today's PHQ-2 Score:   PHQ-2 ( 1999 Pfizer) 10/19/2022   Q1: Little interest or pleasure in doing things 0   Q2: Feeling down, depressed or hopeless 0   PHQ-2 Score 0   PHQ-2 Total Score (12-17 Years)- Positive if 3 or more points; Administer PHQ-A if positive -   Q1: Little interest or pleasure in doing things Not at all   Q2: Feeling down, depressed or hopeless Not at all   PHQ-2 Score 0       Abuse: Current or Past (Physical, Sexual or Emotional) - No  Do you feel safe in your environment? Yes        Social History     Tobacco Use     Smoking status: Never     Smokeless tobacco: Never   Substance Use Topics     Alcohol use: Yes     Alcohol/week: 0.0 standard drinks     Comment: rare         Alcohol Use 10/19/2022   Prescreen: >3 drinks/day or >7 drinks/week? No   Prescreen: >3 drinks/day or >7 drinks/week? -       Reviewed orders with patient.  Reviewed health maintenance and updated orders accordingly - Yes  BP Readings from Last 3 Encounters:   10/19/22 134/82   07/29/21 (!) 151/94   05/20/21 132/80    Wt Readings from Last 3 Encounters:   10/19/22 65.3 kg  (144 lb)   07/29/21 70.3 kg (155 lb)   05/20/21 69.9 kg (154 lb)                  Patient Active Problem List   Diagnosis     Seasonal allergic rhinitis     Generalized anxiety disorder     Transient global amnesia     Fracture of great toe     Hyperlipidemia LDL goal <160     Panic disorder with agoraphobia     PTSD (post-traumatic stress disorder)     Genital warts     Recurrent major depression in partial remission (H)     Other acute pulmonary embolism without acute cor pulmonale (H)     Cholesteatoma of left middle ear     Conductive hearing loss of left ear with unrestricted hearing of right ear     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     Past Surgical History:   Procedure Laterality Date     ESOPHAGOSCOPY, GASTROSCOPY, DUODENOSCOPY (EGD), COMBINED N/A 05/15/2017    Procedure: COMBINED ESOPHAGOSCOPY, GASTROSCOPY, DUODENOSCOPY (EGD), BIOPSY SINGLE OR MULTIPLE;  ESOPHAGOSCOPY, GASTROSCOPY, DUODENOSCOPY (EGD) with biopsies by biopsy;  Surgeon: Robles Falk MD;  [facility]: [facility]     LAPAROSCOPIC SALPINGECTOMY Bilateral 03/13/2019    Procedure: LAPAROSCOPIC BILATERAL SALPINGECTOMY;  Surgeon: Joseluis Wing MD;  [facility]     MYRINGOTOMY, INSERT TUBE, COMBINED Left 05/16/2018    cholesteatoma and surgical repair       Social History     Tobacco Use     Smoking status: Never     Smokeless tobacco: Never   Substance Use Topics     Alcohol use: Yes     Alcohol/week: 0.0 standard drinks     Comment: rare     Family History   Problem Relation Age of Onset     Hypertension Mother      Hyperlipidemia Mother      Mental Illness Mother      Cancer - colorectal Father      Substance Abuse Father         alcoholic recovering     Cerebrovascular Disease Maternal Grandmother      Diabetes Paternal Grandmother      Heart Disease Paternal Grandfather         MI     Bipolar Disorder Maternal Aunt      Schizophrenia Maternal Aunt      Coronary  Artery Disease Maternal Grandfather      Cerebrovascular Disease Maternal Grandfather      Other - See Comments Sister         2 yrs older     Multiple Sclerosis Maternal Aunt 45     Other - See Comments Daughter         9/14/2000     Other - See Comments Son         8/16/2004         Current Outpatient Medications   Medication Sig Dispense Refill     Ascorbic Acid (VITAMIN C) 500 MG CHEW        atorvastatin (LIPITOR) 10 MG tablet Take 1 tablet (10 mg) by mouth daily 90 tablet 3     cholecalciferol (VITAMIN D3) 5000 UNITS CAPS capsule Take by mouth daily 3 capsules daily       fluticasone (FLONASE) 50 MCG/ACT nasal spray Spray 1 spray into both nostrils daily       loratadine (CLARITIN) 10 MG tablet Take 10 mg by mouth daily       Multiple Vitamin (MULTIVITAMIN ADULT PO)        venlafaxine (EFFEXOR XR) 37.5 MG 24 hr capsule Take 1 capsule (37.5 mg) by mouth daily 90 capsule 3       Breast Cancer Screening:    FHS-7: No flowsheet data found.  Mammogram Screening: Recommended annual mammography  Pertinent mammograms are reviewed under the imaging tab.    History of abnormal Pap smear: NO - age 30-65 PAP every 5 years with negative HPV co-testing recommended  PAP / HPV Latest Ref Rng & Units 1/5/2018 11/13/2014   PAP (Historical) - NIL NIL   HPV16 NEG:Negative Negative -   HPV18 NEG:Negative Negative -   HRHPV NEG:Negative Negative -     Reviewed and updated as needed this visit by clinical staff   Tobacco  Allergies  Meds   Med Hx  Surg Hx  Fam Hx  Soc Hx        Reviewed and updated as needed this visit by Provider                     Review of Systems   Constitutional: Negative for chills and fever.   HENT: Positive for hearing loss. Negative for congestion, ear pain and sore throat.    Eyes: Negative for pain and visual disturbance.   Respiratory: Negative for cough and shortness of breath.    Cardiovascular: Negative for chest pain, palpitations and peripheral edema.   Gastrointestinal: Negative for abdominal  "pain, constipation, diarrhea, heartburn, hematochezia and nausea.   Breasts:  Negative for tenderness, breast mass and discharge.   Genitourinary: Negative for dysuria, frequency, genital sores, hematuria, pelvic pain, urgency, vaginal bleeding and vaginal discharge.   Musculoskeletal: Negative for arthralgias, joint swelling and myalgias.   Skin: Negative for rash.   Neurological: Negative for dizziness, weakness, headaches and paresthesias.   Psychiatric/Behavioral: Negative for mood changes. The patient is not nervous/anxious.         OBJECTIVE:   BP (!) 144/86   Pulse 90   Temp 98.4  F (36.9  C) (Temporal)   Ht 1.702 m (5' 7.01\")   Wt 65.3 kg (144 lb)   SpO2 100%   BMI 22.55 kg/m    Physical Exam  GENERAL: healthy, alert and no distress  EYES: Eyes grossly normal to inspection, PERRL and conjunctivae and sclerae normal  HENT: ear canals and TM's normal, nose and mouth without ulcers or lesions  NECK: no adenopathy, no asymmetry, masses, or scars and thyroid normal to palpation  RESP: lungs clear to auscultation - no rales, rhonchi or wheezes  BREAST: normal without masses, tenderness or nipple discharge and no palpable axillary masses or adenopathy  CV: regular rate and rhythm, normal S1 S2, no S3 or S4, no murmur, click or rub, no peripheral edema and peripheral pulses strong  ABDOMEN: soft, nontender, no hepatosplenomegaly, no masses and bowel sounds normal   (female): normal female external genitalia, normal urethral meatus, vaginal mucosa pink, moist, well rugated, and normal cervix/adnexa/uterus without masses or discharge  MS: no gross musculoskeletal defects noted, no edema  SKIN: no suspicious lesions or rashes  NEURO: Normal strength and tone, mentation intact and speech normal  PSYCH: mentation appears normal, affect normal/bright    Diagnostic Test Results:  Labs reviewed in Epic    ASSESSMENT/PLAN:   (Z00.00) Routine general medical examination at a health care facility  (primary encounter " "diagnosis)  Comment: Vaccinations recommended and declined at this time.     (E78.5) Hyperlipidemia LDL goal <160  Plan: Lipid panel reflex to direct LDL Non-fasting,         atorvastatin (LIPITOR) 10 MG tablet    (Z12.31) Visit for screening mammogram  Plan: MA SCREENING DIGITAL BILAT - Future  (s+30)    (Z12.4) Cervical cancer screening  Plan: Pap Screen with HPV - recommended age 30 - 65         years    (I26.99) Other acute pulmonary embolism without acute cor pulmonale (H)  Comment: Stable. No ongoing issues related.     (F33.41) Recurrent major depression in partial remission (H)  Comment: Moods well controlled on current dose of Venlafaxine.   Plan: venlafaxine (EFFEXOR XR) 37.5 MG 24 hr capsule    (F41.1) Generalized anxiety disorder  Plan: venlafaxine (EFFEXOR XR) 37.5 MG 24 hr capsule    (Z13.1) Screening for diabetes mellitus  Plan: Basic metabolic panel  (Ca, Cl, CO2, Creat,         Gluc, K, Na, BUN)      Patient has been advised of split billing requirements and indicates understanding: Yes    COUNSELING:  Reviewed preventive health counseling, as reflected in patient instructions    Estimated body mass index is 22.55 kg/m  as calculated from the following:    Height as of this encounter: 1.702 m (5' 7.01\").    Weight as of this encounter: 65.3 kg (144 lb).        She reports that she has never smoked. She has never used smokeless tobacco.      Counseling Resources:  ATP IV Guidelines  Pooled Cohorts Equation Calculator  Breast Cancer Risk Calculator  BRCA-Related Cancer Risk Assessment: FHS-7 Tool  FRAX Risk Assessment  ICSI Preventive Guidelines  Dietary Guidelines for Americans, 2010  USDA's MyPlate  ASA Prophylaxis  Lung CA Screening    AMINAH Mosher [facility]  "

## 2022-10-19 NOTE — LETTER
October 24, 2022      Aide Charles  [address]  [facility] [address]        Dear ,    We are writing to inform you of your test results.    Labs are all very stable. Cholesterol levels are much improved compared to 1 year ago. Please let me know if you have any questions/concerns.    Resulted Orders   Lipid panel reflex to direct LDL Non-fasting   Result Value Ref Range    Cholesterol 207 (H) <200 mg/dL    Triglycerides 169 (H) <150 mg/dL    Direct Measure HDL 40 (L) >=50 mg/dL    LDL Cholesterol Calculated 133 (H) <=100 mg/dL    Non HDL Cholesterol 167 (H) <130 mg/dL    Patient Fasting > 8hrs? Yes     Narrative    Cholesterol  Desirable:  <200 mg/dL    Triglycerides  Normal:  Less than 150 mg/dL  Borderline High:  150-199 mg/dL  High:  200-499 mg/dL  Very High:  Greater than or equal to 500 mg/dL    Direct Measure HDL  Female:  Greater than or equal to 50 mg/dL   Male:  Greater than or equal to 40 mg/dL    LDL Cholesterol  Desirable:  <100mg/dL  Above Desirable:  100-129 mg/dL   Borderline High:  130-159 mg/dL   High:  160-189 mg/dL   Very High:  >= 190 mg/dL    Non HDL Cholesterol  Desirable:  130 mg/dL  Above Desirable:  130-159 mg/dL  Borderline High:  160-189 mg/dL  High:  190-219 mg/dL  Very High:  Greater than or equal to 220 mg/dL   Basic metabolic panel  (Ca, Cl, CO2, Creat, Gluc, K, Na, BUN)   Result Value Ref Range    Sodium 144 133 - 144 mmol/L    Potassium 4.0 3.4 - 5.3 mmol/L    Chloride 112 (H) 94 - 109 mmol/L    Carbon Dioxide (CO2) 30 20 - 32 mmol/L    Anion Gap 2 (L) 3 - 14 mmol/L    Urea Nitrogen 12 7 - 30 mg/dL    Creatinine 0.81 0.52 - 1.04 mg/dL    Calcium 8.5 8.5 - 10.1 mg/dL    Glucose 107 (H) 70 - 99 mg/dL    GFR Estimate >90 >60 mL/min/1.73m2      Comment:      Effective December 21, 2021 eGFRcr in adults is calculated using the 2021 CKD-EPI creatinine equation which includes age and gender (Chris cooney al., NEJM, DOI: 10.1056/DCMWct8671441)       If you have any questions or concerns,  please call the clinic at the number listed above.       Sincerely,      Suzi Barnett PA-C

## 2022-10-21 LAB
BKR LAB AP GYN ADEQUACY: NORMAL
BKR LAB AP GYN INTERPRETATION: NORMAL
BKR LAB AP HPV REFLEX: NORMAL
BKR LAB AP PREVIOUS ABNORMAL: NORMAL
PATH REPORT.COMMENTS IMP SPEC: NORMAL
PATH REPORT.COMMENTS IMP SPEC: NORMAL
PATH REPORT.RELEVANT HX SPEC: NORMAL

## 2022-10-25 LAB
HUMAN PAPILLOMA VIRUS 16 DNA: NEGATIVE
HUMAN PAPILLOMA VIRUS 18 DNA: NEGATIVE
HUMAN PAPILLOMA VIRUS FINAL DIAGNOSIS: NORMAL
HUMAN PAPILLOMA VIRUS OTHER HR: NEGATIVE

## 2022-11-05 DIAGNOSIS — E78.5 HYPERLIPIDEMIA LDL GOAL <160: ICD-10-CM

## 2022-11-05 DIAGNOSIS — F41.1 GENERALIZED ANXIETY DISORDER: ICD-10-CM

## 2022-11-05 DIAGNOSIS — F33.41 RECURRENT MAJOR DEPRESSION IN PARTIAL REMISSION (H): ICD-10-CM

## 2022-11-07 RX ORDER — VENLAFAXINE HYDROCHLORIDE 37.5 MG/1
CAPSULE, EXTENDED RELEASE ORAL
Qty: 30 CAPSULE | Refills: 0 | Status: SHIPPED | OUTPATIENT
Start: 2022-11-07 | End: 2022-12-09

## 2022-11-07 RX ORDER — ATORVASTATIN CALCIUM 10 MG/1
TABLET, FILM COATED ORAL
Qty: 30 TABLET | Refills: 0 | Status: SHIPPED | OUTPATIENT
Start: 2022-11-07 | End: 2022-12-09

## 2022-12-06 DIAGNOSIS — F41.1 GENERALIZED ANXIETY DISORDER: ICD-10-CM

## 2022-12-06 DIAGNOSIS — F33.41 RECURRENT MAJOR DEPRESSION IN PARTIAL REMISSION (H): ICD-10-CM

## 2022-12-06 DIAGNOSIS — E78.5 HYPERLIPIDEMIA LDL GOAL <160: ICD-10-CM

## 2022-12-09 RX ORDER — VENLAFAXINE HYDROCHLORIDE 37.5 MG/1
CAPSULE, EXTENDED RELEASE ORAL
Qty: 30 CAPSULE | Refills: 5 | Status: SHIPPED | OUTPATIENT
Start: 2022-12-09 | End: 2023-06-26

## 2022-12-09 RX ORDER — ATORVASTATIN CALCIUM 10 MG/1
TABLET, FILM COATED ORAL
Qty: 30 TABLET | Refills: 8 | Status: SHIPPED | OUTPATIENT
Start: 2022-12-09 | End: 2023-08-14

## 2022-12-09 NOTE — TELEPHONE ENCOUNTER
"effexor   lipitor  Prescription approved per [facility] Refill Protocol.  Charisma Milligan, RN    Requested Prescriptions   Pending Prescriptions Disp Refills     atorvastatin (LIPITOR) 10 MG tablet [Pharmacy Med Name: ATORVASTATIN 10MG TAB] 30 tablet 0     Sig: TAKE ONE TABLET BY MOUTH ONCE DAILY       [facility] Protocol Passed - 12/6/2022  6:00 PM        Passed - LDL on file in past 12 months     Recent Labs   Lab Test 10/19/22  0729   *             Passed - No abnormal creatine kinase in past 12 months     No lab results found.             Passed - Recent (12 mo) or future (30 days) visit within the authorizing provider's specialty     Patient has had an office visit with the authorizing provider or a provider within the authorizing providers department within the previous 12 mos or has a future within next 30 days. See \"Patient Info\" tab in inbasket, or \"Choose Columns\" in Meds & Orders section of the refill encounter.              Passed - Medication is active on med list        Passed - Patient is age 18 or older        Passed - No active pregnancy on record        Passed - No positive pregnancy test in past 12 months           venlafaxine (EFFEXOR XR) 37.5 MG 24 hr capsule [Pharmacy Med Name: VENLAFAXINE HCL ER 37.5MG CP24] 30 capsule 0     Sig: TAKE ONE CAPSULE BY MOUTH ONCE DAILY       Serotonin-Norepinephrine Reuptake Inhibitors  Passed - 12/6/2022  6:00 PM   PHQ 4/14/2021 10/5/2021 10/19/2022   PHQ-9 Total Score 0 0 1   Q9: Thoughts of better off dead/self-harm past 2 weeks Not at all Not at all Not at all          Passed - Blood pressure under 140/90 in past 12 months     BP Readings from Last 3 Encounters:   10/19/22 134/82   07/29/21 (!) 151/94   05/20/21 132/80                 Passed - PHQ-9 score of less than 5 in past 6 months     Please review last PHQ-9 score.           Passed - Medication is active on med list        Passed - Patient is age 18 or older        Passed - No active pregnancy on record " "       Passed - Normal serum creatinine on file in past 12 months     Recent Labs   Lab Test 10/19/22  0729   CR 0.81       Ok to refill medication if creatinine is low          Passed - No positive pregnancy test in past 12 months        Passed - Recent (6 mo) or future (30 days) visit within the authorizing provider's specialty     Patient had office visit in the last 6 months or has a visit in the next 30 days with authorizing provider or within the authorizing provider's specialty.  See \"Patient Info\" tab in inbasket, or \"Choose Columns\" in Meds & Orders section of the refill encounter.                 "

## 2023-01-15 ENCOUNTER — HEALTH MAINTENANCE LETTER (OUTPATIENT)
Age: 48
End: 2023-01-15

## 2023-06-25 DIAGNOSIS — F41.1 GENERALIZED ANXIETY DISORDER: ICD-10-CM

## 2023-06-25 DIAGNOSIS — F33.41 RECURRENT MAJOR DEPRESSION IN PARTIAL REMISSION (H): ICD-10-CM

## 2023-06-26 RX ORDER — VENLAFAXINE HYDROCHLORIDE 37.5 MG/1
CAPSULE, EXTENDED RELEASE ORAL
Qty: 30 CAPSULE | Refills: 5 | Status: SHIPPED | OUTPATIENT
Start: 2023-06-26 | End: 2024-04-26

## 2023-08-12 DIAGNOSIS — E78.5 HYPERLIPIDEMIA LDL GOAL <160: ICD-10-CM

## 2023-08-14 RX ORDER — ATORVASTATIN CALCIUM 10 MG/1
TABLET, FILM COATED ORAL
Qty: 30 TABLET | Refills: 1 | Status: SHIPPED | OUTPATIENT
Start: 2023-08-14 | End: 2023-11-13

## 2023-08-18 ENCOUNTER — NURSE TRIAGE (OUTPATIENT)
Dept: NURSING | Facility: CLINIC | Age: 48
End: 2023-08-18
Payer: COMMERCIAL

## 2023-08-18 NOTE — TELEPHONE ENCOUNTER
Patient called to confirm rx sent to pharmacy, records currently show a confirmed receipt to the patients pharmacy on Rx in question.       Reason for Disposition   Patient has refills remaining on their prescription    Protocols used: Medication Refill and Renewal Call-A-AH

## 2023-11-13 DIAGNOSIS — E78.5 HYPERLIPIDEMIA LDL GOAL <160: ICD-10-CM

## 2023-11-13 RX ORDER — ATORVASTATIN CALCIUM 10 MG/1
TABLET, FILM COATED ORAL
Qty: 90 TABLET | Refills: 0 | Status: SHIPPED | OUTPATIENT
Start: 2023-11-13 | End: 2024-03-22

## 2023-12-09 ENCOUNTER — HEALTH MAINTENANCE LETTER (OUTPATIENT)
Age: 48
End: 2023-12-09

## 2024-02-01 ENCOUNTER — OFFICE VISIT (OUTPATIENT)
Dept: INTERNAL MEDICINE | Facility: CLINIC | Age: 49
End: 2024-02-01
Payer: COMMERCIAL

## 2024-02-01 VITALS
HEIGHT: 67 IN | DIASTOLIC BLOOD PRESSURE: 68 MMHG | OXYGEN SATURATION: 100 % | RESPIRATION RATE: 18 BRPM | TEMPERATURE: 98 F | BODY MASS INDEX: 24.01 KG/M2 | HEART RATE: 63 BPM | SYSTOLIC BLOOD PRESSURE: 106 MMHG | WEIGHT: 153 LBS

## 2024-02-01 DIAGNOSIS — E78.5 HYPERLIPIDEMIA LDL GOAL <160: ICD-10-CM

## 2024-02-01 DIAGNOSIS — F41.1 GENERALIZED ANXIETY DISORDER: ICD-10-CM

## 2024-02-01 DIAGNOSIS — Z12.11 SCREEN FOR COLON CANCER: ICD-10-CM

## 2024-02-01 DIAGNOSIS — Z12.31 ENCOUNTER FOR SCREENING MAMMOGRAM FOR BREAST CANCER: ICD-10-CM

## 2024-02-01 DIAGNOSIS — Z00.00 ROUTINE GENERAL MEDICAL EXAMINATION AT A HEALTH CARE FACILITY: Primary | ICD-10-CM

## 2024-02-01 DIAGNOSIS — H90.12 CONDUCTIVE HEARING LOSS OF LEFT EAR WITH UNRESTRICTED HEARING OF RIGHT EAR: ICD-10-CM

## 2024-02-01 LAB
ALBUMIN SERPL BCG-MCNC: 4.1 G/DL (ref 3.5–5.2)
ALBUMIN UR-MCNC: NEGATIVE MG/DL
ALP SERPL-CCNC: 74 U/L (ref 40–150)
ALT SERPL W P-5'-P-CCNC: 14 U/L (ref 0–50)
ANION GAP SERPL CALCULATED.3IONS-SCNC: 10 MMOL/L (ref 7–15)
APPEARANCE UR: CLEAR
AST SERPL W P-5'-P-CCNC: 20 U/L (ref 0–45)
BILIRUB SERPL-MCNC: 0.2 MG/DL
BILIRUB UR QL STRIP: NEGATIVE
BUN SERPL-MCNC: 16.9 MG/DL (ref 6–20)
CALCIUM SERPL-MCNC: 8.6 MG/DL (ref 8.6–10)
CHLORIDE SERPL-SCNC: 105 MMOL/L (ref 98–107)
CHOLEST SERPL-MCNC: 228 MG/DL
COLOR UR AUTO: YELLOW
CREAT SERPL-MCNC: 0.95 MG/DL (ref 0.51–0.95)
DEPRECATED HCO3 PLAS-SCNC: 24 MMOL/L (ref 22–29)
EGFRCR SERPLBLD CKD-EPI 2021: 74 ML/MIN/1.73M2
ERYTHROCYTE [DISTWIDTH] IN BLOOD BY AUTOMATED COUNT: 14 % (ref 10–15)
FASTING STATUS PATIENT QL REPORTED: YES
GLUCOSE SERPL-MCNC: 95 MG/DL (ref 70–99)
GLUCOSE UR STRIP-MCNC: NEGATIVE MG/DL
HCT VFR BLD AUTO: 37.7 % (ref 35–47)
HDLC SERPL-MCNC: 53 MG/DL
HGB BLD-MCNC: 12.4 G/DL (ref 11.7–15.7)
HGB UR QL STRIP: ABNORMAL
KETONES UR STRIP-MCNC: NEGATIVE MG/DL
LDLC SERPL CALC-MCNC: 137 MG/DL
LEUKOCYTE ESTERASE UR QL STRIP: ABNORMAL
MCH RBC QN AUTO: 28.9 PG (ref 26.5–33)
MCHC RBC AUTO-ENTMCNC: 32.9 G/DL (ref 31.5–36.5)
MCV RBC AUTO: 88 FL (ref 78–100)
MUCOUS THREADS #/AREA URNS LPF: PRESENT /LPF
NITRATE UR QL: NEGATIVE
NONHDLC SERPL-MCNC: 175 MG/DL
PH UR STRIP: 6 [PH] (ref 5–7)
PLATELET # BLD AUTO: 264 10E3/UL (ref 150–450)
POTASSIUM SERPL-SCNC: 4.1 MMOL/L (ref 3.4–5.3)
PROT SERPL-MCNC: 7.4 G/DL (ref 6.4–8.3)
RBC # BLD AUTO: 4.29 10E6/UL (ref 3.8–5.2)
RBC URINE: <1 /HPF
SODIUM SERPL-SCNC: 139 MMOL/L (ref 135–145)
SP GR UR STRIP: 1.02 (ref 1–1.03)
SQUAMOUS EPITHELIAL: 6 /HPF
TRIGL SERPL-MCNC: 191 MG/DL
UROBILINOGEN UR STRIP-MCNC: NORMAL MG/DL
WBC # BLD AUTO: 5.1 10E3/UL (ref 4–11)
WBC URINE: 2 /HPF

## 2024-02-01 PROCEDURE — 85027 COMPLETE CBC AUTOMATED: CPT | Performed by: INTERNAL MEDICINE

## 2024-02-01 PROCEDURE — 80061 LIPID PANEL: CPT | Performed by: INTERNAL MEDICINE

## 2024-02-01 PROCEDURE — 36415 COLL VENOUS BLD VENIPUNCTURE: CPT | Performed by: INTERNAL MEDICINE

## 2024-02-01 PROCEDURE — 80053 COMPREHEN METABOLIC PANEL: CPT | Performed by: INTERNAL MEDICINE

## 2024-02-01 PROCEDURE — 99396 PREV VISIT EST AGE 40-64: CPT | Performed by: INTERNAL MEDICINE

## 2024-02-01 PROCEDURE — 81001 URINALYSIS AUTO W/SCOPE: CPT | Performed by: INTERNAL MEDICINE

## 2024-02-01 SDOH — HEALTH STABILITY: PHYSICAL HEALTH: ON AVERAGE, HOW MANY DAYS PER WEEK DO YOU ENGAGE IN MODERATE TO STRENUOUS EXERCISE (LIKE A BRISK WALK)?: 3 DAYS

## 2024-02-01 ASSESSMENT — PAIN SCALES - GENERAL: PAINLEVEL: NO PAIN (0)

## 2024-02-01 ASSESSMENT — SOCIAL DETERMINANTS OF HEALTH (SDOH): HOW OFTEN DO YOU GET TOGETHER WITH FRIENDS OR RELATIVES?: TWICE A WEEK

## 2024-02-01 ASSESSMENT — PATIENT HEALTH QUESTIONNAIRE - PHQ9
SUM OF ALL RESPONSES TO PHQ QUESTIONS 1-9: 0
SUM OF ALL RESPONSES TO PHQ QUESTIONS 1-9: 0

## 2024-02-01 NOTE — PROGRESS NOTES
Preventive Care Visit  [facility]  Jed Bryan DO, Internal Medicine  Feb 1, 2024    Assessment & Plan     Routine general medical examination at a health care facility    - CBC with platelets; Future  - UA Macroscopic with reflex to Microscopic and Culture - Lab Collect; Future  - CBC with platelets  - UA Macroscopic with reflex to Microscopic and Culture - Lab Collect    Hyperlipidemia LDL goal <160    - Comprehensive metabolic panel (BMP + Alb, Alk Phos, ALT, AST, Total. Bili, TP); Future  - Lipid panel reflex to direct LDL Fasting; Future  - Comprehensive metabolic panel (BMP + Alb, Alk Phos, ALT, AST, Total. Bili, TP)  - Lipid panel reflex to direct LDL Fasting    Generalized anxiety disorder  Well-controlled with Effexor    Conductive hearing loss of left ear with unrestricted hearing of right ear  Postsurgical secondary to prior cholesteatoma    Encounter for screening mammogram for breast cancer  Patient will schedule  - *MA Screening Digital Bilateral; Future    Screen for colon cancer  Patient will schedule  - Colonoscopy Screening  Referral; Future            Counseling  Appropriate preventive services were discussed with this patient, including applicable screening as appropriate for fall prevention, nutrition, physical activity, Tobacco-use cessation, weight loss and cognition.  Checklist reviewing preventive services available has been given to the patient.  Reviewed patient's diet, addressing concerns and/or questions.   She is at risk for lack of exercise and has been provided with information to increase physical activity for the benefit of her well-being.   The patient was instructed to see the dentist every 6 months.   She is at risk for psychosocial distress and has been provided with information to reduce risk.     Patient has been advised of split billing requirements and indicates understanding: Yes    Regular exercise    Subjective    Aide is a 48 year old, presenting for the following:  Physical        2/1/2024     6:57 AM   Additional Questions   Roomed by Tiffany NGUYEN        Health Care Directive  Patient does not have a Health Care Directive or Living Will: Discussed advance care planning with patient; however, patient declined at this time. No  HPI              2/1/2024   General Health   How would you rate your overall physical health? Good   Feel stress (tense, anxious, or unable to sleep) Only a little   (!) STRESS CONCERN      2/1/2024   Nutrition   Three or more servings of calcium each day? (!) NO   Diet: Regular (no restrictions)   How many servings of fruit and vegetables per day? (!) 2-3   How many sweetened beverages each day? 0-1         2/1/2024   Exercise   Days per week of moderate/strenous exercise 3 days         2/1/2024   Social Factors   Frequency of gathering with friends or relatives Twice a week   Worry food won't last until get money to buy more No   Food not last or not have enough money for food? No   Do you have housing?  Yes   Are you worried about losing your housing? No   Lack of transportation? No   Unable to get utilities (heat,electricity)? No         2/1/2024   Dental   Dentist two times every year? (!) NO         2/1/2024   TB Screening   Were you born outside of [place]?  (!) YES         Today's PHQ-9 Score:       2/1/2024     6:52 AM   PHQ-9 SCORE   PHQ-9 Total Score MyChart 0   PHQ-9 Total Score 0         2/1/2024   Substance Use   Alcohol more than 3/day or more than 7/wk No   Do you use any other substances recreationally? (!) ALCOHOL     Social History     Tobacco Use    Smoking status: Never    Smokeless tobacco: Never   Vaping Use    Vaping Use: Never used   Substance Use Topics    Alcohol use: Yes     Alcohol/week: 0.0 standard drinks of alcohol     Comment: rare    Drug use: No          Annual screen by mutual decision with patient    2/1/2024   STI Screening   New sexual partner(s) since last STI/HIV  test? No     History of abnormal Pap smear: NO - age 30-65 PAP every 5 years with negative HPV co-testing recommended        Latest Ref Rng & Units 10/19/2022     7:14 AM 2018     7:55 AM 2018     7:47 AM   PAP / HPV   PAP  Negative for Intraepithelial Lesion or Malignancy (NILM)      PAP (Historical)    NIL    HPV 16 DNA Negative Negative  Negative     HPV 18 DNA Negative Negative  Negative     Other HR HPV Negative Negative  Negative       The 10-year ASCVD risk score (Missy CHUN, et al., 2019) is: 1%    Values used to calculate the score:      Age: 48 years      Sex: Female      Is Non- : No      Diabetic: No      Tobacco smoker: No      Systolic Blood Pressure: 106 mmHg      Is BP treated: No      HDL Cholesterol: 40 mg/dL      Total Cholesterol: 207 mg/dL        2024   Contraception/Family Planning   Questions about contraception or family planning No        Reviewed and updated as needed this visit by Provider                    Past Medical History:   Diagnosis Date    Generalized anxiety disorder     MDD (major depressive disorder), recurrent, severe, with psychosis (H)     Pulmonary embolism (H) 11/15/2018    while on OCP     Past Surgical History:   Procedure Laterality Date    ESOPHAGOSCOPY, GASTROSCOPY, DUODENOSCOPY (EGD), COMBINED N/A 05/15/2017    Procedure: COMBINED ESOPHAGOSCOPY, GASTROSCOPY, DUODENOSCOPY (EGD), BIOPSY SINGLE OR MULTIPLE;  ESOPHAGOSCOPY, GASTROSCOPY, DUODENOSCOPY (EGD) with biopsies by biopsy;  Surgeon: Robles Falk MD;  Location: [facility]    LAPAROSCOPIC SALPINGECTOMY Bilateral 2019    Procedure: LAPAROSCOPIC BILATERAL SALPINGECTOMY;  Surgeon: Joseluis Wing MD;  Location: [facility]    MYRINGOTOMY, INSERT TUBE, COMBINED Left 2018    cholesteatoma and surgical repair     OB History    Para Term  AB Living   2 2 2 0 0 2   SAB IAB Ectopic Multiple Live Births   0 0 0 0 0      # Outcome Date GA Lbr Fidencio/2nd Weight Sex  Delivery Anes PTL Lv   2 Term            1 Term              Lab work is in process  Labs reviewed in EPIC  BP Readings from Last 3 Encounters:   24 106/68   10/19/22 134/82   21 (!) 151/94    Wt Readings from Last 3 Encounters:   24 69.4 kg (153 lb)   10/19/22 65.3 kg (144 lb)   21 70.3 kg (155 lb)                  Patient Active Problem List   Diagnosis    Seasonal allergic rhinitis    Generalized anxiety disorder    Transient global amnesia    Fracture of great toe    Hyperlipidemia LDL goal <160    Panic disorder with agoraphobia    PTSD (post-traumatic stress disorder)    Genital warts    Recurrent major depression in partial remission (H24)    Other acute pulmonary embolism without acute cor pulmonale (H)    Cholesteatoma of left middle ear    Conductive hearing loss of left ear with unrestricted hearing of right ear    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     Past Surgical History:   Procedure Laterality Date    ESOPHAGOSCOPY, GASTROSCOPY, DUODENOSCOPY (EGD), COMBINED N/A 05/15/2017    Procedure: COMBINED ESOPHAGOSCOPY, GASTROSCOPY, DUODENOSCOPY (EGD), BIOPSY SINGLE OR MULTIPLE;  ESOPHAGOSCOPY, GASTROSCOPY, DUODENOSCOPY (EGD) with biopsies by biopsy;  Surgeon: Robles Falk MD;  [facility]: [facility]    LAPAROSCOPIC SALPINGECTOMY Bilateral 2019    Procedure: LAPAROSCOPIC BILATERAL SALPINGECTOMY;  Surgeon: Joseluis Wing MD;  Location: [facility]    MYRINGOTOMY, INSERT TUBE, COMBINED Left 2018    cholesteatoma and surgical repair       Social History     Tobacco Use    Smoking status: Never    Smokeless tobacco: Never   Substance Use Topics    Alcohol use: Yes     Alcohol/week: 0.0 standard drinks of alcohol     Comment: rare     Family History   Problem Relation Age of Onset    Hypertension Mother     Hyperlipidemia Mother     Mental Illness Mother     Cancer - colorectal Father     Substance Abuse  Father         alcoholic recovering    Cerebrovascular Disease Maternal Grandmother     Diabetes Paternal Grandmother     Heart Disease Paternal Grandfather         MI    Bipolar Disorder Maternal Aunt     Schizophrenia Maternal Aunt     Coronary Artery Disease Maternal Grandfather     Cerebrovascular Disease Maternal Grandfather     Other - See Comments Sister         2 yrs older    Multiple Sclerosis Maternal Aunt 45    Other - See Comments Daughter         9/14/2000    Other - See Comments Son         8/16/2004         Current Outpatient Medications   Medication Sig Dispense Refill    Ascorbic Acid (VITAMIN C) 500 MG CHEW       atorvastatin (LIPITOR) 10 MG tablet TAKE ONE TABLET BY MOUTH ONCE DAILY 90 tablet 0    cholecalciferol (VITAMIN D3) 5000 UNITS CAPS capsule Take by mouth daily 3 capsules daily      fluticasone (FLONASE) 50 MCG/ACT nasal spray Spray 1 spray into both nostrils daily      loratadine (CLARITIN) 10 MG tablet Take 10 mg by mouth daily      Multiple Vitamin (MULTIVITAMIN ADULT PO)       venlafaxine (EFFEXOR XR) 37.5 MG 24 hr capsule TAKE ONE CAPSULE BY MOUTH ONCE DAILY 30 capsule 5     Allergies   Allergen Reactions    Erythromycin Nausea and Vomiting    Seasonal Allergies      Molds, grass, multiple trees, plant pollen, cats, rabbits, dogs, hay.     Review of Systems    Review of Systems  CONSTITUTIONAL: NEGATIVE for fever, chills, change in weight  INTEGUMENTARY/SKIN: NEGATIVE for worrisome rashes, moles or lesions  EYES: NEGATIVE for vision changes or irritation  ENT/MOUTH: Patient notes decreased hearing in left ear secondary to multiple surgeries cholesteatoma  RESP: NEGATIVE for significant cough or SOB  BREAST: NEGATIVE for masses, tenderness or discharge  CV: NEGATIVE for chest pain, palpitations or peripheral edema  GI: NEGATIVE for nausea, abdominal pain, heartburn, or change in bowel habits  : NEGATIVE for frequency, dysuria, or hematuria  MUSCULOSKELETAL: NEGATIVE for significant  "arthralgias or myalgia  NEURO: NEGATIVE for weakness, dizziness or paresthesias  ENDOCRINE: NEGATIVE for temperature intolerance, skin/hair changes  HEME: NEGATIVE for bleeding problems  PSYCHIATRIC: NEGATIVE for changes in mood or affect     Objective    Exam  /68   Pulse 63   Temp 98  F (36.7  C) (Temporal)   Resp 18   Ht 1.7 m (5' 6.93\")   Wt 69.4 kg (153 lb)   LMP 01/23/2024 (Approximate)   SpO2 100%   BMI 24.01 kg/m     Estimated body mass index is 24.01 kg/m  as calculated from the following:    Height as of this encounter: 1.7 m (5' 6.93\").    Weight as of this encounter: 69.4 kg (153 lb).  Physical Exam  GENERAL: alert and no distress  EYES: Eyes grossly normal to inspection, PERRL and conjunctivae and sclerae normal  HENT: normal cephalic/atraumatic, right ear: normal: no effusions, no erythema, normal landmarks, left ear: Evidence of previous surgeries noted.  Decreased hearing.  Nose and mouth without ulcers or lesions, oropharynx clear, and oral mucous membranes moist  NECK: no adenopathy, no asymmetry, masses, or scars  RESP: lungs clear to auscultation - no rales, rhonchi or wheezes  CV: regular rate and rhythm, normal S1 S2, no S3 or S4, no murmur, click or rub, no peripheral edema  ABDOMEN: soft, nontender, no hepatosplenomegaly, no masses and bowel sounds normal  MS: no gross musculoskeletal defects noted, no edema  SKIN: no suspicious lesions or rashes  NEURO: Normal strength and tone, mentation intact and speech normal  PSYCH: mentation appears normal, affect normal/bright  LYMPH: no cervical, supraclavicular, axillary, or inguinal adenopathy    Patient notes that she needs to get to work and does not have time for pelvic and Pap smear today.  She will schedule this with her primary care provider in the upcoming months.      Signed Electronically by: Jed Bryan DO    Answers submitted by the patient for this visit:  Patient Health Questionnaire (Submitted on " 2/1/2024)  PHQ9 TOTAL SCORE: 0

## 2024-02-06 ENCOUNTER — TELEPHONE (OUTPATIENT)
Dept: FAMILY MEDICINE | Facility: CLINIC | Age: 49
End: 2024-02-06
Payer: COMMERCIAL

## 2024-02-09 ENCOUNTER — TELEPHONE (OUTPATIENT)
Dept: GASTROENTEROLOGY | Facility: CLINIC | Age: 49
End: 2024-02-09
Payer: COMMERCIAL

## 2024-02-09 NOTE — TELEPHONE ENCOUNTER
"Endoscopy Scheduling Screen    Have you had a positive Covid test in the last 14 days?  No    Are you active on MyChart?   No    What insurance is in the chart?  Other:      Ordering/Referring Provider:   JENNIFER JEAN        (If ordering provider performs procedure, schedule with ordering provider unless otherwise instructed. )    BMI: Estimated body mass index is 24.01 kg/m  as calculated from the following:    Height as of 2/1/24: 1.7 m (5' 6.93\").    Weight as of 2/1/24: 69.4 kg (153 lb).     Sedation Ordered  moderate sedation.   If patient BMI > 50 do not schedule in [facility].    If patient BMI > 45 do not schedule at [facility].    Are you taking methadone or Suboxone?  No    Have you had difficulties, pain, or discomfort during past endoscopy procedures?  No    Are you taking any prescription medications for pain 3 or more times per week?   NO - No RN review required.    Do you have a history of malignant hyperthermia or adverse reaction to anesthesia?  No    (Females) Are you currently pregnant?   No     Have you been diagnosed or told you have pulmonary hypertension?   No    Do you have an LVAD?  No    Have you been told you have moderate to severe sleep apnea?  No    Have you been told you have COPD, asthma, or any other lung disease?  No    Do you have any heart conditions?  No     Have you ever had an organ transplant?   No    Have you ever had or are you awaiting a heart or lung transplant?   No    Have you had a stroke or transient ischemic attack (TIA aka \"mini stroke\" in the last 6 months?   No    Have you been diagnosed with or been told you have cirrhosis of the liver?   No    Are you currently on dialysis?   No    Do you need assistance transferring?   No    BMI: Estimated body mass index is 24.01 kg/m  as calculated from the following:    Height as of 2/1/24: 1.7 m (5' 6.93\").    Weight as of 2/1/24: 69.4 kg (153 lb).     Is patients BMI > 40 and scheduling location [facility]?  No    Do you take " an injectable medication for weight loss or diabetes (excluding insulin)?  No    Do you take the medication Naltrexone?  No    Do you take blood thinners?  No       Prep   Are you currently on dialysis or do you have chronic kidney disease?  No    Do you have a diagnosis of diabetes?  No    Do you have a diagnosis of cystic fibrosis (CF)?  No    On a regular basis do you go 3 -5 days between bowel movements?  No    BMI > 40?  No    Preferred Pharmacy:      [facility] - [place], [place] - [address]  [address]  [facility] [address]  Phone: [phone] Fax: [phone]            Final Scheduling Details   Colonoscopy prep sent?  N/A    Procedure scheduled  Colonoscopy    Surgeon:  Ariel     Date of procedure:  4/29     Pre-OP / PAC:   No - Not required for this site.    Location  [facility] - Patient preference.    Sedation   MAC/Deep Sedation  site      Patient Reminders:   You will receive a call from a Nurse to review instructions and health history.  This assessment must be completed prior to your procedure.  Failure to complete the Nurse assessment may result in the procedure being cancelled.      On the day of your procedure, please designate an adult(s) who can drive you home stay with you for the next 24 hours. The medicines used in the exam will make you sleepy. You will not be able to drive.      You cannot take public transportation, ride share services, or non-medical taxi service without a responsible caregiver.  Medical transport services are allowed with the requirement that a responsible caregiver will receive you at your destination.  We require that drivers and caregivers are confirmed prior to your procedure.

## 2024-02-09 NOTE — LETTER
April 11, 2024      Aide JAD Charles  [address]  [facility] [address]              Standard MiraLAX Bowel Prep  Prep Instructions for your Colonoscopy  Pre-Assessment Phone Number: [facility]; [phone]    Please read these instructions carefully at least 7 days prior to your colonoscopy procedure. Be sure to follow all directions completely. The inside of your colon must be clean to allow for a complete examination for the presence of any growths, polyps, and/or abnormalities, as well as their biopsy or removal. A number of tips are included in order to make this part of the procedure as comfortable as possible.    Getting ready:   A nurse will call you to go over instructions and your health history.  It's important to complete the nurse assessment before your procedure. If you don't, your procedure might have to be canceled.  You must arrange for an adult to drive you home after your exam. Your colonoscopy cannot be done unless you have a ride. If you need to use public transportation, someone must ride with you and stay with you for a minimum of 24 hours.  Check with your insurance company to be sure they will cover this exam.  Go to the drug store and buy:  Four (4) - Dulcolax (Bisacodyl) 5mg tablets   8.3 ounce bottle of Miralax powder  64 ounces of Gatorade (not red or purple)     10 ounce bottle of clear magnesium citrate    7 days before the exam:  Talk to your prescribing provider: If you take blood thinners (such as Coumadin, Plavix, Xarelto, Eliquis, Lovenox, or others), these medications may need to be stopped temporarily before your procedure. Your prescribing provider will tell you what to do.   Talk to your prescribing provider: If you take prescription NSAIDS (such as Sulindac, Celebrex, Mobic, Relafen). Your prescribing provider will tell you what to do.   Stop taking fiber and iron supplements   Stop eating corn, popcorn, nuts and foods that contain seeds. These can stay in the colon for many  days and they can clog up the colonoscope.     3 days before the exam:  Begin a low-fiber diet (see below).   Drink at least 4 to 6 large glasses of water or sports drink (not red or purple) each day.     One day before the exam:  Only clear liquid diet is allowed (see below). No solid food should be eaten.   Drink at least 8 to 10 full glasses of clear liquids during the day.   Stop taking NSAID pain relievers, such as Advil, Ibuprofen, Motrin, etc.  You may take Tylenol.  Note: You will start drinking the colonoscopy prep solution at 5 PM. The timing of second step depends on your appointment arrival time. See Steps 1-2 below.    Step One:  At 4 PM, take 2 Dulcolax (Bisacodyl) tablets.   At 4 PM, mix the entire bottle of Miralax with 64 ounces of Gatorade in a pitcher and stir to dissolve the powder. Chill if desired, but do not add ice.   At 5 PM, start drinking an 8-ounce glass of the Miralax and Gatorade mixture every 15 minutes until the pitcher is HALF empty (about 4 glasses).  Store the rest in the refrigerator.   Bowel movements usually begin about one hour after your finish this first dose of Miralax. The stool is likely to be brown at this stage.   After you start drinking the solution, stay near a toilet. You may have watery stools (diarrhea), mild cramping, bloating , and nausea.   You may want to use Vaseline on the skin around your anus after each bowel movement to prevent irritation. You can also use wet wipes to prevent irritation.  Continue to drink clear liquids.     At 10 PM, take 2 Dulcolax (Bisacodyl) tablets  At 10 PM start drinking an 8-ounce glass of Miralax and Gatorade mixture every 15 minutes until the pitcher is empty (about 4 glasses).       Step Two:   If you arrive for your procedure before 11 AM:    6 hours prior to your scheduled arrival to the endoscopy unit drink 10 ounces of clear Magnesium Citrate    If you arrive for your procedure after 11 AM:     At 6 AM on the day of the  exam: drink 10 ounces of clear Magnesium Citrate        Day of exam:  You may drink clear liquids only up until 2 hours before your arrival time.  You may take your necessary morning medications with sips of water  Do not take diabetes medicine by mouth until after your exam.  Do not smoke, chew tobacco, or swallow anything, including water or gum for at least 2 hours before your arrival time. This is a safety issue. Your procedure could be cancelled if you do not follow directions.  Please do not wear jewelry (i.e. earrings, rings, necklaces, watches, etc) . Leave your purse, billfold, credit cards, and other valuables at home.    Please arrive with a responsible adult who can take you home after the test and stay with you for a minimum of 24 hours: The medicine used will make you sleepy and forgetful. If you do not have someone to take you home, we will cancel your procedure. If using public transportation you must have someone to ride with you.  Please perform your nebulizer treatments and airway clearance therapy in the morning prior to the procedure (if applicable).  If you have asthma, bring your inhalers.       CLEAR LIQUID DIET   You may have:    Water, tea, coffee (no milk or cream)  Soda pop, Gatorade (not red or purple)  Jell-O, Popsicles (no milk or fruit pieces - not red or purple)  Fat-free soup broth or bouillon  Plain hard candy, such as clear life savers (not red or purple)  Clear juices and fruit-flavored drinks, such as apple juice, white grape juice, Hi-C, and Camilo-Aid (not red or purple)   Do not have:    Milk or milk products such as ice cream, malts or shakes, or coffee creamer  Red or purple drinks of any kind such as cranberry juice or grape juice. Avoid red or purple Jell-O, Popsicles, Caimlo-Aid, sorbet, sherbet and candy  Juices with pulp such as orange, grapefruit, pineapple or tomato juice  Cream soups of any kind  Alcohol and beer  Protein drinks or protein powder     LOW FIBER DIET    You may have:    Starches: White bread, rolls, biscuits, croissants, Trosper toast, white flour tortillas, waffles, pancakes, [place] toast; white rice, noodles, pasta, macaroni; cooked and peeled potatoes; plain crackers, saltines; cooked farina or cream of rice; puffed rice, corn flakes, Rice Krispies, Special K   Vegetables: tender cooked and canned, vegetable broths  Fruits and fruit juices: Strained fruit juice, canned fruit without seeds or skin (not pineapple), applesauce, pear sauce, ripe bananas, melons (not watermelon)   Milk products: Milk (plain or flavored), cheese, cottage cheese, yogurt (no berries), custard, ice cream    Proteins: Tender, well-cooked ground beef, lamb, veal, ham, pork, chicken, turkey, fish or organ meats; eggs; creamy peanut butter   Fats and condiments:  Margarine, butter, oils, mayonnaise, sour cream, salad dressing, plain gravy; spices, cooked herbs; sugar, clear jelly, honey, syrup   Snacks, sweets and drinks: Pretzels, hard candy; plain cakes and cookies (no nuts or seeds); gelatin, plain pudding, sherbet, Popsicles; coffee, tea, carbonated ( fizzy ) drinks Do not have:    Starches: Breads or rolls that contain nuts, seeds or fruit; whole wheat or whole grain breads that contain more than 1 gram of fiber per slice; cornbread; corn or whole wheat tortillas; potatoes with skin; brown rice, wild rice, kasha (buckwheat), and oatmeal   Vegetables: Any raw or steamed vegetables; vegetables with seeds; corn in any form   Fruits and fruit juices: Prunes, prune juice, raisins and other dried fruits, berries and other fruits with seeds, canned pineapple juices with pulp such as orange, grapefruit, pineapple or tomato juice  Milk products: Any yogurt with nuts, seeds or berries   Proteins: Tough, fibrous meats with gristle; cooked dried beans, peas or lentils; crunchy peanut butter  Fats and condiments: Pickles, olives, relish, horseradish; jam, marmalade, preserves   Snacks, sweets and  drinks: Popcorn, nuts, seeds, granola, coconut, candies made with nuts or seeds; all desserts that contain nuts, seeds, raisins and other dried fruits, coconut, whole grains or bran.      FAQ:    Why should Miralax be mixed with Gatorade or another clear sports drink?   It is important that your body does not develop an imbalance of electrolytes with the large volume of fluid in this prep. Gatorade/sports drinks contains those electrolytes.   Does Miralax have to be mixed with Gatorade?  Gatorade, Powerade, or any of the other sports drinks are acceptable. If you are diabetic, it is acceptable to use the low sugar options, such as Gatorade Zero, Powerade Zero, G2, etc.  Can I put ice in the Gatorade/Miralax mixture?  We prefer that you mix it and put it in the refrigerator to chill instead of using ice. The ice will melt and dilute the laxative.    Why should the Miralax prep be taken in several steps?   The stool is flushed out by a large wave of fluid going through the colon. Just sipping a large volume of the solution will not achieve the desired result. Studies have shown that two smaller waves (or more in some cases) are better than one large one.    Why are the second Miralax dose and Magnesium Citrate so close to the exam?   The intestine continues to produce mucus and waste. Longer intervals between the prep and the exam can lead to less than desired results. However, the stomach must be empty at the time of the exam in order to allow safe sedation. Therefore, there should be nothing by mouth 2 hours before the exam is started.   How do you know if your colon is cleaned out?   After completing the bowel prep, your bowel movements should be all liquid and yellow. Your bowel movements will look similar to urine in the toilet. If there are pieces of stool (poop) in the toilet, or if you can't see to the bottom of the toilet, please call our office for advice. Call [phone] and ask to speak with a nurse.    Why do you need a responsible  to take you home and stay with you?  We require a responsible adult to take you home for your safety. The sedation medicines used to relax you during the procedure can impair your judgement and reaction time, and make you forgetful and possibly a little unsteady. Do not drive, make any important decisions, or sign any legal documents for 24 hours after your procedure.   It is normal to feel bloated and gassy after your procedure. Walking will help move the air through your colon. You can take non-aspirin pain relievers that contain acetaminophen (Tylenol).     When can you eat after your procedure?  You may resume your normal diet when you feel ready, unless advised otherwise by the doctor performing your procedure. Do not drink alcohol for 24 hours after your procedure.   You many resume normal activities (work, exercise, etc.) after 24 hours.     When will you get test results?  You should have your procedure results and any lab results (if applicable) by letter, MyChart message, or phone call within 2 weeks. If you have any questions, please call the doctor that referred you for the procedure.         Updated: 06/22/2022

## 2024-02-17 ENCOUNTER — HEALTH MAINTENANCE LETTER (OUTPATIENT)
Age: 49
End: 2024-02-17

## 2024-03-22 DIAGNOSIS — E78.5 HYPERLIPIDEMIA LDL GOAL <160: ICD-10-CM

## 2024-03-22 RX ORDER — ATORVASTATIN CALCIUM 10 MG/1
TABLET, FILM COATED ORAL
Qty: 90 TABLET | Refills: 3 | Status: SHIPPED | OUTPATIENT
Start: 2024-03-22

## 2024-04-26 ENCOUNTER — HOSPITAL ENCOUNTER (OUTPATIENT)
Dept: MAMMOGRAPHY | Facility: CLINIC | Age: 49
Discharge: HOME | End: 2024-04-26
Attending: INTERNAL MEDICINE | Admitting: INTERNAL MEDICINE
Payer: COMMERCIAL

## 2024-04-26 ENCOUNTER — OFFICE VISIT (OUTPATIENT)
Dept: FAMILY MEDICINE | Facility: CLINIC | Age: 49
End: 2024-04-26
Payer: COMMERCIAL

## 2024-04-26 VITALS
OXYGEN SATURATION: 98 % | BODY MASS INDEX: 23.64 KG/M2 | DIASTOLIC BLOOD PRESSURE: 70 MMHG | HEART RATE: 72 BPM | RESPIRATION RATE: 18 BRPM | WEIGHT: 150.6 LBS | HEIGHT: 67 IN | TEMPERATURE: 97.8 F | SYSTOLIC BLOOD PRESSURE: 110 MMHG

## 2024-04-26 DIAGNOSIS — Z12.4 SCREENING FOR MALIGNANT NEOPLASM OF CERVIX: Primary | ICD-10-CM

## 2024-04-26 DIAGNOSIS — I26.99 OTHER ACUTE PULMONARY EMBOLISM WITHOUT ACUTE COR PULMONALE (H): ICD-10-CM

## 2024-04-26 DIAGNOSIS — E78.5 HYPERLIPIDEMIA LDL GOAL <160: ICD-10-CM

## 2024-04-26 DIAGNOSIS — Z12.31 ENCOUNTER FOR SCREENING MAMMOGRAM FOR BREAST CANCER: ICD-10-CM

## 2024-04-26 DIAGNOSIS — F33.41 RECURRENT MAJOR DEPRESSION IN PARTIAL REMISSION (H): ICD-10-CM

## 2024-04-26 PROCEDURE — 77063 BREAST TOMOSYNTHESIS BI: CPT

## 2024-04-26 PROCEDURE — G0145 SCR C/V CYTO,THINLAYER,RESCR: HCPCS | Performed by: PHYSICIAN ASSISTANT

## 2024-04-26 PROCEDURE — 99213 OFFICE O/P EST LOW 20 MIN: CPT | Performed by: PHYSICIAN ASSISTANT

## 2024-04-26 PROCEDURE — 87624 HPV HI-RISK TYP POOLED RSLT: CPT | Performed by: PHYSICIAN ASSISTANT

## 2024-04-26 SDOH — HEALTH STABILITY: PHYSICAL HEALTH: ON AVERAGE, HOW MANY DAYS PER WEEK DO YOU ENGAGE IN MODERATE TO STRENUOUS EXERCISE (LIKE A BRISK WALK)?: 3 DAYS

## 2024-04-26 SDOH — HEALTH STABILITY: PHYSICAL HEALTH: ON AVERAGE, HOW MANY MINUTES DO YOU ENGAGE IN EXERCISE AT THIS LEVEL?: 20 MIN

## 2024-04-26 ASSESSMENT — PAIN SCALES - GENERAL: PAINLEVEL: NO PAIN (1)

## 2024-04-26 ASSESSMENT — SOCIAL DETERMINANTS OF HEALTH (SDOH): HOW OFTEN DO YOU GET TOGETHER WITH FRIENDS OR RELATIVES?: ONCE A WEEK

## 2024-04-26 NOTE — PROGRESS NOTES
"  Irasema Noble is a 48 year old, presenting for the following health issues:  Physical      4/26/2024     6:51 AM   Additional Questions   Roomed by Tiffany NGUYEN   Accompanied by Amrik, spouse     HPI     Would like pap updated. Suspects she is starting menopause. Periods have become heavier. Saw Dr. Vernon for physical recently. Meds and labs up to date.       Review of Systems  Constitutional, HEENT, cardiovascular, pulmonary, GI, , musculoskeletal, neuro, skin, endocrine and psych systems are negative, except as otherwise noted.      Objective    /70   Pulse 72   Temp 97.8  F (36.6  C) (Temporal)   Resp 18   Ht 1.7 m (5' 6.93\")   Wt 68.3 kg (150 lb 9.6 oz)   SpO2 98%   BMI 23.64 kg/m    Body mass index is 23.64 kg/m .  Physical Exam   GENERAL: alert and no distress  HENT: ear canals and TM's normal, nose and mouth without ulcers or lesions  NECK: no adenopathy, no asymmetry, masses, or scars  RESP: lungs clear to auscultation - no rales, rhonchi or wheezes  CV: regular rate and rhythm, normal S1 S2, no S3 or S4, no murmur, click or rub, no peripheral edema    (female) w/bimanual: normal female external genitalia, normal urethral meatus, normal vaginal mucosa, and normal cervix/adnexa/uterus without masses or discharge  MS: no gross musculoskeletal defects noted, no edema  SKIN: no suspicious lesions or rashes  PSYCH: mentation appears normal, affect normal/bright        Assessment & Plan     Screening for malignant neoplasm of cervix  Pap completed today.   - Pap screen with HPV - recommended age 30 - 65 years    Other acute pulmonary embolism without acute cor pulmonale (H)  No acute concerns.     Recurrent major depression in partial remission (H24)  Stable.     Hyperlipidemia LDL goal <160  Stable.     Patient has been advised of split billing requirements and indicates understanding: Yes    Counseling  Appropriate preventive services were discussed with this patient, including applicable " screening as appropriate for fall prevention, nutrition, physical activity, Tobacco-use cessation, weight loss and cognition.  Checklist reviewing preventive services available has been given to the patient.  Reviewed patient's diet, addressing concerns and/or questions.   She is at risk for lack of exercise and has been provided with information to increase physical activity for the benefit of her well-being.   The patient was instructed to see the dentist every 6 months.       Signed Electronically by: Suzi Barnett PA-C

## 2024-04-26 NOTE — PROGRESS NOTES
Preventive Care Visit  [facility]  Suzi Barnett PA-C, Family Medicine  Apr 26, 2024  {Provider  Link to SmartSet :696175}    {PROVIDER CHARTING PREFERENCE:166676}    Irasema Noble is a 48 year old, presenting for the following:  Physical        4/26/2024     6:51 AM   Additional Questions   Roomed by Tiffany NGUYEN   Accompanied by Amrik, spouse        Health Care Directive  Patient does not have a Health Care Directive or Living Will: Discussed advance care planning with patient; however, patient declined at this time.    HPI  ***  {MA/LPN/RN Pre-Provider Visit Orders- hCG/UA/Strep (Optional):530711}  {SUPERLIST (Optional):501497}  {additonal problems for provider to add (Optional):330827}      4/26/2024   General Health   How would you rate your overall physical health? Good   Feel stress (tense, anxious, or unable to sleep) Not at all         4/26/2024   Nutrition   Three or more servings of calcium each day? Yes   Diet: Regular (no restrictions)   How many servings of fruit and vegetables per day? (!) 0-1   How many sweetened beverages each day? 0-1         4/26/2024   Exercise   Days per week of moderate/strenous exercise 3 days   Average minutes spent exercising at this level 20 min         4/26/2024   Social Factors   Frequency of gathering with friends or relatives Once a week   Worry food won't last until get money to buy more No   Food not last or not have enough money for food? No   Do you have housing?  Yes   Are you worried about losing your housing? No   Lack of transportation? No   Unable to get utilities (heat,electricity)? No         4/26/2024   Dental   Dentist two times every year? (!) NO         4/26/2024   TB Screening   Were you born outside of the [place]? Yes       { Rooming Staff Patient needs a PHQ as part of the AWV.  Use this link to complete and then refresh the note to pull results Link to PHQ9 Assessment :007379}  {USE TO PULL IN PHQ RESULTS FOR  TODAY:552722}        4/26/2024   Substance Use   Alcohol more than 3/day or more than 7/wk No   Do you use any other substances recreationally? (!) ALCOHOL     Social History     Tobacco Use    Smoking status: Never    Smokeless tobacco: Never   Vaping Use    Vaping status: Never Used   Substance Use Topics    Alcohol use: Yes     Alcohol/week: 0.0 standard drinks of alcohol     Comment: rare    Drug use: No     {Provider  If there are gaps in the social history shown above, please follow the link to update and then refresh the note Link to Social and Substance History :585501}     {Mammogram Decision Support (Optional):515688}        4/26/2024   STI Screening   New sexual partner(s) since last STI/HIV test? No     History of abnormal Pap smear: { :221061}        Latest Ref Rng & Units 10/19/2022     7:14 AM 1/5/2018     7:55 AM 1/5/2018     7:47 AM   PAP / HPV   PAP  Negative for Intraepithelial Lesion or Malignancy (NILM)      PAP (Historical)    NIL    HPV 16 DNA Negative Negative  Negative     HPV 18 DNA Negative Negative  Negative     Other HR HPV Negative Negative  Negative       ASCVD Risk   The 10-year ASCVD risk score (Missy CHUN, et al., 2019) is: 0.9%    Values used to calculate the score:      Age: 48 years      Sex: Female      Is Non- : No      Diabetic: No      Tobacco smoker: No      Systolic Blood Pressure: 110 mmHg      Is BP treated: No      HDL Cholesterol: 53 mg/dL      Total Cholesterol: 228 mg/dL        4/26/2024   Contraception/Family Planning   Questions about contraception or family planning No     {Provider  Use the storyboard to review patient history, after sections have been marked as reviewed, refresh note to capture documentation:681972}   Reviewed and updated as needed this visit by Provider                    {HISTORY OPTIONS (Optional):888409}    {ROS Picklists (Optional):742963}     Objective    Exam  /70   Pulse 72   Temp 97.8  F (36.6  " C) (Temporal)   Resp 18   Ht 1.7 m (5' 6.93\")   Wt 68.3 kg (150 lb 9.6 oz)   SpO2 98%   BMI 23.64 kg/m     Estimated body mass index is 23.64 kg/m  as calculated from the following:    Height as of this encounter: 1.7 m (5' 6.93\").    Weight as of this encounter: 68.3 kg (150 lb 9.6 oz).    Physical Exam  {Exam Choices (Optional):528834}        Signed Electronically by: Suzi Barnett PA-C  {Email feedback regarding this note to [email or web address]   :823492}  "

## 2024-04-28 ENCOUNTER — ANESTHESIA EVENT (OUTPATIENT)
Dept: GASTROENTEROLOGY | Facility: CLINIC | Age: 49
End: 2024-04-28
Payer: COMMERCIAL

## 2024-04-28 NOTE — H&P
[facility] History and Physical    Aide Charles MRN# [id number]   Age: 48 year old YOB: 1975     Date of Admission:  (Not on file)    Home clinic: [facility]  Primary care provider: Suzi Barnett          Impression and Plan:   Impression:   Screen for colon cancer [Z12.11]        Plan:   Proceed to Colonoscopy as planned.  The procedure, risks(bleeding, perforation), benefits and alternatives were discussed and the patient agrees to proceed. Cleared for Anesthesia             Chief Complaint:   Screen for colon cancer [Z12.11]    History is obtained from the patient          History of Present Illness:   This 48 year old female is being seen at this time for evaluation for colonoscopy.  No complaints or family hx           Past Medical History:     Past Medical History:   Diagnosis Date    Generalized anxiety disorder     MDD (major depressive disorder), recurrent, severe, with psychosis (H)     Pulmonary embolism (H) 11/15/2018    while on OCP            Past Surgical History:     Past Surgical History:   Procedure Laterality Date    ESOPHAGOSCOPY, GASTROSCOPY, DUODENOSCOPY (EGD), COMBINED N/A 05/15/2017    Procedure: COMBINED ESOPHAGOSCOPY, GASTROSCOPY, DUODENOSCOPY (EGD), BIOPSY SINGLE OR MULTIPLE;  ESOPHAGOSCOPY, GASTROSCOPY, DUODENOSCOPY (EGD) with biopsies by biopsy;  Surgeon: Robles Falk MD;  Location: [facility]    LAPAROSCOPIC SALPINGECTOMY Bilateral 03/13/2019    Procedure: LAPAROSCOPIC BILATERAL SALPINGECTOMY;  Surgeon: Joseluis Wing MD;  Location: [facility]    MYRINGOTOMY, INSERT TUBE, COMBINED Left 05/16/2018    cholesteatoma and surgical repair            Social History:     Social History     Tobacco Use    Smoking status: Never    Smokeless tobacco: Never   Substance Use Topics    Alcohol use: Yes     Alcohol/week: 0.0 standard drinks of alcohol     Comment: rare            Family History:     Family History   Problem Relation Age of Onset     Hypertension Mother     Hyperlipidemia Mother     Mental Illness Mother     Cancer - colorectal Father     Substance Abuse Father         alcoholic recovering    Other - See Comments Sister         2 yrs older    Cerebrovascular Disease Maternal Grandmother     Coronary Artery Disease Maternal Grandfather     Cerebrovascular Disease Maternal Grandfather     Diabetes Paternal Grandmother     Heart Disease Paternal Grandfather         MI    Other - See Comments Daughter         9/14/2000    Other - See Comments Son         8/16/2004    Bipolar Disorder Maternal Aunt     Schizophrenia Maternal Aunt     Multiple Sclerosis Maternal Aunt 45            Immunizations:     VACCINE/DOSE   Diptheria   DPT   DTAP   HBIG   Hepatitis A   Hepatitis B   HIB   Influenza   Measles   Meningococcal   MMR   Mumps   Pneumococcal   Polio   Rubella   Small Pox   TDAP   Varicella   Zoster            Allergies:     Allergies   Allergen Reactions    Erythromycin Nausea and Vomiting    Seasonal Allergies      Molds, grass, multiple trees, plant pollen, cats, rabbits, dogs, hay.            Medications:     No current facility-administered medications for this encounter.     Current Outpatient Medications   Medication Sig Dispense Refill    Ascorbic Acid (VITAMIN C) 500 MG CHEW       atorvastatin (LIPITOR) 10 MG tablet TAKE ONE TABLET BY MOUTH ONCE DAILY 90 tablet 3    cholecalciferol (VITAMIN D3) 5000 UNITS CAPS capsule Take by mouth daily 3 capsules daily      fluticasone (FLONASE) 50 MCG/ACT nasal spray Spray 1 spray into both nostrils daily      loratadine (CLARITIN) 10 MG tablet Take 10 mg by mouth daily      Multiple Vitamin (MULTIVITAMIN ADULT PO)                Review of Systems:   The review of systems was positive for the following findings.  None.  The remainder of the review of systems was unremarkable.          Physical Exam:   All vitals have been reviewed  not currently breastfeeding.  No intake or output data in the 24 hours  ending 04/28/24 1329  SHEENT examination revealed NC/AT, EOMI.  Examination of the chest revealed CTA.  Examination of the heart revealed RRR.  Examination of the abdomen revealed soft, non tender.  The neuromuscular examination was NL.          Data:   All laboratory data reviewed  No results found for any visits on 04/29/24.  -     Sheng George MD, FACS

## 2024-04-28 NOTE — ANESTHESIA PREPROCEDURE EVALUATION
Anesthesia Pre-Procedure Evaluation    Patient: Aide Charles   MRN: [id number] : 1975        Procedure : Procedure(s):  Colonoscopy          Past Medical History:   Diagnosis Date    Generalized anxiety disorder     MDD (major depressive disorder), recurrent, severe, with psychosis (H)     Pulmonary embolism (H) 11/15/2018    while on OCP      Past Surgical History:   Procedure Laterality Date    ESOPHAGOSCOPY, GASTROSCOPY, DUODENOSCOPY (EGD), COMBINED N/A 05/15/2017    Procedure: COMBINED ESOPHAGOSCOPY, GASTROSCOPY, DUODENOSCOPY (EGD), BIOPSY SINGLE OR MULTIPLE;  ESOPHAGOSCOPY, GASTROSCOPY, DUODENOSCOPY (EGD) with biopsies by biopsy;  Surgeon: Robles Falk MD;  Location: [facility]    LAPAROSCOPIC SALPINGECTOMY Bilateral 2019    Procedure: LAPAROSCOPIC BILATERAL SALPINGECTOMY;  Surgeon: Joseluis Wing MD;  Location: [facility]    MYRINGOTOMY, INSERT TUBE, COMBINED Left 2018    cholesteatoma and surgical repair      Allergies   Allergen Reactions    Erythromycin Nausea and Vomiting    Seasonal Allergies      Molds, grass, multiple trees, plant pollen, cats, rabbits, dogs, hay.      Social History     Tobacco Use    Smoking status: Never    Smokeless tobacco: Never   Substance Use Topics    Alcohol use: Yes     Alcohol/week: 0.0 standard drinks of alcohol     Comment: rare      Wt Readings from Last 1 Encounters:   24 68.3 kg (150 lb 9.6 oz)        Anesthesia Evaluation   Pt has had prior anesthetic. Type: General and MAC.        ROS/MED HX  ENT/Pulmonary: Comment: H/O PE      Neurologic:       Cardiovascular:  - neg cardiovascular ROS   (+)  - -   -  - -                                 Previous cardiac testing   Echo: Date: 2018 Results:  Interpretation Summary     1. Normal left ventricular size and systolic function. Biplane LVEF 57%.  2. No regional wall motion abnormalities.  3. Normal right ventricular size and systolic function.  4. No significant valve disease.    Stress  Test:  Date: Results:    ECG Reviewed:  Date: Results:    Cath:  Date: Results:      METS/Exercise Tolerance:     Hematologic:  - neg hematologic  ROS     Musculoskeletal:  - neg musculoskeletal ROS     GI/Hepatic:     (+)        bowel prep,            Renal/Genitourinary:       Endo:  - neg endo ROS     Psychiatric/Substance Use: Comment: H/O Severe Psychosis    (+) psychiatric history depression and anxiety       Infectious Disease:  - neg infectious disease ROS     Malignancy:  - neg malignancy ROS     Other:               OUTSIDE LABS:  CBC:   Lab Results   Component Value Date    WBC 5.1 02/01/2024    WBC 7.7 07/29/2021    HGB 12.4 02/01/2024    HGB 12.8 07/29/2021    HCT 37.7 02/01/2024    HCT 37.4 07/29/2021     02/01/2024     07/29/2021     BMP:   Lab Results   Component Value Date     02/01/2024     10/19/2022    POTASSIUM 4.1 02/01/2024    POTASSIUM 4.0 10/19/2022    CHLORIDE 105 02/01/2024    CHLORIDE 112 (H) 10/19/2022    CO2 24 02/01/2024    CO2 30 10/19/2022    BUN 16.9 02/01/2024    BUN 12 10/19/2022    CR 0.95 02/01/2024    CR 0.81 10/19/2022    GLC 95 02/01/2024     (H) 10/19/2022     COAGS:   Lab Results   Component Value Date    PTT 35 12/12/2018    INR 1.15 (H) 12/12/2018     POC:   Lab Results   Component Value Date    HCG Negative 05/15/2017    HCGS Negative 12/12/2018     HEPATIC:   Lab Results   Component Value Date    ALBUMIN 4.1 02/01/2024    PROTTOTAL 7.4 02/01/2024    ALT 14 02/01/2024    AST 20 02/01/2024    ALKPHOS 74 02/01/2024    BILITOTAL 0.2 02/01/2024     OTHER:   Lab Results   Component Value Date    RYANN 8.6 02/01/2024    TSH 1.12 08/05/2015       Anesthesia Plan    ASA Status:  2    NPO Status:  NPO Appropriate    Anesthesia Type: MAC.     - Reason for MAC: straight local not clinically adequate   Induction: Intravenous, Propofol.   Maintenance: TIVA.        Consents    Anesthesia Plan(s) and associated risks, benefits, and realistic alternatives  discussed. Questions answered and patient/representative(s) expressed understanding.     - Discussed:     - Discussed with:  Patient      - Extended Intubation/Ventilatory Support Discussed: No.      - Patient is DNR/DNI Status: No     Use of blood products discussed: No .     Postoperative Care    Pain management: Oral pain medications.   PONV prophylaxis: Background Propofol Infusion     Comments:    Other Comments: The risks and benefits of anesthesia, and the alternatives where applicable, have been discussed with the patient, and they wish to proceed.              KYLEE Lucero CRNA    I have reviewed the pertinent notes and labs in the chart from the past 30 days and (re)examined the patient.  Any updates or changes from those notes are reflected in this note.

## 2024-04-29 ENCOUNTER — HOSPITAL ENCOUNTER (OUTPATIENT)
Facility: CLINIC | Age: 49
Discharge: HOME | End: 2024-04-29
Attending: SPECIALIST | Admitting: SPECIALIST
Payer: COMMERCIAL

## 2024-04-29 ENCOUNTER — ANESTHESIA (OUTPATIENT)
Dept: GASTROENTEROLOGY | Facility: CLINIC | Age: 49
End: 2024-04-29
Payer: COMMERCIAL

## 2024-04-29 VITALS
HEART RATE: 63 BPM | DIASTOLIC BLOOD PRESSURE: 86 MMHG | TEMPERATURE: 98.5 F | RESPIRATION RATE: 16 BRPM | OXYGEN SATURATION: 94 % | SYSTOLIC BLOOD PRESSURE: 134 MMHG

## 2024-04-29 LAB — COLONOSCOPY: NORMAL

## 2024-04-29 PROCEDURE — 258N000003 HC RX IP 258 OP 636: Performed by: NURSE ANESTHETIST, CERTIFIED REGISTERED

## 2024-04-29 PROCEDURE — 88305 TISSUE EXAM BY PATHOLOGIST: CPT | Mod: TC | Performed by: SPECIALIST

## 2024-04-29 PROCEDURE — 250N000011 HC RX IP 250 OP 636: Performed by: NURSE ANESTHETIST, CERTIFIED REGISTERED

## 2024-04-29 PROCEDURE — 45385 COLONOSCOPY W/LESION REMOVAL: CPT | Mod: PT | Performed by: SPECIALIST

## 2024-04-29 PROCEDURE — 370N000017 HC ANESTHESIA TECHNICAL FEE, PER MIN: Performed by: SPECIALIST

## 2024-04-29 PROCEDURE — 45378 DIAGNOSTIC COLONOSCOPY: CPT | Performed by: SPECIALIST

## 2024-04-29 PROCEDURE — 88305 TISSUE EXAM BY PATHOLOGIST: CPT | Mod: 26 | Performed by: PATHOLOGY

## 2024-04-29 PROCEDURE — 250N000009 HC RX 250: Performed by: NURSE ANESTHETIST, CERTIFIED REGISTERED

## 2024-04-29 RX ORDER — LIDOCAINE HYDROCHLORIDE 10 MG/ML
INJECTION, SOLUTION INFILTRATION; PERINEURAL PRN
Status: DISCONTINUED | OUTPATIENT
Start: 2024-04-29 | End: 2024-04-29

## 2024-04-29 RX ORDER — NALOXONE HYDROCHLORIDE 0.4 MG/ML
0.1 INJECTION, SOLUTION INTRAMUSCULAR; INTRAVENOUS; SUBCUTANEOUS
Status: CANCELLED | OUTPATIENT
Start: 2024-04-29

## 2024-04-29 RX ORDER — ONDANSETRON 2 MG/ML
4 INJECTION INTRAMUSCULAR; INTRAVENOUS EVERY 30 MIN PRN
Status: CANCELLED | OUTPATIENT
Start: 2024-04-29

## 2024-04-29 RX ORDER — SODIUM CHLORIDE, SODIUM LACTATE, POTASSIUM CHLORIDE, CALCIUM CHLORIDE 600; 310; 30; 20 MG/100ML; MG/100ML; MG/100ML; MG/100ML
INJECTION, SOLUTION INTRAVENOUS CONTINUOUS
Status: DISCONTINUED | OUTPATIENT
Start: 2024-04-29 | End: 2024-04-29 | Stop reason: HOSPADM

## 2024-04-29 RX ORDER — LIDOCAINE 40 MG/G
CREAM TOPICAL
Status: DISCONTINUED | OUTPATIENT
Start: 2024-04-29 | End: 2024-04-29 | Stop reason: HOSPADM

## 2024-04-29 RX ORDER — SODIUM CHLORIDE, SODIUM LACTATE, POTASSIUM CHLORIDE, CALCIUM CHLORIDE 600; 310; 30; 20 MG/100ML; MG/100ML; MG/100ML; MG/100ML
INJECTION, SOLUTION INTRAVENOUS CONTINUOUS PRN
Status: DISCONTINUED | OUTPATIENT
Start: 2024-04-29 | End: 2024-04-29

## 2024-04-29 RX ORDER — ONDANSETRON 4 MG/1
4 TABLET, ORALLY DISINTEGRATING ORAL EVERY 30 MIN PRN
Status: CANCELLED | OUTPATIENT
Start: 2024-04-29

## 2024-04-29 RX ORDER — PROPOFOL 10 MG/ML
INJECTION, EMULSION INTRAVENOUS CONTINUOUS PRN
Status: DISCONTINUED | OUTPATIENT
Start: 2024-04-29 | End: 2024-04-29

## 2024-04-29 RX ADMIN — SODIUM CHLORIDE, POTASSIUM CHLORIDE, SODIUM LACTATE AND CALCIUM CHLORIDE: 600; 310; 30; 20 INJECTION, SOLUTION INTRAVENOUS at 07:04

## 2024-04-29 RX ADMIN — PROPOFOL 100 MG: 10 INJECTION, EMULSION INTRAVENOUS at 07:26

## 2024-04-29 RX ADMIN — LIDOCAINE HYDROCHLORIDE 50 MG: 10 INJECTION, SOLUTION INFILTRATION; PERINEURAL at 07:26

## 2024-04-29 RX ADMIN — PROPOFOL 200 MCG/KG/MIN: 10 INJECTION, EMULSION INTRAVENOUS at 07:27

## 2024-04-29 RX ADMIN — SODIUM CHLORIDE, POTASSIUM CHLORIDE, SODIUM LACTATE AND CALCIUM CHLORIDE: 600; 310; 30; 20 INJECTION, SOLUTION INTRAVENOUS at 07:26

## 2024-04-29 ASSESSMENT — ACTIVITIES OF DAILY LIVING (ADL)
ADLS_ACUITY_SCORE: 37
ADLS_ACUITY_SCORE: 37

## 2024-04-29 NOTE — DISCHARGE INSTRUCTIONS
[facility]    Home Care Following Endoscopy          Activity:  You have just undergone an endoscopic procedure usually performed with conscious sedation.  Do not work or operate machinery (including a car) for at least 12 hours.    I encourage you to walk and attempt to pass this air as soon as possible.    Diet:  Return to the diet you were on before your procedure but eat lightly for the first 12-24 hours.  Drink plenty of water.  Resume any regular medications unless otherwise advised by your physician.  Please begin any new medication prescribed as a result of your procedure as directed by your physician.   If you had any biopsy or polyp removed please refrain from aspirin or aspirin products for 2 days.  If on Coumadin please restart as instructed by your physician.   Pain:  You may take Tylenol as needed for pain.  Expected Recovery:  You can expect some mild abdominal fullness and/or discomfort due to the air used to inflate your intestinal tract. It is also normal to have a mild sore throat after upper endoscopy.    Call Your Physician if You Have:    After Colonoscopy:  Worsening persisting abdominal pain which is worse with activity.  Fevers (>101 degrees F), chills or shakes.  Passage of continued blood with bowel movements.     Any questions or concerns about your recovery, please call [phone] or after hours [phone] ([phone]) Nurse Advice Line.    Follow-up Care:  You did have polyps/biopsy tissue sample(s) removed.  The polyps/biopsy tissue sample(s) will be sent to pathology.    You should receive letter in your My Chart from Dr George with your results within 1-2 weeks. If you do not participate in My Chart a physical letter will come in the mail in 2-3 weeks.  Please call if you have not received a notification of your results.  If asked to return to clinic please make an appointment 1 week after your procedure.  Call [phone].

## 2024-04-29 NOTE — ANESTHESIA POSTPROCEDURE EVALUATION
Patient: Aide Charles    Procedure: Procedure(s):  Colonoscopy       Anesthesia Type:  MAC    Note:  Disposition: Outpatient   Postop Pain Control: Uneventful            Sign Out: Well controlled pain   PONV: No   Neuro/Psych: Uneventful            Sign Out: Acceptable/Baseline neuro status   Airway/Respiratory: Uneventful            Sign Out: Acceptable/Baseline resp. status   CV/Hemodynamics: Uneventful            Sign Out: Acceptable CV status   Other NRE: NONE   DID A NON-ROUTINE EVENT OCCUR? No    Event details/Postop Comments:  Pt was happy with anesthesia care.  No complications.  I will follow up with the pt if needed.       Last vitals:  Vitals Value Taken Time   /84 04/29/24 0820   Temp     Pulse 63 04/29/24 0820   Resp 16 04/29/24 0753   SpO2 97 % 04/29/24 0811   Vitals shown include unfiled device data.    Electronically Signed By: KYLEE Lucero CRNA  April 29, 2024  8:25 AM

## 2024-04-29 NOTE — ANESTHESIA CARE TRANSFER NOTE
Patient: Aide Charles    Procedure: Procedure(s):  Colonoscopy       Diagnosis: Screen for colon cancer [Z12.11]  Diagnosis Additional Information: No value filed.    Anesthesia Type:   MAC     Note:    Oropharynx: oropharynx clear of all foreign objects and spontaneously breathing  Level of Consciousness: drowsy  Oxygen Supplementation: room air    Independent Airway: airway patency satisfactory and stable  Dentition: dentition unchanged  Vital Signs Stable: post-procedure vital signs reviewed and stable  Report to RN Given: handoff report given  Patient transferred to: Phase II    Handoff Report: Identifed the Patient, Identified the Reponsible Provider, Reviewed the pertinent medical history, Discussed the surgical course, Reviewed Intra-OP anesthesia mangement and issues during anesthesia, Set expectations for post-procedure period and Allowed opportunity for questions and acknowledgement of understanding  Vitals:  Vitals Value Taken Time   BP 93/57 04/29/24 0753   Temp     Pulse 63 04/29/24 0753   Resp 16 04/29/24 0753   SpO2 99 % 04/29/24 0759   Vitals shown include unfiled device data.    Electronically Signed By: KYLEE Lucero CRNA  April 29, 2024  8:00 AM

## 2024-04-30 LAB
BKR LAB AP GYN ADEQUACY: NORMAL
BKR LAB AP GYN INTERPRETATION: NORMAL
BKR LAB AP HPV REFLEX: NORMAL
BKR LAB AP PREVIOUS ABNORMAL: NORMAL
PATH REPORT.COMMENTS IMP SPEC: NORMAL
PATH REPORT.FINAL DX SPEC: NORMAL
PATH REPORT.GROSS SPEC: NORMAL
PATH REPORT.MICROSCOPIC SPEC OTHER STN: NORMAL
PATH REPORT.RELEVANT HX SPEC: NORMAL
PATH REPORT.RELEVANT HX SPEC: NORMAL
PHOTO IMAGE: NORMAL

## 2024-05-01 ENCOUNTER — OFFICE VISIT (OUTPATIENT)
Dept: INTERNAL MEDICINE | Facility: CLINIC | Age: 49
End: 2024-05-01
Payer: COMMERCIAL

## 2024-05-01 VITALS
TEMPERATURE: 98.3 F | RESPIRATION RATE: 12 BRPM | WEIGHT: 151.4 LBS | BODY MASS INDEX: 23.76 KG/M2 | OXYGEN SATURATION: 98 % | SYSTOLIC BLOOD PRESSURE: 130 MMHG | DIASTOLIC BLOOD PRESSURE: 80 MMHG | HEART RATE: 68 BPM

## 2024-05-01 DIAGNOSIS — N30.00 ACUTE CYSTITIS WITHOUT HEMATURIA: Primary | ICD-10-CM

## 2024-05-01 LAB
ALBUMIN UR-MCNC: NEGATIVE MG/DL
APPEARANCE UR: ABNORMAL
BILIRUB UR QL STRIP: NEGATIVE
COLOR UR AUTO: YELLOW
GLUCOSE UR STRIP-MCNC: NEGATIVE MG/DL
HGB UR QL STRIP: NEGATIVE
HYALINE CASTS: 7 /LPF
KETONES UR STRIP-MCNC: NEGATIVE MG/DL
LEUKOCYTE ESTERASE UR QL STRIP: ABNORMAL
MUCOUS THREADS #/AREA URNS LPF: PRESENT /LPF
NITRATE UR QL: NEGATIVE
PH UR STRIP: 5 [PH] (ref 5–7)
RBC URINE: 0 /HPF
SP GR UR STRIP: 1.01 (ref 1–1.03)
SQUAMOUS EPITHELIAL: 9 /HPF
UROBILINOGEN UR STRIP-MCNC: NORMAL MG/DL
WBC URINE: 10 /HPF

## 2024-05-01 PROCEDURE — 87086 URINE CULTURE/COLONY COUNT: CPT | Performed by: INTERNAL MEDICINE

## 2024-05-01 PROCEDURE — 99213 OFFICE O/P EST LOW 20 MIN: CPT | Performed by: INTERNAL MEDICINE

## 2024-05-01 PROCEDURE — 81001 URINALYSIS AUTO W/SCOPE: CPT | Performed by: INTERNAL MEDICINE

## 2024-05-01 RX ORDER — PHENAZOPYRIDINE HYDROCHLORIDE 200 MG/1
200 TABLET, FILM COATED ORAL 3 TIMES DAILY PRN
Qty: 14 TABLET | Refills: 0 | Status: SHIPPED | OUTPATIENT
Start: 2024-05-01

## 2024-05-01 RX ORDER — CEFDINIR 300 MG/1
300 CAPSULE ORAL 2 TIMES DAILY
Qty: 14 CAPSULE | Refills: 0 | Status: SHIPPED | OUTPATIENT
Start: 2024-05-01 | End: 2024-05-14

## 2024-05-01 NOTE — PROGRESS NOTES
"      Irasema Noble is a 48 year old, presenting for the following health issues:  UTI        5/1/2024     7:06 AM   Additional Questions   Roomed by Patricia palm     History of Present Illness       Reason for visit:  Urinary tract discomfort  Symptom onset:  1-2 weeks ago  Symptoms include:  Constant urge, discomfort  Symptom intensity:  Moderate  Symptom progression:  Worsening  Had these symptoms before:  Yes  Has tried/received treatment for these symptoms:  Yes  Previous treatment was successful:  No  What makes it worse:  Seems worse as the day goes on  What makes it better:  Some improvement with last course of antibiotics    She eats 2-3 servings of fruits and vegetables daily.She consumes 1 sweetened beverage(s) daily.She exercises with enough effort to increase her heart rate 10 to 19 minutes per day.  She exercises with enough effort to increase her heart rate 4 days per week. She is missing 1 dose(s) of medications per week.                EMR reviewed including:             Complaint, History of Chief Complaint, Corresponding Review of Systems, and Complaint Specific Physical Examination.    #1   Persistent dysuria  Initially started on Macrobid with no results.  Switched over to cefdinir with interim results.  Ran out of medication, symptoms worsen.  Cultures not available.  Patient was seen at \"urgent care\".  In the interim, had Pap smear and colonoscopy  Symptoms persisting or getting slightly worse.  Notes urgency, frequency, and dysuria.  Denies flank pain, chills or systemic symptoms.          Exam:   URINARY: Josr's punch is negative.  Mild suprapubic tenderness is noted with palpation.   Constitutional: The patient appears to be in no acute distress. The patient appears to be adequately hydrated. No acute respiratory or hemodynamic distress is noted at this time.        Patient has been interviewed, applicable history and applied review of systems have been performed.    Vital Signs:   BP " 130/80   Pulse 68   Temp 98.3  F (36.8  C)   Resp 12   Wt 68.7 kg (151 lb 6.4 oz)   LMP 04/01/2024 (Approximate)   SpO2 98%   BMI 23.76 kg/m        Decision Making    Problem and Complexity     1. Acute cystitis without hematuria  Will place on Pyridium and continue cefdinir until cultures are available.  Recommend continued high fluid intake/output.    - phenazopyridine (PYRIDIUM) 200 MG tablet; Take 1 tablet (200 mg) by mouth 3 times daily as needed for irritation  Dispense: 14 tablet; Refill: 0  - cefdinir (OMNICEF) 300 MG capsule; Take 1 capsule (300 mg) by mouth 2 times daily  Dispense: 14 capsule; Refill: 0  - UA Macroscopic with reflex to Microscopic and Culture - Lab Collect; Future  - UA Macroscopic with reflex to Microscopic and Culture - Lab Collect                                FOLLOW UP   I have asked the patient to make an appointment for followup with me virtually in the upcoming week with results and response    Regarding routine vaccinations:  I have reviewed the patient's vaccination schedule and discussed the benefits of prophylactic vaccination in detail.  I recommend the patient contact their pharmacist for vaccinations.  Discussed that most insurance companies now favor reimbursement to the pharmacies and it will financially behoove the patient to have vaccinations performed at their pharmacy.        I have carefully explained the diagnosis and treatment options to the patient.  The patient has displayed an understanding of the above, and all subsequent questions were answered.      DO LEONA Harris    Portions of this note were produced using [employer]  Although every attempt at real-time proof reading has been made, occasional grammar/syntax errors may have been missed.

## 2024-05-02 LAB
BACTERIA UR CULT: NO GROWTH
HUMAN PAPILLOMA VIRUS 16 DNA: NEGATIVE
HUMAN PAPILLOMA VIRUS 18 DNA: NEGATIVE
HUMAN PAPILLOMA VIRUS FINAL DIAGNOSIS: NORMAL
HUMAN PAPILLOMA VIRUS OTHER HR: NEGATIVE

## 2024-05-05 ENCOUNTER — MYC MEDICAL ADVICE (OUTPATIENT)
Dept: INTERNAL MEDICINE | Facility: CLINIC | Age: 49
End: 2024-05-05
Payer: COMMERCIAL

## 2024-05-06 NOTE — TELEPHONE ENCOUNTER
As the patient urinary analysis and cultures were negative, and as she is now concluded several courses of antibiotic, and as she continues to have symptoms; I question the diagnosis of urinary tract infection.  I would recommend urology consultation.    If she is so inclined, let me know and I will be happy to place the referral.    Randi

## 2024-05-06 NOTE — TELEPHONE ENCOUNTER
"Patient was seen on 5/1/24 for UTI symptoms.  Started on cefdinir 300mg bid x7 days; has 2 days left of med and symptoms have not improved more than maybe 10%.  States had had a course of cefdinir before that as well as macrobid x3 days; neither helped.  She states continues to have constant urgency with pressure in area.  States is urinating but feels like has to urinate all the time and not a lot out.  States isn't a pain but more of a constant \"deep pressure and sensation of needing to void\"  No low back or flank pain. No noted blood in urine.  No fever.  No urinary odor.  Urine appears clear pale yellow.  Is drinking fluids.  Caffeine intake is 1 can pepsi daily and 1/2 cup tea.  No coffee.  2 alcoholic drinks this weekend; one during week.  She is wondering if due to her caffeine or alcohol intake.    Please advise if needs to be seen or if antibiotic needs to be switched.  [facility] in [place].  Thank you.  Mounika BRENNAN RN    Patient aware MD out til Tues.   "

## 2024-05-13 ENCOUNTER — PATIENT OUTREACH (OUTPATIENT)
Dept: GASTROENTEROLOGY | Facility: CLINIC | Age: 49
End: 2024-05-13
Payer: COMMERCIAL

## 2024-05-14 ENCOUNTER — OFFICE VISIT (OUTPATIENT)
Dept: UROLOGY | Facility: CLINIC | Age: 49
End: 2024-05-14
Payer: COMMERCIAL

## 2024-05-14 VITALS — WEIGHT: 151 LBS | TEMPERATURE: 97 F | BODY MASS INDEX: 23.7 KG/M2 | HEIGHT: 67 IN

## 2024-05-14 DIAGNOSIS — R35.0 URINARY FREQUENCY: Primary | ICD-10-CM

## 2024-05-14 LAB
ALBUMIN UR-MCNC: 30 MG/DL
APPEARANCE UR: ABNORMAL
BILIRUB UR QL STRIP: NEGATIVE
COLOR UR AUTO: YELLOW
GLUCOSE UR STRIP-MCNC: NEGATIVE MG/DL
HGB UR QL STRIP: ABNORMAL
KETONES UR STRIP-MCNC: NEGATIVE MG/DL
LEUKOCYTE ESTERASE UR QL STRIP: NEGATIVE
MUCOUS THREADS #/AREA URNS LPF: PRESENT /LPF
NITRATE UR QL: NEGATIVE
PH UR STRIP: 6 [PH] (ref 5–7)
RBC URINE: >182 /HPF
SP GR UR STRIP: 1.02 (ref 1–1.03)
SQUAMOUS EPITHELIAL: 2 /HPF
UROBILINOGEN UR STRIP-MCNC: NORMAL MG/DL
WBC URINE: 0 /HPF

## 2024-05-14 PROCEDURE — 52000 CYSTOURETHROSCOPY: CPT | Performed by: UROLOGY

## 2024-05-14 PROCEDURE — 99205 OFFICE O/P NEW HI 60 MIN: CPT | Mod: 25 | Performed by: UROLOGY

## 2024-05-14 PROCEDURE — 81001 URINALYSIS AUTO W/SCOPE: CPT | Performed by: UROLOGY

## 2024-05-14 ASSESSMENT — PAIN SCALES - GENERAL: PAINLEVEL: NO PAIN (0)

## 2024-05-14 NOTE — PROGRESS NOTES
"Aide Charles is a 48 year old female seen in consultation for dysuria. Consult from Suzi Barnett      Pt with several wk hx dysuria and \"severe pain that made me unable to walk or sit or cross my legs.\" Sx's acutely resolved 36 hrs ago \"and now I feel fine.\"    Was rx with nitrofurantoin followed by two courses of cefdinir.      Presently denies dysuria, gross hematuria, frequency; nocturia x 1. Rare LILY, no pads. Denies prior  eval, hx bladder surgery, use of bladder meds. No personal or FH for nephrolithiasis.    Hx 2 vag deliveries, TL, no hyster. Denies constipation.    Drinks 1 tea, 1 soda, 6 [place] per day.       Reviewed PMH in detail including major depression with psychosis, PE (on OCP), anxiety, conductive hearing loss, genital warts, PTSD, panic disorder with agorophobia, transient global amnesia        Past Medical History:   Diagnosis Date    Generalized anxiety disorder     MDD (major depressive disorder), recurrent, severe, with psychosis (H)     Pulmonary embolism (H) 11/15/2018    while on OCP       Past Surgical History:   Procedure Laterality Date    COLONOSCOPY N/A 4/29/2024    Procedure: Colonoscopy;  Surgeon: Sheng George MD;  Location:  GI    ESOPHAGOSCOPY, GASTROSCOPY, DUODENOSCOPY (EGD), COMBINED N/A 05/15/2017    Procedure: COMBINED ESOPHAGOSCOPY, GASTROSCOPY, DUODENOSCOPY (EGD), BIOPSY SINGLE OR MULTIPLE;  ESOPHAGOSCOPY, GASTROSCOPY, DUODENOSCOPY (EGD) with biopsies by biopsy;  Surgeon: Robles Falk MD;  Location:  GI    LAPAROSCOPIC SALPINGECTOMY Bilateral 03/13/2019    Procedure: LAPAROSCOPIC BILATERAL SALPINGECTOMY;  Surgeon: Joseluis Wing MD;  Location:  OR    MYRINGOTOMY, INSERT TUBE, COMBINED Left 05/16/2018    cholesteatoma and surgical repair       Social History     Socioeconomic History    Marital status:      Spouse name: Amrik    Number of children: 2    Years of education: 16    Highest education level: Bachelor's degree (e.g., BA, AB, BS) "   Occupational History    Occupation:  for [employer]     Comment: [employer]   Tobacco Use    Smoking status: Never    Smokeless tobacco: Never   Vaping Use    Vaping status: Never Used   Substance and Sexual Activity    Alcohol use: Yes     Alcohol/week: 0.0 standard drinks of alcohol     Comment: rare    Drug use: No    Sexual activity: Yes     Partners: Male     Birth control/protection: Surgical     Comment: salpingectomy   Other Topics Concern    Parent/sibling w/ CABG, MI or angioplasty before 65F 55M? Not Asked   Social History Narrative    Not on file     Social Determinants of Health     Financial Resource Strain: Low Risk  (4/26/2024)    Financial Resource Strain     Within the past 12 months, have you or your family members you live with been unable to get utilities (heat, electricity) when it was really needed?: No   Food Insecurity: Low Risk  (4/26/2024)    Food Insecurity     Within the past 12 months, did you worry that your food would run out before you got money to buy more?: No     Within the past 12 months, did the food you bought just not last and you didn t have money to get more?: No   Transportation Needs: Low Risk  (4/26/2024)    Transportation Needs     Within the past 12 months, has lack of transportation kept you from medical appointments, getting your medicines, non-medical meetings or appointments, work, or from getting things that you need?: No   Physical Activity: Insufficiently Active (4/26/2024)    Exercise Vital Sign     Days of Exercise per Week: 3 days     Minutes of Exercise per Session: 20 min   Stress: No Stress Concern Present (4/26/2024)    [employer] - Occupational Stress Questionnaire     Feeling of Stress : Not at all   Social Connections: Unknown (4/26/2024)    Social Connection and Isolation Panel [NHANES]     Frequency of Communication with Friends and Family: Not on file     Frequency of Social Gatherings with  Friends and Family: Once a week     Attends Zoroastrianism Services: Not on file     Active Member of Clubs or Organizations: Not on file     Attends Club or Organization Meetings: Not on file     Marital Status: Not on file   Interpersonal Safety: Low Risk  (2/1/2024)    Interpersonal Safety     Do you feel physically and emotionally safe where you currently live?: Yes     Within the past 12 months, have you been hit, slapped, kicked or otherwise physically hurt by someone?: No     Within the past 12 months, have you been humiliated or emotionally abused in other ways by your partner or ex-partner?: No   Housing Stability: Low Risk  (4/26/2024)    Housing Stability     Do you have housing? : Yes     Are you worried about losing your housing?: No       Current Outpatient Medications   Medication Sig Dispense Refill    Ascorbic Acid (VITAMIN C) 500 MG CHEW       atorvastatin (LIPITOR) 10 MG tablet TAKE ONE TABLET BY MOUTH ONCE DAILY 90 tablet 3    cefdinir (OMNICEF) 300 MG capsule Take 1 capsule (300 mg) by mouth 2 times daily 14 capsule 0    cholecalciferol (VITAMIN D3) 5000 UNITS CAPS capsule Take by mouth daily 3 capsules daily      fluticasone (FLONASE) 50 MCG/ACT nasal spray Spray 1 spray into both nostrils daily      loratadine (CLARITIN) 10 MG tablet Take 10 mg by mouth daily      Multiple Vitamin (MULTIVITAMIN ADULT PO)       phenazopyridine (PYRIDIUM) 200 MG tablet Take 1 tablet (200 mg) by mouth 3 times daily as needed for irritation 14 tablet 0       Physical Exam:    GENL: NAD.    ABD: Soft, non-tender, no masses.    EG: Well-estrogenized, no masses.    VAGINA: Well-estrogenized, no masses, menses.    BN HYPERMOBILITY: None.    CYSTOCELE: None.    APICAL PROLAPSE: None.    RECTOCELE: None.    BIMANUAL: No mass or tenderness.    Cysto:    (Informed consent obtained. Pause for cause performed)   Sterile prep.    17 Fr scope inserted through urethra. Systematic examination w 70 degree lens.   PVR: 25 cc   MUCOSA:  Normal without lesion   ORIFICES: Normal location and morphology   Scope withdrawn without untoward effect.    (Pt tolerated procedure without difficulty).                5/1/24  UA: negative  UC: negative      Results for orders placed or performed in visit on 05/14/24   Urine Macroscopic with reflex to Microscopic     Status: Abnormal   Result Value Ref Range    Color Urine Yellow Colorless, Straw, Light Yellow, Yellow    Appearance Urine Slightly Cloudy (A) Clear    Glucose Urine Negative Negative mg/dL    Bilirubin Urine Negative Negative    Ketones Urine Negative Negative mg/dL    Specific Gravity Urine 1.019 1.003 - 1.035    Blood Urine Large (A) Negative    pH Urine 6.0 5.0 - 7.0    Protein Albumin Urine 30 (A) Negative mg/dL    Urobilinogen Urine Normal Normal, 2.0 mg/dL    Nitrite Urine Negative Negative    Leukocyte Esterase Urine Negative Negative    RBC Urine >182 (H) <=2 /HPF    WBC Urine 0 <=5 /HPF    Squamous Epithelials Urine 2 (H) <=1 /HPF    Mucus Urine Present (A) None Seen /LPF         IMP:  1. Pelvic pain, resolved; no evidence of active  pathology      PLAN:  1. Discussed situation with patient in detail.  2. Stressed importance of UC prior to treating with emperic abx for presumed UTI  3. Discussed hydration  4. RTC prn recurrent  sx's  5. Total time spent in reviewing patient records, discussing history, performing exam, discussing diagnosis, outlining treatment plan and documentation: 60 minutes

## 2024-12-12 ENCOUNTER — TELEPHONE (OUTPATIENT)
Dept: FAMILY MEDICINE | Facility: CLINIC | Age: 49
End: 2024-12-12
Payer: COMMERCIAL

## 2024-12-12 DIAGNOSIS — Z71.84 TRAVEL ADVICE ENCOUNTER: Primary | ICD-10-CM

## 2024-12-12 NOTE — TELEPHONE ENCOUNTER
General Call    Contacts       Contact Date/Time Type Contact Phone/Fax    12/12/2024 07:47 AM CST Phone (Incoming) Aide Charles (Self) [phone] (M)          Reason for Call:  patient going on a mission trip to [facility]. They are recommending she get a typhoid vaccine. Either the pill or the shot. She is wondering with her history if she should get it. Or if she should just chance it.        Date of last appointment with provider: 4/26/24    Could we send this information to you in [place] or would you prefer to receive a phone call?:   Patient would prefer a phone call   Okay to leave a detailed message?: Yes at Cell number on file:    Telephone Information:   Mobile [phone]

## 2024-12-12 NOTE — TELEPHONE ENCOUNTER
I would recommend the pill form so she can stop if having any difficulties. Will she be going through a travel clinic or does she want me to send this?    Suzi Barnett PA-C

## 2024-12-13 NOTE — TELEPHONE ENCOUNTER
Patient called back and she would like if Suzi Barnett could send over the 3 pill Rx for the Typhoid vaccine and also it was suggested that she bring a Z-nanette with her during the mission trip.  Patient has asked for the Rx sent to [facility] in [place].

## 2024-12-15 RX ORDER — AZITHROMYCIN 250 MG/1
TABLET, FILM COATED ORAL
Qty: 6 TABLET | Refills: 0 | Status: SHIPPED | OUTPATIENT
Start: 2024-12-15 | End: 2024-12-20

## 2025-01-02 ENCOUNTER — PATIENT OUTREACH (OUTPATIENT)
Dept: CARE COORDINATION | Facility: CLINIC | Age: 50
End: 2025-01-02
Payer: COMMERCIAL

## 2025-01-06 ENCOUNTER — HOSPITAL ENCOUNTER (EMERGENCY)
Facility: CLINIC | Age: 50
Discharge: HOME | End: 2025-01-06
Attending: PHYSICIAN ASSISTANT | Admitting: PHYSICIAN ASSISTANT
Payer: COMMERCIAL

## 2025-01-06 ENCOUNTER — NURSE TRIAGE (OUTPATIENT)
Dept: FAMILY MEDICINE | Facility: CLINIC | Age: 50
End: 2025-01-06

## 2025-01-06 VITALS
DIASTOLIC BLOOD PRESSURE: 76 MMHG | TEMPERATURE: 98.2 F | OXYGEN SATURATION: 99 % | WEIGHT: 157 LBS | BODY MASS INDEX: 25.23 KG/M2 | HEIGHT: 66 IN | HEART RATE: 56 BPM | RESPIRATION RATE: 17 BRPM | SYSTOLIC BLOOD PRESSURE: 125 MMHG

## 2025-01-06 DIAGNOSIS — R07.89 CHEST WALL PAIN: ICD-10-CM

## 2025-01-06 LAB
ANION GAP SERPL CALCULATED.3IONS-SCNC: 10 MMOL/L (ref 7–15)
ATRIAL RATE - MUSE: 60 BPM
BASOPHILS # BLD AUTO: 0 10E3/UL (ref 0–0.2)
BASOPHILS NFR BLD AUTO: 1 %
BUN SERPL-MCNC: 12.1 MG/DL (ref 6–20)
CALCIUM SERPL-MCNC: 9 MG/DL (ref 8.8–10.4)
CHLORIDE SERPL-SCNC: 106 MMOL/L (ref 98–107)
CREAT SERPL-MCNC: 0.92 MG/DL (ref 0.51–0.95)
D DIMER PPP FEU-MCNC: 0.44 UG/ML FEU (ref 0–0.5)
DIASTOLIC BLOOD PRESSURE - MUSE: NORMAL MMHG
EGFRCR SERPLBLD CKD-EPI 2021: 77 ML/MIN/1.73M2
EOSINOPHIL # BLD AUTO: 0.1 10E3/UL (ref 0–0.7)
EOSINOPHIL NFR BLD AUTO: 2 %
ERYTHROCYTE [DISTWIDTH] IN BLOOD BY AUTOMATED COUNT: 12.7 % (ref 10–15)
GLUCOSE SERPL-MCNC: 94 MG/DL (ref 70–99)
HCO3 SERPL-SCNC: 22 MMOL/L (ref 22–29)
HCT VFR BLD AUTO: 34.4 % (ref 35–47)
HGB BLD-MCNC: 11.7 G/DL (ref 11.7–15.7)
IMM GRANULOCYTES # BLD: 0 10E3/UL
IMM GRANULOCYTES NFR BLD: 0 %
INTERPRETATION ECG - MUSE: NORMAL
LYMPHOCYTES # BLD AUTO: 1.3 10E3/UL (ref 0.8–5.3)
LYMPHOCYTES NFR BLD AUTO: 25 %
MCH RBC QN AUTO: 28.5 PG (ref 26.5–33)
MCHC RBC AUTO-ENTMCNC: 34 G/DL (ref 31.5–36.5)
MCV RBC AUTO: 84 FL (ref 78–100)
MONOCYTES # BLD AUTO: 0.2 10E3/UL (ref 0–1.3)
MONOCYTES NFR BLD AUTO: 5 %
NEUTROPHILS # BLD AUTO: 3.3 10E3/UL (ref 1.6–8.3)
NEUTROPHILS NFR BLD AUTO: 67 %
NRBC # BLD AUTO: 0 10E3/UL
NRBC BLD AUTO-RTO: 0 /100
P AXIS - MUSE: 71 DEGREES
PLATELET # BLD AUTO: 227 10E3/UL (ref 150–450)
POTASSIUM SERPL-SCNC: 4.1 MMOL/L (ref 3.4–5.3)
PR INTERVAL - MUSE: 138 MS
QRS DURATION - MUSE: 72 MS
QT - MUSE: 442 MS
QTC - MUSE: 442 MS
R AXIS - MUSE: -44 DEGREES
RBC # BLD AUTO: 4.1 10E6/UL (ref 3.8–5.2)
SODIUM SERPL-SCNC: 138 MMOL/L (ref 135–145)
SYSTOLIC BLOOD PRESSURE - MUSE: NORMAL MMHG
T AXIS - MUSE: 48 DEGREES
TROPONIN T SERPL HS-MCNC: <6 NG/L
VENTRICULAR RATE- MUSE: 60 BPM
WBC # BLD AUTO: 4.9 10E3/UL (ref 4–11)

## 2025-01-06 PROCEDURE — 93005 ELECTROCARDIOGRAM TRACING: CPT | Performed by: PHYSICIAN ASSISTANT

## 2025-01-06 PROCEDURE — 84484 ASSAY OF TROPONIN QUANT: CPT | Performed by: PHYSICIAN ASSISTANT

## 2025-01-06 PROCEDURE — 82374 ASSAY BLOOD CARBON DIOXIDE: CPT | Performed by: PHYSICIAN ASSISTANT

## 2025-01-06 PROCEDURE — 85018 HEMOGLOBIN: CPT | Performed by: PHYSICIAN ASSISTANT

## 2025-01-06 PROCEDURE — 93010 ELECTROCARDIOGRAM REPORT: CPT | Performed by: PHYSICIAN ASSISTANT

## 2025-01-06 PROCEDURE — 99284 EMERGENCY DEPT VISIT MOD MDM: CPT | Performed by: PHYSICIAN ASSISTANT

## 2025-01-06 PROCEDURE — 99284 EMERGENCY DEPT VISIT MOD MDM: CPT | Mod: 25 | Performed by: PHYSICIAN ASSISTANT

## 2025-01-06 PROCEDURE — 82435 ASSAY OF BLOOD CHLORIDE: CPT | Performed by: PHYSICIAN ASSISTANT

## 2025-01-06 PROCEDURE — 80048 BASIC METABOLIC PNL TOTAL CA: CPT | Performed by: PHYSICIAN ASSISTANT

## 2025-01-06 PROCEDURE — 85004 AUTOMATED DIFF WBC COUNT: CPT | Performed by: PHYSICIAN ASSISTANT

## 2025-01-06 PROCEDURE — 85379 FIBRIN DEGRADATION QUANT: CPT | Performed by: PHYSICIAN ASSISTANT

## 2025-01-06 PROCEDURE — 36415 COLL VENOUS BLD VENIPUNCTURE: CPT | Performed by: PHYSICIAN ASSISTANT

## 2025-01-06 ASSESSMENT — COLUMBIA-SUICIDE SEVERITY RATING SCALE - C-SSRS
1. IN THE PAST MONTH, HAVE YOU WISHED YOU WERE DEAD OR WISHED YOU COULD GO TO SLEEP AND NOT WAKE UP?: NO
6. HAVE YOU EVER DONE ANYTHING, STARTED TO DO ANYTHING, OR PREPARED TO DO ANYTHING TO END YOUR LIFE?: NO
2. HAVE YOU ACTUALLY HAD ANY THOUGHTS OF KILLING YOURSELF IN THE PAST MONTH?: NO

## 2025-01-06 ASSESSMENT — ACTIVITIES OF DAILY LIVING (ADL)
ADLS_ACUITY_SCORE: 41
ADLS_ACUITY_SCORE: 41

## 2025-01-06 NOTE — DISCHARGE INSTRUCTIONS
Your workup today was very reassuring, no signs of the pain being related to your heart or a blood clot in your lungs.  I think it is likely musculoskeletal since you are having tenderness to the chest wall.  I encourage you to try ibuprofen 600 mg 3-4 times daily for this pain.  Monitor symptoms over the next couple days and if no improvement follow-up with your clinic provider.  If you have any new or worsening concerns please return to the emergency department.    Thank you for choosing [facility] Emergency Department. It was a pleasure taking care of you today. If you have any questions, please call [phone].    Azalea Vargas, [email or web address]

## 2025-01-06 NOTE — ED PROVIDER NOTES
History     Chief Complaint   Patient presents with    Chest Pain    Numbness       HPI  Aide Charles is a 49 year old female who presents to the emergency department complaining of chest pain.  Patient reports 2 days ago she started having some shortness of breath.  Yesterday for about 45 minutes she had some lower central left-sided sharp pain in her chest.  It went away but then would come and go on its own intermittently.  She took some ibuprofen yesterday for it.  Today it has been more persistent in the same spot and it feels like a knuckle digging into her chest.  She has also had some left arm numbness with it today so she called the nurse line who advised to come her for evaluation.  She has not had any nausea, vomiting, or dizziness.  She took Tylenol this morning for the pain which made her sleepy but did not seem to make a difference otherwise.  She has not had a cough or fever.  No leg swelling.  History of PE related to birth control pills.  Reports this kind of feels similar.  She also drove to [place] a week ago.  She is currently on day 3 of the oral typhoid vaccine for an upcoming trip to [facility].        Allergies:  Allergies   Allergen Reactions    Erythromycin Nausea and Vomiting    Seasonal Allergies      Molds, grass, multiple trees, plant pollen, cats, rabbits, dogs, hay.       Problem List:    Patient Active Problem List    Diagnosis Date Noted    Other acute pulmonary embolism without acute cor pulmonale (H) 04/14/2021     Priority: Medium    Recurrent major depression in partial remission (H) 10/16/2018     Priority: Medium    Cholesteatoma of left middle ear 02/20/2018     Priority: Medium     Formatting of this note might be different from the original.  Added automatically from request for surgery [id number]      Conductive hearing loss of left ear with unrestricted hearing of right ear 02/20/2018     Priority: Medium     Formatting of this note might be different from the original.  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  Added automatically from request for surgery [id number]      Genital warts 10/27/2017     Priority: Medium    Panic disorder with agoraphobia 08/28/2015     Priority: Medium    PTSD (post-traumatic stress disorder) 08/28/2015     Priority: Medium    Hyperlipidemia LDL goal <160 11/13/2014     Priority: Medium    Fracture of great toe 11/11/2013     Priority: Medium    Transient global amnesia 05/19/2013     Priority: Medium    Generalized anxiety disorder 11/06/2012     Priority: Medium     Diagnosis updated by automated process. Provider to review and confirm.      Seasonal allergic rhinitis 04/26/2012     Priority: Medium        Past Medical History:    Past Medical History:   Diagnosis Date    Generalized anxiety disorder     MDD (major depressive disorder), recurrent, severe, with psychosis (H)     Pulmonary embolism (H) 11/15/2018       Past Surgical History:    Past Surgical History:   Procedure Laterality Date    COLONOSCOPY N/A 4/29/2024    Procedure: Colonoscopy;  Surgeon: Sheng George MD;  Location:  GI    ESOPHAGOSCOPY, GASTROSCOPY, DUODENOSCOPY (EGD), COMBINED N/A 05/15/2017    Procedure: COMBINED ESOPHAGOSCOPY, GASTROSCOPY, DUODENOSCOPY (EGD), BIOPSY SINGLE OR MULTIPLE;  ESOPHAGOSCOPY, GASTROSCOPY, DUODENOSCOPY (EGD) with biopsies by biopsy;  Surgeon: Robles Falk MD;  Location:  GI    LAPAROSCOPIC SALPINGECTOMY Bilateral 03/13/2019    Procedure: LAPAROSCOPIC BILATERAL SALPINGECTOMY;  Surgeon: Joseluis Wing MD;  Location:  OR    MYRINGOTOMY, INSERT TUBE, COMBINED Left 05/16/2018     "cholesteatoma and surgical repair       Family History:    Family History   Problem Relation Age of Onset    Hypertension Mother     Hyperlipidemia Mother     Mental Illness Mother     Cancer - colorectal Father     Substance Abuse Father         alcoholic recovering    Other - See Comments Sister         2 yrs older    Cerebrovascular Disease Maternal Grandmother     Coronary Artery Disease Maternal Grandfather     Cerebrovascular Disease Maternal Grandfather     Diabetes Paternal Grandmother     Heart Disease Paternal Grandfather         MI    Other - See Comments Daughter         9/14/2000    Other - See Comments Son         8/16/2004    Bipolar Disorder Maternal Aunt     Schizophrenia Maternal Aunt     Multiple Sclerosis Maternal Aunt 45       Social History:  Marital Status:   [2]  Social History     Tobacco Use    Smoking status: Never    Smokeless tobacco: Never   Vaping Use    Vaping status: Never Used   Substance Use Topics    Alcohol use: Yes     Alcohol/week: 0.0 standard drinks of alcohol     Comment: rare    Drug use: No        Medications:    Ascorbic Acid (VITAMIN C) 500 MG CHEW  atorvastatin (LIPITOR) 10 MG tablet  cholecalciferol (VITAMIN D3) 5000 UNITS CAPS capsule  fluticasone (FLONASE) 50 MCG/ACT nasal spray  loratadine (CLARITIN) 10 MG tablet  Multiple Vitamin (MULTIVITAMIN ADULT PO)  phenazopyridine (PYRIDIUM) 200 MG tablet          Review of Systems   All other systems reviewed and are negative.          Physical Exam   BP: (!) 138/100  Pulse: 72  Temp: 98.2  F (36.8  C)  Resp: 18  Height: 167.6 cm (5' 6\")  Weight: 71.2 kg (157 lb)  SpO2: 99 %      Physical Exam  Vitals and nursing note reviewed.   Constitutional:       General: She is not in acute distress.     Appearance: Normal appearance. She is well-developed. She is not ill-appearing, toxic-appearing or diaphoretic.   HENT:      Head: Normocephalic and atraumatic.      Nose: Nose normal.      Mouth/Throat:      Mouth: Mucous " membranes are moist.   Eyes:      Conjunctiva/sclera: Conjunctivae normal.      Pupils: Pupils are equal, round, and reactive to light.   Cardiovascular:      Rate and Rhythm: Normal rate and regular rhythm.      Heart sounds: Normal heart sounds.   Pulmonary:      Effort: Pulmonary effort is normal. No respiratory distress.      Breath sounds: Normal breath sounds.   Chest:      Chest wall: Tenderness (left lower sternal border) present.   Abdominal:      General: Bowel sounds are normal. There is no distension.      Palpations: Abdomen is soft.      Tenderness: There is no abdominal tenderness.   Musculoskeletal:         General: No deformity.      Cervical back: Neck supple.      Right lower leg: No tenderness. No edema.      Left lower leg: No tenderness. No edema.   Skin:     General: Skin is warm and dry.   Neurological:      General: No focal deficit present.      Mental Status: She is alert and oriented to person, place, and time.      Coordination: Coordination normal.   Psychiatric:         Mood and Affect: Mood normal.             ED Course        Procedures              EKG Interpretation:      Interpreted by Chantel Artis PA-C  Time reviewed: 1055  Symptoms at time of EKG: chest pain   Rhythm: normal sinus   Rate: Normal  Axis: Left Axis Deviation  Ectopy: none  Conduction: normal  ST Segments/ T Waves: No acute ischemic changes  Q Waves: none  Comparison to prior: Unchanged    Clinical Impression: no acute changes           Results for orders placed or performed during the hospital encounter of 01/06/25 (from the past 24 hours)   EKG 12 lead   Result Value Ref Range    Systolic Blood Pressure  mmHg    Diastolic Blood Pressure  mmHg    Ventricular Rate 60 BPM    Atrial Rate 60 BPM    AR Interval 138 ms    QRS Duration 72 ms     ms    QTc 442 ms    P Axis 71 degrees    R AXIS -44 degrees    T Axis 48 degrees    Interpretation ECG       Sinus rhythm  Left axis deviation  Cannot rule out  Anterior infarct , age undetermined  Abnormal ECG  No previous ECGs available  Confirmed by SEE ED PROVIDER NOTE FOR, ECG INTERPRETATION ([id number]),  Leeann Coronado ([id number]) on 1/6/2025 10:55:35 AM     CBC with platelets differential    Narrative    The following orders were created for panel order CBC with platelets differential.  Procedure                               Abnormality         Status                     ---------                               -----------         ------                     CBC with platelets and d...[[id number]]  Abnormal            Final result                 Please view results for these tests on the individual orders.   Basic metabolic panel   Result Value Ref Range    Sodium 138 135 - 145 mmol/L    Potassium 4.1 3.4 - 5.3 mmol/L    Chloride 106 98 - 107 mmol/L    Carbon Dioxide (CO2) 22 22 - 29 mmol/L    Anion Gap 10 7 - 15 mmol/L    Urea Nitrogen 12.1 6.0 - 20.0 mg/dL    Creatinine 0.92 0.51 - 0.95 mg/dL    GFR Estimate 77 >60 mL/min/1.73m2    Calcium 9.0 8.8 - 10.4 mg/dL    Glucose 94 70 - 99 mg/dL   Troponin T, High Sensitivity   Result Value Ref Range    Troponin T, High Sensitivity <6 <=14 ng/L   D dimer quantitative   Result Value Ref Range    D-Dimer Quantitative 0.44 0.00 - 0.50 ug/mL FEU    Narrative    This D-dimer assay is intended for use in conjunction with a clinical pretest probability assessment model to exclude pulmonary embolism (PE) and deep venous thrombosis (DVT) in outpatients suspected of PE or DVT. The cut-off value is 0.50 ug/mL FEU.   CBC with platelets and differential   Result Value Ref Range    WBC Count 4.9 4.0 - 11.0 10e3/uL    RBC Count 4.10 3.80 - 5.20 10e6/uL    Hemoglobin 11.7 11.7 - 15.7 g/dL    Hematocrit 34.4 (L) 35.0 - 47.0 %    MCV 84 78 - 100 fL    MCH 28.5 26.5 - 33.0 pg    MCHC 34.0 31.5 - 36.5 g/dL    RDW 12.7 10.0 - 15.0 %    Platelet Count 227 150 - 450 10e3/uL    % Neutrophils 67 %    % Lymphocytes 25 %    % Monocytes 5 %    %  Eosinophils 2 %    % Basophils 1 %    % Immature Granulocytes 0 %    NRBCs per 100 WBC 0 <1 /100    Absolute Neutrophils 3.3 1.6 - 8.3 10e3/uL    Absolute Lymphocytes 1.3 0.8 - 5.3 10e3/uL    Absolute Monocytes 0.2 0.0 - 1.3 10e3/uL    Absolute Eosinophils 0.1 0.0 - 0.7 10e3/uL    Absolute Basophils 0.0 0.0 - 0.2 10e3/uL    Absolute Immature Granulocytes 0.0 <=0.4 10e3/uL    Absolute NRBCs 0.0 10e3/uL       Medications - No data to display    Assessments & Plan (with Medical Decision Making)  Aide Charles is a 49 year old female who presented to the ED complaining of chest pain with shortness of breath.  Symptoms started in the last couple days and have grown more persistent today.  She does have a history of a PE 5 years ago, not currently on anticoagulation.  She also notes her left arm feels numb today.  On arrival to the ED, she was hypertensive but otherwise had normal vital signs.  Heart rate in the 70s, oxygenation 99% on room air.  Blood pressure normalized during course of ED stay.  Her exam today was reassuring, cardiopulmonary findings unremarkable.  She did have tenderness to the left lower sternal border reproducing her pain suggestive of more of an a musculoskeletal etiology.  Given her history of PE, labs obtained along with EKG.  EKG today showed no signs of ischemia or dysrhythmias.  Her troponin was undetectable today as well.  D-dimer was also within normal limits.  CBC and BMP unremarkable.  I went over these results with the patient and she was generally reassured.  Discussed with her that pain is unlikely to be cardiac in nature and unlikely to be related to pulmonary embolus given reassuring lab findings today.  Discussed that it is most likely musculoskeletal given her pain on exam and symptomology.  She was comfortable with plan to treat with over-the-counter NSAIDs for pain relief and monitoring at home.  If symptoms are not improving within a few days she was advised to follow-up with her  clinic provider.  She was given instructions on when to return to the ED.  All questions answered and patient discharged home in stable condition.     I have reviewed the nursing notes.    I have reviewed the findings, diagnosis, plan and need for follow up with the patient.      Discharge Medication List as of 1/6/2025 12:50 PM          Final diagnoses:   Chest wall pain     Note: Chart documentation done in part with Dragon Voice Recognition software. Although reviewed after completion, some word and grammatical errors may remain.    1/6/2025   [facility] EMERGENCY DEPT       Chantel Artis PA-C  01/06/25 [id number]

## 2025-01-06 NOTE — TELEPHONE ENCOUNTER
S-(situation): Chest pain, SOB, left arm tingling.     B-(background): Has hx of PE about 7-8 years ago from birth control. Has taken 3 out of the 4 typhoid vaccine capsules.     A-(assessment): Patient states she took her 3rd dose of the typhoid pill on Saturday. Later on Saturday she states she became SOB for about 30 minutes where she couldn't catch her breath. She then states she had sharp pain in her chest for about 30-40 minutes yesterday and has experienced this intermittently since. Patient states she is having left arm tingling that also started yesterday and also intermittently comes and goes. Denies active SOB or chest pain while on the phone with this RN but is experiencing the left arm tingling.     R-(recommendations): Per RN protocol, patient should be seen in ED now. Patient agreeable and will call  to drive her. No further questions or concerns at this time.      Reason for Disposition   History of prior 'blood clot' in leg or lungs (i.e., deep vein thrombosis, pulmonary embolism)    Protocols used: Chest Pain-A-[place]

## 2025-01-06 NOTE — ED TRIAGE NOTES
Patient reports chest pain and left arm numbness since Saturday that comes and goes. Does say she has a hx of blood clot about 5 years ago. Also notes a long drive to and from [place] last Monday.

## 2025-01-07 NOTE — ADDENDUM NOTE
Chart review completed and communication with PCP regarding patient participation within CCM program.  Patient chronic condition is active and extensive. Patient has been given in person and telephonic education regarding personal healthcare needs and treatments.  Patient has continued to disregard appropriate education and healthcare advice. Open collaboration with PCP and Home Health have been in place and ongoing. Patient is not returning calls or messages. [facility] has made PCP aware that patient remains noncompliant with assistance.  PCP is agreeable that patient participation within CCM program should be dropped at this time.     Encounter addended by: Sara Rodriguez, PT on: 11/7/2018  1:59 PM<BR>     Actions taken: Episode resolved, Sign clinical note

## 2025-04-29 ENCOUNTER — OFFICE VISIT (OUTPATIENT)
Dept: FAMILY MEDICINE | Facility: CLINIC | Age: 50
End: 2025-04-29
Payer: COMMERCIAL

## 2025-04-29 VITALS
SYSTOLIC BLOOD PRESSURE: 129 MMHG | HEIGHT: 66 IN | DIASTOLIC BLOOD PRESSURE: 81 MMHG | BODY MASS INDEX: 23.08 KG/M2 | OXYGEN SATURATION: 98 % | WEIGHT: 143.6 LBS | RESPIRATION RATE: 17 BRPM | TEMPERATURE: 97.9 F | HEART RATE: 73 BPM

## 2025-04-29 DIAGNOSIS — Z12.31 ENCOUNTER FOR SCREENING MAMMOGRAM FOR BREAST CANCER: ICD-10-CM

## 2025-04-29 DIAGNOSIS — Z00.00 ROUTINE GENERAL MEDICAL EXAMINATION AT A HEALTH CARE FACILITY: Primary | ICD-10-CM

## 2025-04-29 DIAGNOSIS — Z01.84 ANTIBODY RESPONSE EXAMINATION: ICD-10-CM

## 2025-04-29 DIAGNOSIS — E78.5 HYPERLIPIDEMIA LDL GOAL <160: ICD-10-CM

## 2025-04-29 PROBLEM — H73.892 RETRACTION OF TYMPANIC MEMBRANE OF LEFT EAR: Status: RESOLVED | Noted: 2018-02-20 | Resolved: 2025-04-29

## 2025-04-29 LAB
CHOLEST SERPL-MCNC: 218 MG/DL
FASTING STATUS PATIENT QL REPORTED: YES
HBV SURFACE AB SERPL IA-ACNC: >1000 M[IU]/ML
HBV SURFACE AB SERPL IA-ACNC: REACTIVE M[IU]/ML
HDLC SERPL-MCNC: 47 MG/DL
LDLC SERPL CALC-MCNC: 135 MG/DL
NONHDLC SERPL-MCNC: 171 MG/DL
TRIGL SERPL-MCNC: 181 MG/DL

## 2025-04-29 PROCEDURE — 3079F DIAST BP 80-89 MM HG: CPT | Performed by: PHYSICIAN ASSISTANT

## 2025-04-29 PROCEDURE — 36415 COLL VENOUS BLD VENIPUNCTURE: CPT | Performed by: PHYSICIAN ASSISTANT

## 2025-04-29 PROCEDURE — 86706 HEP B SURFACE ANTIBODY: CPT | Performed by: PHYSICIAN ASSISTANT

## 2025-04-29 PROCEDURE — 99396 PREV VISIT EST AGE 40-64: CPT | Performed by: PHYSICIAN ASSISTANT

## 2025-04-29 PROCEDURE — 1126F AMNT PAIN NOTED NONE PRSNT: CPT | Performed by: PHYSICIAN ASSISTANT

## 2025-04-29 PROCEDURE — 3074F SYST BP LT 130 MM HG: CPT | Performed by: PHYSICIAN ASSISTANT

## 2025-04-29 PROCEDURE — 80061 LIPID PANEL: CPT | Performed by: PHYSICIAN ASSISTANT

## 2025-04-29 RX ORDER — ATORVASTATIN CALCIUM 10 MG/1
10 TABLET, FILM COATED ORAL DAILY
Qty: 90 TABLET | Refills: 4 | Status: SHIPPED | OUTPATIENT
Start: 2025-04-29

## 2025-04-29 SDOH — HEALTH STABILITY: PHYSICAL HEALTH: ON AVERAGE, HOW MANY DAYS PER WEEK DO YOU ENGAGE IN MODERATE TO STRENUOUS EXERCISE (LIKE A BRISK WALK)?: 3 DAYS

## 2025-04-29 SDOH — HEALTH STABILITY: PHYSICAL HEALTH: ON AVERAGE, HOW MANY MINUTES DO YOU ENGAGE IN EXERCISE AT THIS LEVEL?: 30 MIN

## 2025-04-29 ASSESSMENT — PATIENT HEALTH QUESTIONNAIRE - PHQ9
SUM OF ALL RESPONSES TO PHQ QUESTIONS 1-9: 0
10. IF YOU CHECKED OFF ANY PROBLEMS, HOW DIFFICULT HAVE THESE PROBLEMS MADE IT FOR YOU TO DO YOUR WORK, TAKE CARE OF THINGS AT HOME, OR GET ALONG WITH OTHER PEOPLE: NOT DIFFICULT AT ALL
SUM OF ALL RESPONSES TO PHQ QUESTIONS 1-9: 0

## 2025-04-29 ASSESSMENT — SOCIAL DETERMINANTS OF HEALTH (SDOH): HOW OFTEN DO YOU GET TOGETHER WITH FRIENDS OR RELATIVES?: TWICE A WEEK

## 2025-04-29 ASSESSMENT — PAIN SCALES - GENERAL: PAINLEVEL_OUTOF10: NO PAIN (0)

## 2025-04-29 NOTE — PATIENT INSTRUCTIONS
Patient Education   Preventive Care Advice   This is general advice given by our system to help you stay healthy. However, your care team may have specific advice just for you. Please talk to your care team about your preventive care needs.  Nutrition  Eat 5 or more servings of fruits and vegetables each day.  Try wheat bread, brown rice and whole grain pasta (instead of white bread, rice, and pasta).  Get enough calcium and vitamin D. Check the label on foods and aim for 100% of the RDA (recommended daily allowance).  Lifestyle  Exercise at least 150 minutes each week  (30 minutes a day, 5 days a week).  Do muscle strengthening activities 2 days a week. These help control your weight and prevent disease.  No smoking.  Wear sunscreen to prevent skin cancer.  Have a dental exam and cleaning every 6 months.  Yearly exams  See your health care team every year to talk about:  Any changes in your health.  Any medicines your care team has prescribed.  Preventive care, family planning, and ways to prevent chronic diseases.  Shots (vaccines)   HPV shots (up to age 26), if you've never had them before.  Hepatitis B shots (up to age 59), if you've never had them before.  COVID-19 shot: Get this shot when it's due.  Flu shot: Get a flu shot every year.  Tetanus shot: Get a tetanus shot every 10 years.  Pneumococcal, hepatitis A, and RSV shots: Ask your care team if you need these based on your risk.  Shingles shot (for age 50 and up)  General health tests  Diabetes screening:  Starting at age 35, Get screened for diabetes at least every 3 years.  If you are younger than age 35, ask your care team if you should be screened for diabetes.  Cholesterol test: At age 39, start having a cholesterol test every 5 years, or more often if advised.  Bone density scan (DEXA): At age 50, ask your care team if you should have this scan for osteoporosis (brittle bones).  Hepatitis C: Get tested at least once in your life.  STIs (sexually  transmitted infections)  Before age 24: Ask your care team if you should be screened for STIs.  After age 24: Get screened for STIs if you're at risk. You are at risk for STIs (including HIV) if:  You are sexually active with more than one person.  You don't use condoms every time.  You or a partner was diagnosed with a sexually transmitted infection.  If you are at risk for HIV, ask about PrEP medicine to prevent HIV.  Get tested for HIV at least once in your life, whether you are at risk for HIV or not.  Cancer screening tests  Cervical cancer screening: If you have a cervix, begin getting regular cervical cancer screening tests starting at age 21.  Breast cancer scan (mammogram): If you've ever had breasts, begin having regular mammograms starting at age 40. This is a scan to check for breast cancer.  Colon cancer screening: It is important to start screening for colon cancer at age 45.  Have a colonoscopy test every 10 years (or more often if you're at risk) Or, ask your provider about stool tests like a FIT test every year or Cologuard test every 3 years.  To learn more about your testing options, visit:   .  For help making a decision, visit:   https://bit.ly/rd57670.  Prostate cancer screening test: If you have a prostate, ask your care team if a prostate cancer screening test (PSA) at age 55 is right for you.  Lung cancer screening: If you are a current or former smoker ages 50 to 80, ask your care team if ongoing lung cancer screenings are right for you.  For informational purposes only. Not to replace the advice of your health care provider. Copyright   2023 [employer]. All rights reserved. Clinically reviewed by the [facility] Transitions Program. [employer] 373257 - REV 01/24.

## 2025-04-29 NOTE — PROGRESS NOTES
Preventive Care Visit  [facility]  Suzi Barnett PA-C, Family Medicine  Apr 29, 2025          Irasema Noble is a 49 year old, presenting for the following:  Physical        4/29/2025     7:23 AM   Additional Questions   Roomed by Padma         4/29/2025   Forms   Any forms needing to be completed Yes    No       Multiple values from one day are sorted in reverse-chronological order          HPI     Overall doing great. 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 Enjoys the challenge. Had a great mission trip to [facility]. 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     Advance Care Planning    Discussed advance care planning with patient; however, patient declined at this time.        4/29/2025   General Health   How would you rate your overall physical health? Good   Feel stress (tense, anxious, or unable to sleep) Not at all         4/29/2025   Nutrition   Three or more servings of calcium each day? Yes   Diet: Regular (no restrictions)   How many servings of fruit and vegetables per day? (!) 2-3   How many sweetened beverages each day? 0-1         4/29/2025   Exercise   Days per week of moderate/strenous exercise 3 days   Average minutes spent exercising at this level 30 min         4/29/2025   Social Factors   Frequency of gathering with friends or relatives Twice a week   Worry food won't last until get money to buy more No   Food not last or not have enough money for food? No   Do you have housing? (Housing is defined as stable permanent housing and does not include staying outside in a car, in a tent, in an abandoned building, in an overnight shelter, or couch-surfing.) Yes   Are you worried about losing your housing? No   Lack of transportation? No   Unable to get utilities (heat,electricity)? No         4/29/2025   Dental   Dentist two times every year? Yes       Today's PHQ-9 Score:       4/29/2025     7:17 AM   PHQ-9 SCORE   PHQ-9  Total Score MyChart 0   PHQ-9 Total Score 0        Patient-reported         4/29/2025   Substance Use   Alcohol more than 3/day or more than 7/wk No   Do you use any other substances recreationally? No     Social History     Tobacco Use    Smoking status: Never    Smokeless tobacco: Never   Vaping Use    Vaping status: Never Used   Substance Use Topics    Alcohol use: Yes     Alcohol/week: 0.0 standard drinks of alcohol     Comment: rare    Drug use: No           4/26/2024   LAST FHS-7 RESULTS   1st degree relative breast or ovarian cancer No   Any relative bilateral breast cancer No   Any male have breast cancer No   Any ONE woman have BOTH breast AND ovarian cancer No   Any woman with breast cancer before 50yrs No   2 or more relatives with breast AND/OR ovarian cancer No   2 or more relatives with breast AND/OR bowel cancer No        Mammogram Screening - Mammogram every 1-2 years updated in Health Maintenance based on mutual decision making        4/29/2025   STI Screening   New sexual partner(s) since last STI/HIV test? No     History of abnormal Pap smear: No - age 30- 64 PAP with HPV every 5 years recommended        Latest Ref Rng & Units 4/26/2024     7:09 AM 10/19/2022     7:14 AM 1/5/2018     7:55 AM   PAP / HPV   PAP  Negative for Intraepithelial Lesion or Malignancy (NILM)  Negative for Intraepithelial Lesion or Malignancy (NILM)     HPV 16 DNA Negative Negative  Negative  Negative    HPV 18 DNA Negative Negative  Negative  Negative    Other HR HPV Negative Negative  Negative  Negative      ASCVD Risk   The 10-year ASCVD risk score (Missy CHUN, et al., 2019) is: 1.3%    Values used to calculate the score:      Age: 49 years      Sex: Female      Is Non- : No      Diabetic: No      Tobacco smoker: No      Systolic Blood Pressure: 129 mmHg      Is BP treated: No      HDL Cholesterol: 53 mg/dL      Total Cholesterol: 228 mg/dL        4/29/2025   Contraception/Family Planning  "  Questions about contraception or family planning No        Reviewed and updated as needed this visit by Provider                      Review of Systems  Constitutional, HEENT, cardiovascular, pulmonary, GI, , musculoskeletal, neuro, skin, endocrine and psych systems are negative, except as otherwise noted.     Objective    Exam  /81   Pulse 73   Temp 97.9  F (36.6  C) (Temporal)   Resp 17   Ht 1.688 m (5' 6.46\")   Wt 65.1 kg (143 lb 9.6 oz)   SpO2 98%   BMI 22.86 kg/m     Estimated body mass index is 22.86 kg/m  as calculated from the following:    Height as of this encounter: 1.688 m (5' 6.46\").    Weight as of this encounter: 65.1 kg (143 lb 9.6 oz).    Physical Exam  GENERAL: alert and no distress  EYES: Eyes grossly normal to inspection, PERRL and conjunctivae and sclerae normal  HENT: ear canals and TM's normal, nose and mouth without ulcers or lesions  NECK: no adenopathy, no asymmetry, masses, or scars  RESP: lungs clear to auscultation - no rales, rhonchi or wheezes  CV: regular rate and rhythm, normal S1 S2, no S3 or S4, no murmur, click or rub, no peripheral edema  ABDOMEN: soft, nontender, no hepatosplenomegaly, no masses and bowel sounds normal  MS: no gross musculoskeletal defects noted, no edema  SKIN: no suspicious lesions or rashes  NEURO: Normal strength and tone, mentation intact and speech normal  PSYCH: mentation appears normal, affect normal/bright    Assessment & Plan     Routine general medical examination at a health care facility  Vaccinations reviewed and declined at this time.    Hyperlipidemia LDL goal <160  - Lipid panel reflex to direct LDL Fasting; Future  - atorvastatin (LIPITOR) 10 MG tablet; Take 1 tablet (10 mg) by mouth daily.  - Lipid panel reflex to direct LDL Fasting    Encounter for screening mammogram for breast cancer  - MA Screen Bilateral w/Dominic; Future    Antibody response examination  - Hepatitis B Surface Antibody; Future  - Hepatitis B Surface " Antibody    Patient has been advised of split billing requirements and indicates understanding: Yes    Counseling  Appropriate preventive services were addressed with this patient via screening, questionnaire, or discussion as appropriate for fall prevention, nutrition, physical activity, Tobacco-use cessation, social engagement, weight loss and cognition.  Checklist reviewing preventive services available has been given to the patient.  Reviewed patient's diet, addressing concerns and/or questions.   She is at risk for lack of exercise and has been provided with information to increase physical activity for the benefit of her well-being.       Signed Electronically by: Suzi Barnett PA-C

## 2025-04-30 ENCOUNTER — PATIENT OUTREACH (OUTPATIENT)
Dept: CARE COORDINATION | Facility: CLINIC | Age: 50
End: 2025-04-30
Payer: COMMERCIAL

## 2025-05-27 DIAGNOSIS — E78.5 HYPERLIPIDEMIA LDL GOAL <160: ICD-10-CM

## 2025-05-27 RX ORDER — ATORVASTATIN CALCIUM 10 MG/1
10 TABLET, FILM COATED ORAL
Qty: 90 TABLET | Refills: 3 | OUTPATIENT
Start: 2025-05-27

## 2025-08-14 ENCOUNTER — E-VISIT (OUTPATIENT)
Dept: URGENT CARE | Facility: CLINIC | Age: 50
End: 2025-08-14
Payer: COMMERCIAL

## 2025-08-14 DIAGNOSIS — N89.8 VAGINAL DISCHARGE: Primary | ICD-10-CM

## (undated) DEVICE — BLADE KNIFE SURG 11 371111

## (undated) DEVICE — ENDO TROCAR SLEEVE KII ADV FIXATION 05X100MM CFS02

## (undated) DEVICE — ESU LIGASURE LAPAROSCOPIC BLUNT TIP SEALER 5MMX37CM LF1837

## (undated) DEVICE — SOL WATER IRRIG 1000ML BOTTLE 2F7114

## (undated) DEVICE — TUBING SUCTION 6"X3/16" N56A

## (undated) DEVICE — ESU ENDO SCISSORS 5MM CVD 5DCS

## (undated) DEVICE — GLOVE ESTEEM BLUE W/NEU-THERA 8.0  2D73PB80

## (undated) DEVICE — CATH SELF FEMALE 14FR 6"

## (undated) DEVICE — ENDO SNARE EXACTO COLD 9MM LOOP 2.4MMX230CM 00711115

## (undated) DEVICE — DRAPE POUCH INSTRUMENT 3 POCKET 1018L

## (undated) DEVICE — KIT ENDO TURNOVER/PROCEDURE CARRY-ON 101822

## (undated) DEVICE — SU DERMABOND ADVANCED .7ML DNX12

## (undated) DEVICE — PREP CHLORAPREP 26ML TINTED ORANGE  260815

## (undated) DEVICE — ENDO TROCAR FIRST ENTRY KII FIOS ADV FIX 05X100MM CFF03

## (undated) DEVICE — SU MONOCRYL 4-0 PS-2 18" UND Y496G

## (undated) DEVICE — LUBRICATING JELLY 4.25OZ

## (undated) DEVICE — PACK LITHOTOMY CUSTOM

## (undated) DEVICE — GLOVE ESTEEM POWDER FREE 8.0  2D72PL80

## (undated) RX ORDER — LIDOCAINE HYDROCHLORIDE 20 MG/ML
INJECTION, SOLUTION EPIDURAL; INFILTRATION; INTRACAUDAL; PERINEURAL
Status: DISPENSED
Start: 2019-03-13

## (undated) RX ORDER — FENTANYL CITRATE 50 UG/ML
INJECTION, SOLUTION INTRAMUSCULAR; INTRAVENOUS
Status: DISPENSED
Start: 2017-05-15

## (undated) RX ORDER — LIDOCAINE HYDROCHLORIDE 10 MG/ML
INJECTION, SOLUTION EPIDURAL; INFILTRATION; INTRACAUDAL; PERINEURAL
Status: DISPENSED
Start: 2024-04-29

## (undated) RX ORDER — PROPOFOL 10 MG/ML
INJECTION, EMULSION INTRAVENOUS
Status: DISPENSED
Start: 2024-04-29

## (undated) RX ORDER — ONDANSETRON 2 MG/ML
INJECTION INTRAMUSCULAR; INTRAVENOUS
Status: DISPENSED
Start: 2019-03-13

## (undated) RX ORDER — DIMENHYDRINATE 50 MG/ML
INJECTION, SOLUTION INTRAMUSCULAR; INTRAVENOUS
Status: DISPENSED
Start: 2019-03-13

## (undated) RX ORDER — BUPIVACAINE HYDROCHLORIDE AND EPINEPHRINE 5; 5 MG/ML; UG/ML
INJECTION, SOLUTION EPIDURAL; INTRACAUDAL; PERINEURAL
Status: DISPENSED
Start: 2019-03-13

## (undated) RX ORDER — PROPOFOL 10 MG/ML
INJECTION, EMULSION INTRAVENOUS
Status: DISPENSED
Start: 2019-03-13

## (undated) RX ORDER — DEXAMETHASONE SODIUM PHOSPHATE 10 MG/ML
INJECTION, SOLUTION INTRAMUSCULAR; INTRAVENOUS
Status: DISPENSED
Start: 2019-03-13

## (undated) RX ORDER — FENTANYL CITRATE 50 UG/ML
INJECTION, SOLUTION INTRAMUSCULAR; INTRAVENOUS
Status: DISPENSED
Start: 2019-03-13

## (undated) RX ORDER — HYDROMORPHONE HYDROCHLORIDE 1 MG/ML
INJECTION, SOLUTION INTRAMUSCULAR; INTRAVENOUS; SUBCUTANEOUS
Status: DISPENSED
Start: 2019-03-13

## (undated) RX ORDER — KETOROLAC TROMETHAMINE 30 MG/ML
INJECTION, SOLUTION INTRAMUSCULAR; INTRAVENOUS
Status: DISPENSED
Start: 2019-03-13

## (undated) RX ORDER — PHENYLEPHRINE HCL IN 0.9% NACL 1 MG/10 ML
SYRINGE (ML) INTRAVENOUS
Status: DISPENSED
Start: 2019-03-13